# Patient Record
Sex: FEMALE | Race: WHITE | NOT HISPANIC OR LATINO | Employment: FULL TIME | URBAN - METROPOLITAN AREA
[De-identification: names, ages, dates, MRNs, and addresses within clinical notes are randomized per-mention and may not be internally consistent; named-entity substitution may affect disease eponyms.]

---

## 2017-01-15 ENCOUNTER — MYC REFILL (OUTPATIENT)
Dept: FAMILY MEDICINE | Facility: CLINIC | Age: 47
End: 2017-01-15

## 2017-01-15 DIAGNOSIS — M79.7 FIBROMYALGIA: ICD-10-CM

## 2017-01-16 ENCOUNTER — MYC REFILL (OUTPATIENT)
Dept: FAMILY MEDICINE | Facility: CLINIC | Age: 47
End: 2017-01-16

## 2017-01-16 DIAGNOSIS — E11.9 TYPE 2 DIABETES MELLITUS WITHOUT COMPLICATION (H): Primary | ICD-10-CM

## 2017-01-16 DIAGNOSIS — E11.9 TYPE 2 DIABETES MELLITUS WITHOUT COMPLICATION (H): ICD-10-CM

## 2017-01-16 RX ORDER — CYCLOBENZAPRINE HCL 10 MG
10 TABLET ORAL 3 TIMES DAILY PRN
Qty: 20 TABLET | Refills: 0 | Status: SHIPPED | OUTPATIENT
Start: 2017-01-16 | End: 2017-02-06

## 2017-01-16 NOTE — TELEPHONE ENCOUNTER
Message from [facility]:  Original authorizing provider: Irene Castellanos MD, MD Kaylee Lopez would like a refill of the following medications:  cyclobenzaprine (FLEXERIL) 10 MG tablet [Irene Castellanos MD, MD]    Preferred pharmacy: [facility] [id number] [place] [address] [facility]    Comment:

## 2017-01-16 NOTE — TELEPHONE ENCOUNTER
Flexeril    LRF 12/24/16, dispense 20  LOV 6/22/16    Routing refill request to provider for review/approval because:  Drug not on the [facility] refill protocol - looks like she was to f/u in the fall, did you want to see her for an appt?  She did have some lab work.

## 2017-01-16 NOTE — TELEPHONE ENCOUNTER
I called patient she is unable to make an appt right now will call back when she knows her schedule

## 2017-01-17 RX ORDER — LIRAGLUTIDE 6 MG/ML
1.2 INJECTION SUBCUTANEOUS DAILY
Qty: 6 ML | Refills: 1 | Status: SHIPPED | OUTPATIENT
Start: 2017-01-17 | End: 2017-02-27

## 2017-01-17 NOTE — TELEPHONE ENCOUNTER
Victoza         Last Written Prescription Date: 8/17/2016  Last Fill Quantity: 6nl, # refills: 2  Last Office Visit with [facility], [facility] or [facility] prescribing provider:  6/22/2016   Next 5 appointments (look out 90 days)     Feb 06, 2017  7:45 AM   Return Visit with Frederic Ag MD   [facility] [place] ([facility])    [address]  [facility] [address] [phone]            Feb 27, 2017  8:10 AM   Office Visit with Irene Castellanos MD   [facility] ([facility])    [address] [address] [phone]            Apr 01, 2017  9:45 AM   Screening Mammogram with CRMA1   [facility] ([facility])    [address] [facility] [id number] [phone]                   BP Readings from Last 3 Encounters:   06/22/16 122/80   05/17/16 133/84   05/03/16 142/86     MICROL       13   4/29/2016  No results found for this basename: microalbumin  CREATININE   Date Value Ref Range Status   08/22/2016 0.80 0.52 - 1.04 mg/dL Final   ]  GFR ESTIMATE   Date Value Ref Range Status   08/22/2016 78 >60 mL/min/1.7m2 Final     Comment:     Non  GFR Calc   03/22/2016 78 >60 mL/min/1.7m2 Final   10/30/2015 83 >60 mL/min/1.7m2 Final     Comment:     Non  GFR Calc     GFR ESTIMATE IF BLACK   Date Value Ref Range Status   08/22/2016 >90   GFR Calc   >60 mL/min/1.7m2 Final   03/22/2016 >90 >60 mL/min/1.7m2 Final   10/30/2015 >90   GFR Calc   >60 mL/min/1.7m2 Final     CHOL      112   8/22/2016  HDL       43   8/22/2016  LDL       46   8/22/2016  TRIG      115   8/22/2016  CHOLHDLRATIO      3.0   4/28/2015  AST        9   8/22/2016  ALT       21   8/22/2016  A1C      5.6   8/22/2016  A1C      7.6   4/29/2016  A1C      6.6   10/30/2015  A1C      6.6   4/28/2015  A1C      5.9   10/30/2014  POTASSIUM   Date  Value Ref Range Status   08/22/2016 4.2 3.4 - 5.3 mmol/L Final     Medication is being filled for 1 time refill only due to:  Patient needs to be seen because due for DM follow up. Pt. has OV scheduled for 2/27/2017.     Prescription approved per [facility] Refill Protocol.    Areli Patton RN, BSN, PHN

## 2017-01-17 NOTE — TELEPHONE ENCOUNTER
liraglutide (VICTOZA) 18 MG/3ML soln         Last Written Prescription Date: 8/17/16  Last Fill Quantity: 6ml, # refills: 2  Last Office Visit with [facility], [facility] or [facility] prescribing provider:  6/22/16   Next 5 appointments (look out 90 days)     Feb 06, 2017  7:45 AM   Return Visit with Frederic Ag MD   [facility] [place] ([facility])    [address]  [facility] [place] [address] [phone]            Feb 27, 2017  8:10 AM   Office Visit with Irene Castellanos MD   [facility]    [address] [address] [address] [phone]            Apr 01, 2017  9:45 AM   Screening Mammogram with CRMA1   [facility] ([facility])    [address] [facility] [address] [phone]                   BP Readings from Last 3 Encounters:   06/22/16 122/80   05/17/16 133/84   05/03/16 142/86     MICROL       13   4/29/2016  No results found for this basename: microalbumin  CREATININE   Date Value Ref Range Status   08/22/2016 0.80 0.52 - 1.04 mg/dL Final   ]  GFR ESTIMATE   Date Value Ref Range Status   08/22/2016 78 >60 mL/min/1.7m2 Final     Comment:     Non  GFR Calc   03/22/2016 78 >60 mL/min/1.7m2 Final   10/30/2015 83 >60 mL/min/1.7m2 Final     Comment:     Non  GFR Calc     GFR ESTIMATE IF BLACK   Date Value Ref Range Status   08/22/2016 >90   GFR Calc   >60 mL/min/1.7m2 Final   03/22/2016 >90 >60 mL/min/1.7m2 Final   10/30/2015 >90   GFR Calc   >60 mL/min/1.7m2 Final     CHOL      112   8/22/2016  HDL       43   8/22/2016  LDL       46   8/22/2016  TRIG      115   8/22/2016  CHOLHDLRATIO      3.0   4/28/2015  AST        9   8/22/2016  ALT       21   8/22/2016  A1C      5.6   8/22/2016  A1C      7.6   4/29/2016  A1C      6.6   10/30/2015  A1C      6.6   4/28/2015  A1C      5.9    10/30/2014  POTASSIUM   Date Value Ref Range Status   08/22/2016 4.2 3.4 - 5.3 mmol/L Final

## 2017-01-17 NOTE — TELEPHONE ENCOUNTER
Message from [employer]:  Original authorizing provider: Irene Castellanos MD, MD Kaylee Lopez would like a refill of the following medications:  liraglutide (VICTOZA) 18 MG/3ML soln [Irene Castellanos MD, MD]    Preferred pharmacy: [facility] [address] [facility] [address] [facility]    Comment:

## 2017-01-19 RX ORDER — LIRAGLUTIDE 6 MG/ML
1.2 INJECTION SUBCUTANEOUS DAILY
Qty: 6 ML | Refills: 2 | Status: SHIPPED | OUTPATIENT
Start: 2017-01-19 | End: 2017-06-12

## 2017-01-19 NOTE — TELEPHONE ENCOUNTER
Prescription approved per [facility] [place]ill Protocol.  Patient has appointment with Dr. ramos in February.  Linda Miles, RN  Triage Nurse

## 2017-01-20 DIAGNOSIS — M79.7 FIBROMYALGIA: ICD-10-CM

## 2017-01-20 RX ORDER — TRAZODONE HYDROCHLORIDE 50 MG/1
TABLET, FILM COATED ORAL
Qty: 45 TABLET | Refills: 5 | Status: SHIPPED | OUTPATIENT
Start: 2017-01-20 | End: 2017-07-12

## 2017-01-20 NOTE — TELEPHONE ENCOUNTER
traZODone (DESYREL) 50 MG       Last Written Prescription Date: 7/26/16  Last Fill Quantity: 45; # refills: 5  Last Office Visit with [facility], [facility] or [facility] prescribing provider:  6/22/16   Next 5 appointments (look out 90 days)     Feb 06, 2017  7:45 AM   Return Visit with Frederic Ag MD   [facility] [place] ([facility])    [address] [place] [address]   [phone]            Feb 27, 2017  8:10 AM   Office Visit with Irene Castellanos MD   [facility] ([facility])    [address] [address] [phone]            Apr 01, 2017  9:45 AM   Screening Mammogram with CRMA1   [facility]    [address] [address] [id number] [phone]                   Last PHQ-9 score on record=   PHQ-9 SCORE 12/13/2016   Total Score -   Total Score MyChart 1 (Minimal depression)   Total Score 1       AST        9   8/22/2016  ALT       21   8/22/2016

## 2017-01-20 NOTE — TELEPHONE ENCOUNTER
Prescription approved per [facility] Refill Protocol.  Linda Miles, RN  Triage Nurse

## 2017-01-25 ENCOUNTER — MYC REFILL (OUTPATIENT)
Dept: FAMILY MEDICINE | Facility: CLINIC | Age: 47
End: 2017-01-25

## 2017-01-25 DIAGNOSIS — M79.7 FIBROMYALGIA: ICD-10-CM

## 2017-01-26 RX ORDER — TRAMADOL HYDROCHLORIDE 50 MG/1
50-100 TABLET ORAL EVERY 8 HOURS PRN
Qty: 60 TABLET | Refills: 1 | Status: SHIPPED | OUTPATIENT
Start: 2017-01-26 | End: 2017-06-16

## 2017-01-26 NOTE — TELEPHONE ENCOUNTER
Routing refill request to provider for review/approval because:  Drug not on the [facility] refill protocol please sign if ok.  Linda Miles, ADA  Triage Nurse

## 2017-01-26 NOTE — TELEPHONE ENCOUNTER
Message from [employer]:  Original authorizing provider: Irene Castellanos MD, MD Kaylee Lopez would like a refill of the following medications:  traMADol (ULTRAM) 50 MG tablet [Irene Castellanos MD, MD]    Preferred pharmacy: [facility] [id number] [place] [address] [facility]    Comment:

## 2017-01-27 ENCOUNTER — MYC MEDICAL ADVICE (OUTPATIENT)
Dept: FAMILY MEDICINE | Facility: CLINIC | Age: 47
End: 2017-01-27

## 2017-02-03 ENCOUNTER — HOSPITAL ENCOUNTER (OUTPATIENT)
Dept: CARDIOLOGY | Facility: CLINIC | Age: 47
Discharge: HOME | End: 2017-02-03
Attending: INTERNAL MEDICINE | Admitting: INTERNAL MEDICINE
Payer: COMMERCIAL

## 2017-02-03 DIAGNOSIS — I27.20 PULMONARY HYPERTENSION (H): ICD-10-CM

## 2017-02-03 DIAGNOSIS — E78.5 HYPERLIPIDEMIA LDL GOAL <100: ICD-10-CM

## 2017-02-03 DIAGNOSIS — I07.9 DISEASE OF TRICUSPID VALVE: ICD-10-CM

## 2017-02-03 PROCEDURE — 25500064 ZZH RX 255 OP 636: Performed by: INTERNAL MEDICINE

## 2017-02-03 PROCEDURE — 93306 TTE W/DOPPLER COMPLETE: CPT | Mod: 26 | Performed by: INTERNAL MEDICINE

## 2017-02-03 PROCEDURE — 40000264 ECHO COMPLETE WITH OPTISON

## 2017-02-03 RX ADMIN — HUMAN ALBUMIN MICROSPHERES AND PERFLUTREN 3 ML: 10; .22 INJECTION, SOLUTION INTRAVENOUS at 15:32

## 2017-02-06 ENCOUNTER — MYC REFILL (OUTPATIENT)
Dept: FAMILY MEDICINE | Facility: CLINIC | Age: 47
End: 2017-02-06

## 2017-02-06 ENCOUNTER — OFFICE VISIT (OUTPATIENT)
Dept: CARDIOLOGY | Facility: CLINIC | Age: 47
End: 2017-02-06
Payer: COMMERCIAL

## 2017-02-06 VITALS
HEIGHT: 65 IN | BODY MASS INDEX: 37.99 KG/M2 | SYSTOLIC BLOOD PRESSURE: 110 MMHG | DIASTOLIC BLOOD PRESSURE: 74 MMHG | WEIGHT: 228 LBS | HEART RATE: 84 BPM

## 2017-02-06 DIAGNOSIS — I07.9 DISEASE OF TRICUSPID VALVE: ICD-10-CM

## 2017-02-06 DIAGNOSIS — I27.20 PULMONARY HYPERTENSION (H): Primary | ICD-10-CM

## 2017-02-06 DIAGNOSIS — E78.5 HYPERLIPIDEMIA LDL GOAL <100: ICD-10-CM

## 2017-02-06 DIAGNOSIS — M79.7 FIBROMYALGIA: ICD-10-CM

## 2017-02-06 PROCEDURE — 99214 OFFICE O/P EST MOD 30 MIN: CPT | Performed by: INTERNAL MEDICINE

## 2017-02-06 RX ORDER — CYCLOBENZAPRINE HCL 10 MG
10 TABLET ORAL 3 TIMES DAILY PRN
Qty: 20 TABLET | Refills: 0 | Status: SHIPPED | OUTPATIENT
Start: 2017-02-06 | End: 2017-02-27

## 2017-02-06 NOTE — PROGRESS NOTES
2017             Irene Castellanos MD    [facility]   [address] [place] [address]       RE:    Kaylee Lopez   MRN:  [id number]   :  1970      Dear Dr. Castellanos:      It was my pleasure to see your patient, Kaylee Lopez, in followup.  As you know, this is a 46-year-old patient with a history of pulmonary hypertension and tricuspid regurgitation.  This patient in the past has actually had a right heart cath.  This was on 2011 and her pulmonary pressures were normal at that particular time, but certainly echocardiography we have found raised pulmonary pressures and tricuspid regurgitation.        A few days ago, echocardiography showed that her degree of tricuspid regurgitation was moderate (2+).  Her pulmonary hypertension with mild to moderate at 37.9 mmHg plus right atrial pressure.  Her left ventricular systolic function is normal.  Her left ventricular filling pressures also appeared to be normal as to E to E prime velocity is less than 8.        She feels well.  She has no shortness of breath, orthopnea or PND.  In the past, she has complained of palpitations, but she does not have any palpitations at present unless she drinks caffeine.  Her systemic blood pressure is excellent today is 110/74.  Her lipids are excellent on 10 mg of atorvastatin with an LDL of 46, HDL 43 and triglycerides of 115.  He liver function tests are normal at 21.      Kaylee, as you know, is morbidly obese, based upon a BMI of 38.61.  She is a diabetic, taking metformin and Victoza.      IMPRESSION:   1.  Moderate tricuspid regurgitation.  The degree of tricuspid regurgitation appears to vary with her pulmonary pressures.   2.  Mild to moderate pulmonary hypertension.   3.  Morbid obesity.   4.  Diabetes mellitus.   5.  Excellent lipid profile with the exception of mildly reduced HDL.      PLAN:  We will continue the patient on her good present medications.  I will see her back again in 1 year  and I have encouraged her to continue to lose weight.      It has been a pleasure to be involved in the care of this very nice patient.      Sincerely,         MD PIPO Gastelum MD, [facility]             D: 2017 08:01   T: 2017 11:31   MT: JACK      Name:     VANCE CONNER   MRN:      [id number]        Account:      [id number]   :      1970           Service Date: 2017      Document: [id number]

## 2017-02-06 NOTE — PROGRESS NOTES
HPI and Plan:   See dictation    Orders Placed This Encounter   Procedures     Follow-Up with Cardiologist     Echocardiogram       No orders of the defined types were placed in this encounter.       There are no discontinued medications.      Encounter Diagnoses   Name Primary?     Pulmonary hypertension (H) Yes     Disease of tricuspid valve      Hyperlipidemia LDL goal <100        CURRENT MEDICATIONS:  Current Outpatient Prescriptions   Medication Sig Dispense Refill     traMADol (ULTRAM) 50 MG tablet Take 1-2 tablets ( mg) by mouth every 8 hours as needed for pain not to exceed 8 tablets a day; max 60 per month 60 tablet 1     traZODone (DESYREL) 50 MG tablet Take  by mouth. Take 1.5 tablets before bedtime 45 tablet 5     liraglutide (VICTOZA) 18 MG/3ML soln Inject 1.2 mg Subcutaneous daily Start at 0.6mg once daily for 1 week and then increase to 1.2mg. 6 mL 2     liraglutide (VICTOZA) 18 MG/3ML soln Inject 1.2 mg Subcutaneous daily Start at 0.6mg once daily for 1 week and then increase to 1.2mg. 6 mL 1     cyclobenzaprine (FLEXERIL) 10 MG tablet Take 1 tablet (10 mg) by mouth 3 times daily as needed for muscle spasms 20 tablet 0     metFORMIN (GLUCOPHAGE-XR) 500 MG 24 hr tablet Take 4 tablets (2,000 mg) by mouth daily (with breakfast) 120 tablet 2     atorvastatin (LIPITOR) 10 MG tablet Take 1 tablet (10 mg) by mouth daily 90 tablet 1     citalopram (CELEXA) 40 MG tablet Take 1 tablet (40 mg) by mouth daily 90 tablet 0     cetirizine (ZYRTEC) 10 MG tablet Take 10 mg by mouth daily as needed for allergies       insulin pen needle (B-D U/F) 31G X 5 MM Use 1 pen needles daily or as directed. 31 G x 5mm 100 each 3     blood glucose monitoring (ACCU-CHEK SARAH) test strip Use to test blood sugars three times daily or as directed. 100 each 2     Cholecalciferol (VITAMIN D3) 3000 UNITS TABS        blood glucose calibration (NO BRAND SPECIFIED) solution Use to calibrate blood glucose monitor as directed. 1 each  0     mometasone (NASONEX) 50 MCG/ACT nasal spray Spray 2 sprays into both nostrils daily 1 Box 6     Blood Glucose Monitoring Suppl MISC In vitro 1 each 3     ACE NOT PRESCRIBED, INTENTIONAL, 1 each ACE Inhibitor not prescribed due to Other: not indicated 0 each 0     ASPIRIN NOT PRESCRIBED, INTENTIONAL, 1 each continuous prn for other Antiplatelet medication not prescribed intentionally due to not age appropriate 0 each 0       ALLERGIES     Allergies   Allergen Reactions     Codeine Nausea     nausea       PAST MEDICAL HISTORY:  Past Medical History   Diagnosis Date     TRICUSPID VALVE DISEASE (regurg)      sees Cardiology      Abnormal Papanicolaou smear of cervix and cervical HPV 2003     late 2003, early 2004; treated with TCA     Inflammatory arthritis      ?; has been discharged from Rheumatology     Fibromyalgia      Pulmonary hypertension (H) 5/2/2010     on ECHO, but normal right heart cath 2011     Narcolepsy 11/1/2010     doing well without medication.     Diabetes mellitus      Depression      Reflex sympathetic dystrophy      right foot ; related to stress fracture     Obesity        PAST SURGICAL HISTORY:  Past Surgical History   Procedure Laterality Date     Colonoscopy  1/2008     normal     Laparoscopic cholecystectomy  7/2000     Tca for abnormal pap  2004     Angiogram  3/2011     No pulmonary hypertension     Surgical history of -        essure procedure for contraception       FAMILY HISTORY:  Family History   Problem Relation Age of Onset     Respiratory Father      asthma     DIABETES Paternal Grandfather      CEREBROVASCULAR DISEASE Paternal Grandfather      Cancer - colorectal Paternal Grandmother      Prostate Cancer Maternal Grandfather      CANCER Maternal Grandmother      pancreatic     Arthritis Father      hips     Arthritis Paternal Grandmother      hips     Arthritis Maternal Grandmother      hips     Depression Mother      Depression Father      Depression Maternal Grandmother       Depression Maternal Uncle      bipolar     Alcohol/Drug Father      Respiratory Brother      sleep apnea     Cancer - colorectal Maternal Uncle      Rashes/Skin Problems Mother      Rosacea     Arthritis Mother      osteoarthritis in hands and knees     CEREBROVASCULAR DISEASE Maternal Uncle       of massive stroke--no heart problems     Hypertension Son      Hypertension Mother      Hypertension Maternal Grandmother      Hypertension Maternal Grandfather      Hypertension Paternal Grandfather      Colon Cancer Paternal Grandmother      Other Cancer Maternal Grandmother      Gallbladder Disease Maternal Grandmother        SOCIAL HISTORY:  Social History     Social History     Marital Status:      Spouse Name: N/A     Number of Children: 2     Years of Education: 13+     Occupational History     ; new job starting        None      Social History Main Topics     Smoking status: Never Smoker      Smokeless tobacco: Never Used     Alcohol Use: 0.0 oz/week     0 Standard drinks or equivalent per week      Comment: rare     Drug Use: No     Sexual Activity:     Partners: Male      Comment: essure procedure     Other Topics Concern     Caffeine Concern No     soda- 1 a day     Special Diet No     watches; fruits and veggies throughout the day.      Exercise No     working with fibromyalgia nurses; anticipating more.      Parent/Sibling W/ Cabg, Mi Or Angioplasty Before 65f 55m? No     Social History Narrative       Review of Systems:  Skin:  Negative       Eyes:  Positive for glasses    ENT:  Negative      Respiratory:  Negative       Cardiovascular:    Positive for;palpitations    Gastroenterology: Negative      Genitourinary:  Negative      Musculoskeletal:  Positive for fibromyalgia;arthritis inflammatory arthritis  Neurologic:  Positive for migraine headaches    Psychiatric:  Positive for depression    Heme/Lymph/Imm:  Positive for allergies (seasonal)    Endocrine:  Positive  "for diabetes;night sweats      Physical Exam:  Vitals: /74 mmHg  Pulse 84  Ht 1.638 m (5' 4.5\")  Wt 103.42 kg (228 lb)  BMI 38.55 kg/m2    Constitutional:  cooperative, alert and oriented, well developed, well nourished, in no acute distress morbidly obese      Skin:  warm and dry to the touch, no apparent skin lesions or masses noted        Head:  normocephalic, no masses or lesions        Eyes:  pupils equal and round, conjunctivae and lids unremarkable, sclera white, no xanthalasma, EOMS intact, no nystagmus        ENT:  no pallor or cyanosis, dentition good        Neck:  carotid pulses are full and equal bilaterally, JVP normal, no carotid bruit, no thyromegaly        Chest:  normal breath sounds, clear to auscultation, normal A-P diameter, normal symmetry, normal respiratory excursion, no use of accessory muscles          Cardiac: regular rhythm, normal S1/S2, no S3 or S4, apical impulse not displaced, no murmurs, gallops or rubs                  Abdomen:  abdomen soft, non-tender, BS normoactive, no mass, no HSM, no bruits        Vascular: pulses full and equal, no bruits auscultated                                        Extremities and Back:  no deformities, clubbing, cyanosis, erythema observed;no edema              Neurological:  affect appropriate, oriented to time, person and place;no gross motor deficits              CC  Irene Castellanos MD  [facility]  [address] [place] [address]                "

## 2017-02-06 NOTE — TELEPHONE ENCOUNTER
Routing refill request to provider for review/approval because:  Drug not on the [facility] refill protocol. Last refill 1/16 for 20)  Appointment with Dr. Castellanos on 2/27 (recommended at last refill request) Please sign in PCP absence.  Linda Miles, RN  Triage Nurse

## 2017-02-06 NOTE — TELEPHONE ENCOUNTER
Message from [employer]:  Original authorizing provider: Irene Castellanos MD, MD Kaylee Lopez would like a refill of the following medications:  cyclobenzaprine (FLEXERIL) 10 MG tablet [Irene Castellanos MD, MD]    Preferred pharmacy: [facility] [id number] [place] [address] [facility]    Comment:

## 2017-02-10 ENCOUNTER — TRANSFERRED RECORDS (OUTPATIENT)
Dept: HEALTH INFORMATION MANAGEMENT | Facility: CLINIC | Age: 47
End: 2017-02-10

## 2017-02-16 ENCOUNTER — OFFICE VISIT (OUTPATIENT)
Dept: FAMILY MEDICINE | Facility: CLINIC | Age: 47
End: 2017-02-16
Payer: COMMERCIAL

## 2017-02-16 VITALS
WEIGHT: 225.7 LBS | DIASTOLIC BLOOD PRESSURE: 80 MMHG | HEIGHT: 65 IN | OXYGEN SATURATION: 98 % | HEART RATE: 90 BPM | RESPIRATION RATE: 16 BRPM | TEMPERATURE: 98.4 F | SYSTOLIC BLOOD PRESSURE: 122 MMHG | BODY MASS INDEX: 37.61 KG/M2

## 2017-02-16 DIAGNOSIS — G89.29 OTHER CHRONIC PAIN: ICD-10-CM

## 2017-02-16 DIAGNOSIS — E11.9 TYPE 2 DIABETES MELLITUS WITHOUT COMPLICATION, WITHOUT LONG-TERM CURRENT USE OF INSULIN (H): ICD-10-CM

## 2017-02-16 DIAGNOSIS — Z23 NEED FOR PROPHYLACTIC VACCINATION AND INOCULATION AGAINST INFLUENZA: ICD-10-CM

## 2017-02-16 DIAGNOSIS — M79.7 FIBROMYALGIA: Primary | ICD-10-CM

## 2017-02-16 PROCEDURE — 99213 OFFICE O/P EST LOW 20 MIN: CPT | Mod: 25 | Performed by: FAMILY MEDICINE

## 2017-02-16 PROCEDURE — 90471 IMMUNIZATION ADMIN: CPT | Performed by: FAMILY MEDICINE

## 2017-02-16 PROCEDURE — 90686 IIV4 VACC NO PRSV 0.5 ML IM: CPT | Performed by: FAMILY MEDICINE

## 2017-02-16 NOTE — PROGRESS NOTES
Injectable Influenza Immunization Documentation    1.  Is the person to be vaccinated sick today?  No    2. Does the person to be vaccinated have an allergy to eggs or to a component of the vaccine?  No    3. Has the person to be vaccinated today ever had a serious reaction to influenza vaccine in the past?  No    4. Has the person to be vaccinated ever had Guillain-[place] syndrome?  No     Form completed by Maggie Beasley CMA

## 2017-02-16 NOTE — PROGRESS NOTES
SUBJECTIVE:                                                    Kaylee Lopez is a 46 year old female who presents to clinic today for the following health issues:      Here to have FMLA forms filled out and signed.        Patient Active Problem List   Diagnosis     Fibromyalgia     Disease of tricuspid valve     Papanicolaou smear of cervix with atypical squamous cells cannot exclude high grade squamous intraepithelial lesion (ASC-H)     Major depression in complete remission (H)     Narcolepsy     Hyperlipidemia LDL goal <100     Health Care Home     Reflex sympathetic dystrophy     Contraception     Elevated BMI     Chronic pain-Fibromyalgia     Type 2 diabetes mellitus without complication (H)     Sprain of right ankle, unspecified ligament, initial encounter     Migraine with aura and without status migrainosus, not intractable     Elevated blood pressure reading without diagnosis of hypertension       Current Outpatient Prescriptions   Medication Sig Dispense Refill     cyclobenzaprine (FLEXERIL) 10 MG tablet Take 1 tablet (10 mg) by mouth 3 times daily as needed for muscle spasms 20 tablet 0     traMADol (ULTRAM) 50 MG tablet Take 1-2 tablets ( mg) by mouth every 8 hours as needed for pain not to exceed 8 tablets a day; max 60 per month 60 tablet 1     traZODone (DESYREL) 50 MG tablet Take  by mouth. Take 1.5 tablets before bedtime 45 tablet 5     liraglutide (VICTOZA) 18 MG/3ML soln Inject 1.2 mg Subcutaneous daily Start at 0.6mg once daily for 1 week and then increase to 1.2mg. 6 mL 2     liraglutide (VICTOZA) 18 MG/3ML soln Inject 1.2 mg Subcutaneous daily Start at 0.6mg once daily for 1 week and then increase to 1.2mg. 6 mL 1     metFORMIN (GLUCOPHAGE-XR) 500 MG 24 hr tablet Take 4 tablets (2,000 mg) by mouth daily (with breakfast) 120 tablet 2     atorvastatin (LIPITOR) 10 MG tablet Take 1 tablet (10 mg) by mouth daily 90 tablet 1     citalopram (CELEXA) 40 MG tablet Take 1 tablet (40 mg) by  "mouth daily 90 tablet 0     cetirizine (ZYRTEC) 10 MG tablet Take 10 mg by mouth daily as needed for allergies       insulin pen needle (B-D U/F) 31G X 5 MM Use 1 pen needles daily or as directed. 31 G x 5mm 100 each 3     blood glucose monitoring (ACCU-CHEK SARAH) test strip Use to test blood sugars three times daily or as directed. 100 each 2     Cholecalciferol (VITAMIN D3) 3000 UNITS TABS        blood glucose calibration (NO BRAND SPECIFIED) solution Use to calibrate blood glucose monitor as directed. 1 each 0     mometasone (NASONEX) 50 MCG/ACT nasal spray Spray 2 sprays into both nostrils daily 1 Box 6     Blood Glucose Monitoring Suppl MISC In vitro 1 each 3     ACE NOT PRESCRIBED, INTENTIONAL, 1 each ACE Inhibitor not prescribed due to Other: not indicated 0 each 0     ASPIRIN NOT PRESCRIBED, INTENTIONAL, 1 each continuous prn for other Antiplatelet medication not prescribed intentionally due to not age appropriate 0 each 0       ROS:  CONSTITUTIONAL:She has lost some weight.  MUSCULOSKELETAL: She does report a recent flare of her fibromyalgia. See below.  PSYCHIATRIC: NEGATIVE for changes in mood or affect    Here to complete [place] paperwork. Notes she may need some adjusting; just recently missed 4 days in a row.     Had a recent flare that was worse than she has had in some time.   She notes that she missed 4 days and some hours with getting in late due to using cane etc. Almost came in to ask for lyrica, but glad she didn't. She now recalls the cognitive effects she had with that.    She notes that otherwise this 6 month certification, she had done well. She did not miss much if any. The previous 6 months, she missed hours here and there.     Notes difficult to predict.     OBJECTIVE:                                                    /80 (BP Location: Right arm, Patient Position: Chair, Cuff Size: Adult Large)  Pulse 90  Temp 98.4  F (36.9  C) (Oral)  Resp 16  Ht 5' 4.5\" (1.638 m)  Wt 225 lb 11.2 " oz (102.4 kg)  SpO2 98%  BMI 38.14 kg/m2  Body mass index is 38.14 kg/(m^2).  GENERAL APPEARANCE: alert and no distress  CV: regular rates and rhythm  PSYCH: mentation appears normal and affect normal/bright    Reviewed her refills of tramadol.           ASSESSMENT/PLAN:                                                      Fibromyalgia  She does have FMLA. Have completed paperwork for this again. Will scan into chart.  She overall manages well. Reports recent flare as above.     Type 2 diabetes mellitus without complication, without long-term current use of insulin (H)  She will return for lab and appointment after that.  - Hemoglobin A1c; Future  - TSH with free T4 reflex; Future    Other chronic pain  She is using tramadol prn    Need for prophylactic vaccination and inoculation against influenza    - FLU VAC, SPLIT VIRUS IM > 3 YO (QUADRIVALENT) [06246]  - Vaccine Administration, Initial [67642]    Patient Instructions   See you in a week!          Irene Castellanos MD, MD  [facility]

## 2017-02-16 NOTE — NURSING NOTE
"Chief Complaint   Patient presents with     Forms     FMLA       Initial /80 (BP Location: Right arm, Patient Position: Chair, Cuff Size: Adult Large)  Pulse 90  Temp 98.4  F (36.9  C) (Oral)  Resp 16  Ht 5' 4.5\" (1.638 m)  Wt 225 lb 11.2 oz (102.4 kg)  SpO2 98%  BMI 38.14 kg/m2 Estimated body mass index is 38.14 kg/(m^2) as calculated from the following:    Height as of this encounter: 5' 4.5\" (1.638 m).    Weight as of this encounter: 225 lb 11.2 oz (102.4 kg).  Medication Reconciliation: complete   Maggie Beasley, WILLA      "

## 2017-02-16 NOTE — MR AVS SNAPSHOT
After Visit Summary   2/16/2017    Kaylee Lopez    MRN: [id number]           Patient Information     Date Of Birth          1970        Visit Information        Provider Department      2/16/2017 8:10 AM Irene Castellanos MD [facility]        Care Instructions    See you in a week!            Follow-ups after your visit        Your next 10 appointments already scheduled     Feb 27, 2017  8:10 AM CST   Office Visit with Irene Castellanos MD   [facility] ([facility])    [address] [facility] [address] [phone]           Bring a current list of meds and any records pertaining to this visit.  For Physicals, please bring immunization records and any forms needing to be filled out.  Please arrive 10 minutes early to complete paperwork.            Apr 01, 2017  9:45 AM CDT   Screening Mammogram with CRMA1   [facility] ([facility]    [address] [place] [address] [phone]           Do NOT use body powder, lotions, perfume or deodorant the day of the exam.      If your last mammogram was not done at [facility], please bring your mammogram films. We will need the name of your provider to send a copy of your report.        A mammogram may be covered on an annual or biannual basis, please check with your insurance company.               Future tests that were ordered for you today     Open Future Orders        Priority Expected Expires Ordered    MA Screening Digital Bilateral Routine  2/15/2018 2/15/2017            Who to contact     If you have questions or need follow up information about today's clinic visit or your schedule please contact [facility] directly at [phone].  Normal or non-critical lab and imaging results will be communicated to you by MyChart, letter or phone within 4 business days after the clinic has received the results. If you do not  "hear from us within 7 days, please contact the clinic through MongoHQ or phone. If you have a critical or abnormal lab result, we will notify you by phone as soon as possible.  Submit refill requests through MongoHQ or call your pharmacy and they will forward the refill request to us. Please allow 3 business days for your refill to be completed.          Additional Information About Your Visit        JobbrharLocate Special Diet Information     MongoHQ gives you secure access to your electronic health record. If you see a primary care provider, you can also send messages to your care team and make appointments. If you have questions, please call your primary care clinic.  If you do not have a primary care provider, please call [phone] and they will assist you.        Care EveryWhere ID     This is your Care EveryWhere ID. This could be used by other organizations to access your [place] medical records  [id number]        Your Vitals Were     Pulse Temperature Respirations Height Pulse Oximetry BMI (Body Mass Index)    90 98.4  F (36.9  C) (Oral) 16 5' 4.5\" (1.638 m) 98% 38.14 kg/m2       Blood Pressure from Last 3 Encounters:   02/16/17 122/80   02/06/17 110/74   06/22/16 122/80    Weight from Last 3 Encounters:   02/16/17 225 lb 11.2 oz (102.4 kg)   02/06/17 228 lb (103.4 kg)   06/22/16 240 lb (108.9 kg)              Today, you had the following     No orders found for display       Primary Care Provider Office Phone # Fax #    Irene Castellanos [phone]       [facility] [address] [facility] [address]        Thank you!     Thank you for choosing [facility]  for your care. Our goal is always to provide you with excellent care. Hearing back from our patients is one way we can continue to improve our services. Please take a few minutes to complete the written survey that you may receive in the mail after your visit with us. Thank you!             Your Updated Medication List - " Protect others around you: Learn how to safely use, store and throw away your medicines at www.disposemymeds.org.          This list is accurate as of: 2/16/17  8:41 AM.  Always use your most recent med list.                   Brand Name Dispense Instructions for use    * ACE NOT PRESCRIBED (INTENTIONAL)     0 each    1 each ACE Inhibitor not prescribed due to Other: not indicated       * ASPIRIN NOT PRESCRIBED    INTENTIONAL    0 each    1 each continuous prn for other Antiplatelet medication not prescribed intentionally due to not age appropriate       atorvastatin 10 MG tablet    LIPITOR    90 tablet    Take 1 tablet (10 mg) by mouth daily       blood glucose calibration solution    no brand specified    1 each    Use to calibrate blood glucose monitor as directed.       Blood Glucose Monitoring Suppl Misc     1 each    In vitro       blood glucose monitoring test strip    ACCU-CHEK SARAH    100 each    Use to test blood sugars three times daily or as directed.       cetirizine 10 MG tablet    zyrTEC     Take 10 mg by mouth daily as needed for allergies       citalopram 40 MG tablet    celeXA    90 tablet    Take 1 tablet (40 mg) by mouth daily       cyclobenzaprine 10 MG tablet    FLEXERIL    20 tablet    Take 1 tablet (10 mg) by mouth 3 times daily as needed for muscle spasms       insulin pen needle 31G X 5 MM    B-D U/F    100 each    Use 1 pen needles daily or as directed. 31 G x 5mm       * liraglutide 18 MG/3ML soln    VICTOZA    6 mL    Inject 1.2 mg Subcutaneous daily Start at 0.6mg once daily for 1 week and then increase to 1.2mg.       * liraglutide 18 MG/3ML soln    VICTOZA    6 mL    Inject 1.2 mg Subcutaneous daily Start at 0.6mg once daily for 1 week and then increase to 1.2mg.       metFORMIN 500 MG 24 hr tablet    GLUCOPHAGE-XR    120 tablet    Take 4 tablets (2,000 mg) by mouth daily (with breakfast)       mometasone 50 MCG/ACT spray    NASONEX    1 Box    Spray 2 sprays into both nostrils daily        traMADol 50 MG tablet    ULTRAM    60 tablet    Take 1-2 tablets ( mg) by mouth every 8 hours as needed for pain not to exceed 8 tablets a day; max 60 per month       traZODone 50 MG tablet    DESYREL    45 tablet    Take  by mouth. Take 1.5 tablets before bedtime       Vitamin D3 3000 UNITS Tabs          * Notice:  This list has 4 medication(s) that are the same as other medications prescribed for you. Read the directions carefully, and ask your doctor or other care provider to review them with you.

## 2017-02-21 DIAGNOSIS — F32.5 MAJOR DEPRESSION IN COMPLETE REMISSION (H): ICD-10-CM

## 2017-02-21 NOTE — TELEPHONE ENCOUNTER
citalopram (CELEXA) 40 MG     Last Written Prescription Date: 11/22/16  Last Fill Quantity: 90, # refills: 0  Last Office Visit with [facility] primary care provider:  2/16/17   Next 5 appointments (look out 90 days)     Feb 27, 2017  8:10 AM CST   Office Visit with Irene Castellanos MD   [facility] ([facility])    [address] [place] [address]   [phone]            Apr 01, 2017  9:45 AM CDT   Screening Mammogram with CRMA1   [facility] ([facility])    [id number] [place] [address] [phone]                   Last PHQ-9 score on record=   PHQ-9 SCORE 12/13/2016   Total Score MyChart 1 (Minimal depression)   Total Score 1

## 2017-02-22 ENCOUNTER — MYC MEDICAL ADVICE (OUTPATIENT)
Dept: FAMILY MEDICINE | Facility: CLINIC | Age: 47
End: 2017-02-22

## 2017-02-22 NOTE — TELEPHONE ENCOUNTER
Patient called and wanted to add to the letter that she would to add that she should be able to work from home during inclement weather because driving for an extended amount of time and the stopping and going can cause a flare up of her fibromyalgia.  She is hoping to be the letter before Friday due to the inclement weather we are expecting.    Jeane TURNER    [facility]

## 2017-02-22 NOTE — LETTER
[facility]  [address] [facility] [address] [phone]          February 22, 2017        Re:  Kaylee Lopez                                                                                                                     [address] [facility] [address]            To Whom it May Concern,    Kaylee has fibromyalgia.  I am requesting that she be able to work from home during flare ups and when there is inclement weather.   It is more difficult for her to drive for a prolonged time in difficult traffic which can cause elevated pain and muscle spasms, leading to a flare of her fibromyalgia.    Sincerely,         Irene Castellanos MD

## 2017-02-23 RX ORDER — CITALOPRAM HYDROBROMIDE 40 MG/1
40 TABLET ORAL DAILY
Qty: 90 TABLET | Refills: 1 | Status: SHIPPED | OUTPATIENT
Start: 2017-02-23 | End: 2017-07-20

## 2017-02-23 NOTE — TELEPHONE ENCOUNTER
Prescription approved per [facility] Refill Protocol.  Linda Miles, RN  Triage Nurse

## 2017-02-27 ENCOUNTER — OFFICE VISIT (OUTPATIENT)
Dept: FAMILY MEDICINE | Facility: CLINIC | Age: 47
End: 2017-02-27
Payer: COMMERCIAL

## 2017-02-27 VITALS
TEMPERATURE: 98.5 F | WEIGHT: 226.9 LBS | SYSTOLIC BLOOD PRESSURE: 122 MMHG | HEIGHT: 65 IN | HEART RATE: 90 BPM | BODY MASS INDEX: 37.8 KG/M2 | DIASTOLIC BLOOD PRESSURE: 80 MMHG | OXYGEN SATURATION: 98 %

## 2017-02-27 DIAGNOSIS — M79.7 FIBROMYALGIA: ICD-10-CM

## 2017-02-27 DIAGNOSIS — F32.5 MAJOR DEPRESSION IN COMPLETE REMISSION (H): ICD-10-CM

## 2017-02-27 DIAGNOSIS — E66.01 MORBID OBESITY, UNSPECIFIED OBESITY TYPE (H): ICD-10-CM

## 2017-02-27 DIAGNOSIS — E11.9 TYPE 2 DIABETES MELLITUS WITHOUT COMPLICATION, WITHOUT LONG-TERM CURRENT USE OF INSULIN (H): Primary | ICD-10-CM

## 2017-02-27 DIAGNOSIS — I27.20 PULMONARY HYPERTENSION (H): ICD-10-CM

## 2017-02-27 DIAGNOSIS — E78.5 HYPERLIPIDEMIA LDL GOAL <100: ICD-10-CM

## 2017-02-27 DIAGNOSIS — I36.1 NON-RHEUMATIC TRICUSPID VALVE INSUFFICIENCY: ICD-10-CM

## 2017-02-27 LAB
HBA1C MFR BLD: 5.5 % (ref 4.3–6)
TSH SERPL DL<=0.005 MIU/L-ACNC: 2.66 MU/L (ref 0.4–4)

## 2017-02-27 PROCEDURE — 99214 OFFICE O/P EST MOD 30 MIN: CPT | Performed by: FAMILY MEDICINE

## 2017-02-27 PROCEDURE — 83036 HEMOGLOBIN GLYCOSYLATED A1C: CPT | Performed by: FAMILY MEDICINE

## 2017-02-27 PROCEDURE — 84443 ASSAY THYROID STIM HORMONE: CPT | Performed by: FAMILY MEDICINE

## 2017-02-27 PROCEDURE — 36415 COLL VENOUS BLD VENIPUNCTURE: CPT | Performed by: FAMILY MEDICINE

## 2017-02-27 RX ORDER — CYCLOBENZAPRINE HCL 10 MG
10 TABLET ORAL 3 TIMES DAILY PRN
Qty: 90 TABLET | Refills: 1 | Status: SHIPPED | OUTPATIENT
Start: 2017-02-27 | End: 2017-06-17

## 2017-02-27 RX ORDER — METFORMIN HCL 500 MG
2000 TABLET, EXTENDED RELEASE 24 HR ORAL
Qty: 360 TABLET | Refills: 1 | Status: SHIPPED | OUTPATIENT
Start: 2017-02-27 | End: 2017-10-11

## 2017-02-27 NOTE — PROGRESS NOTES
SUBJECTIVE:                                                    Kaylee Lopez is a 47 year old female who presents to clinic today for the following health issues:      Diabetes Follow-up    Patient is checking blood sugars: three times daily.   Blood sugar testing frequency justification: previously due to adjusting medications; will decrease to once daily  Results are as follows:         am -               postprandial after lunch- 120-133             bedtime - 130-133    Diabetic concerns: None     Symptoms of hypoglycemia (low blood sugar): none     Paresthesias (numbness or burning in feet) or sores: No     Date of last diabetic eye exam: 02/10/2017     Depression Followup    Status since last visit: Stable     See PHQ-9 for current symptoms.  Other associated symptoms: None    Complicating factors:   Significant life event:  No   Current substance abuse:  None  Anxiety or Panic symptoms:  No    PHQ-9  English PHQ-9   Any Language            Amount of exercise or physical activity: None    Problems taking medications regularly: No    Medication side effects: none  Diet: regular (no restrictions)      See under ros    Patient Active Problem List   Diagnosis     Fibromyalgia     Disease of tricuspid valve     Papanicolaou smear of cervix with atypical squamous cells cannot exclude high grade squamous intraepithelial lesion (ASC-H)     Major depression in complete remission (H)     Narcolepsy     Hyperlipidemia LDL goal <100     Health Care Home     Reflex sympathetic dystrophy     Contraception     Elevated BMI     Chronic pain-Fibromyalgia     Type 2 diabetes mellitus without complication (H)     Sprain of right ankle, unspecified ligament, initial encounter     Migraine with aura and without status migrainosus, not intractable     Elevated blood pressure reading without diagnosis of hypertension       Current Outpatient Prescriptions   Medication Sig Dispense Refill     citalopram (CELEXA) 40 MG  tablet Take 1 tablet (40 mg) by mouth daily 90 tablet 1     atorvastatin (LIPITOR) 10 MG tablet Take 1 tablet (10 mg) by mouth daily 90 tablet 1     cetirizine (ZYRTEC) 10 MG tablet Take 10 mg by mouth daily as needed for allergies       blood glucose monitoring (ACCU-CHEK SARAH) test strip Use to test blood sugars three times daily or as directed. 100 each 2     Cholecalciferol (VITAMIN D3) 3000 UNITS TABS        blood glucose calibration (NO BRAND SPECIFIED) solution Use to calibrate blood glucose monitor as directed. 1 each 0     Blood Glucose Monitoring Suppl MISC In vitro 1 each 3     ACE NOT PRESCRIBED, INTENTIONAL, 1 each ACE Inhibitor not prescribed due to Other: not indicated 0 each 0     ASPIRIN NOT PRESCRIBED, INTENTIONAL, 1 each continuous prn for other Antiplatelet medication not prescribed intentionally due to not age appropriate 0 each 0     cyclobenzaprine (FLEXERIL) 10 MG tablet Take 1 tablet (10 mg) by mouth 3 times daily as needed for muscle spasms 20 tablet 0     traMADol (ULTRAM) 50 MG tablet Take 1-2 tablets ( mg) by mouth every 8 hours as needed for pain not to exceed 8 tablets a day; max 60 per month 60 tablet 1     traZODone (DESYREL) 50 MG tablet Take  by mouth. Take 1.5 tablets before bedtime 45 tablet 5     liraglutide (VICTOZA) 18 MG/3ML soln Inject 1.2 mg Subcutaneous daily Start at 0.6mg once daily for 1 week and then increase to 1.2mg. 6 mL 2     liraglutide (VICTOZA) 18 MG/3ML soln Inject 1.2 mg Subcutaneous daily Start at 0.6mg once daily for 1 week and then increase to 1.2mg. 6 mL 1     metFORMIN (GLUCOPHAGE-XR) 500 MG 24 hr tablet Take 4 tablets (2,000 mg) by mouth daily (with breakfast) 120 tablet 2     insulin pen needle (B-D U/F) 31G X 5 MM Use 1 pen needles daily or as directed. 31 G x 5mm 100 each 3     mometasone (NASONEX) 50 MCG/ACT nasal spray Spray 2 sprays into both nostrils daily 1 Box 6     ROS:  CONSTITUTIONAL:NEGATIVE for fever, chills, change in weight x  "recent gain, but overall some loss.  RESP:NEGATIVE for significant cough or SOB  CV: NEGATIVE for chest pain, palpitations or peripheral edema  PSYCHIATRIC: NEGATIVE for changes in mood or affect    Some discomfort in legs p going to the mall. Some discomfort since fibro flare; will build up slowly.    She denies any hypoglycemia.    OBJECTIVE:                                                    /80 (BP Location: Right arm, Patient Position: Chair, Cuff Size: Adult Large)  Pulse 90  Temp 98.5  F (36.9  C) (Oral)  Ht 5' 4.5\" (1.638 m)  Wt 226 lb 14.4 oz (102.9 kg)  SpO2 98%  BMI 38.35 kg/m2  Body mass index is 38.35 kg/(m^2).  GENERAL APPEARANCE: alert and no distress  RESP: lungs clear to auscultation - no rales, rhonchi or wheezes  CV: regular rates and rhythm  MS: extremities normal- no gross deformities noted  PSYCH: mentation appears normal and affect normal/bright    Lab Results   Component Value Date    A1C 5.5 02/27/2017    A1C 5.6 08/22/2016    A1C 7.6 04/29/2016    A1C 6.6 10/30/2015    A1C 6.6 04/28/2015       Reviewed Cardiology note.    PHQ-9 SCORE 3/2/2016 5/3/2016 12/13/2016   Total Score - - -   Total Score MyChart - - 1 (Minimal depression)   Total Score 0 3 1       MICHAEL-7 SCORE 3/2/2016 5/3/2016 12/13/2016   Total Score - - -   Total Score - - 1 (minimal anxiety)   Total Score 0 0 1              ASSESSMENT/PLAN:                                                      Type 2 diabetes mellitus without complication, without long-term current use of insulin (H)  Stable; HgbA1C looks real good. She is not having any hypoglycemia. At this time, will cotninue current medications.   - Hemoglobin A1c  - TSH with free T4 reflex  - metFORMIN (GLUCOPHAGE-XR) 500 MG 24 hr tablet; Take 4 tablets (2,000 mg) by mouth daily (with breakfast)  - **Lipid panel reflex to direct LDL FUTURE anytime; Future  - **A1C FUTURE anytime; Future  - Comprehensive metabolic panel; Future  - **Albumin Random Urine Quant FUTURE " anytime; Future    Morbid obesity, unspecified obesity type (H)  She has had some weight loss. The victoza and metformin both may be helping. Continue current medication. She will try to increase her activity as tolerated after her recent fibro flare.     Pulmonary hypertension (H)  She has been seeing Cardiology. She notes they will be rechecking again in about a year.     Major depression in complete remission (H)  Stable. Doing well. Continues on treatment.    Non-rheumatic tricuspid valve insufficiency  She has had recent ECHO and Cardiology eval.   Thought that the amount of leaking may be related to her pulmonary pressures. Continue to encourage weight loss.     Hyperlipidemia LDL goal <100  Will do fasting lab next visit.   - **Lipid panel reflex to direct LDL FUTURE anytime; Future  - Comprehensive metabolic panel; Future    Fibromyalgia  Refilled. Recent flare; she is working on recovering from this.   - cyclobenzaprine (FLEXERIL) 10 MG tablet; Take 1 tablet (10 mg) by mouth 3 times daily as needed for muscle spasms        Patient Instructions   Keep up the good work!    If all is well, I would like to see you in 6 months.  We should do fasting labs at that time.      china Castellanos MD, MD  [facility]

## 2017-02-27 NOTE — PATIENT INSTRUCTIONS
Keep up the good work!    If all is well, I would like to see you in 6 months.  We should do fasting labs at that time.      m

## 2017-02-27 NOTE — MR AVS SNAPSHOT
After Visit Summary   2/27/2017    Kaylee Lopez    MRN: [id number]           Patient Information     Date Of Birth          1970        Visit Information        Provider Department      2/27/2017 8:10 AM Irene Castellanos MD [facility]        Today's Diagnoses     Type 2 diabetes mellitus without complication, without long-term current use of insulin (H)        Fibromyalgia          Care Instructions    Keep up the good work!    If all is well, I would like to see you in 6 months.  We should do fasting labs at that time.              Follow-ups after your visit        Your next 10 appointments already scheduled     Apr 01, 2017  9:45 AM CDT   Screening Mammogram with CRMA1   [facility]    [address] [place] [address] [phone]           Do NOT use body powder, lotions, perfume or deodorant the day of the exam.      If your last mammogram was not done at [facility], please bring your mammogram films. We will need the name of your provider to send a copy of your report.        A mammogram may be covered on an annual or biannual basis, please check with your insurance company.               Who to contact     If you have questions or need follow up information about today's clinic visit or your schedule please contact [facility] directly at [phone].  Normal or non-critical lab and imaging results will be communicated to you by MyChart, letter or phone within 4 business days after the clinic has received the results. If you do not hear from us within 7 days, please contact the clinic through MyChart or phone. If you have a critical or abnormal lab result, we will notify you by phone as soon as possible.  Submit refill requests through [employer]o or call your pharmacy and they will forward the refill request to us. Please allow 3 business days for your refill to be completed.           "Additional Information About Your Visit        Poptenthart Information     [employer] gives you secure access to your electronic health record. If you see a primary care provider, you can also send messages to your care team and make appointments. If you have questions, please call your primary care clinic.  If you do not have a primary care provider, please call [phone] and they will assist you.        Care EveryWhere ID     This is your Care EveryWhere ID. This could be used by other organizations to access your [place] medical records  [id number]        Your Vitals Were     Pulse Temperature Height Pulse Oximetry BMI (Body Mass Index)       90 98.5  F (36.9  C) (Oral) 5' 4.5\" (1.638 m) 98% 38.35 kg/m2        Blood Pressure from Last 3 Encounters:   02/27/17 122/80   02/16/17 122/80   02/06/17 110/74    Weight from Last 3 Encounters:   02/27/17 226 lb 14.4 oz (102.9 kg)   02/16/17 225 lb 11.2 oz (102.4 kg)   02/06/17 228 lb (103.4 kg)              We Performed the Following     Hemoglobin A1c     TSH with free T4 reflex          Today's Medication Changes          These changes are accurate as of: 2/27/17  8:49 AM.  If you have any questions, ask your nurse or doctor.               These medicines have changed or have updated prescriptions.        Dose/Directions    liraglutide 18 MG/3ML soln   Commonly known as:  VICTOZA   This may have changed:  Another medication with the same name was removed. Continue taking this medication, and follow the directions you see here.   Used for:  Type 2 diabetes mellitus without complication (H)   Changed by:  Irene Castellanos MD        Dose:  1.2 mg   Inject 1.2 mg Subcutaneous daily Start at 0.6mg once daily for 1 week and then increase to 1.2mg.   Quantity:  6 mL   Refills:  2         Stop taking these medicines if you haven't already. Please contact your care team if you have questions.     mometasone 50 MCG/ACT spray   Commonly known as:  NASONEX   Stopped by:  Irene Castellanos" MD GISELE                Where to get your medicines      These medications were sent to [facility] [address] [place] [facility] [address] [facility]  [address] [facility] [address]    Hours:  Tech issues with their phone system Phone:  [phone]     cyclobenzaprine 10 MG tablet    metFORMIN 500 MG 24 hr tablet                Primary Care Provider Office Phone # Fax #    Irene Castellanos [phone]       [facility] [address] [address]  [facility] [address]        Thank you!     Thank you for choosing [facility]  for your care. Our goal is always to provide you with excellent care. Hearing back from our patients is one way we can continue to improve our services. Please take a few minutes to complete the written survey that you may receive in the mail after your visit with us. Thank you!             Your Updated Medication List - Protect others around you: Learn how to safely use, store and throw away your medicines at www.disposemymeds.org.          This list is accurate as of: 2/27/17  8:49 AM.  Always use your most recent med list.                   Brand Name Dispense Instructions for use    * ACE NOT PRESCRIBED (INTENTIONAL)     0 each    1 each ACE Inhibitor not prescribed due to Other: not indicated       * ASPIRIN NOT PRESCRIBED    INTENTIONAL    0 each    1 each continuous prn for other Antiplatelet medication not prescribed intentionally due to not age appropriate       atorvastatin 10 MG tablet    LIPITOR    90 tablet    Take 1 tablet (10 mg) by mouth daily       blood glucose calibration solution    no brand specified    1 each    Use to calibrate blood glucose monitor as directed.       Blood Glucose Monitoring Suppl Misc     1 each    In vitro       blood glucose monitoring test strip    ACCU-CHEK SARAH    100 each    Use to test blood sugars three times daily or as directed.       cetirizine 10 MG tablet    zyrTEC     Take 10 mg by mouth daily as needed  for allergies       citalopram 40 MG tablet    celeXA    90 tablet    Take 1 tablet (40 mg) by mouth daily       cyclobenzaprine 10 MG tablet    FLEXERIL    90 tablet    Take 1 tablet (10 mg) by mouth 3 times daily as needed for muscle spasms       insulin pen needle 31G X 5 MM    B-D U/F    100 each    Use 1 pen needles daily or as directed. 31 G x 5mm       liraglutide 18 MG/3ML soln    VICTOZA    6 mL    Inject 1.2 mg Subcutaneous daily Start at 0.6mg once daily for 1 week and then increase to 1.2mg.       metFORMIN 500 MG 24 hr tablet    GLUCOPHAGE-XR    360 tablet    Take 4 tablets (2,000 mg) by mouth daily (with breakfast)       traMADol 50 MG tablet    ULTRAM    60 tablet    Take 1-2 tablets ( mg) by mouth every 8 hours as needed for pain not to exceed 8 tablets a day; max 60 per month       traZODone 50 MG tablet    DESYREL    45 tablet    Take  by mouth. Take 1.5 tablets before bedtime       Vitamin D3 3000 UNITS Tabs          * Notice:  This list has 2 medication(s) that are the same as other medications prescribed for you. Read the directions carefully, and ask your doctor or other care provider to review them with you.

## 2017-02-27 NOTE — NURSING NOTE
"  Chief Complaint   Patient presents with     Diabetes       Initial /80 (BP Location: Right arm, Patient Position: Chair, Cuff Size: Adult Large)  Pulse 90  Temp 98.5  F (36.9  C) (Oral)  Ht 5' 4.5\" (1.638 m)  Wt 226 lb 14.4 oz (102.9 kg)  SpO2 98%  BMI 38.35 kg/m2 Estimated body mass index is 38.35 kg/(m^2) as calculated from the following:    Height as of this encounter: 5' 4.5\" (1.638 m).    Weight as of this encounter: 226 lb 14.4 oz (102.9 kg).  Medication Reconciliation: complete   Maggie Beasley, WILLA      "

## 2017-04-17 NOTE — Clinical Note
[facility]  [facility] [address]  [place]  [address]  [phone]              Return to Work Release    Date: 4/17/2017      Name: Coretta Huitron                       YOB: 1979        The patient was seen at [facility]    Please excuse from all work responsibilities 4/18/2017.    She may return to work without restrictions on 4/19/2017.              _________________________  Alondra Blakely PA-C                          2017             Irene Castellanos MD    [facility]   [address] [place] [address]       RE:    Kaylee Lopez   MRN:  [id number]   :  1970      Dear Dr. Castellanos:      It was my pleasure to see your patient, Kaylee Lopez, in followup.  As you know, this is a 46-year-old patient with a history of pulmonary hypertension and tricuspid regurgitation.  This patient in the past has actually had a right heart cath.  This was on 2011 and her pulmonary pressures were normal at that particular time, but certainly echocardiography we have found raised pulmonary pressures and tricuspid regurgitation.        A few days ago, echocardiography showed that her degree of tricuspid regurgitation was moderate (2+).  Her pulmonary hypertension with mild to moderate at 37.9 mmHg plus right atrial pressure.  Her left ventricular systolic function is normal.  Her left ventricular filling pressures also appeared to be normal as to E to E prime velocity is less than 8.        She feels well.  She has no shortness of breath, orthopnea or PND.  In the past, she has complained of palpitations, but she does not have any palpitations at present unless she drinks caffeine.  Her systemic blood pressure is excellent today is 110/74.  Her lipids are excellent on 10 mg of atorvastatin with an LDL of 46, HDL 43 and triglycerides of 115.  He liver function tests are normal at 21.      Kaylee, as you know, is morbidly obese, based upon a BMI of 38.61.  She is a diabetic, taking metformin and Victoza.      IMPRESSION:   1.  Moderate tricuspid regurgitation.  The degree of tricuspid regurgitation appears to vary with her pulmonary pressures.   2.  Mild to moderate pulmonary hypertension.   3.  Morbid obesity.   4.  Diabetes mellitus.   5.  Excellent lipid profile with the exception of mildly reduced HDL.      PLAN:  We will continue the patient on her good present medications.  I will see her back again in 1 year  and I have encouraged her to continue to lose weight.      It has been a pleasure to be involved in the care of this very nice patient.      Sincerely,         Frederic Hills MD

## 2017-05-07 ENCOUNTER — OFFICE VISIT (OUTPATIENT)
Dept: URGENT CARE | Facility: URGENT CARE | Age: 47
End: 2017-05-07
Payer: COMMERCIAL

## 2017-05-07 VITALS
DIASTOLIC BLOOD PRESSURE: 86 MMHG | HEART RATE: 109 BPM | SYSTOLIC BLOOD PRESSURE: 133 MMHG | OXYGEN SATURATION: 98 % | TEMPERATURE: 98.8 F

## 2017-05-07 DIAGNOSIS — J06.9 VIRAL URI WITH COUGH: ICD-10-CM

## 2017-05-07 DIAGNOSIS — R07.0 THROAT PAIN: Primary | ICD-10-CM

## 2017-05-07 LAB
DEPRECATED S PYO AG THROAT QL EIA: NORMAL
MICRO REPORT STATUS: NORMAL
SPECIMEN SOURCE: NORMAL

## 2017-05-07 PROCEDURE — 99213 OFFICE O/P EST LOW 20 MIN: CPT | Performed by: NURSE PRACTITIONER

## 2017-05-07 PROCEDURE — 87081 CULTURE SCREEN ONLY: CPT | Performed by: NURSE PRACTITIONER

## 2017-05-07 PROCEDURE — 87880 STREP A ASSAY W/OPTIC: CPT | Performed by: NURSE PRACTITIONER

## 2017-05-07 RX ORDER — ALBUTEROL SULFATE 90 UG/1
2 AEROSOL, METERED RESPIRATORY (INHALATION) EVERY 6 HOURS PRN
Qty: 3 INHALER | Refills: 1 | Status: SHIPPED | OUTPATIENT
Start: 2017-05-07 | End: 2018-06-16

## 2017-05-07 NOTE — PROGRESS NOTES
Chief Complaint   Patient presents with     Urgent Care     Pharyngitis     Pharyngitis, Cx congestion, Cough, ear pressure x4 days       SUBJECTIVE:  Kaylee Lopez is a 47 year old female who presents with about 4 days of upper respiratory symptoms which have included some coughing and congestion. Spouse had similar symptoms and was seen earlier this week and was given some azithromycin. Patient is wondering whether she can get this in prescription. Has been trying some over-the-counter treatments including some Mucinex DM. Patient is diabetic. Reporting occasional shortness of breath. Previously she has used albuterol with similar symptoms with good response. No prior history of reactive airway.        OBJECTIVE:  /86 (BP Location: Right arm, Patient Position: Chair, Cuff Size: Adult Large)  Pulse 109  Temp 98.8  F (37.1  C) (Oral)  SpO2 98%    overweight female in no acute distress. Nontoxic looking. Occasional productive cough with an otherwise normal exam. Bilateral eyes were non injected with pupils equal and reactive to light. Fundi was normal. Bilateral TM were clear. Bilateral external ear canals were clear. Oral mucosa was moist without any erythema or exudate. No significant cervical adenopathy. Lung sounds were clear to ascultation throughout without any wheezing or rales. No rhonchi. Heart was of regular rhythm and rate. No murmur. Abdomen was soft and nontender, with bowel sounds active throughout. No organomegaly.    Results for orders placed or performed in visit on 05/07/17   Rapid strep screen   Result Value Ref Range    Specimen Description Throat     Rapid Strep A Screen       NEGATIVE: No Group A streptococcal antigen detected by immunoassay, await   culture report.      Micro Report Status FINAL 05/07/2017          ASSESSMENT/PLAN:    (R07.0) Throat pain  (primary encounter diagnosis)//(J06.9,  B97.89) Viral URI with cough  Comment: Symptoms probably viral in nature.  Suggestion symptomatic management. Given reported reactivity, patient was prescribed albuterol to use as needed. For now we will defer from the azithromycin which would not be appropriate anyway given that she is on Celexa. Should symptoms not improve in 3-4 days she can call back and we may consider amoxicillin 875 mg b.i.d. for 10 days to cover for bacterial component. Otherwise follow up with any persistent concerns.  Plan: Rapid strep screen, albuterol (PROAIR         HFA/PROVENTIL HFA/VENTOLIN HFA) 108 (90 BASE)         MCG/ACT Inhaler            JEANE Reilly CNP

## 2017-05-07 NOTE — PATIENT INSTRUCTIONS

## 2017-05-07 NOTE — MR AVS SNAPSHOT
After Visit Summary   5/7/2017    Kaylee Lopez    MRN: [id number]           Patient Information     Date Of Birth          1970        Visit Information        Provider Department      5/7/2017 11:10 AM Marcy Cesar APRN [facility]        Today's Diagnoses     Throat pain    -  1    Viral URI with cough          Care Instructions      Viral Upper Respiratory Illness (Adult)  You have a viral upper respiratory illness (URI), which is another term for the common cold. This illness is contagious during the first few days. It is spread through the air by coughing and sneezing. It may also be spread by direct contact (touching the sick person and then touching your own eyes, nose, or mouth). Frequent handwashing will decrease risk of spread. Most viral illnesses go away within 7 to 10 days with rest and simple home remedies. Sometimes the illness may last for several weeks. Antibiotics will not kill a virus, and they are generally not prescribed for this condition.    Home care    If symptoms are severe, rest at home for the first 2 to 3 days. When you resume activity, don't let yourself get too tired.    Avoid being exposed to cigarette smoke (yours or others ).    You may use acetaminophen or ibuprofen to control pain and fever, unless another medicine was prescribed. (Note: If you have chronic liver or kidney disease, have ever had a stomach ulcer or gastrointestinal bleeding, or are taking blood-thinning medicines, talk with your healthcare provider before using these medicines.) Aspirin should never be given to anyone under 18 years of age who is ill with a viral infection or fever. It may cause severe liver or brain damage.    Your appetite may be poor, so a light diet is fine. Avoid dehydration by drinking 6 to 8 glasses of fluids per day (water, soft drinks, juices, tea, or soup). Extra fluids will help loosen secretions in the nose and  lungs.    Over-the-counter cold medicines will not shorten the length of time you re sick, but they may be helpful for the following symptoms: cough, sore throat, and nasal and sinus congestion. (Note: Do not use decongestants if you have high blood pressure.)  Follow-up care  Follow up with your healthcare provider, or as advised.  When to seek medical advice  Call your healthcare provider right away if any of these occur:    Cough with lots of colored sputum (mucus)    Severe headache; face, neck, or ear pain    Difficulty swallowing due to throat pain    Fever of 100.4 F (38 C)  Call 911, or get immediate medical care  Call emergency services right away if any of these occur:    Chest pain, shortness of breath, wheezing, or difficulty breathing    Coughing up blood    Inability to swallow due to throat pain    5906-3773 [employer]. [address] [place] [address]189. All rights reserved. This information is not intended as a substitute for professional medical care. Always follow your healthcare professional's instructions.              Follow-ups after your visit        Who to contact     If you have questions or need follow up information about today's clinic visit or your schedule please contact [facility] URGENT CARE directly at [phone].  Normal or non-critical lab and imaging results will be communicated to you by [employer], letter or phone within 4 business days after the clinic has received the results. If you do not hear from us within 7 days, please contact the clinic through [employer] or phone. If you have a critical or abnormal lab result, we will notify you by phone as soon as possible.  Submit refill requests through [employer] or call your pharmacy and they will forward the refill request to us. Please allow 3 business days for your refill to be completed.          Additional Information About Your Visit        [employer] Information     [employer] gives you secure access to  your electronic health record. If you see a primary care provider, you can also send messages to your care team and make appointments. If you have questions, please call your primary care clinic.  If you do not have a primary care provider, please call [phone] and they will assist you.        Care EveryWhere ID     This is your Care EveryWhere ID. This could be used by other organizations to access your [place] medical records  [id number]        Your Vitals Were     Pulse Temperature Pulse Oximetry             109 98.8  F (37.1  C) (Oral) 98%          Blood Pressure from Last 3 Encounters:   05/07/17 133/86   02/27/17 122/80   02/16/17 122/80    Weight from Last 3 Encounters:   02/27/17 226 lb 14.4 oz (102.9 kg)   02/16/17 225 lb 11.2 oz (102.4 kg)   02/06/17 228 lb (103.4 kg)              We Performed the Following     Rapid strep screen          Today's Medication Changes          These changes are accurate as of: 5/7/17 11:36 AM.  If you have any questions, ask your nurse or doctor.               Start taking these medicines.        Dose/Directions    albuterol 108 (90 BASE) MCG/ACT Inhaler   Commonly known as:  PROAIR HFA/PROVENTIL HFA/VENTOLIN HFA   Used for:  Viral URI with cough, Throat pain        Dose:  2 puff   Inhale 2 puffs into the lungs every 6 hours as needed for shortness of breath / dyspnea or wheezing   Quantity:  3 Inhaler   Refills:  1            Where to get your medicines      These medications were sent to [place] [address] [place] [facility] [address] [facility]  [address] [facility] [address]    Hours:  Tech issues with their phone system Phone:  [phone]     albuterol 108 (90 BASE) MCG/ACT Inhaler                Primary Care Provider Office Phone # Fax #    Irene Castellanos [phone]       [facility] [address]  [facility] [address]        Thank you!     Thank you for choosing [facility] URGENT [facility]ARE  for your care. Our goal  is always to provide you with excellent care. Hearing back from our patients is one way we can continue to improve our services. Please take a few minutes to complete the written survey that you may receive in the mail after your visit with us. Thank you!             Your Updated Medication List - Protect others around you: Learn how to safely use, store and throw away your medicines at www.disposemymeds.org.          This list is accurate as of: 5/7/17 11:36 AM.  Always use your most recent med list.                   Brand Name Dispense Instructions for use    * ACE NOT PRESCRIBED (INTENTIONAL)     0 each    1 each ACE Inhibitor not prescribed due to Other: not indicated       albuterol 108 (90 BASE) MCG/ACT Inhaler    PROAIR HFA/PROVENTIL HFA/VENTOLIN HFA    3 Inhaler    Inhale 2 puffs into the lungs every 6 hours as needed for shortness of breath / dyspnea or wheezing       * ASPIRIN NOT PRESCRIBED    INTENTIONAL    0 each    1 each continuous prn for other Antiplatelet medication not prescribed intentionally due to not age appropriate       atorvastatin 10 MG tablet    LIPITOR    90 tablet    Take 1 tablet (10 mg) by mouth daily       blood glucose calibration solution    no brand specified    1 each    Use to calibrate blood glucose monitor as directed.       Blood Glucose Monitoring Suppl Misc     1 each    In vitro       blood glucose monitoring test strip    ACCU-CHEK SARAH    100 each    Use to test blood sugars three times daily or as directed.       cetirizine 10 MG tablet    zyrTEC     Take 10 mg by mouth daily as needed for allergies       citalopram 40 MG tablet    celeXA    90 tablet    Take 1 tablet (40 mg) by mouth daily       cyclobenzaprine 10 MG tablet    FLEXERIL    90 tablet    Take 1 tablet (10 mg) by mouth 3 times daily as needed for muscle spasms       insulin pen needle 31G X 5 MM    B-D U/F    100 each    Use 1 pen needles daily or as directed. 31 G x 5mm       liraglutide 18 MG/3ML soln     VICTOZA    6 mL    Inject 1.2 mg Subcutaneous daily Start at 0.6mg once daily for 1 week and then increase to 1.2mg.       metFORMIN 500 MG 24 hr tablet    GLUCOPHAGE-XR    360 tablet    Take 4 tablets (2,000 mg) by mouth daily (with breakfast)       traMADol 50 MG tablet    ULTRAM    60 tablet    Take 1-2 tablets ( mg) by mouth every 8 hours as needed for pain not to exceed 8 tablets a day; max 60 per month       traZODone 50 MG tablet    DESYREL    45 tablet    Take  by mouth. Take 1.5 tablets before bedtime       Vitamin D3 3000 UNITS Tabs          * Notice:  This list has 2 medication(s) that are the same as other medications prescribed for you. Read the directions carefully, and ask your doctor or other care provider to review them with you.

## 2017-05-08 LAB
BACTERIA SPEC CULT: NORMAL
MICRO REPORT STATUS: NORMAL
SPECIMEN SOURCE: NORMAL

## 2017-05-30 DIAGNOSIS — E11.9 TYPE 2 DIABETES MELLITUS WITHOUT COMPLICATION (H): ICD-10-CM

## 2017-05-30 NOTE — TELEPHONE ENCOUNTER
insulin pen needle (B-D U/F) 31G X 5 MM      Last Written Prescription Date: 5/17/16  Last Fill Quantity: 100,  # refills: 3   Last Office Visit with [facility], [facility] or [facility] prescribing provider: 2/27/17

## 2017-05-31 RX ORDER — FLURBIPROFEN SODIUM 0.3 MG/ML
SOLUTION/ DROPS OPHTHALMIC
Qty: 100 EACH | Refills: 0 | Status: SHIPPED | OUTPATIENT
Start: 2017-05-31 | End: 2017-10-02

## 2017-05-31 NOTE — TELEPHONE ENCOUNTER
Prescription approved per [facility] Refill Protocol.  Mariajose Ahmadi RN

## 2017-06-10 ENCOUNTER — HEALTH MAINTENANCE LETTER (OUTPATIENT)
Age: 47
End: 2017-06-10

## 2017-06-12 DIAGNOSIS — E11.9 TYPE 2 DIABETES MELLITUS WITHOUT COMPLICATION (H): ICD-10-CM

## 2017-06-12 NOTE — TELEPHONE ENCOUNTER
liraglutide (VICTOZA) 18 MG/3ML         Last Written Prescription Date: 1/19/17  Last Fill Quantity: 6ml, # refills: 2  Last Office Visit with [facility], P or [facility] prescribing provider:  3/27/17        BP Readings from Last 3 Encounters:   05/07/17 133/86   02/27/17 122/80   02/16/17 122/80     Lab Results   Component Value Date    MICROL 13 04/29/2016     Lab Results   Component Value Date    UMALCR 8.49 04/29/2016     Creatinine   Date Value Ref Range Status   08/22/2016 0.80 0.52 - 1.04 mg/dL Final   ]  GFR Estimate   Date Value Ref Range Status   08/22/2016 78 >60 mL/min/1.7m2 Final     Comment:     Non  GFR Calc   03/22/2016 78 >60 mL/min/1.7m2 Final   10/30/2015 83 >60 mL/min/1.7m2 Final     Comment:     Non  GFR Calc     GFR Estimate If Black   Date Value Ref Range Status   08/22/2016 >90   GFR Calc   >60 mL/min/1.7m2 Final   03/22/2016 >90 >60 mL/min/1.7m2 Final   10/30/2015 >90   GFR Calc   >60 mL/min/1.7m2 Final     Lab Results   Component Value Date    CHOL 112 08/22/2016     Lab Results   Component Value Date    HDL 43 08/22/2016     Lab Results   Component Value Date    LDL 46 08/22/2016     Lab Results   Component Value Date    TRIG 115 08/22/2016     Lab Results   Component Value Date    CHOLHDLRATIO 3.0 04/28/2015     Lab Results   Component Value Date    AST 9 08/22/2016     Lab Results   Component Value Date    ALT 21 08/22/2016     Lab Results   Component Value Date    A1C 5.5 02/27/2017    A1C 5.6 08/22/2016    A1C 7.6 04/29/2016    A1C 6.6 10/30/2015    A1C 6.6 04/28/2015     Potassium   Date Value Ref Range Status   08/22/2016 4.2 3.4 - 5.3 mmol/L Final

## 2017-06-14 DIAGNOSIS — E78.5 HYPERLIPIDEMIA LDL GOAL <100: ICD-10-CM

## 2017-06-15 RX ORDER — LIRAGLUTIDE 6 MG/ML
INJECTION SUBCUTANEOUS
Qty: 6 ML | Refills: 2 | Status: SHIPPED | OUTPATIENT
Start: 2017-06-15 | End: 2017-09-05

## 2017-06-15 NOTE — TELEPHONE ENCOUNTER
atorvastatin (LIPITOR) 10 MG     Last Written Prescription Date: 12/7/16  Last Fill Quantity: 90, # refills: 1  Last Office Visit with G, P or [facility] prescribing provider: 2/27/17       Lab Results   Component Value Date    CHOL 112 08/22/2016     Lab Results   Component Value Date    HDL 43 08/22/2016     Lab Results   Component Value Date    LDL 46 08/22/2016     Lab Results   Component Value Date    TRIG 115 08/22/2016     Lab Results   Component Value Date    CHOLHDLRATIO 3.0 04/28/2015

## 2017-06-16 ENCOUNTER — MYC REFILL (OUTPATIENT)
Dept: FAMILY MEDICINE | Facility: CLINIC | Age: 47
End: 2017-06-16

## 2017-06-16 DIAGNOSIS — E11.9 TYPE 2 DIABETES, HBA1C GOAL < 7% (H): ICD-10-CM

## 2017-06-16 DIAGNOSIS — M79.7 FIBROMYALGIA: ICD-10-CM

## 2017-06-16 DIAGNOSIS — E78.5 HYPERLIPIDEMIA LDL GOAL <100: ICD-10-CM

## 2017-06-16 DIAGNOSIS — E11.9 TYPE 2 DIABETES MELLITUS WITHOUT COMPLICATION (H): ICD-10-CM

## 2017-06-16 RX ORDER — TRAMADOL HYDROCHLORIDE 50 MG/1
50-100 TABLET ORAL EVERY 8 HOURS PRN
Qty: 60 TABLET | Refills: 1 | Status: SHIPPED | OUTPATIENT
Start: 2017-06-16 | End: 2017-12-03

## 2017-06-16 RX ORDER — ATORVASTATIN CALCIUM 10 MG/1
10 TABLET, FILM COATED ORAL DAILY
Qty: 90 TABLET | Refills: 0 | Status: SHIPPED | OUTPATIENT
Start: 2017-06-16 | End: 2017-09-19

## 2017-06-16 RX ORDER — BLOOD-GLUCOSE CONTROL, NORMAL
EACH MISCELLANEOUS
Qty: 1 EACH | Refills: 0 | Status: SHIPPED | OUTPATIENT
Start: 2017-06-16 | End: 2018-08-12

## 2017-06-16 RX ORDER — ATORVASTATIN CALCIUM 10 MG/1
TABLET, FILM COATED ORAL
Qty: 90 TABLET | Refills: 1 | OUTPATIENT
Start: 2017-06-16

## 2017-06-16 NOTE — TELEPHONE ENCOUNTER
Prescription approved per [facility] Refill Protocol.  Due for 6 month diabetic check/labs in August.    Tramadol:  Routing refill request to provider for review/approval because:  Drug not on the [facility] refill protocol   Linda Miles RN  Triage Nurse

## 2017-06-16 NOTE — TELEPHONE ENCOUNTER
Message from [employer]:  Original authorizing provider: Irene Castellanos MD, MD    Kaylee ELZADeysi Lopez would like a refill of the following medications:  Blood Glucose Monitoring Suppl MISC [Irene Castellanos MD, MD]  blood glucose calibration (NO BRAND SPECIFIED) solution [Irene Castellanos MD, MD]  blood glucose monitoring (ACCU-CHEK SARAH) test strip [Irene Castellanos MD, MD]  atorvastatin (LIPITOR) 10 MG tablet [Irene Castellanos MD, MD]  traMADol (ULTRAM) 50 MG tablet [Irene Castellanos MD, MD]    Preferred pharmacy: [facility] [id number] [facility] [place] [address] [facility]    Comment:

## 2017-06-17 DIAGNOSIS — M79.7 FIBROMYALGIA: ICD-10-CM

## 2017-06-19 RX ORDER — CYCLOBENZAPRINE HCL 10 MG
TABLET ORAL
Qty: 90 TABLET | Refills: 1 | Status: SHIPPED | OUTPATIENT
Start: 2017-06-19 | End: 2017-12-03

## 2017-06-19 NOTE — TELEPHONE ENCOUNTER
cyclobenzaprine (FLEXERIL) 10 MG      Last Written Prescription Date:  2/27/17  Last Fill Quantity: 90,   # refills: 1  Last Office Visit with [facility], [facility] or [facility] prescribing provider: 2/27/17  Future Office visit:       Routing refill request to provider for review/approval because:  Drug not on the [facility], [facility] or [facility] refill protocol or controlled substance

## 2017-07-06 ENCOUNTER — MYC MEDICAL ADVICE (OUTPATIENT)
Dept: FAMILY MEDICINE | Facility: CLINIC | Age: 47
End: 2017-07-06

## 2017-07-06 NOTE — LETTER
[facility]  [address] [facility] [address] [phone]          July 7, 2017  Re:  Kaylee Lopez                                                                                                                     [address] [facility] [address]            Dear To Whom it May Concern:    Please have light dimming bulbs in her office; this should help her with migraine management.        Sincerely,         Irene Castellanos MD

## 2017-07-07 NOTE — TELEPHONE ENCOUNTER
Letter drafted. Please check to see if OK with her.    If she needs my signature, place on my desk to sign next week.  Otherwise, you can go ahead and send.    Thanks!!

## 2017-07-07 NOTE — TELEPHONE ENCOUNTER
Letter reviewed with patient. Letter sounds good.     Would like letter signed and have at  on Tuesday as her daughter has an OV in which her spouse (Jose Lopez) will  .    Areli RENO RN, BSN, PHN  [place] [employer] RN

## 2017-07-12 ENCOUNTER — TELEPHONE (OUTPATIENT)
Dept: FAMILY MEDICINE | Facility: CLINIC | Age: 47
End: 2017-07-12

## 2017-07-12 DIAGNOSIS — M79.7 FIBROMYALGIA: ICD-10-CM

## 2017-07-12 NOTE — TELEPHONE ENCOUNTER
traZODone (DESYREL) 50 MG tablet       Last Written Prescription Date: 1/20/2017  Last Fill Quantity: 45; # refills: 5  Last Office Visit with [facility], [facility] or [facility] prescribing provider:  2/27/2017   Next 5 appointments (look out 90 days)     Jul 20, 2017  4:10 PM CDT   MyChart Long with Irene Castellanos MD   [address] [address] [phone]                   Last PHQ-9 score on record=   PHQ-9 SCORE 12/13/2016   Total Score MyChart 1 (Minimal depression)   Total Score 1       Lab Results   Component Value Date    AST 9 08/22/2016     Lab Results   Component Value Date    ALT 21 08/22/2016

## 2017-07-12 NOTE — TELEPHONE ENCOUNTER
PA has been started through covermymeds.com, will wait for response.     Medication: Victoza 18mg/3mL  Insurance ID: [id number]  Insurance ph #: [phone]  Mallorie Red MA

## 2017-07-12 NOTE — TELEPHONE ENCOUNTER
PA has been approved from 7/12/17 through 7/12/18, approval letter sent to abstracting. Pharmacy has been notified of approval.   Mallorie Red MA

## 2017-07-13 RX ORDER — TRAZODONE HYDROCHLORIDE 50 MG/1
TABLET, FILM COATED ORAL
Qty: 45 TABLET | Refills: 5 | Status: SHIPPED | OUTPATIENT
Start: 2017-07-13 | End: 2018-01-09

## 2017-07-13 NOTE — TELEPHONE ENCOUNTER
Prescription approved per [facility] Refill Protocol.  Linda Miles, RN  Triage Nurse

## 2017-07-15 ENCOUNTER — TELEPHONE (OUTPATIENT)
Dept: FAMILY MEDICINE | Facility: CLINIC | Age: 47
End: 2017-07-15

## 2017-07-15 DIAGNOSIS — E11.9 TYPE 2 DIABETES MELLITUS WITHOUT COMPLICATION, WITHOUT LONG-TERM CURRENT USE OF INSULIN (H): Primary | ICD-10-CM

## 2017-07-17 RX ORDER — BLOOD-GLUCOSE CONTROL, NORMAL
EACH MISCELLANEOUS
Qty: 1 EACH | Refills: 0 | Status: SHIPPED | OUTPATIENT
Start: 2017-07-17 | End: 2019-05-24

## 2017-07-17 RX ORDER — LANCING DEVICE
EACH MISCELLANEOUS
Qty: 100 EACH | Refills: 5 | Status: SHIPPED | OUTPATIENT
Start: 2017-07-17 | End: 2019-05-24

## 2017-07-17 NOTE — TELEPHONE ENCOUNTER
ACCU-CHEK SARAH not covered. Please send a new Rx for test strips, meter, and lancets.     test strips, meter, and lancets      Last Written Prescription Date: 6/16/17  Last Fill Quantity: 100,  # refills: 2   Last Office Visit with [facility], [facility] or [facility] prescribing provider: 2/27/17                                         Next 5 appointments (look out 90 days)     Jul 20, 2017  4:10 PM CDT   MyChart Long with Irene Castellanos MD   [facility]    [address] [facility] [address] [phone]

## 2017-07-17 NOTE — TELEPHONE ENCOUNTER
Called pharmacy to ask what supplies are covered.   They are not able to tell, so would like a generic order for meter  And supplies sent. This is done with comment to fill with covered meter and supplies.   Linda Miles, RN  Triage Nurse

## 2017-07-20 ENCOUNTER — OFFICE VISIT (OUTPATIENT)
Dept: FAMILY MEDICINE | Facility: CLINIC | Age: 47
End: 2017-07-20
Payer: COMMERCIAL

## 2017-07-20 VITALS
BODY MASS INDEX: 38.37 KG/M2 | WEIGHT: 230.3 LBS | RESPIRATION RATE: 16 BRPM | DIASTOLIC BLOOD PRESSURE: 78 MMHG | HEART RATE: 100 BPM | HEIGHT: 65 IN | OXYGEN SATURATION: 97 % | SYSTOLIC BLOOD PRESSURE: 122 MMHG | TEMPERATURE: 98.6 F

## 2017-07-20 DIAGNOSIS — E11.9 TYPE 2 DIABETES MELLITUS WITHOUT COMPLICATION, WITHOUT LONG-TERM CURRENT USE OF INSULIN (H): ICD-10-CM

## 2017-07-20 DIAGNOSIS — M79.7 FIBROMYALGIA: Primary | ICD-10-CM

## 2017-07-20 DIAGNOSIS — F32.5 MAJOR DEPRESSION IN COMPLETE REMISSION (H): ICD-10-CM

## 2017-07-20 PROCEDURE — 99214 OFFICE O/P EST MOD 30 MIN: CPT | Performed by: FAMILY MEDICINE

## 2017-07-20 PROCEDURE — 82043 UR ALBUMIN QUANTITATIVE: CPT | Performed by: FAMILY MEDICINE

## 2017-07-20 PROCEDURE — 99207 C FOOT EXAM  NO CHARGE: CPT | Performed by: FAMILY MEDICINE

## 2017-07-20 RX ORDER — CITALOPRAM HYDROBROMIDE 40 MG/1
40 TABLET ORAL DAILY
Qty: 90 TABLET | Refills: 1 | Status: SHIPPED | OUTPATIENT
Start: 2017-07-20 | End: 2018-01-09

## 2017-07-20 ASSESSMENT — ANXIETY QUESTIONNAIRES
5. BEING SO RESTLESS THAT IT IS HARD TO SIT STILL: NOT AT ALL
1. FEELING NERVOUS, ANXIOUS, OR ON EDGE: NOT AT ALL
GAD7 TOTAL SCORE: 4
7. FEELING AFRAID AS IF SOMETHING AWFUL MIGHT HAPPEN: SEVERAL DAYS
2. NOT BEING ABLE TO STOP OR CONTROL WORRYING: NOT AT ALL
6. BECOMING EASILY ANNOYED OR IRRITABLE: SEVERAL DAYS
IF YOU CHECKED OFF ANY PROBLEMS ON THIS QUESTIONNAIRE, HOW DIFFICULT HAVE THESE PROBLEMS MADE IT FOR YOU TO DO YOUR WORK, TAKE CARE OF THINGS AT HOME, OR GET ALONG WITH OTHER PEOPLE: NOT DIFFICULT AT ALL
3. WORRYING TOO MUCH ABOUT DIFFERENT THINGS: NOT AT ALL

## 2017-07-20 ASSESSMENT — PATIENT HEALTH QUESTIONNAIRE - PHQ9: 5. POOR APPETITE OR OVEREATING: MORE THAN HALF THE DAYS

## 2017-07-20 NOTE — NURSING NOTE
"Chief Complaint   Patient presents with     Depression     medication recheck       Initial /78 (BP Location: Right arm, Cuff Size: Adult Large)  Pulse 100  Temp 98.6  F (37  C) (Oral)  Resp 16  Ht 5' 4.5\" (1.638 m)  Wt 230 lb 4.8 oz (104.5 kg)  SpO2 97%  BMI 38.92 kg/m2 Estimated body mass index is 38.92 kg/(m^2) as calculated from the following:    Height as of this encounter: 5' 4.5\" (1.638 m).    Weight as of this encounter: 230 lb 4.8 oz (104.5 kg).  Medication Reconciliation: complete   Maggie Beasley, CMA      "

## 2017-07-20 NOTE — PROGRESS NOTES
SUBJECTIVE:                                                    Kaylee Lopez is a 47 year old female who presents to clinic today for the following health issues:        Depression and Anxiety Follow-Up    Status since last visit: No change    Other associated symptoms:None    Complicating factors:     Significant life event: No     Current substance abuse: None    PHQ-9 SCORE 3/2/2016 5/3/2016 12/13/2016   Total Score - - -   Total Score MyChart - - 1 (Minimal depression)   Total Score 0 3 1     MICHAEL-7 SCORE 3/2/2016 5/3/2016 12/13/2016   Total Score - - -   Total Score - - 1 (minimal anxiety)   Total Score 0 0 1       PHQ-9  English  PHQ-9   Any Language  GAD7      Amount of exercise or physical activity: walking daily 30 minutes per day    Problems taking medications regularly: No    Medication side effects: dry mouth from the tramadol  Diet: regular (no restrictions)      Here to have FMLA paperwork.    Problem list and histories reviewed & adjusted, as indicated.  Additional history:     See under ROS     Patient Active Problem List   Diagnosis     Fibromyalgia     Non-rheumatic tricuspid valve insufficiency     Papanicolaou smear of cervix with atypical squamous cells cannot exclude high grade squamous intraepithelial lesion (ASC-H)     Major depression in complete remission (H)     Narcolepsy     Hyperlipidemia LDL goal <100     Health Care Home     Reflex sympathetic dystrophy     Contraception     Elevated BMI     Chronic pain-Fibromyalgia     Type 2 diabetes mellitus without complication (H)     Migraine with aura and without status migrainosus, not intractable     Elevated blood pressure reading without diagnosis of hypertension     Pulmonary hypertension (H)       Current Outpatient Prescriptions   Medication Sig Dispense Refill     blood glucose monitoring (NO BRAND SPECIFIED) test strip Use to test blood sugars 3 times daily or as directed 100 strip 5     blood glucose monitoring (NO BRAND  SPECIFIED) meter device kit Use to test blood sugar 3 times daily or as directed. 1 kit 0     blood glucose (NO BRAND SPECIFIED) lancing device Use to test blood sugars 3 times daily or as directed. 100 each 5     blood glucose calibration (NO BRAND SPECIFIED) solution Use to calibrate blood glucose monitor as directed. 1 each 0     traZODone (DESYREL) 50 MG tablet TAKE 1.5 TABLETS BY MOUTH BEFORE BEDTIME 45 tablet 5     cyclobenzaprine (FLEXERIL) 10 MG tablet TAKE 1 TABLET BY MOUTH 3 TIMES DAILY AS NEEDED FOR MUSCLE SPASMS 90 tablet 1     blood glucose calibration (NO BRAND SPECIFIED) solution Use to calibrate blood glucose monitor as directed. 1 each 0     atorvastatin (LIPITOR) 10 MG tablet Take 1 tablet (10 mg) by mouth daily 90 tablet 0     traMADol (ULTRAM) 50 MG tablet Take 1-2 tablets ( mg) by mouth every 8 hours as needed for pain not to exceed 8 tablets a day; max 60 per month 60 tablet 1     VICTOZA PEN 18 MG/3ML soln INJECT 1.2 MG SUBCUTANEOUS DAILY 6 mL 2     B-D U/F insulin pen needle USE 1 PER DAY OR AS DIRECTED 100 each 0     albuterol (PROAIR HFA/PROVENTIL HFA/VENTOLIN HFA) 108 (90 BASE) MCG/ACT Inhaler Inhale 2 puffs into the lungs every 6 hours as needed for shortness of breath / dyspnea or wheezing 3 Inhaler 1     metFORMIN (GLUCOPHAGE-XR) 500 MG 24 hr tablet Take 4 tablets (2,000 mg) by mouth daily (with breakfast) 360 tablet 1     citalopram (CELEXA) 40 MG tablet Take 1 tablet (40 mg) by mouth daily 90 tablet 1     cetirizine (ZYRTEC) 10 MG tablet Take 10 mg by mouth daily as needed for allergies       Cholecalciferol (VITAMIN D3) 3000 UNITS TABS        Blood Glucose Monitoring Suppl MISC In vitro 1 each 3     ACE NOT PRESCRIBED, INTENTIONAL, 1 each ACE Inhibitor not prescribed due to Other: not indicated 0 each 0     ASPIRIN NOT PRESCRIBED, INTENTIONAL, 1 each continuous prn for other Antiplatelet medication not prescribed intentionally due to not age appropriate 0 each 0       Reviewed  "and updated as needed this visit by clinical staffTobacco  Allergies  Med Hx  Surg Hx  Fam Hx  Soc Hx      Reviewed and updated as needed this visit by Provider         ROS:  CONSTITUTIONAL:NEGATIVE for fever, chills, change in weight x has gone down some.  RESP:NEGATIVE for significant cough or SOB  CV: NEGATIVE for chest pain, palpitations or peripheral edema  PSYCHIATRIC:  work is stressful. Difficult to shut brain off.  Now document .  New position.    OBJECTIVE:     /78 (BP Location: Right arm, Cuff Size: Adult Large)  Pulse 100  Temp 98.6  F (37  C) (Oral)  Resp 16  Ht 5' 4.5\" (1.638 m)  Wt 230 lb 4.8 oz (104.5 kg)  SpO2 97%  BMI 38.92 kg/m2  Body mass index is 38.92 kg/(m^2).  GENERAL APPEARANCE: alert and no distress  RESP: lungs clear to auscultation - no rales, rhonchi or wheezes  CV: regular rates and rhythm  MS: no ankle edema.  PSYCH: mentation appears normal and affect normal/bright    DP pulse present bilaterally.  No sores or ulcerations.  Little callous.  Monofilament exam normal.    Lab Results   Component Value Date    A1C 5.5 02/27/2017    A1C 5.6 08/22/2016    A1C 7.6 04/29/2016    A1C 6.6 10/30/2015    A1C 6.6 04/28/2015     PHQ-9 SCORE 5/3/2016 12/13/2016 7/20/2017   Total Score - - -   Total Score MyChart - 1 (Minimal depression) -   Total Score 3 1 2       MICHAEL-7 SCORE 5/3/2016 12/13/2016 7/20/2017   Total Score - - -   Total Score - 1 (minimal anxiety) -   Total Score 0 1 4           ASSESSMENT/PLAN:     Fibromyalgia  [place] for her. See scan.    Type 2 diabetes mellitus without complication, without long-term current use of insulin (H)    - FOOT EXAM  - **Albumin Random Urine Quant FUTURE anytime    Major depression in complete remission (H)  Stable. Continue current medication. She does note stressful job; new position.  - citalopram (CELEXA) 40 MG tablet; Take 1 tablet (40 mg) by mouth daily      She anticipates following up end of " August for DM.      Irene Castellanos MD, MD  [facility]

## 2017-07-20 NOTE — MR AVS SNAPSHOT
"              After Visit Summary   7/20/2017    Kaylee Lopez    MRN: [id number]           Patient Information     Date Of Birth          1970        Visit Information        Provider Department      7/20/2017 4:10 PM Irene Castellanos MD [facility]        Today's Diagnoses     Type 2 diabetes mellitus without complication, without long-term current use of insulin (H)        Major depression in complete remission (H)           Follow-ups after your visit        Who to contact     If you have questions or need follow up information about today's clinic visit or your schedule please contact [facility] directly at [phone].  Normal or non-critical lab and imaging results will be communicated to you by [employer], letter or phone within 4 business days after the clinic has received the results. If you do not hear from us within 7 days, please contact the clinic through [employer] or phone. If you have a critical or abnormal lab result, we will notify you by phone as soon as possible.  Submit refill requests through [employer] or call your pharmacy and they will forward the refill request to us. Please allow 3 business days for your refill to be completed.          Additional Information About Your Visit        MyChart Information     [employer] gives you secure access to your electronic health record. If you see a primary care provider, you can also send messages to your care team and make appointments. If you have questions, please call your primary care clinic.  If you do not have a primary care provider, please call [phone] and they will assist you.        Care EveryWhere ID     This is your Care EveryWhere ID. This could be used by other organizations to access your [place] medical records  [id number]        Your Vitals Were     Pulse Temperature Respirations Height Pulse Oximetry BMI (Body Mass Index)    100 98.6  F (37  C) (Oral) 16 5' 4.5\" (1.638 m) 97% 38.92 kg/m2       " Blood Pressure from Last 3 Encounters:   07/20/17 122/78   05/07/17 133/86   02/27/17 122/80    Weight from Last 3 Encounters:   07/20/17 230 lb 4.8 oz (104.5 kg)   02/27/17 226 lb 14.4 oz (102.9 kg)   02/16/17 225 lb 11.2 oz (102.4 kg)              We Performed the Following     **Albumin Random Urine Quant FUTURE anytime     DEPRESSION ACTION PLAN (DAP)     FOOT EXAM          Today's Medication Changes          These changes are accurate as of: 7/20/17  5:12 PM.  If you have any questions, ask your nurse or doctor.               These medicines have changed or have updated prescriptions.        Dose/Directions    blood glucose monitoring test strip   Commonly known as:  no brand specified   This may have changed:  Another medication with the same name was removed. Continue taking this medication, and follow the directions you see here.   Used for:  Type 2 diabetes mellitus without complication, without long-term current use of insulin (H)   Changed by:  Irene Castellanos MD        Use to test blood sugars 3 times daily or as directed   Quantity:  100 strip   Refills:  5            Where to get your medicines      These medications were sent to [address] [address] [facility]  [address] [facility] [address]    Hours:  Tech issues with their phone system Phone:  [phone]     citalopram 40 MG tablet                Primary Care Provider Office Phone # Fax #    Irene Castellanos [phone]       [facility] [address] [facility] [address]        Equal Access to Services     GERARDO CRAWLEY AH: Hadii aad ku hadasho Soomaali, waaxda luqadaha, qaybta kaalmada adeegyada, waxay idiin haymelanyn renetta godinez. So [facility] [phone].    ATENCIÓN: Si habla español, tiene a garner disposición servicios gratuitos de asistencia lingüística. Llame al [phone].    We comply with applicable federal civil rights laws and [place] laws. We do not discriminate on  the basis of race, color, national origin, age, disability sex, sexual orientation or gender identity.            Thank you!     Thank you for choosing [facility]  for your care. Our goal is always to provide you with excellent care. Hearing back from our patients is one way we can continue to improve our services. Please take a few minutes to complete the written survey that you may receive in the mail after your visit with us. Thank you!             Your Updated Medication List - Protect others around you: Learn how to safely use, store and throw away your medicines at www.disposemymeds.org.          This list is accurate as of: 7/20/17  5:12 PM.  Always use your most recent med list.                   Brand Name Dispense Instructions for use Diagnosis    * ACE NOT PRESCRIBED (INTENTIONAL)     0 each    1 each ACE Inhibitor not prescribed due to Other: not indicated    Type 2 diabetes, HbA1c goal < 7% (H)       albuterol 108 (90 BASE) MCG/ACT Inhaler    PROAIR HFA/PROVENTIL HFA/VENTOLIN HFA    3 Inhaler    Inhale 2 puffs into the lungs every 6 hours as needed for shortness of breath / dyspnea or wheezing    Viral URI with cough, Throat pain       * ASPIRIN NOT PRESCRIBED    INTENTIONAL    0 each    1 each continuous prn for other Antiplatelet medication not prescribed intentionally due to not age appropriate    Type 2 diabetes, HbA1c goal < 7% (H)       atorvastatin 10 MG tablet    LIPITOR    90 tablet    Take 1 tablet (10 mg) by mouth daily    Hyperlipidemia LDL goal <100       B-D U/F 31G X 5 MM   Generic drug:  insulin pen needle     100 each    USE 1 PER DAY OR AS DIRECTED    Type 2 diabetes mellitus without complication (H)       * blood glucose calibration solution    no brand specified    1 each    Use to calibrate blood glucose monitor as directed.    Type 2 diabetes, HbA1c goal < 7% (H), Type 2 diabetes mellitus without complication (H)       * blood glucose calibration solution    no  brand specified    1 each    Use to calibrate blood glucose monitor as directed.    Type 2 diabetes mellitus without complication, without long-term current use of insulin (H)       blood glucose lancing device    no brand specified    100 each    Use to test blood sugars 3 times daily or as directed.    Type 2 diabetes mellitus without complication, without long-term current use of insulin (H)       * Blood Glucose Monitoring Suppl Misc     1 each    In vitro    Type 2 diabetes, HbA1c goal < 7% (H)       * blood glucose monitoring meter device kit    no brand specified    1 kit    Use to test blood sugar 3 times daily or as directed.    Type 2 diabetes mellitus without complication, without long-term current use of insulin (H)       blood glucose monitoring test strip    no brand specified    100 strip    Use to test blood sugars 3 times daily or as directed    Type 2 diabetes mellitus without complication, without long-term current use of insulin (H)       cetirizine 10 MG tablet    zyrTEC     Take 10 mg by mouth daily as needed for allergies        citalopram 40 MG tablet    celeXA    90 tablet    Take 1 tablet (40 mg) by mouth daily    Major depression in complete remission (H)       cyclobenzaprine 10 MG tablet    FLEXERIL    90 tablet    TAKE 1 TABLET BY MOUTH 3 TIMES DAILY AS NEEDED FOR MUSCLE SPASMS    Fibromyalgia       metFORMIN 500 MG 24 hr tablet    GLUCOPHAGE-XR    360 tablet    Take 4 tablets (2,000 mg) by mouth daily (with breakfast)    Type 2 diabetes mellitus without complication, without long-term current use of insulin (H)       traMADol 50 MG tablet    ULTRAM    60 tablet    Take 1-2 tablets ( mg) by mouth every 8 hours as needed for pain not to exceed 8 tablets a day; max 60 per month    Fibromyalgia       traZODone 50 MG tablet    DESYREL    45 tablet    TAKE 1.5 TABLETS BY MOUTH BEFORE BEDTIME    Fibromyalgia       VICTOZA PEN 18 MG/3ML soln   Generic drug:  liraglutide     6 mL     INJECT 1.2 MG SUBCUTANEOUS DAILY    Type 2 diabetes mellitus without complication (H)       Vitamin D3 3000 UNITS Tabs           * Notice:  This list has 6 medication(s) that are the same as other medications prescribed for you. Read the directions carefully, and ask your doctor or other care provider to review them with you.

## 2017-07-20 NOTE — LETTER
My Depression Action Plan  Name: Kaylee Lopez   Date of Birth 1970  Date: 7/20/2017    My doctor: Irene Castellanos   My clinic: [address] [address] [phone]          GREEN    ZONE   Good Control    What it looks like:     Things are going generally well. You have normal up s and down s. You may even feel depressed from time to time, but bad moods usually last less than a day.   What you need to do:  1. Continue to care for yourself (see self care plan)  2. Check your depression survival kit and update it as needed  3. Follow your physician s recommendations including any medication.  4. Do not stop taking medication unless you consult with your physician first.           YELLOW         ZONE Getting Worse    What it looks like:     Depression is starting to interfere with your life.     It may be hard to get out of bed; you may be starting to isolate yourself from others.    Symptoms of depression are starting to last most all day and this has happened for several days.     You may have suicidal thoughts but they are not constant.   What you need to do:     1. Call your care team, your response to treatment will improve if you keep your care team informed of your progress. Yellow periods are signs an adjustment may need to be made.     2. Continue your self-care, even if you have to fake it!    3. Talk to someone in your support network    4. Open up your depression survival kit           RED    ZONE Medical Alert - Get Help    What it looks like:     Depression is seriously interfering with your life.     You may experience these or other symptoms: You can t get out of bed most days, can t work or engage in other necessary activities, you have trouble taking care of basic hygiene, or basic responsibilities, thoughts of suicide or death that will not go away, self-injurious behavior.     What you need to do:  1. Call your care team and  request a same-day appointment. If they are not available (weekends or after hours) call your local crisis line, emergency room or 911.      Electronically signed by: Maggie Beasley, July 20, 2017    Depression Self Care Plan / Survival Kit    Self-Care for Depression  Here s the deal. Your body and mind are really not as separate as most people think.  What you do and think affects how you feel and how you feel influences what you do and think. This means if you do things that people who feel good do, it will help you feel better.  Sometimes this is all it takes.  There is also a place for medication and therapy depending on how severe your depression is, so be sure to consult with your medical provider and/ or Behavioral Health Consultant if your symptoms are worsening or not improving.     In order to better manage my stress, I will:    Exercise  Get some form of exercise, every day. This will help reduce pain and release endorphins, the  feel good  chemicals in your brain. This is almost as good as taking antidepressants!  This is not the same as joining a gym and then never going! (they count on that by the way ) It can be as simple as just going for a walk or doing some gardening, anything that will get you moving.      Hygiene   Maintain good hygiene (Get out of bed in the morning, Make your bed, Brush your teeth, Take a shower, and Get dressed like you were going to work, even if you are unemployed).  If your clothes don't fit try to get ones that do.    Diet  I will strive to eat foods that are good for me, drink plenty of water, and avoid excessive sugar, caffeine, alcohol, and other mood-altering substances.  Some foods that are helpful in depression are: complex carbohydrates, B vitamins, flaxseed, fish or fish oil, fresh fruits and vegetables.    Psychotherapy  I agree to participate in Individual Therapy (if recommended).    Medication  If prescribed medications, I agree to take them.  Missing doses  can result in serious side effects.  I understand that drinking alcohol, or other illicit drug use, may cause potential side effects.  I will not stop my medication abruptly without first discussing it with my provider.    Staying Connected With Others  I will stay in touch with my friends, family members, and my primary care provider/team.    Use your imagination  Be creative.  We all have a creative side; it doesn t matter if it s oil painting, sand castles, or mud pies! This will also kick up the endorphins.    Witness Beauty  (AKA stop and smell the roses) Take a look outside, even in mid-winter. Notice colors, textures. Watch the squirrels and birds.     Service to others  Be of service to others.  There is always someone else in need.  By helping others we can  get out of ourselves  and remember the really important things.  This also provides opportunities for practicing all the other parts of the program.    Humor  Laugh and be silly!  Adjust your TV habits for less news and crime-drama and more comedy.    Control your stress  Try breathing deep, massage therapy, biofeedback, and meditation. Find time to relax each day.     My support system    Clinic Contact:  Phone number:    Contact 1:  Phone number:    Contact 2:  Phone number:    Spiritism/:  Phone number:    Therapist:  Phone number:    Local crisis center:    Phone number:    Other community support:  Phone number:

## 2017-07-21 LAB
CREAT UR-MCNC: 295 MG/DL
MICROALBUMIN UR-MCNC: 26 MG/L
MICROALBUMIN/CREAT UR: 8.81 MG/G CR (ref 0–25)

## 2017-07-21 ASSESSMENT — PATIENT HEALTH QUESTIONNAIRE - PHQ9: SUM OF ALL RESPONSES TO PHQ QUESTIONS 1-9: 2

## 2017-07-21 ASSESSMENT — ANXIETY QUESTIONNAIRES: GAD7 TOTAL SCORE: 4

## 2017-08-15 DIAGNOSIS — F32.5 MAJOR DEPRESSION IN COMPLETE REMISSION (H): ICD-10-CM

## 2017-08-15 NOTE — TELEPHONE ENCOUNTER
citalopram (CELEXA) 40 MG      Last Written Prescription Date: 7/20/17  Last Fill Quantity: 90, # refills: 1  Last Office Visit with G primary care provider:  7/20/17        Last PHQ-9 score on record=   PHQ-9 SCORE 7/20/2017   Total Score -   Total Score MyChart -   Total Score 2

## 2017-08-16 ENCOUNTER — OFFICE VISIT (OUTPATIENT)
Dept: FAMILY MEDICINE | Facility: CLINIC | Age: 47
End: 2017-08-16
Payer: COMMERCIAL

## 2017-08-16 ENCOUNTER — TELEPHONE (OUTPATIENT)
Dept: NURSING | Facility: CLINIC | Age: 47
End: 2017-08-16

## 2017-08-16 ENCOUNTER — NURSE TRIAGE (OUTPATIENT)
Dept: NURSING | Facility: CLINIC | Age: 47
End: 2017-08-16

## 2017-08-16 VITALS
SYSTOLIC BLOOD PRESSURE: 118 MMHG | BODY MASS INDEX: 38.65 KG/M2 | HEART RATE: 94 BPM | WEIGHT: 228.7 LBS | OXYGEN SATURATION: 97 % | DIASTOLIC BLOOD PRESSURE: 78 MMHG | RESPIRATION RATE: 18 BRPM | TEMPERATURE: 98.8 F

## 2017-08-16 DIAGNOSIS — J02.9 ACUTE PHARYNGITIS, UNSPECIFIED ETIOLOGY: Primary | ICD-10-CM

## 2017-08-16 LAB
DEPRECATED S PYO AG THROAT QL EIA: NORMAL
SPECIMEN SOURCE: NORMAL

## 2017-08-16 PROCEDURE — 99213 OFFICE O/P EST LOW 20 MIN: CPT | Performed by: NURSE PRACTITIONER

## 2017-08-16 PROCEDURE — 87081 CULTURE SCREEN ONLY: CPT | Performed by: NURSE PRACTITIONER

## 2017-08-16 PROCEDURE — 87880 STREP A ASSAY W/OPTIC: CPT | Performed by: NURSE PRACTITIONER

## 2017-08-16 NOTE — PROGRESS NOTES
SUBJECTIVE:                                                    Kaylee Lopez is a 47 year old female who presents to clinic today for the following health issues:      RESPIRATORY SYMPTOMS      Duration: 4 days    Description  sore throat, fever, chills, headache, hoarse voice, myalgias, nausea and stomach ache     Severity: moderate    Accompanying signs and symptoms: None    History (predisposing factors):  none    Precipitating or alleviating factors: None    Therapies tried and outcome:  Acetaminophen without relief    Upset stomach and headache for the past 2 days.  Felt feverish this am.  Did not take temp.  Sore throat, L ear pain.  Taking in food and fluids.  No vomiting or diarrhea.    No known exposure.  But was around a lot of people at her brother's .    Problem list and histories reviewed & adjusted, as indicated.  Additional history: as documented    Patient Active Problem List   Diagnosis     Fibromyalgia     Non-rheumatic tricuspid valve insufficiency     Papanicolaou smear of cervix with atypical squamous cells cannot exclude high grade squamous intraepithelial lesion (ASC-H)     Major depression in complete remission (H)     Narcolepsy     Hyperlipidemia LDL goal <100     Health Care Home     Reflex sympathetic dystrophy     Contraception     Elevated BMI     Chronic pain-Fibromyalgia     Type 2 diabetes mellitus without complication (H)     Migraine with aura and without status migrainosus, not intractable     Elevated blood pressure reading without diagnosis of hypertension     Pulmonary hypertension (H)     Past Surgical History:   Procedure Laterality Date     Angiogram  3/2011    No pulmonary hypertension     COLONOSCOPY  2008    normal     LAPAROSCOPIC CHOLECYSTECTOMY  2000     SURGICAL HISTORY OF -       essure procedure for contraception     TCA for abnormal pap         Social History   Substance Use Topics     Smoking status: Never Smoker     Smokeless tobacco: Never  Used     Alcohol use 0.0 oz/week     0 Standard drinks or equivalent per week      Comment: rare     Family History   Problem Relation Age of Onset     Respiratory Father      asthma     Arthritis Father      hips     Depression Father      Alcohol/Drug Father      DIABETES Paternal Grandfather      CEREBROVASCULAR DISEASE Paternal Grandfather      Hypertension Paternal Grandfather      Cancer - colorectal Paternal Grandmother      Arthritis Paternal Grandmother      hips     Colon Cancer Paternal Grandmother      Prostate Cancer Maternal Grandfather      Hypertension Maternal Grandfather      CANCER Maternal Grandmother      pancreatic     Arthritis Maternal Grandmother      hips     Depression Maternal Grandmother      Hypertension Maternal Grandmother      Other Cancer Maternal Grandmother      Gallbladder Disease Maternal Grandmother      Depression Mother      Rashes/Skin Problems Mother      Rosacea     Arthritis Mother      osteoarthritis in hands and knees     Hypertension Mother      Depression Maternal Uncle      bipolar     Respiratory Brother      sleep apnea     Cancer - colorectal Maternal Uncle      CEREBROVASCULAR DISEASE Maternal Uncle       of massive stroke--no heart problems     Hypertension Son              Reviewed and updated as needed this visit by clinical staffTobacco  Allergies  Meds  Med Hx  Surg Hx  Fam Hx  Soc Hx      Reviewed and updated as needed this visit by Provider         ROS:  SEE HPI.    OBJECTIVE:     /78  Pulse 94  Temp 98.8  F (37.1  C) (Oral)  Resp 18  Wt 228 lb 11.2 oz (103.7 kg)  SpO2 97%  BMI 38.65 kg/m2  Body mass index is 38.65 kg/(m^2).  GENERAL: healthy, alert and no distress  EYES: Eyes grossly normal to inspection  HENT: ear canals and TM's normal, nose and mouth without ulcers or lesions  NECK: no adenopathy, no asymmetry, masses, or scars and thyroid normal to palpation  RESP: lungs clear to auscultation - no rales, rhonchi or wheezes  CV:  regular rates and rhythm, normal S1 S2, no S3 or S4   ABDOMEN:  BS normal, soft, nontender.  PSYCH: mentation appears normal, affect normal/bright    Diagnostic Test Results:  Results for orders placed or performed in visit on 08/16/17 (from the past 24 hour(s))   Rapid strep screen   Result Value Ref Range    Specimen Description Throat     Rapid Strep A Screen       NEGATIVE: No Group A streptococcal antigen detected by immunoassay, await culture report.       ASSESSMENT/PLAN:   1. Acute pharyngitis, unspecified etiology  RST negative.  Monitor symptoms,  thermometer from pharmacy.  If symptoms persist or worsen she will call or return, or schedule e-visit with PCP.  Symptomatic care.  Pt agrees with plan and verbalized understanding.  - Rapid strep screen  - Beta strep group A culture    JEANE Londono Ra [facility]

## 2017-08-16 NOTE — TELEPHONE ENCOUNTER
Kaylee has a headache fever and upset stomach.  Autumn states that she has a sore throat left side.

## 2017-08-16 NOTE — TELEPHONE ENCOUNTER
Clinic Action Needed:  None FYI  FNA Triage Call  Presenting Problem:    Kaylee tested positive for measles screen (low risk).   Positive symptoms are fever, runny nose cough and was to [facility] in [facility].  Kaylee was vaccinated for measles.  Kaylee's main complaint today is sore throat.  No red eyes and exposure was that she was in   [facility] visiting brother in hospital.  Appointment is set for 2:20pm today with NP Erika Silva.      Routed to:  RN Pool  Please be sure to close this encounter once this patient's issue/question has been addressed.    Irene Wolf RN/[facility]

## 2017-08-16 NOTE — MR AVS SNAPSHOT
After Visit Summary   8/16/2017    Kaylee Lopez    MRN: [id number]           Patient Information     Date Of Birth          1970        Visit Information        Provider Department      8/16/2017 2:20 PM Erika Silva Ra, APRN CNP [facility]        Today's Diagnoses     Acute pharyngitis, unspecified etiology    -  1      Care Instructions      Please continue to monitor symptoms.  I would recommend increasing fluids, rest, and steamy humidification.  Please return to clinic if you notice any change or increase in symptoms.              Follow-ups after your visit        Who to contact     If you have questions or need follow up information about today's clinic visit or your schedule please contact [facility] directly at [phone].  Normal or non-critical lab and imaging results will be communicated to you by [employer], letter or phone within 4 business days after the clinic has received the results. If you do not hear from us within 7 days, please contact the clinic through [employer] or phone. If you have a critical or abnormal lab result, we will notify you by phone as soon as possible.  Submit refill requests through [employer] or call your pharmacy and they will forward the refill request to us. Please allow 3 business days for your refill to be completed.          Additional Information About Your Visit        MyChart Information     [employer] gives you secure access to your electronic health record. If you see a primary care provider, you can also send messages to your care team and make appointments. If you have questions, please call your primary care clinic.  If you do not have a primary care provider, please call [phone] and they will assist you.        Care EveryWhere ID     This is your Care EveryWhere ID. This could be used by other organizations to access your [facility] medical records  [id number]        Your Vitals Were     Pulse Temperature  Respirations Pulse Oximetry BMI (Body Mass Index)       94 98.8  F (37.1  C) (Oral) 18 97% 38.65 kg/m2        Blood Pressure from Last 3 Encounters:   08/16/17 118/78   07/20/17 122/78   05/07/17 133/86    Weight from Last 3 Encounters:   08/16/17 228 lb 11.2 oz (103.7 kg)   07/20/17 230 lb 4.8 oz (104.5 kg)   02/27/17 226 lb 14.4 oz (102.9 kg)              We Performed the Following     Beta strep group A culture     Rapid strep screen        Primary Care Provider Office Phone # Fax #    Irene Castellanos [phone] [address] [facility] [address]        Equal Access to Services     GERARDO CRAWLEY : Hadii aad ku hadasho Soania, waaxda luqadaha, qaybta kaalmada adeegyada, gloria padron . So [facility] [phone].    ATENCIÓN: Si habla español, tiene a garner disposición servicios gratuitos de asistencia lingüística. Llame al [phone].    We comply with applicable federal civil rights laws and [place] laws. We do not discriminate on the basis of race, color, national origin, age, disability sex, sexual orientation or gender identity.            Thank you!     Thank you for choosing [facility]  for your care. Our goal is always to provide you with excellent care. Hearing back from our patients is one way we can continue to improve our services. Please take a few minutes to complete the written survey that you may receive in the mail after your visit with us. Thank you!             Your Updated Medication List - Protect others around you: Learn how to safely use, store and throw away your medicines at www.disposemymeds.org.          This list is accurate as of: 8/16/17  2:57 PM.  Always use your most recent med list.                   Brand Name Dispense Instructions for use Diagnosis    * ACE NOT PRESCRIBED (INTENTIONAL)     0 each    1 each ACE Inhibitor not prescribed due to Other: not indicated    Type 2 diabetes, HbA1c goal < 7% (H)        albuterol 108 (90 BASE) MCG/ACT Inhaler    PROAIR HFA/PROVENTIL HFA/VENTOLIN HFA    3 Inhaler    Inhale 2 puffs into the lungs every 6 hours as needed for shortness of breath / dyspnea or wheezing    Viral URI with cough, Throat pain       * ASPIRIN NOT PRESCRIBED    INTENTIONAL    0 each    1 each continuous prn for other Antiplatelet medication not prescribed intentionally due to not age appropriate    Type 2 diabetes, HbA1c goal < 7% (H)       atorvastatin 10 MG tablet    LIPITOR    90 tablet    Take 1 tablet (10 mg) by mouth daily    Hyperlipidemia LDL goal <100       B-D U/F 31G X 5 MM   Generic drug:  insulin pen needle     100 each    USE 1 PER DAY OR AS DIRECTED    Type 2 diabetes mellitus without complication (H)       * blood glucose calibration solution    no brand specified    1 each    Use to calibrate blood glucose monitor as directed.    Type 2 diabetes, HbA1c goal < 7% (H), Type 2 diabetes mellitus without complication (H)       * blood glucose calibration solution    no brand specified    1 each    Use to calibrate blood glucose monitor as directed.    Type 2 diabetes mellitus without complication, without long-term current use of insulin (H)       blood glucose lancing device    no brand specified    100 each    Use to test blood sugars 3 times daily or as directed.    Type 2 diabetes mellitus without complication, without long-term current use of insulin (H)       * Blood Glucose Monitoring Suppl Misc     1 each    In vitro    Type 2 diabetes, HbA1c goal < 7% (H)       * blood glucose monitoring meter device kit    no brand specified    1 kit    Use to test blood sugar 3 times daily or as directed.    Type 2 diabetes mellitus without complication, without long-term current use of insulin (H)       blood glucose monitoring test strip    no brand specified    100 strip    Use to test blood sugars 3 times daily or as directed    Type 2 diabetes mellitus without complication, without long-term current  use of insulin (H)       cetirizine 10 MG tablet    zyrTEC     Take 10 mg by mouth daily as needed for allergies        citalopram 40 MG tablet    celeXA    90 tablet    Take 1 tablet (40 mg) by mouth daily    Major depression in complete remission (H)       cyclobenzaprine 10 MG tablet    FLEXERIL    90 tablet    TAKE 1 TABLET BY MOUTH 3 TIMES DAILY AS NEEDED FOR MUSCLE SPASMS    Fibromyalgia       metFORMIN 500 MG 24 hr tablet    GLUCOPHAGE-XR    360 tablet    Take 4 tablets (2,000 mg) by mouth daily (with breakfast)    Type 2 diabetes mellitus without complication, without long-term current use of insulin (H)       traMADol 50 MG tablet    ULTRAM    60 tablet    Take 1-2 tablets ( mg) by mouth every 8 hours as needed for pain not to exceed 8 tablets a day; max 60 per month    Fibromyalgia       traZODone 50 MG tablet    DESYREL    45 tablet    TAKE 1.5 TABLETS BY MOUTH BEFORE BEDTIME    Fibromyalgia       VICTOZA PEN 18 MG/3ML soln   Generic drug:  liraglutide     6 mL    INJECT 1.2 MG SUBCUTANEOUS DAILY    Type 2 diabetes mellitus without complication (H)       Vitamin D3 3000 UNITS Tabs           * Notice:  This list has 6 medication(s) that are the same as other medications prescribed for you. Read the directions carefully, and ask your doctor or other care provider to review them with you.

## 2017-08-16 NOTE — TELEPHONE ENCOUNTER
Reason for Disposition    SEVERE (e.g., excruciating) throat pain    Additional Information    Negative: Severe difficulty breathing (e.g., struggling for each breath, speaks in single words, stridor)    Negative: Sounds like a life-threatening emergency to the triager    Negative: [1] Diagnosed strep throat AND [2] taking antibiotic AND [3] symptoms continue    Negative: Throat culture results, call about    Negative: Productive cough is main symptom    Negative: Non-productive cough is main symptom    Negative: Hoarseness is main symptom    Negative: Runny nose is main symptom    Negative: [1] Drooling or spitting out saliva (because can't swallow) AND [2] normal breathing    Negative: Unable to open mouth completely    Negative: [1] Difficulty breathing AND [2] not severe    Negative: Fever > 104 F (40 C)    Negative: [1] Refuses to drink anything AND [2] for > 12 hours    Negative: [1] Drinking very little AND [2] dehydration suspected (e.g., no urine > 12 hours, very dry mouth, very lightheaded)    Negative: Patient sounds very sick or weak to the triager    Protocols used: SORE THROAT-ADULT-  Kaylee tested positive for measles screen.  Positive symptoms are fever, runny nose cough and was [place] [facility] [place] [facility].  Autumn was vaccinated for measles.  Kaylee's main complaint today is sore throat.  No red eyes and exposure was that she was [place]   [facility] [place] in hospital.

## 2017-08-16 NOTE — NURSING NOTE
"Chief Complaint   Patient presents with     URI       Initial /78  Pulse 94  Temp 98.8  F (37.1  C) (Oral)  Resp 18  Wt 228 lb 11.2 oz (103.7 kg)  SpO2 97%  BMI 38.65 kg/m2 Estimated body mass index is 38.65 kg/(m^2) as calculated from the following:    Height as of 7/20/17: 5' 4.5\" (1.638 m).    Weight as of this encounter: 228 lb 11.2 oz (103.7 kg).  Medication Reconciliation: complete   Tiny MACIEL M.A.      "

## 2017-08-18 LAB
BACTERIA SPEC CULT: NORMAL
SPECIMEN SOURCE: NORMAL

## 2017-08-18 RX ORDER — CITALOPRAM HYDROBROMIDE 40 MG/1
TABLET ORAL
Qty: 90 TABLET | Refills: 1 | OUTPATIENT
Start: 2017-08-18

## 2017-08-18 NOTE — TELEPHONE ENCOUNTER
Chart review confirms medication was sent 7/20/17 to requesting pharmacy, 6 month supply. Sent back as denied/duplicate.    Anita Sharp, MSN, RN-BC  Care Coordinator

## 2017-09-05 DIAGNOSIS — E11.9 TYPE 2 DIABETES MELLITUS WITHOUT COMPLICATION (H): ICD-10-CM

## 2017-09-05 NOTE — TELEPHONE ENCOUNTER
VICTOZA PEN 18 MG/3ML soln         Last Written Prescription Date: 6/15/17  Last Fill Quantity: 6ml, # refills: 2  Last Office Visit with G, [facility] or [facility] prescribing provider:  8/16/17        BP Readings from Last 3 Encounters:   08/16/17 118/78   07/20/17 122/78   05/07/17 133/86     Lab Results   Component Value Date    MICROL 26 07/20/2017     Lab Results   Component Value Date    UMALCR 8.81 07/20/2017     Creatinine   Date Value Ref Range Status   08/22/2016 0.80 0.52 - 1.04 mg/dL Final   ]  GFR Estimate   Date Value Ref Range Status   08/22/2016 78 >60 mL/min/1.7m2 Final     Comment:     Non  GFR Calc   03/22/2016 78 >60 mL/min/1.7m2 Final   10/30/2015 83 >60 mL/min/1.7m2 Final     Comment:     Non  GFR Calc     GFR Estimate If Black   Date Value Ref Range Status   08/22/2016 >90   GFR Calc   >60 mL/min/1.7m2 Final   03/22/2016 >90 >60 mL/min/1.7m2 Final   10/30/2015 >90   GFR Calc   >60 mL/min/1.7m2 Final     Lab Results   Component Value Date    CHOL 112 08/22/2016     Lab Results   Component Value Date    HDL 43 08/22/2016     Lab Results   Component Value Date    LDL 46 08/22/2016     Lab Results   Component Value Date    TRIG 115 08/22/2016     Lab Results   Component Value Date    CHOLHDLRATIO 3.0 04/28/2015     Lab Results   Component Value Date    AST 9 08/22/2016     Lab Results   Component Value Date    ALT 21 08/22/2016     Lab Results   Component Value Date    A1C 5.5 02/27/2017    A1C 5.6 08/22/2016    A1C 7.6 04/29/2016    A1C 6.6 10/30/2015    A1C 6.6 04/28/2015     Potassium   Date Value Ref Range Status   08/22/2016 4.2 3.4 - 5.3 mmol/L Final

## 2017-09-07 RX ORDER — LIRAGLUTIDE 6 MG/ML
INJECTION SUBCUTANEOUS
Qty: 6 ML | Refills: 0 | Status: SHIPPED | OUTPATIENT
Start: 2017-09-07 | End: 2017-10-12

## 2017-09-07 NOTE — TELEPHONE ENCOUNTER
Medication is being filled for 1 time refill only due to:  Patient needs labs fasting labs. Patient needs to be seen because diabetic office visit..     Patient informed per [employer] message.    Belkys Calvin RN

## 2017-09-18 DIAGNOSIS — E11.9 TYPE 2 DIABETES MELLITUS WITHOUT COMPLICATION, WITHOUT LONG-TERM CURRENT USE OF INSULIN (H): ICD-10-CM

## 2017-09-18 DIAGNOSIS — E78.5 HYPERLIPIDEMIA LDL GOAL <100: ICD-10-CM

## 2017-09-18 LAB
ALBUMIN SERPL-MCNC: 3.7 G/DL (ref 3.4–5)
ALP SERPL-CCNC: 138 U/L (ref 40–150)
ALT SERPL W P-5'-P-CCNC: 23 U/L (ref 0–50)
ANION GAP SERPL CALCULATED.3IONS-SCNC: 10 MMOL/L (ref 3–14)
AST SERPL W P-5'-P-CCNC: 15 U/L (ref 0–45)
BILIRUB SERPL-MCNC: 0.6 MG/DL (ref 0.2–1.3)
BUN SERPL-MCNC: 13 MG/DL (ref 7–30)
CALCIUM SERPL-MCNC: 8.9 MG/DL (ref 8.5–10.1)
CHLORIDE SERPL-SCNC: 101 MMOL/L (ref 94–109)
CHOLEST SERPL-MCNC: 107 MG/DL
CO2 SERPL-SCNC: 28 MMOL/L (ref 20–32)
CREAT SERPL-MCNC: 0.8 MG/DL (ref 0.52–1.04)
GFR SERPL CREATININE-BSD FRML MDRD: 77 ML/MIN/1.7M2
GLUCOSE SERPL-MCNC: 104 MG/DL (ref 70–99)
HBA1C MFR BLD: 5.6 % (ref 4.3–6)
HDLC SERPL-MCNC: 63 MG/DL
LDLC SERPL CALC-MCNC: 19 MG/DL
NONHDLC SERPL-MCNC: 44 MG/DL
POTASSIUM SERPL-SCNC: 4 MMOL/L (ref 3.4–5.3)
PROT SERPL-MCNC: 7.5 G/DL (ref 6.8–8.8)
SODIUM SERPL-SCNC: 139 MMOL/L (ref 133–144)
TRIGL SERPL-MCNC: 124 MG/DL

## 2017-09-18 PROCEDURE — 80061 LIPID PANEL: CPT | Performed by: FAMILY MEDICINE

## 2017-09-18 PROCEDURE — 80053 COMPREHEN METABOLIC PANEL: CPT | Performed by: FAMILY MEDICINE

## 2017-09-18 PROCEDURE — 83036 HEMOGLOBIN GLYCOSYLATED A1C: CPT | Performed by: FAMILY MEDICINE

## 2017-09-18 PROCEDURE — 36415 COLL VENOUS BLD VENIPUNCTURE: CPT | Performed by: FAMILY MEDICINE

## 2017-09-19 ENCOUNTER — TELEPHONE (OUTPATIENT)
Dept: PHARMACY | Facility: CLINIC | Age: 47
End: 2017-09-19

## 2017-09-19 DIAGNOSIS — E78.5 HYPERLIPIDEMIA LDL GOAL <100: ICD-10-CM

## 2017-09-19 RX ORDER — ATORVASTATIN CALCIUM 10 MG/1
TABLET, FILM COATED ORAL
Qty: 90 TABLET | Refills: 3 | Status: SHIPPED | OUTPATIENT
Start: 2017-09-19 | End: 2018-09-20

## 2017-09-19 NOTE — TELEPHONE ENCOUNTER
atorvastatin (LIPITOR) 10 MG     Last Written Prescription Date: 6/16/17  Last Fill Quantity: 90, # refills: 0  Last Office Visit with G, P or [facility] prescribing provider: 8/16/17       Lab Results   Component Value Date    CHOL 107 09/18/2017     Lab Results   Component Value Date    HDL 63 09/18/2017     Lab Results   Component Value Date    LDL 19 09/18/2017     Lab Results   Component Value Date    TRIG 124 09/18/2017     Lab Results   Component Value Date    CHOLHDLRATIO 3.0 04/28/2015

## 2017-09-19 NOTE — TELEPHONE ENCOUNTER
This patient is due for MT follow-up.     Patient is not reachable after several attempts. We will stop reaching out to the patient at this time. Please let us know if we can assist in this patient's care in the future. Routed to PCP as FYI.    She currently does not have [facility] insurance coverage.    Bertha Wolfe, PharmD [facility]  Medication Therapy Management Practitioner   #[phone]

## 2017-09-19 NOTE — TELEPHONE ENCOUNTER
Prescription approved per [facility] Refill Protocol.  Mariajose Ahmadi RN

## 2017-09-25 ENCOUNTER — HOSPITAL ENCOUNTER (OUTPATIENT)
Dept: MAMMOGRAPHY | Facility: CLINIC | Age: 47
Discharge: HOME | End: 2017-09-25
Attending: FAMILY MEDICINE | Admitting: FAMILY MEDICINE
Payer: COMMERCIAL

## 2017-09-25 DIAGNOSIS — Z12.31 VISIT FOR SCREENING MAMMOGRAM: ICD-10-CM

## 2017-09-25 PROCEDURE — G0202 SCR MAMMO BI INCL CAD: HCPCS

## 2017-09-25 PROCEDURE — 77063 BREAST TOMOSYNTHESIS BI: CPT

## 2017-09-28 ENCOUNTER — HOSPITAL ENCOUNTER (OUTPATIENT)
Dept: ULTRASOUND IMAGING | Facility: CLINIC | Age: 47
Discharge: HOME | End: 2017-09-28
Attending: FAMILY MEDICINE | Admitting: FAMILY MEDICINE
Payer: COMMERCIAL

## 2017-09-28 DIAGNOSIS — R92.8 ABNORMAL MAMMOGRAM: ICD-10-CM

## 2017-09-28 PROCEDURE — 76642 ULTRASOUND BREAST LIMITED: CPT | Mod: LT

## 2017-10-02 ENCOUNTER — MYC REFILL (OUTPATIENT)
Dept: FAMILY MEDICINE | Facility: CLINIC | Age: 47
End: 2017-10-02

## 2017-10-02 ENCOUNTER — MYC MEDICAL ADVICE (OUTPATIENT)
Dept: FAMILY MEDICINE | Facility: CLINIC | Age: 47
End: 2017-10-02

## 2017-10-02 DIAGNOSIS — E11.9 TYPE 2 DIABETES MELLITUS WITHOUT COMPLICATION (H): ICD-10-CM

## 2017-10-03 NOTE — TELEPHONE ENCOUNTER
Message from [employer]:  Original authorizing provider: Irene Castellanos MD, MD Kaylee Lopez would like a refill of the following medications:  B-D U/F insulin pen needle [Irene Castellanos MD, MD]    Preferred pharmacy: [facility] [address] [facility] [address] [facility]    Comment:

## 2017-10-11 DIAGNOSIS — E11.9 TYPE 2 DIABETES MELLITUS WITHOUT COMPLICATION, WITHOUT LONG-TERM CURRENT USE OF INSULIN (H): ICD-10-CM

## 2017-10-11 RX ORDER — METFORMIN HCL 500 MG
TABLET, EXTENDED RELEASE 24 HR ORAL
Qty: 360 TABLET | Refills: 1 | Status: SHIPPED | OUTPATIENT
Start: 2017-10-11 | End: 2018-03-27

## 2017-10-12 DIAGNOSIS — E11.9 TYPE 2 DIABETES MELLITUS WITHOUT COMPLICATION, WITHOUT LONG-TERM CURRENT USE OF INSULIN (H): Primary | ICD-10-CM

## 2017-10-12 NOTE — TELEPHONE ENCOUNTER
VICTOZA PEN 18 MG/3ML soln         Last Written Prescription Date: 9/7/17  Last Fill Quantity: 6ml, # refills: 0  Last Office Visit with G, [facility] or [facility] prescribing provider:  8/16/17        BP Readings from Last 3 Encounters:   08/16/17 118/78   07/20/17 122/78   05/07/17 133/86     Lab Results   Component Value Date    MICROL 26 07/20/2017     Lab Results   Component Value Date    UMALCR 8.81 07/20/2017     Creatinine   Date Value Ref Range Status   09/18/2017 0.80 0.52 - 1.04 mg/dL Final   ]  GFR Estimate   Date Value Ref Range Status   09/18/2017 77 >60 mL/min/1.7m2 Final     Comment:     Non  GFR Calc   08/22/2016 78 >60 mL/min/1.7m2 Final     Comment:     Non  GFR Calc   03/22/2016 78 >60 mL/min/1.7m2 Final     GFR Estimate If Black   Date Value Ref Range Status   09/18/2017 >90 >60 mL/min/1.7m2 Final     Comment:      GFR Calc   08/22/2016 >90   GFR Calc   >60 mL/min/1.7m2 Final   03/22/2016 >90 >60 mL/min/1.7m2 Final     Lab Results   Component Value Date    CHOL 107 09/18/2017     Lab Results   Component Value Date    HDL 63 09/18/2017     Lab Results   Component Value Date    LDL 19 09/18/2017     Lab Results   Component Value Date    TRIG 124 09/18/2017     Lab Results   Component Value Date    CHOLHDLRATIO 3.0 04/28/2015     Lab Results   Component Value Date    AST 15 09/18/2017     Lab Results   Component Value Date    ALT 23 09/18/2017     Lab Results   Component Value Date    A1C 5.6 09/18/2017    A1C 5.5 02/27/2017    A1C 5.6 08/22/2016    A1C 7.6 04/29/2016    A1C 6.6 10/30/2015     Potassium   Date Value Ref Range Status   09/18/2017 4.0 3.4 - 5.3 mmol/L Final

## 2017-10-13 RX ORDER — LIRAGLUTIDE 6 MG/ML
1.2 INJECTION SUBCUTANEOUS DAILY
Qty: 18 ML | Refills: 0 | Status: SHIPPED | OUTPATIENT
Start: 2017-10-13 | End: 2018-01-10

## 2017-10-13 NOTE — TELEPHONE ENCOUNTER
Routing refill request to provider for review/approval because:  Kelli given x1 and patient did not follow up, please advise

## 2017-10-24 ENCOUNTER — OFFICE VISIT (OUTPATIENT)
Dept: FAMILY MEDICINE | Facility: CLINIC | Age: 47
End: 2017-10-24
Payer: COMMERCIAL

## 2017-10-24 VITALS
DIASTOLIC BLOOD PRESSURE: 78 MMHG | RESPIRATION RATE: 18 BRPM | WEIGHT: 228.4 LBS | HEIGHT: 65 IN | BODY MASS INDEX: 38.05 KG/M2 | OXYGEN SATURATION: 97 % | SYSTOLIC BLOOD PRESSURE: 122 MMHG | HEART RATE: 87 BPM | TEMPERATURE: 98.7 F

## 2017-10-24 DIAGNOSIS — H00.014 HORDEOLUM EXTERNUM OF LEFT UPPER EYELID: Primary | ICD-10-CM

## 2017-10-24 PROCEDURE — 99213 OFFICE O/P EST LOW 20 MIN: CPT | Performed by: PHYSICIAN ASSISTANT

## 2017-10-24 NOTE — NURSING NOTE
"Chief Complaint   Patient presents with     Eye Problem       Initial /78 (BP Location: Right arm, Patient Position: Chair, Cuff Size: Adult Large)  Pulse 87  Temp 98.7  F (37.1  C) (Oral)  Resp 18  Ht 5' 4.5\" (1.638 m)  Wt 228 lb 6.4 oz (103.6 kg)  LMP 10/10/2017 (Exact Date)  SpO2 97%  Breastfeeding? No  BMI 38.6 kg/m2 Estimated body mass index is 38.6 kg/(m^2) as calculated from the following:    Height as of this encounter: 5' 4.5\" (1.638 m).    Weight as of this encounter: 228 lb 6.4 oz (103.6 kg).  Medication Reconciliation: complete   Anu Cosme MA      "

## 2017-10-24 NOTE — MR AVS SNAPSHOT
"              After Visit Summary   10/24/2017    Kaylee Lopez    MRN: [id number]           Patient Information     Date Of Birth          1970        Visit Information        Provider Department      10/24/2017 9:10 AM Roseanne Suero PA-C [facility]        Today's Diagnoses     Hordeolum externum of left upper eyelid    -  1       Follow-ups after your visit        Who to contact     If you have questions or need follow up information about today's clinic visit or your schedule please contact [facility] directly at [phone].  Normal or non-critical lab and imaging results will be communicated to you by [employer], letter or phone within 4 business days after the clinic has received the results. If you do not hear from us within 7 days, please contact the clinic through TrackaPh[employer] or phone. If you have a critical or abnormal lab result, we will notify you by phone as soon as possible.  Submit refill requests through [employer] or call your pharmacy and they will forward the refill request to us. Please allow 3 business days for your refill to be completed.          Additional Information About Your Visit        MyChart Information     [employer] gives you secure access to your electronic health record. If you see a primary care provider, you can also send messages to your care team and make appointments. If you have questions, please call your primary care clinic.  If you do not have a primary care provider, please call [phone] and they will assist you.        Care EveryWhere ID     This is your Care EveryWhere ID. This could be used by other organizations to access your [facility] medical records  [id number]        Your Vitals Were     Pulse Temperature Respirations Height Last Period Pulse Oximetry    87 98.7  F (37.1  C) (Oral) 18 5' 4.5\" (1.638 m) 10/10/2017 (Exact Date) 97%    Breastfeeding? BMI (Body Mass Index)                No 38.6 kg/m2           Blood " Pressure from Last 3 Encounters:   10/24/17 122/78   08/16/17 118/78   07/20/17 122/78    Weight from Last 3 Encounters:   10/24/17 228 lb 6.4 oz (103.6 kg)   08/16/17 228 lb 11.2 oz (103.7 kg)   07/20/17 230 lb 4.8 oz (104.5 kg)              Today, you had the following     No orders found for display       Primary Care Provider Office Phone # Fax #    Irene Castellanos [phone]       [address] [facility] [address]        Equal Access to Services     [facility]: Hadii aad ku hadasho Soomaali, waaxda luqadaha, qaybta kaalmada adeegyada, gloria aguero adepaul padron . So [facility] [phone].    ATENCIÓN: Si habla español, tiene a garner disposición servicios gratuitos de asistencia lingüística. Llame al [phone].    We comply with applicable federal civil rights laws and [place] laws. We do not discriminate on the basis of race, color, national origin, age, disability, sex, sexual orientation, or gender identity.            Thank you!     Thank you for choosing [facility]  for your care. Our goal is always to provide you with excellent care. Hearing back from our patients is one way we can continue to improve our services. Please take a few minutes to complete the written survey that you may receive in the mail after your visit with us. Thank you!             Your Updated Medication List - Protect others around you: Learn how to safely use, store and throw away your medicines at www.disposemymeds.org.          This list is accurate as of: 10/24/17  9:18 AM.  Always use your most recent med list.                   Brand Name Dispense Instructions for use Diagnosis    * ACE NOT PRESCRIBED (INTENTIONAL)     0 each    1 each ACE Inhibitor not prescribed due to Other: not indicated    Type 2 diabetes, HbA1c goal < 7% (H)       albuterol 108 (90 BASE) MCG/ACT Inhaler    PROAIR HFA/PROVENTIL HFA/VENTOLIN HFA    3 Inhaler    Inhale 2 puffs into the lungs every 6 hours as  needed for shortness of breath / dyspnea or wheezing    Viral URI with cough, Throat pain       * ASPIRIN NOT PRESCRIBED    INTENTIONAL    0 each    1 each continuous prn for other Antiplatelet medication not prescribed intentionally due to not age appropriate    Type 2 diabetes, HbA1c goal < 7% (H)       atorvastatin 10 MG tablet    LIPITOR    90 tablet    TAKE 1 TABLET BY MOUTH EVERY DAY    Hyperlipidemia LDL goal <100       * blood glucose calibration solution    no brand specified    1 each    Use to calibrate blood glucose monitor as directed.    Type 2 diabetes, HbA1c goal < 7% (H), Type 2 diabetes mellitus without complication (H)       * blood glucose calibration solution    no brand specified    1 each    Use to calibrate blood glucose monitor as directed.    Type 2 diabetes mellitus without complication, without long-term current use of insulin (H)       blood glucose lancing device    no brand specified    100 each    Use to test blood sugars 3 times daily or as directed.    Type 2 diabetes mellitus without complication, without long-term current use of insulin (H)       * Blood Glucose Monitoring Suppl Misc     1 each    In vitro    Type 2 diabetes, HbA1c goal < 7% (H)       * blood glucose monitoring meter device kit    no brand specified    1 kit    Use to test blood sugar 3 times daily or as directed.    Type 2 diabetes mellitus without complication, without long-term current use of insulin (H)       blood glucose monitoring test strip    no brand specified    100 strip    Use to test blood sugars 3 times daily or as directed    Type 2 diabetes mellitus without complication, without long-term current use of insulin (H)       cetirizine 10 MG tablet    zyrTEC     Take 10 mg by mouth daily as needed for allergies        citalopram 40 MG tablet    celeXA    90 tablet    Take 1 tablet (40 mg) by mouth daily    Major depression in complete remission (H)       cyclobenzaprine 10 MG tablet    FLEXERIL    90  tablet    TAKE 1 TABLET BY MOUTH 3 TIMES DAILY AS NEEDED FOR MUSCLE SPASMS    Fibromyalgia       insulin pen needle 31G X 5 MM    B-D U/F    100 each    Use 1 pen needles daily or as directed.    Type 2 diabetes mellitus without complication (H)       liraglutide 18 MG/3ML soln    VICTOZA PEN    18 mL    Inject 1.2 mg Subcutaneous daily    Type 2 diabetes mellitus without complication, without long-term current use of insulin (H)       metFORMIN 500 MG 24 hr tablet    GLUCOPHAGE-XR    360 tablet    TAKE 4 TABLETS BY MOUTH DAILY WITH BREAKFAST    Type 2 diabetes mellitus without complication, without long-term current use of insulin (H)       traMADol 50 MG tablet    ULTRAM    60 tablet    Take 1-2 tablets ( mg) by mouth every 8 hours as needed for pain not to exceed 8 tablets a day; max 60 per month    Fibromyalgia       traZODone 50 MG tablet    DESYREL    45 tablet    TAKE 1.5 TABLETS BY MOUTH BEFORE BEDTIME    Fibromyalgia       Vitamin D3 3000 UNITS Tabs           * Notice:  This list has 6 medication(s) that are the same as other medications prescribed for you. Read the directions carefully, and ask your doctor or other care provider to review them with you.

## 2017-10-24 NOTE — PROGRESS NOTES
SUBJECTIVE:   Kaylee Lopez is a 47 year old female who presents to clinic today for the following health issues:      Eye(s) Problem      Duration: this AM, but pt thought she had a small stye last night    Description:  Location: left  Pain: YES  Redness: no   Discharge: YES    Accompanying signs and symptoms: very swollen, was having some blurred vision but not anymore    History (Trauma, foreign body exposure,): None    Precipitating or alleviating factors (contact use): hot compress to reduce swelling    Therapies tried and outcome: hot compress, helped with swelling    Patient is here today for evaluation of left eyelid swelling  Woke up this morning with it swollen   She notes it has improved since she started using hot compresses  No fever, chills, no eye pain, had some blurry vision initially, but that has resolved  No light sensitivity  Notes no copious drainage, there was some dried drainage along eye lid this am  Hx of stye in past    Problem list and histories reviewed & adjusted, as indicated.  Additional history: as documented    Patient Active Problem List   Diagnosis     Fibromyalgia     Non-rheumatic tricuspid valve insufficiency     Papanicolaou smear of cervix with atypical squamous cells cannot exclude high grade squamous intraepithelial lesion (ASC-H)     Major depression in complete remission (H)     Narcolepsy     Hyperlipidemia LDL goal <100     Health Care Home     Reflex sympathetic dystrophy     Contraception     Elevated BMI     Chronic pain-Fibromyalgia     Type 2 diabetes mellitus without complication (H)     Migraine with aura and without status migrainosus, not intractable     Elevated blood pressure reading without diagnosis of hypertension     Pulmonary hypertension     Past Surgical History:   Procedure Laterality Date     Angiogram  3/2011    No pulmonary hypertension     COLONOSCOPY  1/2008    normal     LAPAROSCOPIC CHOLECYSTECTOMY  7/2000     SURGICAL HISTORY OF -        essure procedure for contraception     TCA for abnormal pap         Social History   Substance Use Topics     Smoking status: Never Smoker     Smokeless tobacco: Never Used     Alcohol use 0.0 oz/week     0 Standard drinks or equivalent per week      Comment: rare     Family History   Problem Relation Age of Onset     Respiratory Father      asthma     Arthritis Father      hips     Depression Father      Alcohol/Drug Father      DIABETES Paternal Grandfather      CEREBROVASCULAR DISEASE Paternal Grandfather      Hypertension Paternal Grandfather      Cancer - colorectal Paternal Grandmother      Arthritis Paternal Grandmother      hips     Colon Cancer Paternal Grandmother      Prostate Cancer Maternal Grandfather      Hypertension Maternal Grandfather      CANCER Maternal Grandmother      pancreatic     Arthritis Maternal Grandmother      hips     Depression Maternal Grandmother      Hypertension Maternal Grandmother      Other Cancer Maternal Grandmother      Gallbladder Disease Maternal Grandmother      Depression Mother      Rashes/Skin Problems Mother      Rosacea     Arthritis Mother      osteoarthritis in hands and knees     Hypertension Mother      Depression Maternal Uncle      bipolar     Respiratory Brother      sleep apnea     Cancer - colorectal Maternal Uncle      CEREBROVASCULAR DISEASE Maternal Uncle       of massive stroke--no heart problems     Hypertension Son          Current Outpatient Prescriptions   Medication Sig Dispense Refill     liraglutide (VICTOZA PEN) 18 MG/3ML soln Inject 1.2 mg Subcutaneous daily 18 mL 0     metFORMIN (GLUCOPHAGE-XR) 500 MG 24 hr tablet TAKE 4 TABLETS BY MOUTH DAILY WITH BREAKFAST 360 tablet 1     insulin pen needle (B-D U/F) 31G X 5 MM Use 1 pen needles daily or as directed. 100 each 3     atorvastatin (LIPITOR) 10 MG tablet TAKE 1 TABLET BY MOUTH EVERY DAY 90 tablet 3     citalopram (CELEXA) 40 MG tablet Take 1 tablet (40 mg) by mouth daily 90 tablet 1      blood glucose monitoring (NO BRAND SPECIFIED) test strip Use to test blood sugars 3 times daily or as directed 100 strip 5     blood glucose monitoring (NO BRAND SPECIFIED) meter device kit Use to test blood sugar 3 times daily or as directed. 1 kit 0     blood glucose (NO BRAND SPECIFIED) lancing device Use to test blood sugars 3 times daily or as directed. 100 each 5     blood glucose calibration (NO BRAND SPECIFIED) solution Use to calibrate blood glucose monitor as directed. 1 each 0     traZODone (DESYREL) 50 MG tablet TAKE 1.5 TABLETS BY MOUTH BEFORE BEDTIME 45 tablet 5     cyclobenzaprine (FLEXERIL) 10 MG tablet TAKE 1 TABLET BY MOUTH 3 TIMES DAILY AS NEEDED FOR MUSCLE SPASMS 90 tablet 1     blood glucose calibration (NO BRAND SPECIFIED) solution Use to calibrate blood glucose monitor as directed. 1 each 0     traMADol (ULTRAM) 50 MG tablet Take 1-2 tablets ( mg) by mouth every 8 hours as needed for pain not to exceed 8 tablets a day; max 60 per month 60 tablet 1     albuterol (PROAIR HFA/PROVENTIL HFA/VENTOLIN HFA) 108 (90 BASE) MCG/ACT Inhaler Inhale 2 puffs into the lungs every 6 hours as needed for shortness of breath / dyspnea or wheezing 3 Inhaler 1     cetirizine (ZYRTEC) 10 MG tablet Take 10 mg by mouth daily as needed for allergies       Cholecalciferol (VITAMIN D3) 3000 UNITS TABS        Blood Glucose Monitoring Suppl MISC In vitro 1 each 3     ACE NOT PRESCRIBED, INTENTIONAL, 1 each ACE Inhibitor not prescribed due to Other: not indicated 0 each 0     ASPIRIN NOT PRESCRIBED, INTENTIONAL, 1 each continuous prn for other Antiplatelet medication not prescribed intentionally due to not age appropriate 0 each 0     Allergies   Allergen Reactions     Codeine Nausea     nausea         Reviewed and updated as needed this visit by clinical staffTobacco  Allergies  Med Hx  Surg Hx  Fam Hx  Soc Hx      Reviewed and updated as needed this visit by Provider         ROS:  Constitutional, HEENT,  "cardiovascular, pulmonary, gi and gu systems are negative, except as otherwise noted.      OBJECTIVE:   /78 (BP Location: Right arm, Patient Position: Chair, Cuff Size: Adult Large)  Pulse 87  Temp 98.7  F (37.1  C) (Oral)  Resp 18  Ht 5' 4.5\" (1.638 m)  Wt 228 lb 6.4 oz (103.6 kg)  LMP 10/10/2017 (Exact Date)  SpO2 97%  Breastfeeding? No  BMI 38.6 kg/m2  Body mass index is 38.6 kg/(m^2).  GENERAL: healthy, alert and no distress  EYES: PERRL, EOMI, visual fields normal, conjunctivae and sclerae normal and eyelids- stye on let upper inner lid  NECK: no adenopathy, no asymmetry, masses, or scars and thyroid normal to palpation  RESP: lungs clear to auscultation - no rales, rhonchi or wheezes  CV: regular rate and rhythm, normal S1 S2, no S3 or S4, no murmur, click or rub, no peripheral edema and peripheral pulses strong  MS: no gross musculoskeletal defects noted, no edema    Diagnostic Test Results:  none     ASSESSMENT/PLAN:     1. Hordeolum externum of left upper eyelid  New problem, recommend she continue warm compresses. Advised against use of antibiotic drops or ointment. Recommend f/u in clinic if increased redness, pain, swelling, vision changes.      Risks, benefits and alternatives were discussed with patient. Agreeable to the plan of care.      Roseanne Suero PA-C  [facility]    "

## 2017-12-03 DIAGNOSIS — M79.7 FIBROMYALGIA: ICD-10-CM

## 2017-12-03 DIAGNOSIS — G89.29 CHRONIC PAIN: ICD-10-CM

## 2017-12-04 RX ORDER — CYCLOBENZAPRINE HCL 10 MG
TABLET ORAL
Qty: 90 TABLET | Refills: 1 | Status: SHIPPED | OUTPATIENT
Start: 2017-12-04 | End: 2018-04-23

## 2017-12-04 RX ORDER — TRAMADOL HYDROCHLORIDE 50 MG/1
TABLET ORAL
Qty: 60 TABLET | Refills: 0 | Status: SHIPPED | OUTPATIENT
Start: 2017-12-04 | End: 2018-01-09

## 2017-12-04 NOTE — TELEPHONE ENCOUNTER
Called and talked to pt, notified of approved rx ready for pick-up.  Anu Cosme CMA ([facility])

## 2017-12-04 NOTE — TELEPHONE ENCOUNTER
TRAMADOL 50 MG - MAX 8 TABS/DAY, 60 TABS/MONTH  Last rx written: 6/16/17 # 60 w/ 1 refills    **[place]  reviewed- last filled: 9/1, 6/21  Narc agreement: #60 for 30 days    CYCLOBENZAPRINE 10 MG TID PRN  Last rx written: 6/19/17 # 90 w/ 1 refill      Last OV: 10/24/17  for hordeolum    Unable to fill per standing order routed to Dr. Castellanos for approval.    [facility] to [facility].      Problem List Complete:  Yes   checked in past 6 months?  Yes 12/4/17    Tonia Acuna RN

## 2018-01-09 DIAGNOSIS — M79.7 FIBROMYALGIA: ICD-10-CM

## 2018-01-09 DIAGNOSIS — F32.5 MAJOR DEPRESSION IN COMPLETE REMISSION (H): ICD-10-CM

## 2018-01-09 NOTE — TELEPHONE ENCOUNTER
"Requested Prescriptions   Pending Prescriptions Disp Refills     citalopram (CELEXA) 40 MG tablet [Pharmacy Med Name: CITALOPRAM HBR 40 MG TABLET]  Last Written Prescription Date:  7/20/17  Last Fill Quantity: 90,  # refills: 1   Last Office Visit with [facility], [facility] or [facility] prescribing provider:  10/24/2017   Future Office Visit:    Next 5 appointments (look out 90 days)     Mar 16, 2018  3:45 PM CDT   Return Visit with Frederic Ag MD   [facility] ([facility])    [address] [place] [id number] [phone]                  90 tablet 1     Sig: TAKE 1 TABLET BY MOUTH DAILY    SSRIs Protocol Passed    1/9/2018  1:02 AM       Passed - PHQ-9 score less than 5 in past 6 months    The PHQ-9 criteria is meant to fail. It requires a PHQ-9 score review  PHQ-9 SCORE 5/3/2016 12/13/2016 7/20/2017   Total Score - - -   Total Score MyChart - 1 (Minimal depression) -   Total Score 3 1 2     MICHAEL-7 SCORE 5/3/2016 12/13/2016 7/20/2017   Total Score - - -   Total Score - 1 (minimal anxiety) -   Total Score 0 1 4              Passed - Patient is age 18 or older       Passed - No active pregnancy on record       Passed - No positive pregnancy test in last 12 months       Passed - Recent (6 mo) or future visit with authorizing provider's specialty    Patient had office visit in the last 6 months or has a visit in the next 30 days with authorizing provider.  See \"Patient Info\" tab in inbasket, or \"Choose Columns\" in Meds & Orders section of the refill encounter.                 traZODone (DESYREL) 50 MG tablet [Pharmacy Med Name: TRAZODONE 50 MG TABLET]  Last Written Prescription Date:  7/13/17  Last Fill Quantity: 45,  # refills: 5   Last Office Visit with [facility], [facility] or [facility] prescribing provider:  10/24/2017     Future Office Visit:    Next 5 appointments (look out 90 days)     Mar 16, 2018  3:45 PM CDT   Return Visit with Frederic Ag, " "MD   [facility] ([facility])    [address] [address]  [place] [address] [phone]                  45 tablet 5     Sig: TAKE 1.5 TABLETS BY MOUTH BEFORE BEDTIME    Serotonin Modulators Passed    1/9/2018  1:02 AM       Passed - Recent or future visit with authorizing provider's specialty    Patient had office visit in the last year or has a visit in the next 30 days with authorizing provider.  See \"Patient Info\" tab in inbasket, or \"Choose Columns\" in Meds & Orders section of the refill encounter.              Passed - Patient is age 18 or older       Passed - No active pregnancy on record       Passed - No positive pregnancy test in past 12 months          "

## 2018-01-10 DIAGNOSIS — E11.9 TYPE 2 DIABETES MELLITUS WITHOUT COMPLICATION, WITHOUT LONG-TERM CURRENT USE OF INSULIN (H): ICD-10-CM

## 2018-01-10 NOTE — TELEPHONE ENCOUNTER
Requested Prescriptions   Pending Prescriptions Disp Refills     VICTOZA PEN 18 MG/3ML soln [Pharmacy Med Name: VICTOZA 2-ANDREW 18 MG/3 ML PEN]    Last Written Prescription Date:  10/13/2017  Last Fill Quantity: 18 ml,  # refills: 0   Last Office Visit with [facility], [facility] or [facility] prescribing provider:  10/24/2017   Future Office Visit:    Next 5 appointments (look out 90 days)     Mar 16, 2018  3:45 PM CDT   Return Visit with Frederic gA MD   [facility] ([facility])    [address] [place] [id number] [phone]                   0     Sig: INJECT 1.2 MG SUBCUTANEOUS DAILY    GLP-1 Agonists Protocol Passed    1/10/2018  1:11 AM       Passed - Blood pressure less than 140/90 in past 6 months    BP Readings from Last 3 Encounters:   10/24/17 122/78   08/16/17 118/78   07/20/17 122/78                Passed - LDL on file in past 12 months    Recent Labs   Lab Test  09/18/17   0754   LDL  19            Passed - Microalbumin on file in past 12 months    Recent Labs   Lab Test  07/20/17   1631   MICROL  26   UMALCR  8.81            Passed - HgbA1C in past 3 or 6 months    Recent Labs   Lab Test  09/18/17   0754   A1C  5.6            Passed - Patient is age 18 or older       Passed - No active pregnancy on record       Passed - A normal serum creatinine on file in past 12 months    Recent Labs   Lab Test  09/18/17   0754   CR  0.80            Passed - No positive pregnancy test in past 12 months       Passed - Recent visit with authorizing provider's specialty in past 6 months    IV to PO - Antibiotics     None

## 2018-01-12 ENCOUNTER — MYC MEDICAL ADVICE (OUTPATIENT)
Dept: FAMILY MEDICINE | Facility: CLINIC | Age: 48
End: 2018-01-12

## 2018-01-12 RX ORDER — TRAZODONE HYDROCHLORIDE 50 MG/1
TABLET, FILM COATED ORAL
Qty: 45 TABLET | Refills: 5 | OUTPATIENT
Start: 2018-01-12

## 2018-01-12 RX ORDER — CITALOPRAM HYDROBROMIDE 40 MG/1
TABLET ORAL
Qty: 90 TABLET | Refills: 1 | Status: SHIPPED | OUTPATIENT
Start: 2018-01-12 | End: 2018-06-26

## 2018-01-12 RX ORDER — LIRAGLUTIDE 6 MG/ML
INJECTION SUBCUTANEOUS
Qty: 18 ML | Refills: 1 | Status: SHIPPED | OUTPATIENT
Start: 2018-01-12 | End: 2018-06-27

## 2018-01-12 ASSESSMENT — ANXIETY QUESTIONNAIRES
GAD7 TOTAL SCORE: 1
5. BEING SO RESTLESS THAT IT IS HARD TO SIT STILL: NOT AT ALL
4. TROUBLE RELAXING: SEVERAL DAYS
6. BECOMING EASILY ANNOYED OR IRRITABLE: NOT AT ALL
1. FEELING NERVOUS, ANXIOUS, OR ON EDGE: NOT AT ALL
GAD7 TOTAL SCORE: 1
3. WORRYING TOO MUCH ABOUT DIFFERENT THINGS: NOT AT ALL
7. FEELING AFRAID AS IF SOMETHING AWFUL MIGHT HAPPEN: NOT AT ALL
2. NOT BEING ABLE TO STOP OR CONTROL WORRYING: NOT AT ALL
GAD7 TOTAL SCORE: 1
7. FEELING AFRAID AS IF SOMETHING AWFUL MIGHT HAPPEN: NOT AT ALL

## 2018-01-12 ASSESSMENT — PATIENT HEALTH QUESTIONNAIRE - PHQ9
SUM OF ALL RESPONSES TO PHQ QUESTIONS 1-9: 2
10. IF YOU CHECKED OFF ANY PROBLEMS, HOW DIFFICULT HAVE THESE PROBLEMS MADE IT FOR YOU TO DO YOUR WORK, TAKE CARE OF THINGS AT HOME, OR GET ALONG WITH OTHER PEOPLE: SOMEWHAT DIFFICULT
SUM OF ALL RESPONSES TO PHQ QUESTIONS 1-9: 2

## 2018-01-12 NOTE — TELEPHONE ENCOUNTER
Patient due for an updated PHQ 9/MICHAEL 7. This is sent through  My chart. Trazodone was filled on 1/10/18.  Linda Miles, RN  Triage Nurse

## 2018-01-13 ASSESSMENT — ANXIETY QUESTIONNAIRES: GAD7 TOTAL SCORE: 1

## 2018-01-13 ASSESSMENT — PATIENT HEALTH QUESTIONNAIRE - PHQ9: SUM OF ALL RESPONSES TO PHQ QUESTIONS 1-9: 2

## 2018-03-08 DIAGNOSIS — M79.7 FIBROMYALGIA: ICD-10-CM

## 2018-03-08 NOTE — TELEPHONE ENCOUNTER
"Requested Prescriptions   Pending Prescriptions Disp Refills     traZODone (DESYREL) 50 MG tablet [Pharmacy Med Name: TRAZODONE 50 MG TABLET]  Last Written Prescription Date:  1/10/18  Last Fill Quantity: 45,  # refills: 1   Last office visit: 10/24/2017 with prescribing provider:  10/24/2017     Future Office Visit:   Next 5 appointments (look out 90 days)     Mar 16, 2018  3:45 PM CDT   Return Visit with Frederic Ag MD   [facility] ([facility])    [address] [place] [id number] [phone]                  45 tablet 1     Sig: TAKE 1.5 TABLETS (75 MG) BY MOUTH NIGHTLY AS NEEDED FOR SLEEP    Serotonin Modulators Passed    3/8/2018  1:39 AM       Passed - Recent (12 mo) or future (30 days) visit within the authorizing provider's specialty    Patient had office visit in the last year or has a visit in the next 30 days with authorizing provider.  See \"Patient Info\" tab in inbasket, or \"Choose Columns\" in Meds & Orders section of the refill encounter.            Passed - Patient is age 18 or older       Passed - No active pregnancy on record       Passed - No positive pregnancy test in past 12 months          "

## 2018-03-09 RX ORDER — TRAZODONE HYDROCHLORIDE 50 MG/1
TABLET, FILM COATED ORAL
Qty: 45 TABLET | Refills: 1 | Status: SHIPPED | OUTPATIENT
Start: 2018-03-09 | End: 2018-05-05

## 2018-03-09 NOTE — TELEPHONE ENCOUNTER
Prescription approved per [facility] Refill Protocol.  Patient is due for a diabetic check and labs this month.   Sent my chart message.   Linda Miles RN  Triage Nurse

## 2018-03-15 ENCOUNTER — HOSPITAL ENCOUNTER (OUTPATIENT)
Dept: CARDIOLOGY | Facility: CLINIC | Age: 48
Discharge: HOME | End: 2018-03-15
Admitting: FAMILY MEDICINE
Payer: COMMERCIAL

## 2018-03-15 DIAGNOSIS — I07.9 DISEASE OF TRICUSPID VALVE: ICD-10-CM

## 2018-03-15 DIAGNOSIS — E11.9 TYPE 2 DIABETES MELLITUS WITHOUT COMPLICATION, WITHOUT LONG-TERM CURRENT USE OF INSULIN (H): ICD-10-CM

## 2018-03-15 DIAGNOSIS — I27.20 PULMONARY HYPERTENSION (H): ICD-10-CM

## 2018-03-15 PROCEDURE — 93306 TTE W/DOPPLER COMPLETE: CPT | Mod: 26 | Performed by: INTERNAL MEDICINE

## 2018-03-15 PROCEDURE — 25500064 ZZH RX 255 OP 636: Performed by: INTERNAL MEDICINE

## 2018-03-15 PROCEDURE — 40000264 ECHO COMPLETE WITH LUMASON

## 2018-03-15 RX ADMIN — SULFUR HEXAFLUORIDE 5 ML: KIT at 09:11

## 2018-03-15 NOTE — TELEPHONE ENCOUNTER
"Requested Prescriptions   Pending Prescriptions Disp Refills     RAJNI CONTOUR NEXT test strip [Pharmacy Med Name: CONTOUR NEXT STRIPS]    Last Written Prescription Date:  7/17/2017  Last Fill Quantity: 100,  # refills: 5   Last office visit: 10/24/2017 with prescribing provider:  Roseanne Suero PA-C    Future Office Visit:   Next 5 appointments (look out 90 days)     Mar 16, 2018  3:45 PM CDT   Return Visit with Frederic Ag MD   [facility] ([facility])    [address] [place] [id number] [phone]                  100 strip 5     Sig: USE TO TEST BLOOD SUGARS 3 TIMES DAILY OR AS DIRECTED    Diabetic Supplies Protocol Passed    3/15/2018  3:44 AM       Passed - Patient is 18 years of age or older       Passed - Recent (6 mo) or future (30 days) visit within the authorizing provider's specialty    Patient had office visit in the last 6 months or has a visit in the next 30 days with authorizing provider.  See \"Patient Info\" tab in inbasket, or \"Choose Columns\" in Meds & Orders section of the refill encounter.              "

## 2018-03-16 ENCOUNTER — OFFICE VISIT (OUTPATIENT)
Dept: CARDIOLOGY | Facility: CLINIC | Age: 48
End: 2018-03-16
Attending: INTERNAL MEDICINE
Payer: COMMERCIAL

## 2018-03-16 VITALS
WEIGHT: 238 LBS | HEART RATE: 106 BPM | BODY MASS INDEX: 39.65 KG/M2 | SYSTOLIC BLOOD PRESSURE: 135 MMHG | HEIGHT: 65 IN | DIASTOLIC BLOOD PRESSURE: 81 MMHG

## 2018-03-16 DIAGNOSIS — I27.20 PULMONARY HYPERTENSION (H): ICD-10-CM

## 2018-03-16 DIAGNOSIS — E78.5 HYPERLIPIDEMIA LDL GOAL <100: ICD-10-CM

## 2018-03-16 DIAGNOSIS — I07.9 DISEASE OF TRICUSPID VALVE: ICD-10-CM

## 2018-03-16 PROCEDURE — 99213 OFFICE O/P EST LOW 20 MIN: CPT | Performed by: INTERNAL MEDICINE

## 2018-03-16 NOTE — LETTER
3/16/2018      [place]  [address]  [facility] [address]      RE: Kaylee Lopez       Dear Colleague,    I had the pleasure of seeing Kaylee Lopez in the [facility].    Service Date: 03/16/2018      REFERRING PHYSICIAN:  Dr. Irene Castellanos.       HISTORY OF PRESENT ILLNESS:  It is my pleasure to see your patient, Kaylee Lopez.  She is a pleasant 48-year-old patient with a history of mild pulmonary hypertension, tricuspid valve disease, mixed hyperlipidemia, diabetes mellitus and obesity.  Since I last saw her, she has been doing relatively well.  Echocardiography shows that she still has moderate mitral regurgitation which is unchanged from previously.  Her right ventricular size and function is normal.  Her pulmonary pressures are only mildly raised at 33 mmHg plus right atrial pressure.  She has a trace degree of mitral regurgitation which was worrying her, but I told her that trace mitral regurgitation is ubiquitous in the population.  She is feeling well, but she had a stressful week this week and a stressful day today, which probably accounts for her blood pressure being upper limits of normal at 135/81.  Normally her systolic pressures are in the 120s, and her echocardiogram indeed showed a blood pressure of 116/70.  She has no chest pains or chest pressure.  She has gained about 10 pounds in weight since last October, with her BMI going from 38.68 to 40.31.  Her hemoglobin A1c, however, last September, which is the last time it was drawn, was excellent at 5.6.      Her lipid profile reveals an LDL of 19, HDL of 63 and triglycerides of 124.  This is on atorvastatin 10 mg per day, so she has quite an amplified response to atorvastatin.      IMPRESSION:   1.  Moderate tricuspid regurgitation which is unchanged from previously.   2.  Mild pulmonary hypertension.   3.  Blood pressure is at the upper limits of normal but normally is very well controlled.   4.   Excellent lipid profile.   5.  Diabetes mellitus with an excellent hemoglobin A1c.   6.  Severe obesity with a BMI of 40.31.      PLAN:  We continue the patient on her present medications.  She will try to continue to exercise and lose weight.  As always, he has been told to contact me if she has any questions or any concerns.  We will repeat her echo in a year.      Many thanks for allowing me to be involved in the care of this nice patient.      cc:   [place] [address]         PIPO SOARES MD, M[facility]             D: 2018   T: 2018   MT: KIRSTEN      Name:     VANCE CONNER   MRN:      [id number]        Account:      [id number]   :      1970           Service Date: 2018      Document: [id number]         Outpatient Encounter Prescriptions as of 3/16/2018   Medication Sig Dispense Refill     RAJNI CONTOUR NEXT test strip USE TO TEST BLOOD SUGARS 3 TIMES DAILY OR AS DIRECTED 100 strip 0     traZODone (DESYREL) 50 MG tablet TAKE 1.5 TABLETS (75 MG) BY MOUTH NIGHTLY AS NEEDED FOR SLEEP 45 tablet 1     citalopram (CELEXA) 40 MG tablet TAKE 1 TABLET BY MOUTH DAILY 90 tablet 1     VICTOZA PEN 18 MG/3ML soln INJECT 1.2 MG SUBCUTANEOUS DAILY 18 mL 1     traMADol (ULTRAM) 50 MG tablet Take 1-2 tablets ( mg) by mouth every 8 hours as needed for moderate pain NOT TO EXCEED 8 TABS PER DAY. 60TABS/MONTH 60 tablet 0     cyclobenzaprine (FLEXERIL) 10 MG tablet TAKE 1 TABLET BY MOUTH 3 TIMES DAILY AS NEEDED FOR MUSCLE SPASMS 90 tablet 1     metFORMIN (GLUCOPHAGE-XR) 500 MG 24 hr tablet TAKE 4 TABLETS BY MOUTH DAILY WITH BREAKFAST 360 tablet 1     insulin pen needle (B-D U/F) 31G X 5 MM Use 1 pen needles daily or as directed. 100 each 3     atorvastatin (LIPITOR) 10 MG tablet TAKE 1 TABLET BY MOUTH EVERY DAY 90 tablet 3     blood glucose monitoring (NO BRAND SPECIFIED) meter device kit Use to test blood sugar 3 times daily  or as directed. 1 kit 0     blood glucose (NO BRAND SPECIFIED) lancing device Use to test blood sugars 3 times daily or as directed. 100 each 5     blood glucose calibration (NO BRAND SPECIFIED) solution Use to calibrate blood glucose monitor as directed. 1 each 0     blood glucose calibration (NO BRAND SPECIFIED) solution Use to calibrate blood glucose monitor as directed. 1 each 0     albuterol (PROAIR HFA/PROVENTIL HFA/VENTOLIN HFA) 108 (90 BASE) MCG/ACT Inhaler Inhale 2 puffs into the lungs every 6 hours as needed for shortness of breath / dyspnea or wheezing 3 Inhaler 1     cetirizine (ZYRTEC) 10 MG tablet Take 10 mg by mouth daily as needed for allergies       Blood Glucose Monitoring Suppl MISC In vitro 1 each 3     ACE NOT PRESCRIBED, INTENTIONAL, 1 each ACE Inhibitor not prescribed due to Other: not indicated 0 each 0     ASPIRIN NOT PRESCRIBED, INTENTIONAL, 1 each continuous prn for other Antiplatelet medication not prescribed intentionally due to not age appropriate 0 each 0     [DISCONTINUED] Cholecalciferol (VITAMIN D3) 3000 UNITS TABS        No facility-administered encounter medications on file as of 3/16/2018.        Again, thank you for allowing me to participate in the care of your patient.      Sincerely,    Frederic Ag MD, MD     [facility]

## 2018-03-16 NOTE — MR AVS SNAPSHOT
After Visit Summary   3/16/2018    Kaylee Lopez    MRN: [id number]           Patient Information     Date Of Birth          1970        Visit Information        Provider Department      3/16/2018 3:45 PM Frederic Ag MD [facility]        Today's Diagnoses     Pulmonary hypertension        Disease of tricuspid valve        Hyperlipidemia LDL goal <100           Follow-ups after your visit        Additional Services     Follow-Up with Cardiologist                 Future tests that were ordered for you today     Open Future Orders        Priority Expected Expires Ordered    Echocardiogram Routine 3/16/2019 3/17/2019 3/16/2018    Lipid Profile Routine 3/16/2019 3/16/2019 3/16/2018    ALT Routine 3/16/2019 3/16/2019 3/16/2018    Follow-Up with Cardiologist Routine 3/16/2019 3/17/2019 3/16/2018    Echo Complete with Lumason Routine 3/31/2018 9/21/2018 2/6/2017            Who to contact     If you have questions or need follow up information about today's clinic visit or your schedule please contact [facility] directly at [phone].  Normal or non-critical lab and imaging results will be communicated to you by [employer], letter or phone within 4 business days after the clinic has received the results. If you do not hear from us within 7 days, please contact the clinic through [employer] or phone. If you have a critical or abnormal lab result, we will notify you by phone as soon as possible.  Submit refill requests through [employer] or call your pharmacy and they will forward the refill request to us. Please allow 3 business days for your refill to be completed.          Additional Information About Your Visit        MyChart Information     [employer] gives you secure access to your electronic health record. If you see a primary care provider, you can also send messages to your care team and make appointments. If you  "have questions, please call your primary care clinic.  If you do not have a primary care provider, please call [phone] and they will assist you.        Care EveryWhere ID     This is your Care EveryWhere ID. This could be used by other organizations to access your [place] medical records  [id number]        Your Vitals Were     Pulse Height BMI (Body Mass Index)             106 1.638 m (5' 4.5\") 40.22 kg/m2          Blood Pressure from Last 3 Encounters:   03/16/18 135/81   10/24/17 122/78   08/16/17 118/78    Weight from Last 3 Encounters:   03/16/18 108 kg (238 lb)   10/24/17 103.6 kg (228 lb 6.4 oz)   08/16/17 103.7 kg (228 lb 11.2 oz)              We Performed the Following     Follow-Up with Cardiologist        Primary Care Provider Office Phone # Fax #    Irene Castellanos [phone] [address] [facility] [address]        Equal Access to Services     SUKI CRAWLEY : Hadii aad ku hadasho Soomaali, waaxda luqadaha, qaybta kaalmada adeegyada, gloria tanner haycamilo padron . So [facility] [phone].    ATENCIÓN: Si habla español, tiene a garner disposición servicios gratuitos de asistencia lingüística. Llame al [phone].    We comply with applicable federal civil rights laws and [place] laws. We do not discriminate on the basis of race, color, national origin, age, disability, sex, sexual orientation, or gender identity.            Thank you!     Thank you for choosing [facility]  for your care. Our goal is always to provide you with excellent care. Hearing back from our patients is one way we can continue to improve our services. Please take a few minutes to complete the written survey that you may receive in the mail after your visit with us. Thank you!             Your Updated Medication List - Protect others around you: Learn how to safely use, store and throw away your medicines at www.disposemymeds.org.          This list is " accurate as of 3/16/18  3:53 PM.  Always use your most recent med list.                   Brand Name Dispense Instructions for use Diagnosis    * ACE NOT PRESCRIBED (INTENTIONAL)     0 each    1 each ACE Inhibitor not prescribed due to Other: not indicated    Type 2 diabetes, HbA1c goal < 7% (H)       albuterol 108 (90 BASE) MCG/ACT Inhaler    PROAIR HFA/PROVENTIL HFA/VENTOLIN HFA    3 Inhaler    Inhale 2 puffs into the lungs every 6 hours as needed for shortness of breath / dyspnea or wheezing    Viral URI with cough, Throat pain       * ASPIRIN NOT PRESCRIBED    INTENTIONAL    0 each    1 each continuous prn for other Antiplatelet medication not prescribed intentionally due to not age appropriate    Type 2 diabetes, HbA1c goal < 7% (H)       atorvastatin 10 MG tablet    LIPITOR    90 tablet    TAKE 1 TABLET BY MOUTH EVERY DAY    Hyperlipidemia LDL goal <100       RAJNI CONTOUR NEXT test strip   Generic drug:  blood glucose monitoring     100 strip    USE TO TEST BLOOD SUGARS 3 TIMES DAILY OR AS DIRECTED    Type 2 diabetes mellitus without complication, without long-term current use of insulin (H)       * blood glucose calibration solution    no brand specified    1 each    Use to calibrate blood glucose monitor as directed.    Type 2 diabetes, HbA1c goal < 7% (H), Type 2 diabetes mellitus without complication (H)       * blood glucose calibration solution    no brand specified    1 each    Use to calibrate blood glucose monitor as directed.    Type 2 diabetes mellitus without complication, without long-term current use of insulin (H)       blood glucose lancing device    no brand specified    100 each    Use to test blood sugars 3 times daily or as directed.    Type 2 diabetes mellitus without complication, without long-term current use of insulin (H)       * Blood Glucose Monitoring Suppl Misc     1 each    In vitro    Type 2 diabetes, HbA1c goal < 7% (H)       * blood glucose monitoring meter device kit    no  brand specified    1 kit    Use to test blood sugar 3 times daily or as directed.    Type 2 diabetes mellitus without complication, without long-term current use of insulin (H)       cetirizine 10 MG tablet    zyrTEC     Take 10 mg by mouth daily as needed for allergies        citalopram 40 MG tablet    celeXA    90 tablet    TAKE 1 TABLET BY MOUTH DAILY    Major depression in complete remission (H)       cyclobenzaprine 10 MG tablet    FLEXERIL    90 tablet    TAKE 1 TABLET BY MOUTH 3 TIMES DAILY AS NEEDED FOR MUSCLE SPASMS    Fibromyalgia       insulin pen needle 31G X 5 MM    B-D U/F    100 each    Use 1 pen needles daily or as directed.    Type 2 diabetes mellitus without complication (H)       metFORMIN 500 MG 24 hr tablet    GLUCOPHAGE-XR    360 tablet    TAKE 4 TABLETS BY MOUTH DAILY WITH BREAKFAST    Type 2 diabetes mellitus without complication, without long-term current use of insulin (H)       traMADol 50 MG tablet    ULTRAM    60 tablet    Take 1-2 tablets ( mg) by mouth every 8 hours as needed for moderate pain NOT TO EXCEED 8 TABS PER DAY. 60TABS/MONTH    Fibromyalgia       traZODone 50 MG tablet    DESYREL    45 tablet    TAKE 1.5 TABLETS (75 MG) BY MOUTH NIGHTLY AS NEEDED FOR SLEEP    Fibromyalgia       VICTOZA PEN 18 MG/3ML soln   Generic drug:  liraglutide     18 mL    INJECT 1.2 MG SUBCUTANEOUS DAILY    Type 2 diabetes mellitus without complication, without long-term current use of insulin (H)       * Notice:  This list has 6 medication(s) that are the same as other medications prescribed for you. Read the directions carefully, and ask your doctor or other care provider to review them with you.

## 2018-03-16 NOTE — PROGRESS NOTES
Service Date: 03/16/2018      REFERRING PHYSICIAN:  Dr. Irene Castellanos.       HISTORY OF PRESENT ILLNESS:  It is my pleasure to see your patient, Kaylee Lopez.  She is a pleasant 48-year-old patient with a history of mild pulmonary hypertension, tricuspid valve disease, mixed hyperlipidemia, diabetes mellitus and obesity.  Since I last saw her, she has been doing relatively well.  Echocardiography shows that she still has moderate mitral regurgitation which is unchanged from previously.  Her right ventricular size and function is normal.  Her pulmonary pressures are only mildly raised at 33 mmHg plus right atrial pressure.  She has a trace degree of mitral regurgitation which was worrying her, but I told her that trace mitral regurgitation is ubiquitous in the population.  She is feeling well, but she had a stressful week this week and a stressful day today, which probably accounts for her blood pressure being upper limits of normal at 135/81.  Normally her systolic pressures are in the 120s, and her echocardiogram indeed showed a blood pressure of 116/70.  She has no chest pains or chest pressure.  She has gained about 10 pounds in weight since last October, with her BMI going from 38.68 to 40.31.  Her hemoglobin A1c, however, last September, which is the last time it was drawn, was excellent at 5.6.      Her lipid profile reveals an LDL of 19, HDL of 63 and triglycerides of 124.  This is on atorvastatin 10 mg per day, so she has quite an amplified response to atorvastatin.      IMPRESSION:   1.  Moderate tricuspid regurgitation which is unchanged from previously.   2.  Mild pulmonary hypertension.   3.  Blood pressure is at the upper limits of normal but normally is very well controlled.   4.  Excellent lipid profile.   5.  Diabetes mellitus with an excellent hemoglobin A1c.   6.  Severe obesity with a BMI of 40.31.      PLAN:  We continue the patient on her present medications.  She will try to continue to exercise  and lose weight.  As always, he has been told to contact me if she has any questions or any concerns.  We will repeat her echo in a year.      Many thanks for allowing me to be involved in the care of this nice patient.      cc:   [place] [address]         PIPO SOARES MD, [facility]             D: 2018   T: 2018   MT: KIRSTEN      Name:     VANCE CONNER   MRN:      [id number]        Account:      [id number]   :      1970           Service Date: 2018      Document: [id number]

## 2018-03-16 NOTE — PROGRESS NOTES
HPI and Plan:   See dictation    Orders Placed This Encounter   Procedures     Lipid Profile     ALT     Follow-Up with Cardiologist     Echocardiogram       No orders of the defined types were placed in this encounter.      Medications Discontinued During This Encounter   Medication Reason     Cholecalciferol (VITAMIN D3) 3000 UNITS TABS Stopped by Patient         Encounter Diagnoses   Name Primary?     Pulmonary hypertension      Disease of tricuspid valve      Hyperlipidemia LDL goal <100        CURRENT MEDICATIONS:  Current Outpatient Prescriptions   Medication Sig Dispense Refill     RAJNI CONTOUR NEXT test strip USE TO TEST BLOOD SUGARS 3 TIMES DAILY OR AS DIRECTED 100 strip 0     traZODone (DESYREL) 50 MG tablet TAKE 1.5 TABLETS (75 MG) BY MOUTH NIGHTLY AS NEEDED FOR SLEEP 45 tablet 1     citalopram (CELEXA) 40 MG tablet TAKE 1 TABLET BY MOUTH DAILY 90 tablet 1     VICTOZA PEN 18 MG/3ML soln INJECT 1.2 MG SUBCUTANEOUS DAILY 18 mL 1     traMADol (ULTRAM) 50 MG tablet Take 1-2 tablets ( mg) by mouth every 8 hours as needed for moderate pain NOT TO EXCEED 8 TABS PER DAY. 60TABS/MONTH 60 tablet 0     cyclobenzaprine (FLEXERIL) 10 MG tablet TAKE 1 TABLET BY MOUTH 3 TIMES DAILY AS NEEDED FOR MUSCLE SPASMS 90 tablet 1     metFORMIN (GLUCOPHAGE-XR) 500 MG 24 hr tablet TAKE 4 TABLETS BY MOUTH DAILY WITH BREAKFAST 360 tablet 1     insulin pen needle (B-D U/F) 31G X 5 MM Use 1 pen needles daily or as directed. 100 each 3     atorvastatin (LIPITOR) 10 MG tablet TAKE 1 TABLET BY MOUTH EVERY DAY 90 tablet 3     blood glucose monitoring (NO BRAND SPECIFIED) meter device kit Use to test blood sugar 3 times daily or as directed. 1 kit 0     blood glucose (NO BRAND SPECIFIED) lancing device Use to test blood sugars 3 times daily or as directed. 100 each 5     blood glucose calibration (NO BRAND SPECIFIED) solution Use to calibrate blood glucose monitor as directed. 1 each 0     blood glucose calibration (NO BRAND  SPECIFIED) solution Use to calibrate blood glucose monitor as directed. 1 each 0     albuterol (PROAIR HFA/PROVENTIL HFA/VENTOLIN HFA) 108 (90 BASE) MCG/ACT Inhaler Inhale 2 puffs into the lungs every 6 hours as needed for shortness of breath / dyspnea or wheezing 3 Inhaler 1     cetirizine (ZYRTEC) 10 MG tablet Take 10 mg by mouth daily as needed for allergies       Blood Glucose Monitoring Suppl MISC In vitro 1 each 3     ACE NOT PRESCRIBED, INTENTIONAL, 1 each ACE Inhibitor not prescribed due to Other: not indicated 0 each 0     ASPIRIN NOT PRESCRIBED, INTENTIONAL, 1 each continuous prn for other Antiplatelet medication not prescribed intentionally due to not age appropriate 0 each 0       ALLERGIES     Allergies   Allergen Reactions     Codeine Nausea     nausea       PAST MEDICAL HISTORY:  Past Medical History:   Diagnosis Date     Abnormal Papanicolaou smear of cervix and cervical HPV 2003    late 2003, early 2004; treated with TCA     Depression      Diabetes mellitus      Fibromyalgia      Inflammatory arthritis     ?; has been discharged from Rheumatology     Narcolepsy 11/1/2010    doing well without medication.     Non-rheumatic tricuspid valve insufficiency     antibiotic prophylax Problem list name updated by automated process. Provider to review      Obesity      Pulmonary hypertension 5/2/2010    on ECHO, but normal right heart cath 2011     Reflex sympathetic dystrophy     right foot ; related to stress fracture     TRICUSPID VALVE DISEASE (regurg)     sees Cardiology        PAST SURGICAL HISTORY:  Past Surgical History:   Procedure Laterality Date     Angiogram  3/2011    No pulmonary hypertension     COLONOSCOPY  1/2008    normal     LAPAROSCOPIC CHOLECYSTECTOMY  7/2000     SURGICAL HISTORY OF -       essure procedure for contraception     TCA for abnormal pap  2004       FAMILY HISTORY:  Family History   Problem Relation Age of Onset     Respiratory Father      asthma     Arthritis Father       hips     Depression Father      Alcohol/Drug Father      DIABETES Paternal Grandfather      CEREBROVASCULAR DISEASE Paternal Grandfather      Hypertension Paternal Grandfather      Cancer - colorectal Paternal Grandmother      Arthritis Paternal Grandmother      hips     Colon Cancer Paternal Grandmother      Prostate Cancer Maternal Grandfather      Hypertension Maternal Grandfather      CANCER Maternal Grandmother      pancreatic     Arthritis Maternal Grandmother      hips     Depression Maternal Grandmother      Hypertension Maternal Grandmother      Other Cancer Maternal Grandmother      Gallbladder Disease Maternal Grandmother      Depression Mother      Rashes/Skin Problems Mother      Rosacea     Arthritis Mother      osteoarthritis in hands and knees     Hypertension Mother      Depression Maternal Uncle      bipolar     Respiratory Brother      sleep apnea     Cancer - colorectal Maternal Uncle      CEREBROVASCULAR DISEASE Maternal Uncle       of massive stroke--no heart problems     Hypertension Son        SOCIAL HISTORY:  Social History     Social History     Marital status:      Spouse name: N/A     Number of children: 2     Years of education: 13+     Occupational History     ; new job starting        None      Social History Main Topics     Smoking status: Never Smoker     Smokeless tobacco: Never Used     Alcohol use 0.0 oz/week     0 Standard drinks or equivalent per week      Comment: rare     Drug use: No     Sexual activity: Yes     Partners: Male      Comment: essure procedure     Other Topics Concern     Caffeine Concern No     soda- 1 a day     Special Diet No     watches; fruits and veggies throughout the day.      Exercise No     working with fibromyalgia nurses; anticipating more.      Parent/Sibling W/ Cabg, Mi Or Angioplasty Before 65f 55m? No     Social History Narrative       Review of Systems:  Skin:  Negative       Eyes:  Positive for glasses   "  ENT:  Negative      Respiratory:  Positive for dyspnea on exertion on occ    Cardiovascular:    Positive for;palpitations    Gastroenterology: Negative      Genitourinary:  Negative      Musculoskeletal:  Positive for fibromyalgia;arthritis inflammatory arthritis  Neurologic:  Positive for migraine headaches    Psychiatric:  Positive for depression    Heme/Lymph/Imm:  Positive for allergies    Endocrine:  Positive for diabetes;night sweats      Physical Exam:  Vitals: /81  Pulse 106  Ht 1.638 m (5' 4.5\")  Wt 108 kg (238 lb)  BMI 40.22 kg/m2    Constitutional:  cooperative, alert and oriented, well developed, well nourished, in no acute distress morbidly obese      Skin:  warm and dry to the touch, no apparent skin lesions or masses noted          Head:  normocephalic, no masses or lesions        Eyes:  pupils equal and round, conjunctivae and lids unremarkable, sclera white, no xanthalasma, EOMS intact, no nystagmus        Lymph:      ENT:  no pallor or cyanosis, dentition good        Neck:  carotid pulses are full and equal bilaterally, JVP normal, no carotid bruit        Respiratory:  normal breath sounds, clear to auscultation, normal A-P diameter, normal symmetry, normal respiratory excursion, no use of accessory muscles         Cardiac: regular rhythm, normal S1/S2, no S3 or S4, apical impulse not displaced, no murmurs, gallops or rubs                pulses full and equal, no bruits auscultated                                        GI:  abdomen soft, non-tender, BS normoactive, no mass, no HSM, no bruits        Extremities and Muscular Skeletal:  no deformities, clubbing, cyanosis, erythema observed;no edema              Neurological:  no gross motor deficits;affect appropriate        Psych:  Alert and Oriented x 3        CC  Frederic Ag MD  [address]  [place] [address]              "

## 2018-03-16 NOTE — LETTER
3/16/2018    [place]  [address] [place] [address]    RE: Kaylee Lopez       Dear Colleague,    I had the pleasure of seeing Kaylee Lopez in the [facility].    HPI and Plan:   See dictation    Orders Placed This Encounter   Procedures     Lipid Profile     ALT     Follow-Up with Cardiologist     Echocardiogram       No orders of the defined types were placed in this encounter.      Medications Discontinued During This Encounter   Medication Reason     Cholecalciferol (VITAMIN D3) 3000 UNITS TABS Stopped by Patient         Encounter Diagnoses   Name Primary?     Pulmonary hypertension      Disease of tricuspid valve      Hyperlipidemia LDL goal <100        CURRENT MEDICATIONS:  Current Outpatient Prescriptions   Medication Sig Dispense Refill     RAJNI CONTOUR NEXT test strip USE TO TEST BLOOD SUGARS 3 TIMES DAILY OR AS DIRECTED 100 strip 0     traZODone (DESYREL) 50 MG tablet TAKE 1.5 TABLETS (75 MG) BY MOUTH NIGHTLY AS NEEDED FOR SLEEP 45 tablet 1     citalopram (CELEXA) 40 MG tablet TAKE 1 TABLET BY MOUTH DAILY 90 tablet 1     VICTOZA PEN 18 MG/3ML soln INJECT 1.2 MG SUBCUTANEOUS DAILY 18 mL 1     traMADol (ULTRAM) 50 MG tablet Take 1-2 tablets ( mg) by mouth every 8 hours as needed for moderate pain NOT TO EXCEED 8 TABS PER DAY. 60TABS/MONTH 60 tablet 0     cyclobenzaprine (FLEXERIL) 10 MG tablet TAKE 1 TABLET BY MOUTH 3 TIMES DAILY AS NEEDED FOR MUSCLE SPASMS 90 tablet 1     metFORMIN (GLUCOPHAGE-XR) 500 MG 24 hr tablet TAKE 4 TABLETS BY MOUTH DAILY WITH BREAKFAST 360 tablet 1     insulin pen needle (B-D U/F) 31G X 5 MM Use 1 pen needles daily or as directed. 100 each 3     atorvastatin (LIPITOR) 10 MG tablet TAKE 1 TABLET BY MOUTH EVERY DAY 90 tablet 3     blood glucose monitoring (NO BRAND SPECIFIED) meter device kit Use to test blood sugar 3 times daily or as directed. 1 kit 0     blood glucose (NO BRAND SPECIFIED) lancing device Use to test  blood sugars 3 times daily or as directed. 100 each 5     blood glucose calibration (NO BRAND SPECIFIED) solution Use to calibrate blood glucose monitor as directed. 1 each 0     blood glucose calibration (NO BRAND SPECIFIED) solution Use to calibrate blood glucose monitor as directed. 1 each 0     albuterol (PROAIR HFA/PROVENTIL HFA/VENTOLIN HFA) 108 (90 BASE) MCG/ACT Inhaler Inhale 2 puffs into the lungs every 6 hours as needed for shortness of breath / dyspnea or wheezing 3 Inhaler 1     cetirizine (ZYRTEC) 10 MG tablet Take 10 mg by mouth daily as needed for allergies       Blood Glucose Monitoring Suppl MISC In vitro 1 each 3     ACE NOT PRESCRIBED, INTENTIONAL, 1 each ACE Inhibitor not prescribed due to Other: not indicated 0 each 0     ASPIRIN NOT PRESCRIBED, INTENTIONAL, 1 each continuous prn for other Antiplatelet medication not prescribed intentionally due to not age appropriate 0 each 0       ALLERGIES     Allergies   Allergen Reactions     Codeine Nausea     nausea       PAST MEDICAL HISTORY:  Past Medical History:   Diagnosis Date     Abnormal Papanicolaou smear of cervix and cervical HPV 2003    late 2003, early 2004; treated with TCA     Depression      Diabetes mellitus      Fibromyalgia      Inflammatory arthritis     ?; has been discharged from Rheumatology     Narcolepsy 11/1/2010    doing well without medication.     Non-rheumatic tricuspid valve insufficiency     antibiotic prophylax Problem list name updated by automated process. Provider to review      Obesity      Pulmonary hypertension 5/2/2010    on ECHO, but normal right heart cath 2011     Reflex sympathetic dystrophy     right foot ; related to stress fracture     TRICUSPID VALVE DISEASE (regurg)     sees Cardiology        PAST SURGICAL HISTORY:  Past Surgical History:   Procedure Laterality Date     Angiogram  3/2011    No pulmonary hypertension     COLONOSCOPY  1/2008    normal     LAPAROSCOPIC CHOLECYSTECTOMY  7/2000     SURGICAL  HISTORY OF -       essure procedure for contraception     TCA for abnormal pap         FAMILY HISTORY:  Family History   Problem Relation Age of Onset     Respiratory Father      asthma     Arthritis Father      hips     Depression Father      Alcohol/Drug Father      DIABETES Paternal Grandfather      CEREBROVASCULAR DISEASE Paternal Grandfather      Hypertension Paternal Grandfather      Cancer - colorectal Paternal Grandmother      Arthritis Paternal Grandmother      hips     Colon Cancer Paternal Grandmother      Prostate Cancer Maternal Grandfather      Hypertension Maternal Grandfather      CANCER Maternal Grandmother      pancreatic     Arthritis Maternal Grandmother      hips     Depression Maternal Grandmother      Hypertension Maternal Grandmother      Other Cancer Maternal Grandmother      Gallbladder Disease Maternal Grandmother      Depression Mother      Rashes/Skin Problems Mother      Rosacea     Arthritis Mother      osteoarthritis in hands and knees     Hypertension Mother      Depression Maternal Uncle      bipolar     Respiratory Brother      sleep apnea     Cancer - colorectal Maternal Uncle      CEREBROVASCULAR DISEASE Maternal Uncle       of massive stroke--no heart problems     Hypertension Son        SOCIAL HISTORY:  Social History     Social History     Marital status:      Spouse name: N/A     Number of children: 2     Years of education: 13+     Occupational History     ; new job starting        None      Social History Main Topics     Smoking status: Never Smoker     Smokeless tobacco: Never Used     Alcohol use 0.0 oz/week     0 Standard drinks or equivalent per week      Comment: rare     Drug use: No     Sexual activity: Yes     Partners: Male      Comment: essure procedure     Other Topics Concern     Caffeine Concern No     soda- 1 a day     Special Diet No     watches; fruits and veggies throughout the day.      Exercise No     working with  "fibromyalgia nurses; anticipating more.      Parent/Sibling W/ Cabg, Mi Or Angioplasty Before 65f 55m? No     Social History Narrative       Review of Systems:  Skin:  Negative       Eyes:  Positive for glasses    ENT:  Negative      Respiratory:  Positive for dyspnea on exertion on occ    Cardiovascular:    Positive for;palpitations    Gastroenterology: Negative      Genitourinary:  Negative      Musculoskeletal:  Positive for fibromyalgia;arthritis inflammatory arthritis  Neurologic:  Positive for migraine headaches    Psychiatric:  Positive for depression    Heme/Lymph/Imm:  Positive for allergies    Endocrine:  Positive for diabetes;night sweats      Physical Exam:  Vitals: /81  Pulse 106  Ht 1.638 m (5' 4.5\")  Wt 108 kg (238 lb)  BMI 40.22 kg/m2    Constitutional:  cooperative, alert and oriented, well developed, well nourished, in no acute distress morbidly obese      Skin:  warm and dry to the touch, no apparent skin lesions or masses noted          Head:  normocephalic, no masses or lesions        Eyes:  pupils equal and round, conjunctivae and lids unremarkable, sclera white, no xanthalasma, EOMS intact, no nystagmus        Lymph:      ENT:  no pallor or cyanosis, dentition good        Neck:  carotid pulses are full and equal bilaterally, JVP normal, no carotid bruit        Respiratory:  normal breath sounds, clear to auscultation, normal A-P diameter, normal symmetry, normal respiratory excursion, no use of accessory muscles         Cardiac: regular rhythm, normal S1/S2, no S3 or S4, apical impulse not displaced, no murmurs, gallops or rubs                pulses full and equal, no bruits auscultated                                        GI:  abdomen soft, non-tender, BS normoactive, no mass, no HSM, no bruits        Extremities and Muscular Skeletal:  no deformities, clubbing, cyanosis, erythema observed;no edema              Neurological:  no gross motor deficits;affect appropriate    "     Psych:  Alert and Oriented x 3        CC  Frederic Ag MD  [address]  [place] [address]                Thank you for allowing me to participate in the care of your patient.      Sincerely,     Frederic Ag MD, MD     [facility]    cc:   Frederic Ag MD  [address]  [place] [id number]

## 2018-03-23 DIAGNOSIS — E78.5 HYPERLIPIDEMIA LDL GOAL <100: ICD-10-CM

## 2018-03-23 LAB
ALT SERPL W P-5'-P-CCNC: 20 U/L (ref 0–50)
CHOLEST SERPL-MCNC: 127 MG/DL
HDLC SERPL-MCNC: 61 MG/DL
LDLC SERPL CALC-MCNC: 46 MG/DL
NONHDLC SERPL-MCNC: 66 MG/DL
TRIGL SERPL-MCNC: 100 MG/DL

## 2018-03-23 PROCEDURE — 36415 COLL VENOUS BLD VENIPUNCTURE: CPT | Performed by: INTERNAL MEDICINE

## 2018-03-23 PROCEDURE — 80061 LIPID PANEL: CPT | Performed by: INTERNAL MEDICINE

## 2018-03-23 PROCEDURE — 84460 ALANINE AMINO (ALT) (SGPT): CPT | Performed by: INTERNAL MEDICINE

## 2018-03-27 DIAGNOSIS — E11.9 TYPE 2 DIABETES MELLITUS WITHOUT COMPLICATION, WITHOUT LONG-TERM CURRENT USE OF INSULIN (H): ICD-10-CM

## 2018-03-27 NOTE — TELEPHONE ENCOUNTER
Requested Prescriptions   Pending Prescriptions Disp Refills     metFORMIN (GLUCOPHAGE-XR) 500 MG 24 hr tablet [Pharmacy Med Name: METFORMIN  MG TABLET]  Last Written Prescription Date:  10/11/17  Last Fill Quantity: 360 TABLET,  # refills: 1   Last office visit: 10/24/2017 with prescribing provider:  YARIEL   Future Office Visit:   Next 5 appointments (look out 90 days)     Apr 13, 2018  8:30 AM CDT   Pepe Guerra with Irene Castellanos MD   [facility] ([facility]    [address] [facility] [address] [phone]                  360 tablet 1     Sig: TAKE 4 TABLETS BY MOUTH DAILY WITH BREAKFAST    Biguanide Agents Failed    3/27/2018  1:48 AM       Failed - Patient has documented A1c within the specified period of time.    Recent Labs   Lab Test  09/18/17   0754   A1C  5.6            Passed - Blood pressure less than 140/90 in past 6 months    BP Readings from Last 3 Encounters:   03/16/18 135/81   10/24/17 122/78   08/16/17 118/78                Passed - Patient has documented LDL within the past 12 mos.    Recent Labs   Lab Test  03/23/18   0831   LDL  46            Passed - Patient has had a Microalbumin in the past 12 mos.    Recent Labs   Lab Test  07/20/17   1631   MICROL  26   UMALCR  8.81            Passed - Patient is age 10 or older       Passed - Patient's CR is NOT>1.4 OR Patient's EGFR is NOT<45 within past 12 mos.    Recent Labs   Lab Test  09/18/17   0754   GFRESTIMATED  77   GFRESTBLACK  >90       Recent Labs   Lab Test  09/18/17   0754   CR  0.80            Passed - Patient does NOT have a diagnosis of CHF.       Passed - Patient is not pregnant       Passed - Patient has not had a positive pregnancy test within the past 12 mos.        Passed - Recent (6 mo) or future (30 days) visit within the authorizing provider's specialty    Patient had office visit in the last 6 months or has a visit in the next 30 days with authorizing provider or within  "the authorizing provider's specialty.  See \"Patient Info\" tab in inbasket, or \"Choose Columns\" in Meds & Orders section of the refill encounter.              "

## 2018-03-28 RX ORDER — METFORMIN HCL 500 MG
TABLET, EXTENDED RELEASE 24 HR ORAL
Qty: 360 TABLET | Refills: 0 | Status: SHIPPED | OUTPATIENT
Start: 2018-03-28 | End: 2018-06-21

## 2018-03-28 NOTE — TELEPHONE ENCOUNTER
Medication is being filled for 1 time refill only due to:  due for labs and diabetic check.   Appointment next month.  Linda Miles, RN  Triage Nurse

## 2018-04-23 ENCOUNTER — OFFICE VISIT (OUTPATIENT)
Dept: FAMILY MEDICINE | Facility: CLINIC | Age: 48
End: 2018-04-23
Payer: COMMERCIAL

## 2018-04-23 VITALS
WEIGHT: 239.2 LBS | SYSTOLIC BLOOD PRESSURE: 136 MMHG | DIASTOLIC BLOOD PRESSURE: 84 MMHG | HEIGHT: 65 IN | OXYGEN SATURATION: 98 % | RESPIRATION RATE: 16 BRPM | BODY MASS INDEX: 39.85 KG/M2 | TEMPERATURE: 98.6 F | HEART RATE: 88 BPM

## 2018-04-23 DIAGNOSIS — E66.01 MORBID OBESITY (H): ICD-10-CM

## 2018-04-23 DIAGNOSIS — Z11.4 ENCOUNTER FOR SCREENING FOR HIV: ICD-10-CM

## 2018-04-23 DIAGNOSIS — Z12.4 SCREENING FOR CERVICAL CANCER: ICD-10-CM

## 2018-04-23 DIAGNOSIS — E78.5 HYPERLIPIDEMIA LDL GOAL <100: ICD-10-CM

## 2018-04-23 DIAGNOSIS — Z23 NEED FOR PROPHYLACTIC VACCINATION WITH TETANUS-DIPHTHERIA (TD): ICD-10-CM

## 2018-04-23 DIAGNOSIS — E11.9 TYPE 2 DIABETES MELLITUS WITHOUT COMPLICATION, WITHOUT LONG-TERM CURRENT USE OF INSULIN (H): Primary | ICD-10-CM

## 2018-04-23 DIAGNOSIS — M79.7 FIBROMYALGIA: ICD-10-CM

## 2018-04-23 DIAGNOSIS — F32.5 MAJOR DEPRESSION IN COMPLETE REMISSION (H): ICD-10-CM

## 2018-04-23 LAB — HBA1C MFR BLD: 5.8 % (ref 0–5.6)

## 2018-04-23 PROCEDURE — 84443 ASSAY THYROID STIM HORMONE: CPT | Performed by: FAMILY MEDICINE

## 2018-04-23 PROCEDURE — 83036 HEMOGLOBIN GLYCOSYLATED A1C: CPT | Performed by: FAMILY MEDICINE

## 2018-04-23 PROCEDURE — 87389 HIV-1 AG W/HIV-1&-2 AB AG IA: CPT | Performed by: FAMILY MEDICINE

## 2018-04-23 PROCEDURE — 36415 COLL VENOUS BLD VENIPUNCTURE: CPT | Performed by: FAMILY MEDICINE

## 2018-04-23 PROCEDURE — G0124 SCREEN C/V THIN LAYER BY MD: HCPCS | Performed by: FAMILY MEDICINE

## 2018-04-23 PROCEDURE — 99214 OFFICE O/P EST MOD 30 MIN: CPT | Mod: 25 | Performed by: FAMILY MEDICINE

## 2018-04-23 PROCEDURE — 90715 TDAP VACCINE 7 YRS/> IM: CPT | Performed by: FAMILY MEDICINE

## 2018-04-23 PROCEDURE — G0145 SCR C/V CYTO,THINLAYER,RESCR: HCPCS | Performed by: FAMILY MEDICINE

## 2018-04-23 PROCEDURE — 90471 IMMUNIZATION ADMIN: CPT | Performed by: FAMILY MEDICINE

## 2018-04-23 PROCEDURE — 87624 HPV HI-RISK TYP POOLED RSLT: CPT | Performed by: FAMILY MEDICINE

## 2018-04-23 RX ORDER — CYCLOBENZAPRINE HCL 10 MG
TABLET ORAL
Qty: 90 TABLET | Refills: 1 | Status: SHIPPED | OUTPATIENT
Start: 2018-04-23 | End: 2018-10-30

## 2018-04-23 NOTE — PROGRESS NOTES
SUBJECTIVE:   Kaylee Lopez is a 48 year old female who presents to clinic today for the following health issues:      1. Wants pap done today  Due to abnormal test results in the past    2. Diabetes Follow-up      Patient is checking blood sugars: not at all    Diabetic concerns: None     Symptoms of hypoglycemia (low blood sugar): none     Paresthesias (numbness or burning in feet) or sores: No     Date of last diabetic eye exam: 2/10/17, has one scheduled next week    BP Readings from Last 2 Encounters:   03/16/18 135/81   10/24/17 122/78     Hemoglobin A1C (%)   Date Value   09/18/2017 5.6   02/27/2017 5.5     LDL Cholesterol Calculated (mg/dL)   Date Value   03/23/2018 46   09/18/2017 19       Amount of exercise or physical activity: None    Problems taking medications regularly: No    Medication side effects: dry mouth with tramadol but it is tolerable    Diet: low sugar, low gluten        Problem list and histories reviewed & adjusted, as indicated.  Additional history: as documented    See under ROS     Patient Active Problem List   Diagnosis     Fibromyalgia     Non-rheumatic tricuspid valve insufficiency     Papanicolaou smear of cervix with atypical squamous cells cannot exclude high grade squamous intraepithelial lesion (ASC-H)     Major depression in complete remission (H)     Narcolepsy     Hyperlipidemia LDL goal <100     Health Care Home     Reflex sympathetic dystrophy     Contraception     Elevated BMI     Chronic pain-Fibromyalgia     Type 2 diabetes mellitus without complication (H)     Migraine with aura and without status migrainosus, not intractable     Elevated blood pressure reading without diagnosis of hypertension     Pulmonary hypertension     Past Surgical History:   Procedure Laterality Date     Angiogram  3/2011    No pulmonary hypertension     COLONOSCOPY  1/2008    normal     LAPAROSCOPIC CHOLECYSTECTOMY  7/2000     SURGICAL HISTORY OF -       essure procedure for contraception      TCA for abnormal pap  2004         Current Outpatient Prescriptions   Medication Sig Dispense Refill     ACE NOT PRESCRIBED, INTENTIONAL, 1 each ACE Inhibitor not prescribed due to Other: not indicated 0 each 0     albuterol (PROAIR HFA/PROVENTIL HFA/VENTOLIN HFA) 108 (90 BASE) MCG/ACT Inhaler Inhale 2 puffs into the lungs every 6 hours as needed for shortness of breath / dyspnea or wheezing 3 Inhaler 1     atorvastatin (LIPITOR) 10 MG tablet TAKE 1 TABLET BY MOUTH EVERY DAY 90 tablet 3     RAJNI CONTOUR NEXT test strip USE TO TEST BLOOD SUGARS 3 TIMES DAILY OR AS DIRECTED 100 strip 0     blood glucose (NO BRAND SPECIFIED) lancing device Use to test blood sugars 3 times daily or as directed. 100 each 5     blood glucose calibration (NO BRAND SPECIFIED) solution Use to calibrate blood glucose monitor as directed. 1 each 0     blood glucose calibration (NO BRAND SPECIFIED) solution Use to calibrate blood glucose monitor as directed. 1 each 0     blood glucose monitoring (NO BRAND SPECIFIED) meter device kit Use to test blood sugar 3 times daily or as directed. 1 kit 0     Blood Glucose Monitoring Suppl MISC In vitro 1 each 3     cetirizine (ZYRTEC) 10 MG tablet Take 10 mg by mouth daily as needed for allergies       citalopram (CELEXA) 40 MG tablet TAKE 1 TABLET BY MOUTH DAILY 90 tablet 1     cyclobenzaprine (FLEXERIL) 10 MG tablet TAKE 1 TABLET BY MOUTH 3 TIMES DAILY AS NEEDED FOR MUSCLE SPASMS 90 tablet 1     insulin pen needle (B-D U/F) 31G X 5 MM Use 1 pen needles daily or as directed. 100 each 3     metFORMIN (GLUCOPHAGE-XR) 500 MG 24 hr tablet TAKE 4 TABLETS BY MOUTH DAILY WITH BREAKFAST 360 tablet 0     traMADol (ULTRAM) 50 MG tablet TAKE 1-2 TABS BY MOUTH EVERY 8 HOURS AS NEEDED FOR MODERATE PAIN. MAX 8TABS/DAY & 60 TABS/MONTH 60 tablet 0     traZODone (DESYREL) 50 MG tablet TAKE 1.5 TABLETS (75 MG) BY MOUTH NIGHTLY AS NEEDED FOR SLEEP 45 tablet 1     VICTOZA PEN 18 MG/3ML soln INJECT 1.2 MG SUBCUTANEOUS  "DAILY 18 mL 1     [DISCONTINUED] ASPIRIN NOT PRESCRIBED, INTENTIONAL, 1 each continuous prn for other Antiplatelet medication not prescribed intentionally due to not age appropriate 0 each 0         Reviewed and updated as needed this visit by clinical staff       Reviewed and updated as needed this visit by Provider         ROS:  CONSTITUTIONAL:NEGATIVE for fever, chills, change in weight  RESP:NEGATIVE for significant cough or SOB  CV: NEGATIVE for chest pain, palpitations or peripheral edema  PSYCHIATRIC: NEGATIVE for changes in mood or affect    Periods mostly regular; will skip a month here or there.     Getting more hot flashes than previously.      Has eye exam next week.     OBJECTIVE:     /84 (BP Location: Right arm, Patient Position: Chair, Cuff Size: Adult Large)  Pulse 88  Temp 98.6  F (37  C) (Oral)  Resp 16  Ht 5' 4.5\" (1.638 m)  Wt 239 lb 3.2 oz (108.5 kg)  LMP 04/05/2018 (Exact Date)  SpO2 98%  Breastfeeding? No  BMI 40.42 kg/m2  Body mass index is 40.42 kg/(m^2).  GENERAL APPEARANCE: alert and no distress  RESP: lungs clear to auscultation - no rales, rhonchi or wheezes  CV: regular rates and rhythm   (female): normal cervix, adnexae, and uterus without masses or discharge  MS: no edema  PSYCH: mentation appears normal and affect normal/bright    DP pulse present bilaterally.  No sores or ulcerations.  Little callous.  Monofilament exam normal.    PHQ-9 SCORE 12/13/2016 7/20/2017 1/12/2018   Total Score - - -   Total Score MyChart 1 (Minimal depression) - 2 (Minimal depression)   Total Score 1 2 2       MICHAEL-7 SCORE 12/13/2016 7/20/2017 1/12/2018   Total Score - - -   Total Score 1 (minimal anxiety) - 1 (minimal anxiety)   Total Score 1 4 1           Recent Labs   Lab Test  03/23/18   0831  09/18/17   0754   04/28/15   0758  04/28/14   0813   CHOL  127  107   < >  166  151   HDL  61  63   < >  56  42*   LDL  46  19   < >  82  81   TRIG  100  124   < >  142  139   CHOLHDLRATIO   --    " --    --   3.0  3.6    < > = values in this interval not displayed.       Lab Results   Component Value Date    A1C 5.8 04/23/2018    A1C 5.6 09/18/2017    A1C 5.5 02/27/2017    A1C 5.6 08/22/2016    A1C 7.6 04/29/2016             ASSESSMENT/PLAN:     Type 2 diabetes mellitus without complication, without long-term current use of insulin (H)  Stable. Meeting goal.   - Hemoglobin A1c  - TSH with free T4 reflex    Fibromyalgia  Refilling.   - cyclobenzaprine (FLEXERIL) 10 MG tablet; TAKE 1 TABLET BY MOUTH 3 TIMES DAILY AS NEEDED FOR MUSCLE SPASMS    Major depression in complete remission (H)  Controlled.     Elevated BMI  Encourage healthy lifestyle.     Hyperlipidemia LDL goal <100  Satisfactory.    Encounter for screening for HIV    - HIV Antigen Antibody Combo    Screening for cervical cancer    - Pap imaged thin layer screen with HPV - recommended age 30 - 65 years (select HPV order below)  - HPV High Risk Types DNA Cervical    Need for prophylactic vaccination with tetanus-diphtheria (TD)    -      ADMIN VACCINE, FIRST  - SCREENING QUESTIONS FOR ADULT IMMUNIZATIONS  - TDAP VACCINE (ADACEL)          Irene Castellanos MD, MD  [facility]

## 2018-04-23 NOTE — MR AVS SNAPSHOT
After Visit Summary   4/23/2018    Kaylee Lopez    MRN: [id number]           Patient Information     Date Of Birth          1970        Visit Information        Provider Department      4/23/2018 3:10 PM Irene Castellanos MD [facility] [place]        Today's Diagnoses     Need for prophylactic vaccination with tetanus-diphtheria (TD)    -  1    Type 2 diabetes mellitus without complication, without long-term current use of insulin (H)        Encounter for screening for HIV        Screening for cervical cancer        Encounter for drug screening        Fibromyalgia           Follow-ups after your visit        Follow-up notes from your care team     Return in about 6 months (around 10/23/2018).      Who to contact     If you have questions or need follow up information about today's clinic visit or your schedule please contact [facility] directly at [phone].  Normal or non-critical lab and imaging results will be communicated to you by SolAeroMedhart, letter or phone within 4 business days after the clinic has received the results. If you do not hear from us within 7 days, please contact the clinic through SolAeroMedhart or phone. If you have a critical or abnormal lab result, we will notify you by phone as soon as possible.  Submit refill requests through Smash Bucket or call your pharmacy and they will forward the refill request to us. Please allow 3 business days for your refill to be completed.          Additional Information About Your Visit        SolAeroMedhart Information     Smash Bucket gives you secure access to your electronic health record. If you see a primary care provider, you can also send messages to your care team and make appointments. If you have questions, please call your primary care clinic.  If you do not have a primary care provider, please call [phone] and they will assist you.        Care EveryWhere ID     This is your Care EveryWhere ID. This could be used by other  "organizations to access your [place] medical records  [id number]        Your Vitals Were     Pulse Temperature Respirations Height Last Period Pulse Oximetry    88 98.6  F (37  C) (Oral) 16 5' 4.5\" (1.638 m) 04/05/2018 (Exact Date) 98%    Breastfeeding? BMI (Body Mass Index)                No 40.42 kg/m2           Blood Pressure from Last 3 Encounters:   04/23/18 136/84   03/16/18 135/81   10/24/17 122/78    Weight from Last 3 Encounters:   04/23/18 239 lb 3.2 oz (108.5 kg)   03/16/18 238 lb (108 kg)   10/24/17 228 lb 6.4 oz (103.6 kg)              We Performed the Following          ADMIN VACCINE, FIRST     Hemoglobin A1c     HIV Antigen Antibody Combo     HPV High Risk Types DNA Cervical     Pap imaged thin layer screen with HPV - recommended age 30 - 65 years (select HPV order below)     SCREENING QUESTIONS FOR ADULT IMMUNIZATIONS     TDAP VACCINE (ADACEL)     TSH with free T4 reflex          Today's Medication Changes          These changes are accurate as of 4/23/18  3:44 PM.  If you have any questions, ask your nurse or doctor.               These medicines have changed or have updated prescriptions.        Dose/Directions    cyclobenzaprine 10 MG tablet   Commonly known as:  FLEXERIL   This may have changed:  See the new instructions.   Used for:  Fibromyalgia   Changed by:  Irene Castellanos MD        TAKE 1 TABLET BY MOUTH 3 TIMES DAILY AS NEEDED FOR MUSCLE SPASMS   Quantity:  90 tablet   Refills:  1            Where to get your medicines      These medications were sent to [place] [address] [place] [facility] [address] [facility]  [address] [facility] [address]    Hours:  Tech issues with their phone system Phone:  [phone]     cyclobenzaprine 10 MG tablet                Primary Care Provider Office Phone # Fax #    Irene Castellanos [phone] [address]  [facility] [address]        Equal Access to Services     GERARDO CRAWLEY AH: dane Weber " darryljaye gloria kang ah. So [facility] [phone].    ATENCIÓN: Si kris buchanan, tiene a garner disposición servicios gratuitos de asistencia lingüística. Elle al [phone].    We comply with applicable federal civil rights laws and [place] laws. We do not discriminate on the basis of race, color, national origin, age, disability, sex, sexual orientation, or gender identity.            Thank you!     Thank you for choosing [facility]  for your care. Our goal is always to provide you with excellent care. Hearing back from our patients is one way we can continue to improve our services. Please take a few minutes to complete the written survey that you may receive in the mail after your visit with us. Thank you!             Your Updated Medication List - Protect others around you: Learn how to safely use, store and throw away your medicines at www.disposemymeds.org.          This list is accurate as of 4/23/18  3:44 PM.  Always use your most recent med list.                   Brand Name Dispense Instructions for use Diagnosis    ACE NOT PRESCRIBED (INTENTIONAL)     0 each    1 each ACE Inhibitor not prescribed due to Other: not indicated    Type 2 diabetes, HbA1c goal < 7% (H)       albuterol 108 (90 Base) MCG/ACT Inhaler    PROAIR HFA/PROVENTIL HFA/VENTOLIN HFA    3 Inhaler    Inhale 2 puffs into the lungs every 6 hours as needed for shortness of breath / dyspnea or wheezing    Viral URI with cough, Throat pain       atorvastatin 10 MG tablet    LIPITOR    90 tablet    TAKE 1 TABLET BY MOUTH EVERY DAY    Hyperlipidemia LDL goal <100       RAJNI CONTOUR NEXT test strip   Generic drug:  blood glucose monitoring     100 strip    USE TO TEST BLOOD SUGARS 3 TIMES DAILY OR AS DIRECTED    Type 2 diabetes mellitus without complication, without long-term current use of insulin (H)       * blood glucose calibration solution    no brand specified    1 each     Use to calibrate blood glucose monitor as directed.    Type 2 diabetes, HbA1c goal < 7% (H), Type 2 diabetes mellitus without complication (H)       * blood glucose calibration solution    no brand specified    1 each    Use to calibrate blood glucose monitor as directed.    Type 2 diabetes mellitus without complication, without long-term current use of insulin (H)       blood glucose lancing device    no brand specified    100 each    Use to test blood sugars 3 times daily or as directed.    Type 2 diabetes mellitus without complication, without long-term current use of insulin (H)       * Blood Glucose Monitoring Suppl Misc     1 each    In vitro    Type 2 diabetes, HbA1c goal < 7% (H)       * blood glucose monitoring meter device kit    no brand specified    1 kit    Use to test blood sugar 3 times daily or as directed.    Type 2 diabetes mellitus without complication, without long-term current use of insulin (H)       cetirizine 10 MG tablet    zyrTEC     Take 10 mg by mouth daily as needed for allergies        citalopram 40 MG tablet    celeXA    90 tablet    TAKE 1 TABLET BY MOUTH DAILY    Major depression in complete remission (H)       cyclobenzaprine 10 MG tablet    FLEXERIL    90 tablet    TAKE 1 TABLET BY MOUTH 3 TIMES DAILY AS NEEDED FOR MUSCLE SPASMS    Fibromyalgia       insulin pen needle 31G X 5 MM    B-D U/F    100 each    Use 1 pen needles daily or as directed.    Type 2 diabetes mellitus without complication (H)       metFORMIN 500 MG 24 hr tablet    GLUCOPHAGE-XR    360 tablet    TAKE 4 TABLETS BY MOUTH DAILY WITH BREAKFAST    Type 2 diabetes mellitus without complication, without long-term current use of insulin (H)       traMADol 50 MG tablet    ULTRAM    60 tablet    TAKE 1-2 TABS BY MOUTH EVERY 8 HOURS AS NEEDED FOR MODERATE PAIN. MAX 8TABS/DAY & 60 TABS/MONTH    Fibromyalgia       traZODone 50 MG tablet    DESYREL    45 tablet    TAKE 1.5 TABLETS (75 MG) BY MOUTH NIGHTLY AS NEEDED FOR SLEEP     Fibromyalgia       VICTOZA PEN 18 MG/3ML soln   Generic drug:  liraglutide     18 mL    INJECT 1.2 MG SUBCUTANEOUS DAILY    Type 2 diabetes mellitus without complication, without long-term current use of insulin (H)       * Notice:  This list has 4 medication(s) that are the same as other medications prescribed for you. Read the directions carefully, and ask your doctor or other care provider to review them with you.

## 2018-04-24 LAB
HIV 1+2 AB+HIV1 P24 AG SERPL QL IA: NONREACTIVE
TSH SERPL DL<=0.005 MIU/L-ACNC: 2.44 MU/L (ref 0.4–4)

## 2018-04-26 LAB
COPATH REPORT: NORMAL
PAP: NORMAL

## 2018-04-30 ENCOUNTER — TELEPHONE (OUTPATIENT)
Dept: FAMILY MEDICINE | Facility: CLINIC | Age: 48
End: 2018-04-30

## 2018-04-30 LAB
FINAL DIAGNOSIS: NORMAL
HPV HR 12 DNA CVX QL NAA+PROBE: NEGATIVE
HPV16 DNA SPEC QL NAA+PROBE: NEGATIVE
HPV18 DNA SPEC QL NAA+PROBE: NEGATIVE
SPECIMEN DESCRIPTION: NORMAL
SPECIMEN SOURCE CVX/VAG CYTO: NORMAL

## 2018-04-30 NOTE — TELEPHONE ENCOUNTER
"Pap from 4/23/18 showing: Normal, Negative HPV, Presence of Endometrial cells.    Per visit notes:  LMP date listed as 4/5/18  \"Periods mostly regular; will skip a month here or there.\"    Dr. Castellanos- Please advise if you would like any follow up regarding endometrial cells on pap.    Thank you  Lynette Nissen RN    "

## 2018-04-30 NOTE — TELEPHONE ENCOUNTER
See if she got her period shortly after that. If so, no follow up.  If not, would recommend consult with GYN.

## 2018-05-01 ENCOUNTER — TRANSFERRED RECORDS (OUTPATIENT)
Dept: HEALTH INFORMATION MANAGEMENT | Facility: CLINIC | Age: 48
End: 2018-05-01

## 2018-05-01 ENCOUNTER — MYC MEDICAL ADVICE (OUTPATIENT)
Dept: FAMILY MEDICINE | Facility: CLINIC | Age: 48
End: 2018-05-01

## 2018-05-01 NOTE — TELEPHONE ENCOUNTER
Let's have a consult with GYN...    I did review some in UpToDate. She does have some risk factors with her DM and high BMI.     Thanks!

## 2018-05-01 NOTE — TELEPHONE ENCOUNTER
Spoke with the patient, informed of recommendation for consult with Gyn.  She will plan to call and schedule with Dr. Pizarro.  We discussed that evaluation may include ultrasound and/or endometrial biopsy and would be per provider discretion.  Encouraged to prepare for visit by taking IB or Tylenol 1 hour prior and to schedule outside of her menstrual cycle.  Patient verbalized understanding and agrees to plan.  Lynette Nissen RN

## 2018-05-01 NOTE — TELEPHONE ENCOUNTER
Dr. Johnson MEZA spoke with the patient this morning.  She did not get a period shortly after her pap on 4/23/18.    She had her period for the month of April on 4/4/18 through 4/8/18.  She plans to get her period now at the end of this week or early next week.  She reports that her menstrual cycles are monthly and regular, BUT, she did skip a cycle back in February or March.    Please advise if you would like to recommend any follow up or continue to monitor?  Thank you  Lynette Nissen RN

## 2018-05-01 NOTE — TELEPHONE ENCOUNTER
Will forward to station A - pt states she is going to have an eye exam today - could you help update that on health maintenance?  Thanks!

## 2018-05-01 NOTE — TELEPHONE ENCOUNTER
Sent patient message regarding need to know where she's having eye exam done and what the results are.  Anu Cosme CMA ([facility])

## 2018-05-01 NOTE — TELEPHONE ENCOUNTER
[facility] will send over results from eye exam when done.  Anu Cosme CMA ([facility])

## 2018-05-05 DIAGNOSIS — M79.7 FIBROMYALGIA: ICD-10-CM

## 2018-05-05 NOTE — TELEPHONE ENCOUNTER
"Requested Prescriptions   Pending Prescriptions Disp Refills     traZODone (DESYREL) 50 MG tablet [Pharmacy Med Name: TRAZODONE 50 MG TABLET]  Last Written Prescription Date:  03/09/2018  Last Fill Quantity: 45 tablet,  # refills: 1   Last office visit: 4/23/2018 with prescribing provider:  Irene Castellanos MD    Future Office Visit:   Next 5 appointments (look out 90 days)     Jun 14, 2018  9:30 AM CDT   [facility] Georgiana [facility] DO   [facility]    [address]  [facility] [address]   [phone]                  45 tablet 1     Sig: TAKE 1.5 TABLETS (75 MG) BY MOUTH NIGHTLY AS NEEDED FOR SLEEP    Serotonin Modulators Passed    5/5/2018  2:02 AM       Passed - Recent (12 mo) or future (30 days) visit within the authorizing provider's specialty    Patient had office visit in the last 12 months or has a visit in the next 30 days with authorizing provider or within the authorizing provider's specialty.  See \"Patient Info\" tab in inbasket, or \"Choose Columns\" in Meds & Orders section of the refill encounter.           Passed - Patient is age 18 or older       Passed - No active pregnancy on record       Passed - No positive pregnancy test in past 12 months          "

## 2018-05-08 RX ORDER — TRAZODONE HYDROCHLORIDE 50 MG/1
TABLET, FILM COATED ORAL
Qty: 45 TABLET | Refills: 1 | Status: SHIPPED | OUTPATIENT
Start: 2018-05-08 | End: 2018-07-09

## 2018-05-08 NOTE — TELEPHONE ENCOUNTER
Prescription approved per [facility] Refill Protocol.    Janis Davila RN  [facility]

## 2018-06-07 ENCOUNTER — RADIANT APPOINTMENT (OUTPATIENT)
Dept: ULTRASOUND IMAGING | Facility: CLINIC | Age: 48
End: 2018-06-07
Payer: COMMERCIAL

## 2018-06-07 DIAGNOSIS — R87.619 ENDOMETRIAL CELLS ON CERVICAL PAP SMEAR INCONSISTENT WITH LAST MENSTRUAL PERIOD: ICD-10-CM

## 2018-06-07 PROCEDURE — 76856 US EXAM PELVIC COMPLETE: CPT | Performed by: FAMILY MEDICINE

## 2018-06-07 PROCEDURE — 76830 TRANSVAGINAL US NON-OB: CPT | Performed by: FAMILY MEDICINE

## 2018-06-16 ENCOUNTER — MYC REFILL (OUTPATIENT)
Dept: FAMILY MEDICINE | Facility: CLINIC | Age: 48
End: 2018-06-16

## 2018-06-16 DIAGNOSIS — R05.9 COUGH: Primary | ICD-10-CM

## 2018-06-18 ENCOUNTER — OFFICE VISIT (OUTPATIENT)
Dept: OBGYN | Facility: CLINIC | Age: 48
End: 2018-06-18
Payer: COMMERCIAL

## 2018-06-18 VITALS
HEIGHT: 65 IN | DIASTOLIC BLOOD PRESSURE: 82 MMHG | WEIGHT: 237.7 LBS | SYSTOLIC BLOOD PRESSURE: 134 MMHG | BODY MASS INDEX: 39.6 KG/M2

## 2018-06-18 DIAGNOSIS — N84.0 ENDOMETRIAL POLYP: ICD-10-CM

## 2018-06-18 DIAGNOSIS — R87.619 ENDOMETRIAL CELLS ON CERVICAL PAP SMEAR INCONSISTENT WITH LAST MENSTRUAL PERIOD: Primary | ICD-10-CM

## 2018-06-18 PROCEDURE — 88305 TISSUE EXAM BY PATHOLOGIST: CPT | Performed by: FAMILY MEDICINE

## 2018-06-18 PROCEDURE — 58100 BIOPSY OF UTERUS LINING: CPT | Performed by: FAMILY MEDICINE

## 2018-06-18 PROCEDURE — 99214 OFFICE O/P EST MOD 30 MIN: CPT | Mod: 25 | Performed by: FAMILY MEDICINE

## 2018-06-18 RX ORDER — ALBUTEROL SULFATE 90 UG/1
2 AEROSOL, METERED RESPIRATORY (INHALATION) EVERY 6 HOURS PRN
Qty: 1 INHALER | Refills: 0 | Status: SHIPPED | OUTPATIENT
Start: 2018-06-18 | End: 2019-03-07

## 2018-06-18 NOTE — MR AVS SNAPSHOT
After Visit Summary   6/18/2018    Kaylee Lopez    MRN: [id number]           Patient Information     Date Of Birth          1970        Visit Information        Provider Department      6/18/2018 3:15 PM Georgiana Pizarro, DO [facility]        Today's Diagnoses     Endometrial cells on cervical Pap smear inconsistent with last menstrual period    -  1    Endometrial polyp          Care Instructions    Will notify of results     jann will call you to schedule the procedure     Dr. Georgiana Pizarro, DO    Obstetrics and Gynecology  [facility]                 Follow-ups after your visit        Who to contact     If you have questions or need follow up information about today's clinic visit or your schedule please contact [facility] directly at [phone].  Normal or non-critical lab and imaging results will be communicated to you by InContext Solutionshart, letter or phone within 4 business days after the clinic has received the results. If you do not hear from us within 7 days, please contact the clinic through ozuket or phone. If you have a critical or abnormal lab result, we will notify you by phone as soon as possible.  Submit refill requests through [employer] or call your pharmacy and they will forward the refill request to us. Please allow 3 business days for your refill to be completed.          Additional Information About Your Visit        InContext Solutions[employer]Mercury solar systems Information     [employer] gives you secure access to your electronic health record. If you see a primary care provider, you can also send messages to your care team and make appointments. If you have questions, please call your primary care clinic.  If you do not have a primary care provider, please call [phone] and they will assist you.        Care EveryWhere ID     This is your Care EveryWhere ID. This could be used by other organizations to access your [facility]  "records  [id number]        Your Vitals Were     Height Last Period BMI (Body Mass Index)             5' 4.5\" (1.638 m) 05/30/2018 40.17 kg/m2          Blood Pressure from Last 3 Encounters:   06/18/18 134/82   04/23/18 136/84   03/16/18 135/81    Weight from Last 3 Encounters:   06/18/18 237 lb 11.2 oz (107.8 kg)   04/23/18 239 lb 3.2 oz (108.5 kg)   03/16/18 238 lb (108 kg)              We Performed the Following     Марина-Operative Worksheet GYN        Primary Care Provider Office Phone # Fax #    Irene Castellanos [phone]       [address] [address] [facility] [address]        Equal Access to Services     [facility] MISBAH : Hadii dima alfred hadasho Soomaali, waaxda luqadaha, qaybta kaalmada adeegyada, gloria padron . So [facility] [phone].    ATENCIÓN: Si habla español, tiene a garner disposición servicios gratuitos de asistencia lingüística. Llame al [phone].    We comply with applicable federal civil rights laws and [place] laws. We do not discriminate on the basis of race, color, national origin, age, disability, sex, sexual orientation, or gender identity.            Thank you!     Thank you for choosing [facility]  for your care. Our goal is always to provide you with excellent care. Hearing back from our patients is one way we can continue to improve our services. Please take a few minutes to complete the written survey that you may receive in the mail after your visit with us. Thank you!             Your Updated Medication List - Protect others around you: Learn how to safely use, store and throw away your medicines at www.disposemymeds.org.          This list is accurate as of 6/18/18  3:37 PM.  Always use your most recent med list.                   Brand Name Dispense Instructions for use Diagnosis    ACE NOT PRESCRIBED (INTENTIONAL)     0 each    1 each ACE Inhibitor not prescribed due to Other: not indicated    Type 2 diabetes, HbA1c goal < 7% (H)    "    albuterol 108 (90 Base) MCG/ACT Inhaler    PROAIR HFA/PROVENTIL HFA/VENTOLIN HFA    1 Inhaler    Inhale 2 puffs into the lungs every 6 hours as needed for shortness of breath / dyspnea or wheezing    Cough       atorvastatin 10 MG tablet    LIPITOR    90 tablet    TAKE 1 TABLET BY MOUTH EVERY DAY    Hyperlipidemia LDL goal <100       RAJNI CONTOUR NEXT test strip   Generic drug:  blood glucose monitoring     100 strip    USE TO TEST BLOOD SUGARS 3 TIMES DAILY OR AS DIRECTED    Type 2 diabetes mellitus without complication, without long-term current use of insulin (H)       * blood glucose calibration solution    no brand specified    1 each    Use to calibrate blood glucose monitor as directed.    Type 2 diabetes, HbA1c goal < 7% (H), Type 2 diabetes mellitus without complication (H)       * blood glucose calibration solution    no brand specified    1 each    Use to calibrate blood glucose monitor as directed.    Type 2 diabetes mellitus without complication, without long-term current use of insulin (H)       blood glucose lancing device    no brand specified    100 each    Use to test blood sugars 3 times daily or as directed.    Type 2 diabetes mellitus without complication, without long-term current use of insulin (H)       * Blood Glucose Monitoring Suppl Misc     1 each    In vitro    Type 2 diabetes, HbA1c goal < 7% (H)       * blood glucose monitoring meter device kit    no brand specified    1 kit    Use to test blood sugar 3 times daily or as directed.    Type 2 diabetes mellitus without complication, without long-term current use of insulin (H)       cetirizine 10 MG tablet    zyrTEC     Take 10 mg by mouth daily as needed for allergies        citalopram 40 MG tablet    celeXA    90 tablet    TAKE 1 TABLET BY MOUTH DAILY    Major depression in complete remission (H)       cyclobenzaprine 10 MG tablet    FLEXERIL    90 tablet    TAKE 1 TABLET BY MOUTH 3 TIMES DAILY AS NEEDED FOR MUSCLE SPASMS     Fibromyalgia       insulin pen needle 31G X 5 MM    B-D U/F    100 each    Use 1 pen needles daily or as directed.    Type 2 diabetes mellitus without complication (H)       metFORMIN 500 MG 24 hr tablet    GLUCOPHAGE-XR    360 tablet    TAKE 4 TABLETS BY MOUTH DAILY WITH BREAKFAST    Type 2 diabetes mellitus without complication, without long-term current use of insulin (H)       traMADol 50 MG tablet    ULTRAM    60 tablet    TAKE 1-2 TABS BY MOUTH EVERY 8 HOURS AS NEEDED FOR MODERATE PAIN. MAX 8TABS/DAY & 60 TABS/MONTH    Fibromyalgia       traZODone 50 MG tablet    DESYREL    45 tablet    TAKE 1.5 TABLETS (75 MG) BY MOUTH NIGHTLY AS NEEDED FOR SLEEP    Fibromyalgia       VICTOZA PEN 18 MG/3ML soln   Generic drug:  liraglutide     18 mL    INJECT 1.2 MG SUBCUTANEOUS DAILY    Type 2 diabetes mellitus without complication, without long-term current use of insulin (H)       * Notice:  This list has 4 medication(s) that are the same as other medications prescribed for you. Read the directions carefully, and ask your doctor or other care provider to review them with you.

## 2018-06-18 NOTE — TELEPHONE ENCOUNTER
Message from BareedEEhart:  Original authorizing provider: JEANE Reilly CNP would like a refill of the following medications:  albuterol (PROAIR HFA/PROVENTIL HFA/VENTOLIN HFA) 108 (90 BASE) MCG/ACT Inhaler [JEANE Reilly CNP]    Preferred pharmacy: [address]    Comment:

## 2018-06-18 NOTE — PROGRESS NOTES
SUBJECTIVE:  Kaylee Lopez is an 48 year old  woman who presents for   gynecology consult for abnormal pap findings - Endometrial cells present in a woman 45 years of age or older.  Patient's last menstrual period was 2018. Periods are regular q 28-30 days, lasting 5 days. Dysmenorrhea:moderate, occurring throughout menses. Cyclic symptoms include none. No intermenstrual bleeding, spotting, or discharge.    Current contraception: Essure  History of abnormal Pap smear: No  Family history of uterine or ovarian cancer: No  History of abnormal mammogram: No  Family history of breast cancer: No    Concerns today:       US Results:  Normal myometrium   Endometrium with intracavitary hyperechoic area: polyp versus submucous myoma  Consider hysterosonogram vs hysteroscopy for diagnosis    - Pt states her menses has been increasing in volume & pelvic cramping has worsened over the past 6 months -- she believed this was just due to aging, but states the cramping did become so severe that she missed 2 days of work. She would like an endo bx performed today in clinic, to investigate the cause of her menorrhagia & endo cells seen on pap.           Past Medical History:   Diagnosis Date     Abnormal Papanicolaou smear of cervix and cervical HPV     late , early ; treated with TCA     Depression      Diabetes mellitus      Fibromyalgia      Inflammatory arthritis     ?; has been discharged from Rheumatology     Narcolepsy 2010    doing well without medication.     Non-rheumatic tricuspid valve insufficiency     antibiotic prophylax Problem list name updated by automated process. Provider to review      Obesity      Pulmonary hypertension 2010    on ECHO, but normal right heart cath      Reflex sympathetic dystrophy     right foot ; related to stress fracture     TRICUSPID VALVE DISEASE (regurg)     sees Cardiology           Family History   Problem Relation Age of Onset     Respiratory  Father      asthma     Arthritis Father      hips     Depression Father      Alcohol/Drug Father      DIABETES Paternal Grandfather      CEREBROVASCULAR DISEASE Paternal Grandfather      Hypertension Paternal Grandfather      Cancer - colorectal Paternal Grandmother      Arthritis Paternal Grandmother      hips     Colon Cancer Paternal Grandmother      Prostate Cancer Maternal Grandfather      Hypertension Maternal Grandfather      CANCER Maternal Grandmother      pancreatic     Arthritis Maternal Grandmother      hips     Depression Maternal Grandmother      Hypertension Maternal Grandmother      Other Cancer Maternal Grandmother      Gallbladder Disease Maternal Grandmother      Depression Mother      Rashes/Skin Problems Mother      Rosacea     Arthritis Mother      osteoarthritis in hands and knees     Hypertension Mother      Depression Maternal Uncle      bipolar     Respiratory Brother      sleep apnea     Cancer - colorectal Maternal Uncle      CEREBROVASCULAR DISEASE Maternal Uncle       of massive stroke--no heart problems     Hypertension Son        Past Surgical History:   Procedure Laterality Date     Angiogram  3/2011    No pulmonary hypertension     COLONOSCOPY  2008    normal     LAPAROSCOPIC CHOLECYSTECTOMY  2000     SURGICAL HISTORY OF -       essure procedure for contraception     TCA for abnormal pap         Current Outpatient Prescriptions   Medication     ACE NOT PRESCRIBED, INTENTIONAL,     albuterol (PROAIR HFA/PROVENTIL HFA/VENTOLIN HFA) 108 (90 Base) MCG/ACT Inhaler     atorvastatin (LIPITOR) 10 MG tablet     OpenX CONTOUR NEXT test strip     blood glucose (NO BRAND SPECIFIED) lancing device     blood glucose calibration (NO BRAND SPECIFIED) solution     blood glucose calibration (NO BRAND SPECIFIED) solution     blood glucose monitoring (NO BRAND SPECIFIED) meter device kit     Blood Glucose Monitoring Suppl MISC     cetirizine (ZYRTEC) 10 MG tablet     citalopram (CELEXA) 40  "MG tablet     cyclobenzaprine (FLEXERIL) 10 MG tablet     insulin pen needle (B-D U/F) 31G X 5 MM     metFORMIN (GLUCOPHAGE-XR) 500 MG 24 hr tablet     traMADol (ULTRAM) 50 MG tablet     traZODone (DESYREL) 50 MG tablet     VICTOZA PEN 18 MG/3ML soln     No current facility-administered medications for this visit.      Allergies   Allergen Reactions     Codeine Nausea     nausea       Social History   Substance Use Topics     Smoking status: Never Smoker     Smokeless tobacco: Never Used     Alcohol use 0.0 oz/week     0 Standard drinks or equivalent per week      Comment: rare       Review Of Systems  Ears/Nose/Throat: negative  Respiratory: No shortness of breath, dyspnea on exertion, cough, or hemoptysis  Cardiovascular: negative  Gastrointestinal: negative  Genitourinary: negative  Constitutional, HEENT, cardiovascular, pulmonary, GI, , musculoskeletal, neuro, skin, endocrine and psych systems are negative, except as otherwise noted.      This document serves as a record of the services and decisions personally performed and made by Georgiana Pizarro DO. It was created on her behalf by Britany Frausto, a trained medical scribe. The creation of this document is based the provider's statements to the medical scribe.  Scribe Britany Frausto 3:24 PM, June 18, 2018    OBJECTIVE:  /82  Ht 1.638 m (5' 4.5\")  Wt 107.8 kg (237 lb 11.2 oz)  LMP 05/30/2018  BMI 40.17 kg/m2  General appearance: healthy, alert and no distress  Skin: Skin color, texture, turgor normal. No rashes or lesions.  Neck: Neck supple. No adenopathy. Thyroid symmetric, normal size,, Carotids without bruits.  Lungs: negative, Percussion normal. Good diaphragmatic excursion. Lungs clear  Heart: negative, PMI normal. No lifts, heaves, or thrills. RRR. No murmurs, clicks gallops or rub  Pelvic: Pelvic examination without pap/ Gonorrhea and Chlamydia   including  External genitalia normal   and vagina normal rugatted not atrophic  Examination of urethra " normal no masses, tenderness, scarring  bladder, no masses or tenderness  Cervix no lesions or discharge  Bimanual exam with   Uterus 10 weeks size, mid position, mobile, no tenderness, no descent   Adnexa/parametria normal        LABS:  None        Procedure:  Endometrial biopsy  After obtaining informed consent pt prepped in the usual sterile fashion and endometrial biopsy preformed w a pipelle type sampler without difficulty or complication w a thorough sampling and acceptable specimen.  The uterus sounded to 10 cm.  the pt tolerated the procedure well and was D/C'd in good condition. Signs and Sx's of complications disc, pt to call.  pt will call in 1 week to rev path report and develope an approp plan.        ASSESSMENT:  Kaylee Lopez is an 48 year old  woman who presents for   gynecology consult for abnormal pap findings - Endometrial cells present in a woman 45 years of age or older.      PLAN:  Dx: Abnormal pap & US findings; Menorrhagia  1)  Abnormal pap & US this possible fibroid or polyp:  Discussed procedures to investigate   - Endometrial biopsy, Hysteroscopy    - Pt desires both of these procedures: endo completed today in clinic, D&C to be  scheduled as soon as possible  2)  Menorrhagia: Discussed possible treatments, including medical & surgical   - Medical: OCP, IUD, Nexplanon, Depo shot   - Surgical: Uterine ablation, Hysteroscopy, Hysterectomy  3)  Return to clinic prn.        Rx:  None        The information in this document, created by the medical scribe for me, accurately reflects the services I personally performed and the decisions made by me. I have reviewed and approved this document for accuracy prior to leaving the patient care area.  3:24 PM, 18    Dr. Georgiana Pizarro, DO    Obstetrics and Gynecology  [facility]

## 2018-06-18 NOTE — NURSING NOTE
"Chief Complaint   Patient presents with     Minor Procedure     endometrial biopsy     Results     pelvic US 18       Initial /82  Ht 5' 4.5\" (1.638 m)  Wt 237 lb 11.2 oz (107.8 kg)  LMP 2018  BMI 40.17 kg/m2 Estimated body mass index is 40.17 kg/(m^2) as calculated from the following:    Height as of this encounter: 5' 4.5\" (1.638 m).    Weight as of this encounter: 237 lb 11.2 oz (107.8 kg).  BP completed using cuff size: large        The following HM Due: NONE    "

## 2018-06-18 NOTE — PATIENT INSTRUCTIONS
Will notify of results     jann will call you to schedule the procedure     Dr. Georgiana Pizarro,     Obstetrics and Gynecology  [facility]

## 2018-06-19 ENCOUNTER — TELEPHONE (OUTPATIENT)
Dept: OBGYN | Facility: CLINIC | Age: 48
End: 2018-06-19

## 2018-06-19 NOTE — TELEPHONE ENCOUNTER
Procedure name(s) - multi select hysteroscopy, polpectomy vs myomectomy with myosure device, dilation and curettage       Reason for procedure endometrial polyp vs fibroid      Surgeon: Juarez      Is this a multi surgeon case? No      Laterality N/A      Request for additional equipment Other (see comments) None     Anesthesia General      Initiate Pre-op orders for above procedure: Yes, as ordered in Epic Additional orders noted there also     Location of Case: [facility]      Surgeon Procedure Time (incision to closure) in minutes (per procedure as applicable) 30      Note:  Surgical Case Time Needed (in minutes)     Date of Surgery (Requested): 7/1/2018 july or august     Patient Class (for admit prior to surgery, specify number of days in comments): Same day (surgery center outpatient)      Post-Op Appointment 1.5 week      Vendor Needed? No

## 2018-06-20 LAB — COPATH REPORT: NORMAL

## 2018-06-20 NOTE — TELEPHONE ENCOUNTER
Patient would like to schedule at the first available opening on a Wednesday, with the exception of 7/25/18.

## 2018-06-21 DIAGNOSIS — E11.9 TYPE 2 DIABETES MELLITUS WITHOUT COMPLICATION, WITHOUT LONG-TERM CURRENT USE OF INSULIN (H): ICD-10-CM

## 2018-06-21 RX ORDER — METFORMIN HCL 500 MG
TABLET, EXTENDED RELEASE 24 HR ORAL
Qty: 360 TABLET | Refills: 0 | Status: SHIPPED | OUTPATIENT
Start: 2018-06-21 | End: 2018-10-30

## 2018-06-21 NOTE — TELEPHONE ENCOUNTER
"Requested Prescriptions   Pending Prescriptions Disp Refills     metFORMIN (GLUCOPHAGE-XR) 500 MG 24 hr tablet [Pharmacy Med Name: METFORMIN  MG TABLET] 360 tablet 0     Sig: TAKE 4 TABLETS BY MOUTH DAILY WITH BREAKFAST    Biguanide Agents Passed    6/21/2018  5:52 AM       Passed - Blood pressure less than 140/90 in past 6 months    BP Readings from Last 3 Encounters:   06/18/18 134/82   04/23/18 136/84   03/16/18 135/81                Passed - Patient has documented LDL within the past 12 mos.    Recent Labs   Lab Test  03/23/18   0831   LDL  46            Passed - Patient has had a Microalbumin in the past 12 mos.    Recent Labs   Lab Test  07/20/17   1631   MICROL  26   UMALCR  8.81            Passed - Patient is age 10 or older       Passed - Patient has documented A1c within the specified period of time.    If HgbA1C is 8 or greater, it needs to be on file within the past 3 months.  If less than 8, must be on file within the past 6 months.     Recent Labs   Lab Test  04/23/18   1512   A1C  5.8*            Passed - Patient's CR is NOT>1.4 OR Patient's EGFR is NOT<45 within past 12 mos.    Recent Labs   Lab Test  09/18/17   0754   GFRESTIMATED  77   GFRESTBLACK  >90       Recent Labs   Lab Test  09/18/17   0754   CR  0.80            Passed - Patient does NOT have a diagnosis of CHF.       Passed - Patient is not pregnant       Passed - Patient has not had a positive pregnancy test within the past 12 mos.        Passed - Recent (6 mo) or future (30 days) visit within the authorizing provider's specialty    Patient had office visit in the last 6 months or has a visit in the next 30 days with authorizing provider or within the authorizing provider's specialty.  See \"Patient Info\" tab in inbasket, or \"Choose Columns\" in Meds & Orders section of the refill encounter.              "

## 2018-06-21 NOTE — TELEPHONE ENCOUNTER
Last Written Prescription Date:  3/28/2018  Last Fill Quantity: 360,  # refills: 0   Last office visit: 4/23/2018 with prescribing provider:  Irene Castellanos   Future Office Visit:      Prescription approved per [facility] Refill Protocol.    Due for DM follow up in October.    Areli RENO RN, BSN, PHN  [place] [facility] RN'

## 2018-06-21 NOTE — TELEPHONE ENCOUNTER
Type of surgery: hysteroscopy, polypectomy vs myomectomy with myosure device, dilation and curettage  Location of surgery: [facility]  Date and time of surgery: 7/9/18 @ 7:30 am  Surgeon: Dr. Pizarro  Pre-Op Appt Date: 7/2/18  Post-Op Appt Date: 7/23/18   Packet sent out: Yes  Pre-cert/Authorization completed:  No  Date:   '

## 2018-06-26 DIAGNOSIS — E11.9 TYPE 2 DIABETES MELLITUS WITHOUT COMPLICATION, WITHOUT LONG-TERM CURRENT USE OF INSULIN (H): ICD-10-CM

## 2018-06-26 DIAGNOSIS — F32.5 MAJOR DEPRESSION IN COMPLETE REMISSION (H): ICD-10-CM

## 2018-06-27 ENCOUNTER — MYC REFILL (OUTPATIENT)
Dept: FAMILY MEDICINE | Facility: CLINIC | Age: 48
End: 2018-06-27

## 2018-06-27 DIAGNOSIS — E11.9 TYPE 2 DIABETES MELLITUS WITHOUT COMPLICATION, WITHOUT LONG-TERM CURRENT USE OF INSULIN (H): ICD-10-CM

## 2018-06-27 RX ORDER — CITALOPRAM HYDROBROMIDE 40 MG/1
TABLET ORAL
Qty: 90 TABLET | Refills: 1 | Status: SHIPPED | OUTPATIENT
Start: 2018-06-27 | End: 2018-12-19

## 2018-06-27 RX ORDER — LIRAGLUTIDE 6 MG/ML
INJECTION SUBCUTANEOUS
Qty: 18 ML | Refills: 0 | Status: SHIPPED | OUTPATIENT
Start: 2018-06-27 | End: 2018-08-12

## 2018-06-27 RX ORDER — LIRAGLUTIDE 6 MG/ML
1.2 INJECTION SUBCUTANEOUS DAILY
Qty: 18 ML | Refills: 0 | Status: SHIPPED | OUTPATIENT
Start: 2018-06-27 | End: 2018-11-26

## 2018-06-27 NOTE — TELEPHONE ENCOUNTER
Message from [place]:  Original authorizing provider: Irene Castellanos MD, MD    Kaylee Lopez would like a refill of the following medications:  VICTOZA PEN 18 MG/3ML soln [Irene Castellanos MD, MD]    Preferred pharmacy: [facility] [address] [address] [facility] [address] [facility]    Comment:

## 2018-06-27 NOTE — TELEPHONE ENCOUNTER
"Requested Prescriptions   Pending Prescriptions Disp Refills     liraglutide (VICTOZA PEN) 18 MG/3ML soln 18 mL 1    GLP-1 Agonists Protocol Passed    6/27/2018 11:59 AM       Passed - Blood pressure less than 140/90 in past 6 months    BP Readings from Last 3 Encounters:   06/18/18 134/82   04/23/18 136/84   03/16/18 135/81                Passed - LDL on file in past 12 months    Recent Labs   Lab Test  03/23/18   0831   LDL  46            Passed - Microalbumin on file in past 12 months    Recent Labs   Lab Test  07/20/17   1631   MICROL  26   UMALCR  8.81            Passed - HgbA1C in past 3 or 6 months    If HgbA1C is 8 or greater, it needs to be on file within the past 3 months.  If less than 8, must be on file within the past 6 months.     Recent Labs   Lab Test  04/23/18   1512   A1C  5.8*            Passed - Patient is age 18 or older       Passed - No active pregnancy on record       Passed - Normal serum creatinine on file in past 12 months    Recent Labs   Lab Test  09/18/17   0754   CR  0.80            Passed - No positive pregnancy test in past 12 months       Passed - Recent (6 mo) or future (30 days) visit within the authorizing provider's specialty    Patient had office visit in the last 6 months or has a visit in the next 30 days with authorizing provider.  See \"Patient Info\" tab in inbasket, or \"Choose Columns\" in Meds & Orders section of the refill encounter.              "

## 2018-06-27 NOTE — TELEPHONE ENCOUNTER
Prescription approved per [facility] Refill Protocol.    Areli RENO RN, BSN, PHN  [place] [facility] RN

## 2018-06-27 NOTE — TELEPHONE ENCOUNTER
Prescription approved per [facility] Refill Protocol.    Areli RENO RN, BSN, PHN  [place] Flex RN

## 2018-06-27 NOTE — TELEPHONE ENCOUNTER
Requested Prescriptions   Pending Prescriptions Disp Refills     VICTOZA PEN 18 MG/3ML soln [Pharmacy Med Name: VICTOZA 3-ANDREW 18 MG/3 ML PEN]    Last Written Prescription Date:  1/12/2018  Last Fill Quantity: 18 ml,  # refills: 1   Last office visit: 4/23/2018 with prescribing provider:  Irene Castellanos     Future Office Visit:   Next 5 appointments (look out 90 days)     Jul 02, 2018  8:00 AM CDT   [facility]    [address] [address]   [phone]            Jul 23, 2018 11:00 AM CDT   SHORT with Georgiana Pizarro DO   [facility]    [address] [address] [address]   [phone]            Jul 24, 2018  4:10 PM CDT   MyChart Long with Irene Castellanos MD   [facility]    [address] [address] [address]   [phone]                   1     Sig: INJECT 1.2 MG SUBCUTANEOUS DAILY    GLP-1 Agonists Protocol Passed    6/26/2018  7:04 PM       Passed - Blood pressure less than 140/90 in past 6 months    BP Readings from Last 3 Encounters:   06/18/18 134/82   04/23/18 136/84   03/16/18 135/81                Passed - LDL on file in past 12 months    Recent Labs   Lab Test  03/23/18   0831   LDL  46            Passed - Microalbumin on file in past 12 months    Recent Labs   Lab Test  07/20/17   1631   MICROL  26   UMALCR  8.81            Passed - HgbA1C in past 3 or 6 months    If HgbA1C is 8 or greater, it needs to be on file within the past 3 months.  If less than 8, must be on file within the past 6 months.     Recent Labs   Lab Test  04/23/18   1512   A1C  5.8*            Passed - Patient is age 18 or older       Passed - No active pregnancy on record       Passed - Normal serum creatinine on file in past 12 months    Recent Labs   Lab Test  09/18/17   0754   CR  0.80            Passed  "- No positive pregnancy test in past 12 months       Passed - Recent (6 mo) or future (30 days) visit within the authorizing provider's specialty    Patient had office visit in the last 6 months or has a visit in the next 30 days with authorizing provider.  See \"Patient Info\" tab in inbasket, or \"Choose Columns\" in Meds & Orders section of the refill encounter.            citalopram (CELEXA) 40 MG tablet [Pharmacy Med Name: CITALOPRAM HBR 40 MG TABLET]    Last Written Prescription Date:  1/12/2018  Last Fill Quantity: 90,  # refills: 1   Last office visit: 4/23/2018 with prescribing provider:  Irene Castellanos     Future Office Visit:   Next 5 appointments (look out 90 days)     Jul 02, 2018  8:00 AM CDT   Pre-Op physical with JEANE Londono Ra, CNP   [facility]    [address] [address] [address]   [phone]            Jul 23, 2018 11:00 AM CDT   [facility] P[facility]ro,    [facility]    [address] [address] [facility] [address]   [phone]            Jul 24, 2018  4:10 PM CDT   MyChart Long with Irene Castellanos MD   [facility]    [address] [address] [address]   [phone]                  90 tablet 1     Sig: TAKE 1 TABLET BY MOUTH DAILY    SSRIs Protocol Passed    6/26/2018  7:04 PM       Passed - PHQ-9 score less than 5 in past 6 months    Please review last PHQ-9 score.     PHQ-9 SCORE 12/13/2016 7/20/2017 1/12/2018   Total Score - - -   Total Score MyChart 1 (Minimal depression) - 2 (Minimal depression)   Total Score 1 2 2     MICHAEL-7 SCORE 12/13/2016 7/20/2017 1/12/2018   Total Score - - -   Total Score 1 (minimal anxiety) - 1 (minimal anxiety)   Total Score 1 4 1                Passed - Patient is age 18 or older       Passed - No active pregnancy on record       Passed - No positive pregnancy test in last 12 months       " "Passed - Recent (6 mo) or future (30 days) visit within the authorizing provider's specialty    Patient had office visit in the last 6 months or has a visit in the next 30 days with authorizing provider or within the authorizing provider's specialty.  See \"Patient Info\" tab in inbasket, or \"Choose Columns\" in Meds & Orders section of the refill encounter.              "

## 2018-06-29 ENCOUNTER — OFFICE VISIT (OUTPATIENT)
Dept: FAMILY MEDICINE | Facility: CLINIC | Age: 48
End: 2018-06-29
Payer: COMMERCIAL

## 2018-06-29 VITALS
TEMPERATURE: 99.1 F | SYSTOLIC BLOOD PRESSURE: 137 MMHG | HEART RATE: 90 BPM | BODY MASS INDEX: 38.65 KG/M2 | RESPIRATION RATE: 14 BRPM | OXYGEN SATURATION: 96 % | WEIGHT: 232 LBS | DIASTOLIC BLOOD PRESSURE: 81 MMHG | HEIGHT: 65 IN

## 2018-06-29 DIAGNOSIS — J36 TONSILLAR ABSCESS: Primary | ICD-10-CM

## 2018-06-29 PROCEDURE — 99214 OFFICE O/P EST MOD 30 MIN: CPT | Performed by: INTERNAL MEDICINE

## 2018-06-29 RX ORDER — CLINDAMYCIN HCL 300 MG
300 CAPSULE ORAL 4 TIMES DAILY
Qty: 40 CAPSULE | Refills: 0 | Status: SHIPPED | OUTPATIENT
Start: 2018-06-29 | End: 2018-08-12

## 2018-06-29 ASSESSMENT — ENCOUNTER SYMPTOMS
FEVER: 0
COUGH: 1
SHORTNESS OF BREATH: 0
ABDOMINAL PAIN: 0
VOMITING: 0
DIARRHEA: 0
NAUSEA: 0
SORE THROAT: 1
MYALGIAS: 0
CHILLS: 0

## 2018-06-29 NOTE — NURSING NOTE
"Chief Complaint   Patient presents with     Mass       Initial /81  Pulse 90  Temp 99.1  F (37.3  C) (Oral)  Resp 14  Ht 5' 4.5\" (1.638 m)  Wt 232 lb (105.2 kg)  LMP 05/30/2018  SpO2 96%  Breastfeeding? No  BMI 39.21 kg/m2 Estimated body mass index is 39.21 kg/(m^2) as calculated from the following:    Height as of this encounter: 5' 4.5\" (1.638 m).    Weight as of this encounter: 232 lb (105.2 kg).  BP completed using cuff size: large    "

## 2018-06-29 NOTE — MR AVS SNAPSHOT
After Visit Summary   6/29/2018    Kaylee Lopez    MRN: [id number]           Patient Information     Date Of Birth          1970        Visit Information        Provider Department      6/29/2018 2:30 PM Girma Acosta MD [facility] [place]        Today's Diagnoses     Tonsillar abscess    -  1      Care Instructions    Take antibiotic as prescribed.    Follow up with your doctor if your symptoms persist/worsens, or if you develop new symptoms or side effects from the medication.    Inform the surgeon about your infection.          Follow-ups after your visit        Your next 10 appointments already scheduled     Jul 02, 2018  8:00 AM CDT   Pre-Op physical with JEANE Londono Ra, CNP   [facility] ([facility])    [address] [facility] [address] [phone]            Jul 09, 2018   Procedure with Georgiana Pizarro DO   [facility] (--)    [address] [facility] [id number] [phone]            Jul 23, 2018 11:00 AM CDT   SHORT with Georgiana Pizarro DO   [facility]    [address] [facility] [id number] [phone]            Jul 24, 2018  4:10 PM CDT   MyChart Long with Irene Castellanos MD   [facility] ([facility])    [address] [facility] [address] [phone]              Who to contact     If you have questions or need follow up information about today's clinic visit or your schedule please contact [facility] directly at [phone].  Normal or non-critical lab and imaging results will be communicated to you by MyChart, letter or phone within 4 business days after the clinic has received the results. If you do not hear from us within 7 days, please contact the clinic through MyChart or phone. If you have a critical or abnormal lab result, we will notify you  "by phone as soon as possible.  Submit refill requests through [employer] or call your pharmacy and they will forward the refill request to us. Please allow 3 business days for your refill to be completed.          Additional Information About Your Visit        [employer] Information     [employer] gives you secure access to your electronic health record. If you see a primary care provider, you can also send messages to your care team and make appointments. If you have questions, please call your primary care clinic.  If you do not have a primary care provider, please call [phone] and they will assist you.        Care EveryWhere ID     This is your Care EveryWhere ID. This could be used by other organizations to access your [place] medical records  [id number]        Your Vitals Were     Pulse Temperature Respirations Height Last Period Pulse Oximetry    90 99.1  F (37.3  C) (Oral) 14 5' 4.5\" (1.638 m) 05/30/2018 96%    Breastfeeding? BMI (Body Mass Index)                No 39.21 kg/m2           Blood Pressure from Last 3 Encounters:   06/29/18 137/81   06/18/18 134/82   04/23/18 136/84    Weight from Last 3 Encounters:   06/29/18 232 lb (105.2 kg)   06/18/18 237 lb 11.2 oz (107.8 kg)   04/23/18 239 lb 3.2 oz (108.5 kg)              Today, you had the following     No orders found for display         Today's Medication Changes          These changes are accurate as of 6/29/18  2:59 PM.  If you have any questions, ask your nurse or doctor.               Start taking these medicines.        Dose/Directions    clindamycin 300 MG capsule   Commonly known as:  CLEOCIN   Used for:  Tonsillar abscess   Started by:  Girma Acosta MD        Dose:  300 mg   Take 1 capsule (300 mg) by mouth 4 times daily   Quantity:  40 capsule   Refills:  0            Where to get your medicines      These medications were sent to [place] [address] [place] [facility] [address] [address] " [address]    Hours:  Tech issues with their phone system Phone:  [phone]     clindamycin 300 MG capsule                Primary Care Provider Office Phone # Fax #    Irene Castellanos [phone] [address] [place] [address]        Equal Access to Services     [facility] MISBAH : Hadii aad ku hadasho Soomaali, waaxda luqadaha, qaybta kaalmada adeegyada, waxay idiin hayaan adeeg khcristysh larebecan ah. So [facility] [phone].    ATENCIÓN: Si habla español, tiene a garner disposición servicios gratuitos de asistencia lingüística. [facility] [phone].    We comply with applicable federal civil rights laws and [place] laws. We do not discriminate on the basis of race, color, national origin, age, disability, sex, sexual orientation, or gender identity.            Thank you!     Thank you for choosing [facility]  for your care. Our goal is always to provide you with excellent care. Hearing back from our patients is one way we can continue to improve our services. Please take a few minutes to complete the written survey that you may receive in the mail after your visit with us. Thank you!             Your Updated Medication List - Protect others around you: Learn how to safely use, store and throw away your medicines at www.disposemymeds.org.          This list is accurate as of 6/29/18  2:59 PM.  Always use your most recent med list.                   Brand Name Dispense Instructions for use Diagnosis    ACE NOT PRESCRIBED (INTENTIONAL)     0 each    1 each ACE Inhibitor not prescribed due to Other: not indicated    Type 2 diabetes, HbA1c goal < 7% (H)       albuterol 108 (90 Base) MCG/ACT Inhaler    PROAIR HFA/PROVENTIL HFA/VENTOLIN HFA    1 Inhaler    Inhale 2 puffs into the lungs every 6 hours as needed for shortness of breath / dyspnea or wheezing    Cough       atorvastatin 10 MG tablet    LIPITOR    90 tablet    TAKE 1 TABLET BY MOUTH EVERY DAY    Hyperlipidemia LDL goal <100       RAJNI  CONTOUR NEXT test strip   Generic drug:  blood glucose monitoring     100 strip    USE TO TEST BLOOD SUGARS 3 TIMES DAILY OR AS DIRECTED    Type 2 diabetes mellitus without complication, without long-term current use of insulin (H)       * blood glucose calibration solution    no brand specified    1 each    Use to calibrate blood glucose monitor as directed.    Type 2 diabetes, HbA1c goal < 7% (H), Type 2 diabetes mellitus without complication (H)       * blood glucose calibration solution    no brand specified    1 each    Use to calibrate blood glucose monitor as directed.    Type 2 diabetes mellitus without complication, without long-term current use of insulin (H)       blood glucose lancing device    no brand specified    100 each    Use to test blood sugars 3 times daily or as directed.    Type 2 diabetes mellitus without complication, without long-term current use of insulin (H)       * Blood Glucose Monitoring Suppl Misc     1 each    In vitro    Type 2 diabetes, HbA1c goal < 7% (H)       * blood glucose monitoring meter device kit    no brand specified    1 kit    Use to test blood sugar 3 times daily or as directed.    Type 2 diabetes mellitus without complication, without long-term current use of insulin (H)       cetirizine 10 MG tablet    zyrTEC     Take 10 mg by mouth daily as needed for allergies        citalopram 40 MG tablet    celeXA    90 tablet    TAKE 1 TABLET BY MOUTH DAILY    Major depression in complete remission (H)       clindamycin 300 MG capsule    CLEOCIN    40 capsule    Take 1 capsule (300 mg) by mouth 4 times daily    Tonsillar abscess       cyclobenzaprine 10 MG tablet    FLEXERIL    90 tablet    TAKE 1 TABLET BY MOUTH 3 TIMES DAILY AS NEEDED FOR MUSCLE SPASMS    Fibromyalgia       insulin pen needle 31G X 5 MM    B-D U/F    100 each    Use 1 pen needles daily or as directed.    Type 2 diabetes mellitus without complication (H)       metFORMIN 500 MG 24 hr tablet    GLUCOPHAGE-XR     360 tablet    TAKE 4 TABLETS BY MOUTH DAILY WITH BREAKFAST    Type 2 diabetes mellitus without complication, without long-term current use of insulin (H)       traMADol 50 MG tablet    ULTRAM    60 tablet    TAKE 1-2 TABS BY MOUTH EVERY 8 HOURS AS NEEDED FOR MODERATE PAIN. MAX 8TABS/DAY & 60 TABS/MONTH    Fibromyalgia       traZODone 50 MG tablet    DESYREL    45 tablet    TAKE 1.5 TABLETS (75 MG) BY MOUTH NIGHTLY AS NEEDED FOR SLEEP    Fibromyalgia       * VICTOZA PEN 18 MG/3ML soln   Generic drug:  liraglutide     18 mL    INJECT 1.2 MG SUBCUTANEOUS DAILY    Type 2 diabetes mellitus without complication, without long-term current use of insulin (H)       * liraglutide 18 MG/3ML soln    VICTOZA PEN    18 mL    Inject 1.2 mg Subcutaneous daily    Type 2 diabetes mellitus without complication, without long-term current use of insulin (H)       * Notice:  This list has 6 medication(s) that are the same as other medications prescribed for you. Read the directions carefully, and ask your doctor or other care provider to review them with you.

## 2018-06-29 NOTE — PROGRESS NOTES
"HPI    SUBJECTIVE:   Kaylee Lopez is a 48 year old female who presents to clinic today for the following health issues:      lump      Duration: had 1-2 weeks ago - went away, then returned today    Description (location/character/radiation): lump on right tonsil    Intensity:  mild    Accompanying signs and symptoms: cough    History (similar episodes/previous evaluation): None    Precipitating or alleviating factors: None    Therapies tried and outcome: OTC throat spray -- no relief       Past Medical History:   Diagnosis Date     Abnormal Papanicolaou smear of cervix and cervical HPV 2003    late 2003, early 2004; treated with TCA     Depression      Diabetes mellitus      Fibromyalgia      Inflammatory arthritis     ?; has been discharged from Rheumatology     Narcolepsy 11/1/2010    doing well without medication.     Non-rheumatic tricuspid valve insufficiency     antibiotic prophylax Problem list name updated by automated process. Provider to review      Obesity      Pulmonary hypertension 5/2/2010    on ECHO, but normal right heart cath 2011     Reflex sympathetic dystrophy     right foot ; related to stress fracture     TRICUSPID VALVE DISEASE (regurg)     sees Cardiology        Review of Systems   Constitutional: Negative for chills, fever and malaise/fatigue.   HENT: Positive for sore throat. Negative for congestion.    Respiratory: Positive for cough. Negative for shortness of breath.    Gastrointestinal: Negative for abdominal pain, diarrhea, nausea and vomiting.   Musculoskeletal: Negative for myalgias.   Skin: Negative for rash.       /81  Pulse 90  Temp 99.1  F (37.3  C) (Oral)  Resp 14  Ht 5' 4.5\" (1.638 m)  Wt 232 lb (105.2 kg)  LMP 05/30/2018  SpO2 96%  Breastfeeding? No  BMI 39.21 kg/m2      Physical Exam   Constitutional: She is oriented to person, place, and time. No distress.   HENT:   Mouth/Throat: Oropharyngeal exudate present.   (+) Small right tonsillar abscess "   Pulmonary/Chest: Effort normal. No respiratory distress.   Lymphadenopathy:     She has cervical adenopathy.   Neurological: She is alert and oriented to person, place, and time. GCS score is 15.   Skin: No rash noted.   Vitals reviewed.        ICD-10-CM    1. Tonsillar abscess J36 clindamycin (CLEOCIN) 300 MG capsule       Patient Instructions   Take antibiotic as prescribed.    Follow up with your doctor if your symptoms persist/worsens, or if you develop new symptoms or side effects from the medication.    Inform the surgeon about your infection.

## 2018-06-29 NOTE — PATIENT INSTRUCTIONS
Take antibiotic as prescribed.    Follow up with your doctor if your symptoms persist/worsens, or if you develop new symptoms or side effects from the medication.    Inform the surgeon about your infection.

## 2018-07-02 ENCOUNTER — OFFICE VISIT (OUTPATIENT)
Dept: FAMILY MEDICINE | Facility: CLINIC | Age: 48
End: 2018-07-02
Payer: COMMERCIAL

## 2018-07-02 VITALS
HEART RATE: 99 BPM | SYSTOLIC BLOOD PRESSURE: 114 MMHG | BODY MASS INDEX: 40.22 KG/M2 | TEMPERATURE: 98.9 F | RESPIRATION RATE: 16 BRPM | DIASTOLIC BLOOD PRESSURE: 82 MMHG | OXYGEN SATURATION: 97 % | WEIGHT: 238 LBS

## 2018-07-02 DIAGNOSIS — N84.0 UTERINE POLYP: ICD-10-CM

## 2018-07-02 DIAGNOSIS — Z01.818 PREOP GENERAL PHYSICAL EXAM: Primary | ICD-10-CM

## 2018-07-02 LAB — HGB BLD-MCNC: 11.8 G/DL (ref 11.7–15.7)

## 2018-07-02 PROCEDURE — 80048 BASIC METABOLIC PNL TOTAL CA: CPT | Performed by: NURSE PRACTITIONER

## 2018-07-02 PROCEDURE — 36415 COLL VENOUS BLD VENIPUNCTURE: CPT | Performed by: NURSE PRACTITIONER

## 2018-07-02 PROCEDURE — 93000 ELECTROCARDIOGRAM COMPLETE: CPT | Performed by: NURSE PRACTITIONER

## 2018-07-02 PROCEDURE — 99214 OFFICE O/P EST MOD 30 MIN: CPT | Performed by: NURSE PRACTITIONER

## 2018-07-02 PROCEDURE — 85018 HEMOGLOBIN: CPT | Performed by: NURSE PRACTITIONER

## 2018-07-02 ASSESSMENT — ANXIETY QUESTIONNAIRES
1. FEELING NERVOUS, ANXIOUS, OR ON EDGE: SEVERAL DAYS
IF YOU CHECKED OFF ANY PROBLEMS ON THIS QUESTIONNAIRE, HOW DIFFICULT HAVE THESE PROBLEMS MADE IT FOR YOU TO DO YOUR WORK, TAKE CARE OF THINGS AT HOME, OR GET ALONG WITH OTHER PEOPLE: SOMEWHAT DIFFICULT
3. WORRYING TOO MUCH ABOUT DIFFERENT THINGS: NOT AT ALL
2. NOT BEING ABLE TO STOP OR CONTROL WORRYING: NOT AT ALL
6. BECOMING EASILY ANNOYED OR IRRITABLE: SEVERAL DAYS
GAD7 TOTAL SCORE: 3
7. FEELING AFRAID AS IF SOMETHING AWFUL MIGHT HAPPEN: SEVERAL DAYS
5. BEING SO RESTLESS THAT IT IS HARD TO SIT STILL: NOT AT ALL

## 2018-07-02 ASSESSMENT — PATIENT HEALTH QUESTIONNAIRE - PHQ9: 5. POOR APPETITE OR OVEREATING: NOT AT ALL

## 2018-07-02 NOTE — MR AVS SNAPSHOT
After Visit Summary   7/2/2018    Kaylee Lopez    MRN: [id number]           Patient Information     Date Of Birth          1970        Visit Information        Provider Department      7/2/2018 8:00 AM Erika Silva Ra, APRN CNP [facility]        Today's Diagnoses     Preop general physical exam    -  1      Care Instructions      Before Your Surgery      Call your surgeon if there is any change in your health. This includes signs of a cold or flu (such as a sore throat, runny nose, cough, rash or fever).    Do not smoke, drink alcohol or take over the counter medicine (unless your surgeon or primary care doctor tells you to) for the 24 hours before and after surgery.    If you take prescribed drugs: Follow your doctor s orders about which medicines to take and which to stop until after surgery.    Eating and drinking prior to surgery: follow the instructions from your surgeon    Take a shower or bath the night before surgery. Use the soap your surgeon gave you to gently clean your skin. If you do not have soap from your surgeon, use your regular soap. Do not shave or scrub the surgery site.  Wear clean pajamas and have clean sheets on your bed.     Do not take your metformin or victoza 24 hours prior to surgery.          Follow-ups after your visit        Follow-up notes from your care team     Return in about 1 year (around 7/2/2019) for Physical Exam.      Your next 10 appointments already scheduled     Jul 09, 2018   Procedure with Georgiana Pizarro DO   [facility] (--)    [address] [facility] [address]   [phone]            Jul 23, 2018 11:00 AM CDT   SHORT with Georgiana Pizarro DO   [facility] ([facility])    [address] [facility] [address]   [phone]            Jul 24, 2018  4:10 PM CDT   MyChart Long with Irene Castellanos MD   [facility] ([facility]  [place] [address] [facility] [address] [phone]              Who to contact     If you have questions or need follow up information about today's clinic visit or your schedule please contact [facility] directly at [phone].  Normal or non-critical lab and imaging results will be communicated to you by MyChart, letter or phone within 4 business days after the clinic has received the results. If you do not hear from us within 7 days, please contact the clinic through MyChart or phone. If you have a critical or abnormal lab result, we will notify you by phone as soon as possible.  Submit refill requests through [employer] or call your pharmacy and they will forward the refill request to us. Please allow 3 business days for your refill to be completed.          Additional Information About Your Visit        MyChart Information     [employer] gives you secure access to your electronic health record. If you see a primary care provider, you can also send messages to your care team and make appointments. If you have questions, please call your primary care clinic.  If you do not have a primary care provider, please call [phone] and they will assist you.        Care EveryWhere ID     This is your Care EveryWhere ID. This could be used by other organizations to access your [facility] medical records  [id number]        Your Vitals Were     Pulse Temperature Respirations Last Period Pulse Oximetry BMI (Body Mass Index)    99 98.9  F (37.2  C) (Oral) 16 07/01/2018 (Exact Date) 97% 40.22 kg/m2       Blood Pressure from Last 3 Encounters:   07/02/18 114/82   06/29/18 137/81   06/18/18 134/82    Weight from Last 3 Encounters:   07/02/18 238 lb (108 kg)   06/29/18 232 lb (105.2 kg)   06/18/18 237 lb 11.2 oz (107.8 kg)              We Performed the Following     Basic metabolic panel     DEPRESSION ACTION PLAN (DAP)     EKG 12-lead complete w/read - Clinics     Hemoglobin        Primary  Care Provider Office Phone # Fax #    Irene Castellanos [phone]       [address]  [facility] [address]        Equal Access to Services     PAUL[facility]KI MISBAH : Hadii aad ku hadkano Soeleanorali, waaxda luqadaha, qaybta kaalmada adepaulyada, gloria capps larebecagrupo herbert. So [facility] [phone].    ATENCIÓN: Si habla español, tiene a garner disposición servicios gratuitos de asistencia lingüística. Llame al [phone].    We comply with applicable federal civil rights laws and [place] laws. We do not discriminate on the basis of race, color, national origin, age, disability, sex, sexual orientation, or gender identity.            Thank you!     Thank you for choosing [facility]  for your care. Our goal is always to provide you with excellent care. Hearing back from our patients is one way we can continue to improve our services. Please take a few minutes to complete the written survey that you may receive in the mail after your visit with us. Thank you!             Your Updated Medication List - Protect others around you: Learn how to safely use, store and throw away your medicines at www.disposemymeds.org.          This list is accurate as of 7/2/18  8:58 AM.  Always use your most recent med list.                   Brand Name Dispense Instructions for use Diagnosis    ACE NOT PRESCRIBED (INTENTIONAL)     0 each    1 each ACE Inhibitor not prescribed due to Other: not indicated    Type 2 diabetes, HbA1c goal < 7% (H)       albuterol 108 (90 Base) MCG/ACT Inhaler    PROAIR HFA/PROVENTIL HFA/VENTOLIN HFA    1 Inhaler    Inhale 2 puffs into the lungs every 6 hours as needed for shortness of breath / dyspnea or wheezing    Cough       atorvastatin 10 MG tablet    LIPITOR    90 tablet    TAKE 1 TABLET BY MOUTH EVERY DAY    Hyperlipidemia LDL goal <100       RAJNI CONTOUR NEXT test strip   Generic drug:  blood glucose monitoring     100 strip    USE TO TEST BLOOD SUGARS 3 TIMES DAILY OR  AS DIRECTED    Type 2 diabetes mellitus without complication, without long-term current use of insulin (H)       * blood glucose calibration solution    no brand specified    1 each    Use to calibrate blood glucose monitor as directed.    Type 2 diabetes, HbA1c goal < 7% (H), Type 2 diabetes mellitus without complication (H)       * blood glucose calibration solution    no brand specified    1 each    Use to calibrate blood glucose monitor as directed.    Type 2 diabetes mellitus without complication, without long-term current use of insulin (H)       blood glucose lancing device    no brand specified    100 each    Use to test blood sugars 3 times daily or as directed.    Type 2 diabetes mellitus without complication, without long-term current use of insulin (H)       * Blood Glucose Monitoring Suppl Misc     1 each    In vitro    Type 2 diabetes, HbA1c goal < 7% (H)       * blood glucose monitoring meter device kit    no brand specified    1 kit    Use to test blood sugar 3 times daily or as directed.    Type 2 diabetes mellitus without complication, without long-term current use of insulin (H)       cetirizine 10 MG tablet    zyrTEC     Take 10 mg by mouth daily as needed for allergies        citalopram 40 MG tablet    celeXA    90 tablet    TAKE 1 TABLET BY MOUTH DAILY    Major depression in complete remission (H)       clindamycin 300 MG capsule    CLEOCIN    40 capsule    Take 1 capsule (300 mg) by mouth 4 times daily    Tonsillar abscess       cyclobenzaprine 10 MG tablet    FLEXERIL    90 tablet    TAKE 1 TABLET BY MOUTH 3 TIMES DAILY AS NEEDED FOR MUSCLE SPASMS    Fibromyalgia       insulin pen needle 31G X 5 MM    B-D U/F    100 each    Use 1 pen needles daily or as directed.    Type 2 diabetes mellitus without complication (H)       metFORMIN 500 MG 24 hr tablet    GLUCOPHAGE-XR    360 tablet    TAKE 4 TABLETS BY MOUTH DAILY WITH BREAKFAST    Type 2 diabetes mellitus without complication, without  long-term current use of insulin (H)       traMADol 50 MG tablet    ULTRAM    60 tablet    TAKE 1-2 TABS BY MOUTH EVERY 8 HOURS AS NEEDED FOR MODERATE PAIN. MAX 8TABS/DAY & 60 TABS/MONTH    Fibromyalgia       traZODone 50 MG tablet    DESYREL    45 tablet    TAKE 1.5 TABLETS (75 MG) BY MOUTH NIGHTLY AS NEEDED FOR SLEEP    Fibromyalgia       * VICTOZA PEN 18 MG/3ML soln   Generic drug:  liraglutide     18 mL    INJECT 1.2 MG SUBCUTANEOUS DAILY    Type 2 diabetes mellitus without complication, without long-term current use of insulin (H)       * liraglutide 18 MG/3ML soln    VICTOZA PEN    18 mL    Inject 1.2 mg Subcutaneous daily    Type 2 diabetes mellitus without complication, without long-term current use of insulin (H)       * Notice:  This list has 6 medication(s) that are the same as other medications prescribed for you. Read the directions carefully, and ask your doctor or other care provider to review them with you.

## 2018-07-02 NOTE — LETTER
My Depression Action Plan  Name: Kaylee Lopez   Date of Birth 1970  Date: 7/2/2018    My doctor: Irene Castellanos   My clinic: [address] [address] [phone]          GREEN    ZONE   Good Control    What it looks like:     Things are going generally well. You have normal up s and down s. You may even feel depressed from time to time, but bad moods usually last less than a day.   What you need to do:  1. Continue to care for yourself (see self care plan)  2. Check your depression survival kit and update it as needed  3. Follow your physician s recommendations including any medication.  4. Do not stop taking medication unless you consult with your physician first.           YELLOW         ZONE Getting Worse    What it looks like:     Depression is starting to interfere with your life.     It may be hard to get out of bed; you may be starting to isolate yourself from others.    Symptoms of depression are starting to last most all day and this has happened for several days.     You may have suicidal thoughts but they are not constant.   What you need to do:     1. Call your care team, your response to treatment will improve if you keep your care team informed of your progress. Yellow periods are signs an adjustment may need to be made.     2. Continue your self-care, even if you have to fake it!    3. Talk to someone in your support network    4. Open up your depression survival kit           RED    ZONE Medical Alert - Get Help    What it looks like:     Depression is seriously interfering with your life.     You may experience these or other symptoms: You can t get out of bed most days, can t work or engage in other necessary activities, you have trouble taking care of basic hygiene, or basic responsibilities, thoughts of suicide or death that will not go away, self-injurious behavior.     What you need to do:  1. Call your care team and  request a same-day appointment. If they are not available (weekends or after hours) call your local crisis line, emergency room or 911.            Depression Self Care Plan / Survival Kit    Self-Care for Depression  Here s the deal. Your body and mind are really not as separate as most people think.  What you do and think affects how you feel and how you feel influences what you do and think. This means if you do things that people who feel good do, it will help you feel better.  Sometimes this is all it takes.  There is also a place for medication and therapy depending on how severe your depression is, so be sure to consult with your medical provider and/ or Behavioral Health Consultant if your symptoms are worsening or not improving.     In order to better manage my stress, I will:    Exercise  Get some form of exercise, every day. This will help reduce pain and release endorphins, the  feel good  chemicals in your brain. This is almost as good as taking antidepressants!  This is not the same as joining a gym and then never going! (they count on that by the way ) It can be as simple as just going for a walk or doing some gardening, anything that will get you moving.      Hygiene   Maintain good hygiene (Get out of bed in the morning, Make your bed, Brush your teeth, Take a shower, and Get dressed like you were going to work, even if you are unemployed).  If your clothes don't fit try to get ones that do.    Diet  I will strive to eat foods that are good for me, drink plenty of water, and avoid excessive sugar, caffeine, alcohol, and other mood-altering substances.  Some foods that are helpful in depression are: complex carbohydrates, B vitamins, flaxseed, fish or fish oil, fresh fruits and vegetables.    Psychotherapy  I agree to participate in Individual Therapy (if recommended).    Medication  If prescribed medications, I agree to take them.  Missing doses can result in serious side effects.  I understand that  drinking alcohol, or other illicit drug use, may cause potential side effects.  I will not stop my medication abruptly without first discussing it with my provider.    Staying Connected With Others  I will stay in touch with my friends, family members, and my primary care provider/team.    Use your imagination  Be creative.  We all have a creative side; it doesn t matter if it s oil painting, sand castles, or mud pies! This will also kick up the endorphins.    Witness Beauty  (AKA stop and smell the roses) Take a look outside, even in mid-winter. Notice colors, textures. Watch the squirrels and birds.     Service to others  Be of service to others.  There is always someone else in need.  By helping others we can  get out of ourselves  and remember the really important things.  This also provides opportunities for practicing all the other parts of the program.    Humor  Laugh and be silly!  Adjust your TV habits for less news and crime-drama and more comedy.    Control your stress  Try breathing deep, massage therapy, biofeedback, and meditation. Find time to relax each day.     My support system    Clinic Contact:  Phone number:    Contact 1:  Phone number:    Contact 2:  Phone number:    [facility]/:  Phone number:    Therapist:  Phone number:    Local crisis center:    Phone number:    Other community support:  Phone number:

## 2018-07-02 NOTE — PROGRESS NOTES
[facility]  [address] [facility] [address] [phone]  Dept: [phone]    PRE-OP EVALUATION:  Today's date: 2018    Kaylee Lopez (: 1970) presents for pre-operative evaluation assessment as requested by Dr. Pizarro.  She requires evaluation and anesthesia risk assessment prior to undergoing surgery/procedure for treatment of COMBINED DILATION AND CURETTAGE, OPERATIVE HYSTEROSCOPY WITH MORCELLATOR .    Fax number for surgical facility: [facility]  Primary Physician: Irene Castellanos  Type of Anesthesia Anticipated: General    Patient has a Health Care Directive or Living Will:  NO    Preop Questions 2018   Who is doing your surgery? Dr Reynoso   What are you having done? biopsy of mass in uterus with D and C   Date of Surgery/Procedure:    Facility or Hospital where procedure/surgery will be performed[facility]   1.  Do you have a history of Heart attack, stroke, stent, coronary bypass surgery, or other heart surgery? No   2.  Do you ever have any pain or discomfort in your chest? No   3.  Do you have a history of  Heart Failure? No   4.   Are you troubled by shortness of breath when:  walking on a level surface, or up a slight hill, or at night? No   5.  Do you currently have a cold, bronchitis or other respiratory infection? No   6.  Do you have a cough, shortness of breath, or wheezing? No   7.  Do you sometimes get pains in the calves of your legs when you walk? No   8. Do you or anyone in your family have previous history of blood clots? UNKNOWN - none on mom's side.   9.  Do you or does anyone in your family have a serious bleeding problem such as prolonged bleeding following surgeries or cuts? No   10. Have you ever had problems with anemia or been told to take iron pills? No   11. Have you had any abnormal blood loss such as black, tarry or bloody stools, or abnormal vaginal bleeding? No   12. Have you ever had a blood transfusion? No    13. Have you or any of your relatives ever had problems with anesthesia? No   14. Do you have sleep apnea, excessive snoring or daytime drowsiness? No   15. Do you have any prosthetic heart valves? No   16. Do you have prosthetic joints? No   17. Is there any chance that you may be pregnant? No         HPI:     HPI related to upcoming procedure:     Endometrial cells found on pap.  US with intracavitary hyperechoic area, polyp vs. submucous myoma.  Endometrial biopsy performed.  Hysteroscopy planned.  Also notes cramps.    DIABETES - Patient has a longstanding history of DiabetesType Type II . Patient is being treated with oral agents and victoza and denies significant side effects. Control has been good.                                                                                                             .  Pulmonary htn  Cardiology visit 3/2018  Mild pulmonary htn.  Stable.  No changes to medications.      MEDICAL HISTORY:     Patient Active Problem List    Diagnosis Date Noted     Health Care Home 07/06/2012     Priority: High           Diagnosis Specialist Due to be seen Following   Tricuspid valve Cardiology Annually; due in ~ May 2013    Narcolepsy  Sleep; [facility]; Dr. Yevgeniy noe   Fibromyalgia Rheum nurses About due cyclobenzaprene             DX V65.8 REPLACED WITH 48008 [facility]ALTH CARE HOME (04/08/2013)       Pulmonary hypertension 02/27/2017     Priority: Medium     Elevated blood pressure reading without diagnosis of hypertension 05/09/2016     Priority: Medium     Migraine with aura and without status migrainosus, not intractable 05/03/2016     Priority: Medium     Type 2 diabetes mellitus with retinopathy and macular edema (H) 10/29/2015     Priority: Medium     Chronic pain-Fibromyalgia 05/05/2015     Priority: Medium     Patient is followed by Irene Castellanos MD for ongoing prescription of pain medication.  All refills should be approved by this provider, or covering partner.    Medication(s):  tramadol.   Maximum quantity per month: 60  Clinic visit frequency required: Q 6  months     Controlled substance agreement on file: Yes       Date(s): 5/15/15    Pain Clinic evaluation in the past: No    DIRE Total Score(s):  No flowsheet data found.    Pain Clinic evaluation in the past: No    Last [facility] website verification:  4/12/18   [email or web address]         Elevated BMI 12/15/2014     Priority: Medium     Contraception 10/29/2013     Priority: Medium     Reflex sympathetic dystrophy      Priority: Medium     right foot ; related to stress fracture       Hyperlipidemia LDL goal <100 07/27/2011     Priority: Medium     Narcolepsy 11/01/2010     Priority: Medium     Major depression in complete remission (H) 05/01/2010     Priority: Medium     Papanicolaou smear of cervix with atypical squamous cells cannot exclude high grade squamous intraepithelial lesion (ASC-H) 04/27/2010     Priority: Medium     Late 2003; treated with TCA.        Non-rheumatic tricuspid valve insufficiency      Priority: Medium     antibiotic prophylax  Problem list name updated by automated process. Provider to review       Fibromyalgia 10/15/2005     Priority: Medium     Problem list name updated by automated process. Provider to review        Past Medical History:   Diagnosis Date     Abnormal Papanicolaou smear of cervix and cervical HPV 2003    late 2003, early 2004; treated with TCA     Depression      Diabetes mellitus      Fibromyalgia      Inflammatory arthritis     ?; has been discharged from Rheumatology     Narcolepsy 11/1/2010    doing well without medication.     Non-rheumatic tricuspid valve insufficiency     antibiotic prophylax Problem list name updated by automated process. Provider to review      Obesity      Pulmonary hypertension 5/2/2010    on ECHO, but normal right heart cath 2011     Reflex sympathetic dystrophy     right foot ; related to stress fracture     TRICUSPID VALVE DISEASE (regurg)     sees  Cardiology      Past Surgical History:   Procedure Laterality Date     Angiogram  3/2011    No pulmonary hypertension     COLONOSCOPY  1/2008    normal     LAPAROSCOPIC CHOLECYSTECTOMY  7/2000     SURGICAL HISTORY OF -       essure procedure for contraception     TCA for abnormal pap  2004     Current Outpatient Prescriptions   Medication Sig Dispense Refill     ACE NOT PRESCRIBED, INTENTIONAL, 1 each ACE Inhibitor not prescribed due to Other: not indicated 0 each 0     albuterol (PROAIR HFA/PROVENTIL HFA/VENTOLIN HFA) 108 (90 Base) MCG/ACT Inhaler Inhale 2 puffs into the lungs every 6 hours as needed for shortness of breath / dyspnea or wheezing 1 Inhaler 0     atorvastatin (LIPITOR) 10 MG tablet TAKE 1 TABLET BY MOUTH EVERY DAY 90 tablet 3     RAJNI CONTOUR NEXT test strip USE TO TEST BLOOD SUGARS 3 TIMES DAILY OR AS DIRECTED 100 strip 0     blood glucose (NO BRAND SPECIFIED) lancing device Use to test blood sugars 3 times daily or as directed. 100 each 5     blood glucose calibration (NO BRAND SPECIFIED) solution Use to calibrate blood glucose monitor as directed. 1 each 0     blood glucose calibration (NO BRAND SPECIFIED) solution Use to calibrate blood glucose monitor as directed. 1 each 0     blood glucose monitoring (NO BRAND SPECIFIED) meter device kit Use to test blood sugar 3 times daily or as directed. 1 kit 0     Blood Glucose Monitoring Suppl MISC In vitro 1 each 3     cetirizine (ZYRTEC) 10 MG tablet Take 10 mg by mouth daily as needed for allergies       citalopram (CELEXA) 40 MG tablet TAKE 1 TABLET BY MOUTH DAILY 90 tablet 1     clindamycin (CLEOCIN) 300 MG capsule Take 1 capsule (300 mg) by mouth 4 times daily 40 capsule 0     cyclobenzaprine (FLEXERIL) 10 MG tablet TAKE 1 TABLET BY MOUTH 3 TIMES DAILY AS NEEDED FOR MUSCLE SPASMS 90 tablet 1     insulin pen needle (B-D U/F) 31G X 5 MM Use 1 pen needles daily or as directed. 100 each 3     liraglutide (VICTOZA PEN) 18 MG/3ML soln Inject 1.2 mg  Subcutaneous daily 18 mL 0     metFORMIN (GLUCOPHAGE-XR) 500 MG 24 hr tablet TAKE 4 TABLETS BY MOUTH DAILY WITH BREAKFAST 360 tablet 0     traMADol (ULTRAM) 50 MG tablet TAKE 1-2 TABS BY MOUTH EVERY 8 HOURS AS NEEDED FOR MODERATE PAIN. MAX 8TABS/DAY & 60 TABS/MONTH 60 tablet 0     traZODone (DESYREL) 50 MG tablet TAKE 1.5 TABLETS (75 MG) BY MOUTH NIGHTLY AS NEEDED FOR SLEEP 45 tablet 1     VICTOZA PEN 18 MG/3ML soln INJECT 1.2 MG SUBCUTANEOUS DAILY 18 mL 0     OTC products: None, no NSAIDs for the past 1 week.    Allergies   Allergen Reactions     Codeine Nausea     nausea      Latex Allergy: NO    Social History   Substance Use Topics     Smoking status: Never Smoker     Smokeless tobacco: Never Used     Alcohol use 0.0 oz/week     0 Standard drinks or equivalent per week      Comment: rare     History   Drug Use No       REVIEW OF SYSTEMS:   CONSTITUTIONAL: NEGATIVE for fever, chills, change in weight  EYES: NEGATIVE for vision changes or irritation  ENT/MOUTH: Recent dx of tonsillar abscess, treated with clindamycin, now asymptomatic.  Surgeon aware.  RESP: NEGATIVE for significant cough or SOB  CV: NEGATIVE for chest pain, palpitations or peripheral edema  GI: NEGATIVE for nausea, abdominal pain, heartburn, or change in bowel habits   female: SEE HPI.  NEURO: NEGATIVE for weakness, dizziness or paresthesias  ENDOCRINE: NEGATIVE for temperature intolerance, skin/hair changes  HEME: NEGATIVE for bleeding problems  PSYCHIATRIC: NEGATIVE for changes in mood or affect    EXAM:   /82 (BP Location: Right arm, Patient Position: Chair, Cuff Size: Adult Large)  Pulse 99  Temp 98.9  F (37.2  C) (Oral)  Resp 16  Wt 238 lb (108 kg)  LMP 07/01/2018 (Exact Date)  SpO2 97%  BMI 40.22 kg/m2    GENERAL APPEARANCE: healthy, alert and no distress     EYES: Eyes grossly normal to inspection     HENT: ear canals and TM's normal and nose and mouth without ulcers or lesions     NECK: no adenopathy, no asymmetry, masses,  or scars and thyroid normal to palpation     RESP: lungs clear to auscultation - no rales, rhonchi or wheezes     CV: regular rates and rhythm, normal S1 S2, no S3 or S4 and no murmur, click or rub     ABDOMEN:  soft, nontender, no HSM or masses and bowel sounds normal     NEURO: Normal strength and tone, sensory exam grossly normal, mentation intact and speech normal     PSYCH: mentation appears normal. and affect normal/bright     LYMPHATICS: No cervical adenopathy    DIAGNOSTICS:   EKG: appears normal, NSR, normal axis, normal intervals, no acute ST/T changes c/w ischemia, no LVH by voltage criteria, unchanged from previous tracings  Hemoglobin (indicated for history of anemia or procedure with significant blood loss such as tonsillectomy, major intraperitoneal surgery, vascular surgery, major spine surgery, total joint replacement)  Serum Potassium  Serum Creatinine    Hemoglobin   Date Value Ref Range Status   07/02/2018 11.8 11.7 - 15.7 g/dL Final     Last Basic Metabolic Panel:  Lab Results   Component Value Date     07/02/2018      Lab Results   Component Value Date    POTASSIUM 4.4 07/02/2018     Lab Results   Component Value Date    CHLORIDE 102 07/02/2018     Lab Results   Component Value Date    KIMBERLEE 8.4 07/02/2018     Lab Results   Component Value Date    CO2 29 07/02/2018     Lab Results   Component Value Date    BUN 13 07/02/2018     Lab Results   Component Value Date    CR 0.81 07/02/2018     Lab Results   Component Value Date     07/02/2018     Lab Results   Component Value Date    A1C 5.8 04/23/2018    A1C 5.6 09/18/2017    A1C 5.5 02/27/2017    A1C 5.6 08/22/2016    A1C 7.6 04/29/2016       pmRecent Labs   Lab Test  04/23/18   1512  09/18/17   0754   08/22/16   0745   10/30/15   1319   04/20/12   0905  04/20/12   0830   03/04/11   1117   HGB   --    --    --    --    --   13.5   --    --   13.2   < >  13.1   PLT   --    --    --    --    --   419   --    --   357   --   322   INR   --     --    --    --    --    --    --   1.16*   --    --   1.14   NA   --   139   --   138   --    --    < >   --   138   < >  137   POTASSIUM   --   4.0   --   4.2   --    --    < >   --   4.3   < >  4.6   CR   --   0.80   --   0.80   --    --    < >   --   0.56   < >  0.74   A1C  5.8*  5.6   < >  5.6   < >   --    < >   --    --    < >   --     < > = values in this interval not displayed.        IMPRESSION:   Reason for surgery/procedure: endometrial   Diagnosis/reason for consult: preop, hx of well controlled DM, mild pulmonary htn.      The proposed surgical procedure is considered INTERMEDIATE risk.    REVISED CARDIAC RISK INDEX  The patient has the following serious cardiovascular risks for perioperative complications such as (MI, PE, VFib and 3  AV Block):  No serious cardiac risks  INTERPRETATION: 0 risks: Class I (very low risk - 0.4% complication rate)    The patient has the following additional risks for perioperative complications:  No identified additional risks      ICD-10-CM    1. Preop general physical exam Z01.818 Basic metabolic panel     Hemoglobin     EKG 12-lead complete w/read - Clinics   2. Uterine polyp N84.0        RECOMMENDATIONS:     --Patient is to take all scheduled medications on the day of surgery EXCEPT for modifications listed below.    Diabetes Medication Use    -----Hold usual oral and non-insulin diabetic meds (e.g. Metformin, Actos, Glipizide) while NPO.   Hold victoza 24 hours prior to procedure.      APPROVAL GIVEN to proceed with proposed procedure, without further diagnostic evaluation       Signed Electronically by: JEANE Londono Ra CNP    Copy of this evaluation report is provided to requesting physician.    [facility] Preop Guidelines    Revised Cardiac Risk Index

## 2018-07-02 NOTE — PATIENT INSTRUCTIONS
Before Your Surgery      Call your surgeon if there is any change in your health. This includes signs of a cold or flu (such as a sore throat, runny nose, cough, rash or fever).    Do not smoke, drink alcohol or take over the counter medicine (unless your surgeon or primary care doctor tells you to) for the 24 hours before and after surgery.    If you take prescribed drugs: Follow your doctor s orders about which medicines to take and which to stop until after surgery.    Eating and drinking prior to surgery: follow the instructions from your surgeon    Take a shower or bath the night before surgery. Use the soap your surgeon gave you to gently clean your skin. If you do not have soap from your surgeon, use your regular soap. Do not shave or scrub the surgery site.  Wear clean pajamas and have clean sheets on your bed.     Do not take your metformin or victoza 24 hours prior to surgery.

## 2018-07-03 LAB
ANION GAP SERPL CALCULATED.3IONS-SCNC: 5 MMOL/L (ref 3–14)
BUN SERPL-MCNC: 13 MG/DL (ref 7–30)
CALCIUM SERPL-MCNC: 8.4 MG/DL (ref 8.5–10.1)
CHLORIDE SERPL-SCNC: 102 MMOL/L (ref 94–109)
CO2 SERPL-SCNC: 29 MMOL/L (ref 20–32)
CREAT SERPL-MCNC: 0.81 MG/DL (ref 0.52–1.04)
GFR SERPL CREATININE-BSD FRML MDRD: 76 ML/MIN/1.7M2
GLUCOSE SERPL-MCNC: 129 MG/DL (ref 70–99)
POTASSIUM SERPL-SCNC: 4.4 MMOL/L (ref 3.4–5.3)
SODIUM SERPL-SCNC: 136 MMOL/L (ref 133–144)

## 2018-07-03 ASSESSMENT — ANXIETY QUESTIONNAIRES: GAD7 TOTAL SCORE: 3

## 2018-07-03 ASSESSMENT — PATIENT HEALTH QUESTIONNAIRE - PHQ9: SUM OF ALL RESPONSES TO PHQ QUESTIONS 1-9: 1

## 2018-07-05 DIAGNOSIS — M79.7 FIBROMYALGIA: ICD-10-CM

## 2018-07-05 NOTE — TELEPHONE ENCOUNTER
"Requested Prescriptions   Pending Prescriptions Disp Refills     traZODone (DESYREL) 50 MG tablet [Pharmacy Med Name: TRAZODONE 50 MG TABLET]  Last Written Prescription Date:  5/8/18  Last Fill Quantity: 45,  # refills: 1   Last office visit: 7/2/2018 with prescribing provider:  7/2/2018     Future Office Visit:   Next 5 appointments (look out 90 days)     Jul 23, 2018 11:00 AM CDT   [facility] with Georgiana Pizarro DO   [facility]    [address] [facility] [id number] [phone]            Jul 24, 2018  4:10 PM CDT   MyChart Long with Irene Castellanos MD   [facility] ([facility]    [address] [address] [phone]                  45 tablet 1     Sig: TAKE 1.5 TABLETS (75 MG) BY MOUTH NIGHTLY AS NEEDED FOR SLEEP    Serotonin Modulators Passed    7/5/2018  1:35 AM       Passed - Recent (12 mo) or future (30 days) visit within the authorizing provider's specialty    Patient had office visit in the last 12 months or has a visit in the next 30 days with authorizing provider or within the authorizing provider's specialty.  See \"Patient Info\" tab in inbasket, or \"Choose Columns\" in Meds & Orders section of the refill encounter.           Passed - Patient is age 18 or older       Passed - No active pregnancy on record       Passed - No positive pregnancy test in past 12 months          "

## 2018-07-06 NOTE — H&P (VIEW-ONLY)
[facility]  [address] [facility] [id number] [phone]  Dept: [phone]    PRE-OP EVALUATION:  Today's date: 2018    Kaylee Lopez (: 1970) presents for pre-operative evaluation assessment as requested by Dr. Pizarro.  She requires evaluation and anesthesia risk assessment prior to undergoing surgery/procedure for treatment of COMBINED DILATION AND CURETTAGE, OPERATIVE HYSTEROSCOPY WITH MORCELLATOR .    Fax number for surgical facility: [facility]  Primary Physician: Irene Castellanos  Type of Anesthesia Anticipated: General    Patient has a Health Care Directive or Living Will:  NO    Preop Questions 2018   Who is doing your surgery? Dr Reynoso   What are you having done? biopsy of mass in uterus with D and C   Date of Surgery/Procedure:    Facility or Hospital where procedure/surgery [facility] be performed[facility]   1.  Do you have a history of Heart attack, stroke, stent, coronary bypass surgery, or other heart surgery? No   2.  Do you ever have any pain or discomfort in your chest? No   3.  Do you have a history of  Heart Failure? No   4.   Are you troubled by shortness of breath when:  walking on a level surface, or up a slight hill, or at night? No   5.  Do you currently have a cold, bronchitis or other respiratory infection? No   6.  Do you have a cough, shortness of breath, or wheezing? No   7.  Do you sometimes get pains in the calves of your legs when you walk? No   8. Do you or anyone in your family have previous history of blood clots? UNKNOWN - none on mom's side.   9.  Do you or does anyone in your family have a serious bleeding problem such as prolonged bleeding following surgeries or cuts? No   10. Have you ever had problems with anemia or been told to take iron pills? No   11. Have you had any abnormal blood loss such as black, tarry or bloody stools, or abnormal vaginal bleeding? No   12. Have you ever had a blood transfusion? No    13. Have you or any of your relatives ever had problems with anesthesia? No   14. Do you have sleep apnea, excessive snoring or daytime drowsiness? No   15. Do you have any prosthetic heart valves? No   16. Do you have prosthetic joints? No   17. Is there any chance that you may be pregnant? No         HPI:     HPI related to upcoming procedure:     Endometrial cells found on pap.  US with intracavitary hyperechoic area, polyp vs. submucous myoma.  Endometrial biopsy performed.  Hysteroscopy planned.  Also notes cramps.    DIABETES - Patient has a longstanding history of DiabetesType Type II . Patient is being treated with oral agents and victoza and denies significant side effects. Control has been good.                                                                                                             .  Pulmonary htn  Cardiology visit 3/2018  Mild pulmonary htn.  Stable.  No changes to medications.      MEDICAL HISTORY:     Patient Active Problem List    Diagnosis Date Noted     Health Care Home 07/06/2012     Priority: High           Diagnosis Specialist Due to be seen Following   Tricuspid valve Cardiology Annually; due in ~ May 2013    Narcolepsy  Sleep; [facility]; Dr. Yevgeniy noe   Fibromyalgia Rheum nurses About due cyclobenzaprene             DX V65.8 REPLACED WITH [facility] (04/08/2013)       Pulmonary hypertension 02/27/2017     Priority: Medium     Elevated blood pressure reading without diagnosis of hypertension 05/09/2016     Priority: Medium     Migraine with aura and without status migrainosus, not intractable 05/03/2016     Priority: Medium     Type 2 diabetes mellitus with retinopathy and macular edema (H) 10/29/2015     Priority: Medium     Chronic pain-Fibromyalgia 05/05/2015     Priority: Medium     Patient is followed by Irene Castellanos MD for ongoing prescription of pain medication.  All refills should be approved by this provider, or covering partner.    Medication(s):  tramadol.   Maximum quantity per month: 60  Clinic visit frequency required: Q 6  months     Controlled substance agreement on file: Yes       Date(s): 5/15/15    Pain Clinic evaluation in the past: No    DIRE Total Score(s):  No flowsheet data found.    Pain Clinic evaluation in the past: No    Last [facility] website verification:  4/12/18   [email or web address]         Elevated BMI 12/15/2014     Priority: Medium     Contraception 10/29/2013     Priority: Medium     Reflex sympathetic dystrophy      Priority: Medium     right foot ; related to stress fracture       Hyperlipidemia LDL goal <100 07/27/2011     Priority: Medium     Narcolepsy 11/01/2010     Priority: Medium     Major depression in complete remission (H) 05/01/2010     Priority: Medium     Papanicolaou smear of cervix with atypical squamous cells cannot exclude high grade squamous intraepithelial lesion (ASC-H) 04/27/2010     Priority: Medium     Late 2003; treated with TCA.        Non-rheumatic tricuspid valve insufficiency      Priority: Medium     antibiotic prophylax  Problem list name updated by automated process. Provider to review       Fibromyalgia 10/15/2005     Priority: Medium     Problem list name updated by automated process. Provider to review        Past Medical History:   Diagnosis Date     Abnormal Papanicolaou smear of cervix and cervical HPV 2003    late 2003, early 2004; treated with TCA     Depression      Diabetes mellitus      Fibromyalgia      Inflammatory arthritis     ?; has been discharged from Rheumatology     Narcolepsy 11/1/2010    doing well without medication.     Non-rheumatic tricuspid valve insufficiency     antibiotic prophylax Problem list name updated by automated process. Provider to review      Obesity      Pulmonary hypertension 5/2/2010    on ECHO, but normal right heart cath 2011     Reflex sympathetic dystrophy     right foot ; related to stress fracture     TRICUSPID VALVE DISEASE (regurg)     sees  Cardiology      Past Surgical History:   Procedure Laterality Date     Angiogram  3/2011    No pulmonary hypertension     COLONOSCOPY  1/2008    normal     LAPAROSCOPIC CHOLECYSTECTOMY  7/2000     SURGICAL HISTORY OF -       essure procedure for contraception     TCA for abnormal pap  2004     Current Outpatient Prescriptions   Medication Sig Dispense Refill     ACE NOT PRESCRIBED, INTENTIONAL, 1 each ACE Inhibitor not prescribed due to Other: not indicated 0 each 0     albuterol (PROAIR HFA/PROVENTIL HFA/VENTOLIN HFA) 108 (90 Base) MCG/ACT Inhaler Inhale 2 puffs into the lungs every 6 hours as needed for shortness of breath / dyspnea or wheezing 1 Inhaler 0     atorvastatin (LIPITOR) 10 MG tablet TAKE 1 TABLET BY MOUTH EVERY DAY 90 tablet 3     RAJNI CONTOUR NEXT test strip USE TO TEST BLOOD SUGARS 3 TIMES DAILY OR AS DIRECTED 100 strip 0     blood glucose (NO BRAND SPECIFIED) lancing device Use to test blood sugars 3 times daily or as directed. 100 each 5     blood glucose calibration (NO BRAND SPECIFIED) solution Use to calibrate blood glucose monitor as directed. 1 each 0     blood glucose calibration (NO BRAND SPECIFIED) solution Use to calibrate blood glucose monitor as directed. 1 each 0     blood glucose monitoring (NO BRAND SPECIFIED) meter device kit Use to test blood sugar 3 times daily or as directed. 1 kit 0     Blood Glucose Monitoring Suppl MISC In vitro 1 each 3     cetirizine (ZYRTEC) 10 MG tablet Take 10 mg by mouth daily as needed for allergies       citalopram (CELEXA) 40 MG tablet TAKE 1 TABLET BY MOUTH DAILY 90 tablet 1     clindamycin (CLEOCIN) 300 MG capsule Take 1 capsule (300 mg) by mouth 4 times daily 40 capsule 0     cyclobenzaprine (FLEXERIL) 10 MG tablet TAKE 1 TABLET BY MOUTH 3 TIMES DAILY AS NEEDED FOR MUSCLE SPASMS 90 tablet 1     insulin pen needle (B-D U/F) 31G X 5 MM Use 1 pen needles daily or as directed. 100 each 3     liraglutide (VICTOZA PEN) 18 MG/3ML soln Inject 1.2 mg  Subcutaneous daily 18 mL 0     metFORMIN (GLUCOPHAGE-XR) 500 MG 24 hr tablet TAKE 4 TABLETS BY MOUTH DAILY WITH BREAKFAST 360 tablet 0     traMADol (ULTRAM) 50 MG tablet TAKE 1-2 TABS BY MOUTH EVERY 8 HOURS AS NEEDED FOR MODERATE PAIN. MAX 8TABS/DAY & 60 TABS/MONTH 60 tablet 0     traZODone (DESYREL) 50 MG tablet TAKE 1.5 TABLETS (75 MG) BY MOUTH NIGHTLY AS NEEDED FOR SLEEP 45 tablet 1     VICTOZA PEN 18 MG/3ML soln INJECT 1.2 MG SUBCUTANEOUS DAILY 18 mL 0     OTC products: None, no NSAIDs for the past 1 week.    Allergies   Allergen Reactions     Codeine Nausea     nausea      Latex Allergy: NO    Social History   Substance Use Topics     Smoking status: Never Smoker     Smokeless tobacco: Never Used     Alcohol use 0.0 oz/week     0 Standard drinks or equivalent per week      Comment: rare     History   Drug Use No       REVIEW OF SYSTEMS:   CONSTITUTIONAL: NEGATIVE for fever, chills, change in weight  EYES: NEGATIVE for vision changes or irritation  ENT/MOUTH: Recent dx of tonsillar abscess, treated with clindamycin, now asymptomatic.  Surgeon aware.  RESP: NEGATIVE for significant cough or SOB  CV: NEGATIVE for chest pain, palpitations or peripheral edema  GI: NEGATIVE for nausea, abdominal pain, heartburn, or change in bowel habits   female: SEE HPI.  NEURO: NEGATIVE for weakness, dizziness or paresthesias  ENDOCRINE: NEGATIVE for temperature intolerance, skin/hair changes  HEME: NEGATIVE for bleeding problems  PSYCHIATRIC: NEGATIVE for changes in mood or affect    EXAM:   /82 (BP Location: Right arm, Patient Position: Chair, Cuff Size: Adult Large)  Pulse 99  Temp 98.9  F (37.2  C) (Oral)  Resp 16  Wt 238 lb (108 kg)  LMP 07/01/2018 (Exact Date)  SpO2 97%  BMI 40.22 kg/m2    GENERAL APPEARANCE: healthy, alert and no distress     EYES: Eyes grossly normal to inspection     HENT: ear canals and TM's normal and nose and mouth without ulcers or lesions     NECK: no adenopathy, no asymmetry, masses,  or scars and thyroid normal to palpation     RESP: lungs clear to auscultation - no rales, rhonchi or wheezes     CV: regular rates and rhythm, normal S1 S2, no S3 or S4 and no murmur, click or rub     ABDOMEN:  soft, nontender, no HSM or masses and bowel sounds normal     NEURO: Normal strength and tone, sensory exam grossly normal, mentation intact and speech normal     PSYCH: mentation appears normal. and affect normal/bright     LYMPHATICS: No cervical adenopathy    DIAGNOSTICS:   EKG: appears normal, NSR, normal axis, normal intervals, no acute ST/T changes c/w ischemia, no LVH by voltage criteria, unchanged from previous tracings  Hemoglobin (indicated for history of anemia or procedure with significant blood loss such as tonsillectomy, major intraperitoneal surgery, vascular surgery, major spine surgery, total joint replacement)  Serum Potassium  Serum Creatinine    Hemoglobin   Date Value Ref Range Status   07/02/2018 11.8 11.7 - 15.7 g/dL Final     Last Basic Metabolic Panel:  Lab Results   Component Value Date     07/02/2018      Lab Results   Component Value Date    POTASSIUM 4.4 07/02/2018     Lab Results   Component Value Date    CHLORIDE 102 07/02/2018     Lab Results   Component Value Date    KIMBERLEE 8.4 07/02/2018     Lab Results   Component Value Date    CO2 29 07/02/2018     Lab Results   Component Value Date    BUN 13 07/02/2018     Lab Results   Component Value Date    CR 0.81 07/02/2018     Lab Results   Component Value Date     07/02/2018     Lab Results   Component Value Date    A1C 5.8 04/23/2018    A1C 5.6 09/18/2017    A1C 5.5 02/27/2017    A1C 5.6 08/22/2016    A1C 7.6 04/29/2016       pmRecent Labs   Lab Test  04/23/18   1512  09/18/17   0754   08/22/16   0745   10/30/15   1319   04/20/12   0905  04/20/12   0830   03/04/11   1117   HGB   --    --    --    --    --   13.5   --    --   13.2   < >  13.1   PLT   --    --    --    --    --   419   --    --   357   --   322   INR   --     --    --    --    --    --    --   1.16*   --    --   1.14   NA   --   139   --   138   --    --    < >   --   138   < >  137   POTASSIUM   --   4.0   --   4.2   --    --    < >   --   4.3   < >  4.6   CR   --   0.80   --   0.80   --    --    < >   --   0.56   < >  0.74   A1C  5.8*  5.6   < >  5.6   < >   --    < >   --    --    < >   --     < > = values in this interval not displayed.        IMPRESSION:   Reason for surgery/procedure: endometrial   Diagnosis/reason for consult: preop, hx of well controlled DM, mild pulmonary htn.      The proposed surgical procedure is considered INTERMEDIATE risk.    REVISED CARDIAC RISK INDEX  The patient has the following serious cardiovascular risks for perioperative complications such as (MI, PE, VFib and 3  AV Block):  No serious cardiac risks  INTERPRETATION: 0 risks: Class I (very low risk - 0.4% complication rate)    The patient has the following additional risks for perioperative complications:  No identified additional risks      ICD-10-CM    1. Preop general physical exam Z01.818 Basic metabolic panel     Hemoglobin     EKG 12-lead complete w/read - Clinics   2. Uterine polyp N84.0        RECOMMENDATIONS:     --Patient is to take all scheduled medications on the day of surgery EXCEPT for modifications listed below.    Diabetes Medication Use    -----Hold usual oral and non-insulin diabetic meds (e.g. Metformin, Actos, Glipizide) while NPO.   Hold victoza 24 hours prior to procedure.      APPROVAL GIVEN to proceed with proposed procedure, without further diagnostic evaluation       Signed Electronically by: JEANE Londono Ra CNP    Copy of this evaluation report is provided to requesting physician.    [facility] Preop Guidelines    Revised Cardiac Risk Index

## 2018-07-09 ENCOUNTER — MYC REFILL (OUTPATIENT)
Dept: FAMILY MEDICINE | Facility: CLINIC | Age: 48
End: 2018-07-09

## 2018-07-09 ENCOUNTER — ANESTHESIA (OUTPATIENT)
Dept: SURGERY | Facility: CLINIC | Age: 48
End: 2018-07-09
Payer: COMMERCIAL

## 2018-07-09 ENCOUNTER — HOSPITAL ENCOUNTER (OUTPATIENT)
Facility: CLINIC | Age: 48
Discharge: HOME | End: 2018-07-09
Attending: FAMILY MEDICINE | Admitting: FAMILY MEDICINE
Payer: COMMERCIAL

## 2018-07-09 ENCOUNTER — ANESTHESIA EVENT (OUTPATIENT)
Dept: SURGERY | Facility: CLINIC | Age: 48
End: 2018-07-09
Payer: COMMERCIAL

## 2018-07-09 VITALS
SYSTOLIC BLOOD PRESSURE: 151 MMHG | WEIGHT: 238 LBS | HEIGHT: 65 IN | OXYGEN SATURATION: 100 % | DIASTOLIC BLOOD PRESSURE: 91 MMHG | BODY MASS INDEX: 39.65 KG/M2 | RESPIRATION RATE: 16 BRPM | TEMPERATURE: 97.9 F

## 2018-07-09 DIAGNOSIS — M79.7 FIBROMYALGIA: ICD-10-CM

## 2018-07-09 DIAGNOSIS — Z98.890 STATUS POST HYSTEROSCOPIC POLYPECTOMY: Primary | ICD-10-CM

## 2018-07-09 LAB
GLUCOSE BLDC GLUCOMTR-MCNC: 107 MG/DL (ref 70–99)
GLUCOSE BLDC GLUCOMTR-MCNC: 144 MG/DL (ref 70–99)
HCG UR QL: NEGATIVE

## 2018-07-09 PROCEDURE — 25000125 ZZHC RX 250: Performed by: NURSE ANESTHETIST, CERTIFIED REGISTERED

## 2018-07-09 PROCEDURE — 27210794 ZZH OR GENERAL SUPPLY STERILE: Performed by: FAMILY MEDICINE

## 2018-07-09 PROCEDURE — 25000132 ZZH RX MED GY IP 250 OP 250 PS 637: Performed by: ANESTHESIOLOGY

## 2018-07-09 PROCEDURE — 40000306 ZZH STATISTIC PRE PROC ASSESS II: Performed by: FAMILY MEDICINE

## 2018-07-09 PROCEDURE — 25000128 H RX IP 250 OP 636: Performed by: FAMILY MEDICINE

## 2018-07-09 PROCEDURE — 82962 GLUCOSE BLOOD TEST: CPT

## 2018-07-09 PROCEDURE — 25000128 H RX IP 250 OP 636: Performed by: ANESTHESIOLOGY

## 2018-07-09 PROCEDURE — 71000012 ZZH RECOVERY PHASE 1 LEVEL 1 FIRST HR: Performed by: FAMILY MEDICINE

## 2018-07-09 PROCEDURE — 36000056 ZZH SURGERY LEVEL 3 1ST 30 MIN: Performed by: FAMILY MEDICINE

## 2018-07-09 PROCEDURE — 81025 URINE PREGNANCY TEST: CPT | Performed by: ANESTHESIOLOGY

## 2018-07-09 PROCEDURE — 25000128 H RX IP 250 OP 636: Performed by: NURSE ANESTHETIST, CERTIFIED REGISTERED

## 2018-07-09 PROCEDURE — 88305 TISSUE EXAM BY PATHOLOGIST: CPT | Mod: 26 | Performed by: FAMILY MEDICINE

## 2018-07-09 PROCEDURE — 36000058 ZZH SURGERY LEVEL 3 EA 15 ADDTL MIN: Performed by: FAMILY MEDICINE

## 2018-07-09 PROCEDURE — 25000132 ZZH RX MED GY IP 250 OP 250 PS 637: Performed by: FAMILY MEDICINE

## 2018-07-09 PROCEDURE — 88305 TISSUE EXAM BY PATHOLOGIST: CPT | Performed by: FAMILY MEDICINE

## 2018-07-09 PROCEDURE — 37000009 ZZH ANESTHESIA TECHNICAL FEE, EACH ADDTL 15 MIN: Performed by: FAMILY MEDICINE

## 2018-07-09 PROCEDURE — 58558 HYSTEROSCOPY BIOPSY: CPT | Performed by: FAMILY MEDICINE

## 2018-07-09 PROCEDURE — 25000566 ZZH SEVOFLURANE, EA 15 MIN: Performed by: FAMILY MEDICINE

## 2018-07-09 PROCEDURE — 71000027 ZZH RECOVERY PHASE 2 EACH 15 MINS: Performed by: FAMILY MEDICINE

## 2018-07-09 PROCEDURE — 37000008 ZZH ANESTHESIA TECHNICAL FEE, 1ST 30 MIN: Performed by: FAMILY MEDICINE

## 2018-07-09 RX ORDER — DEXAMETHASONE SODIUM PHOSPHATE 4 MG/ML
INJECTION, SOLUTION INTRA-ARTICULAR; INTRALESIONAL; INTRAMUSCULAR; INTRAVENOUS; SOFT TISSUE PRN
Status: DISCONTINUED | OUTPATIENT
Start: 2018-07-09 | End: 2018-07-09

## 2018-07-09 RX ORDER — FENTANYL CITRATE 50 UG/ML
25-50 INJECTION, SOLUTION INTRAMUSCULAR; INTRAVENOUS
Status: DISCONTINUED | OUTPATIENT
Start: 2018-07-09 | End: 2018-07-09 | Stop reason: HOSPADM

## 2018-07-09 RX ORDER — NALOXONE HYDROCHLORIDE 0.4 MG/ML
.1-.4 INJECTION, SOLUTION INTRAMUSCULAR; INTRAVENOUS; SUBCUTANEOUS
Status: DISCONTINUED | OUTPATIENT
Start: 2018-07-09 | End: 2018-07-09 | Stop reason: HOSPADM

## 2018-07-09 RX ORDER — HYDROMORPHONE HYDROCHLORIDE 1 MG/ML
.3-.5 INJECTION, SOLUTION INTRAMUSCULAR; INTRAVENOUS; SUBCUTANEOUS EVERY 10 MIN PRN
Status: DISCONTINUED | OUTPATIENT
Start: 2018-07-09 | End: 2018-07-09 | Stop reason: HOSPADM

## 2018-07-09 RX ORDER — SODIUM CHLORIDE, SODIUM LACTATE, POTASSIUM CHLORIDE, CALCIUM CHLORIDE 600; 310; 30; 20 MG/100ML; MG/100ML; MG/100ML; MG/100ML
INJECTION, SOLUTION INTRAVENOUS CONTINUOUS
Status: DISCONTINUED | OUTPATIENT
Start: 2018-07-09 | End: 2018-07-09 | Stop reason: HOSPADM

## 2018-07-09 RX ORDER — HYDRALAZINE HYDROCHLORIDE 20 MG/ML
2.5-5 INJECTION INTRAMUSCULAR; INTRAVENOUS EVERY 10 MIN PRN
Status: DISCONTINUED | OUTPATIENT
Start: 2018-07-09 | End: 2018-07-09 | Stop reason: HOSPADM

## 2018-07-09 RX ORDER — ASPIRIN 81 MG
100 TABLET, DELAYED RELEASE (ENTERIC COATED) ORAL DAILY
Qty: 60 TABLET | Refills: 1 | Status: SHIPPED | OUTPATIENT
Start: 2018-07-09 | End: 2018-08-12

## 2018-07-09 RX ORDER — KETOROLAC TROMETHAMINE 30 MG/ML
30 INJECTION, SOLUTION INTRAMUSCULAR; INTRAVENOUS ONCE
Status: COMPLETED | OUTPATIENT
Start: 2018-07-09 | End: 2018-07-09

## 2018-07-09 RX ORDER — PROPOFOL 10 MG/ML
INJECTION, EMULSION INTRAVENOUS PRN
Status: DISCONTINUED | OUTPATIENT
Start: 2018-07-09 | End: 2018-07-09

## 2018-07-09 RX ORDER — ONDANSETRON 4 MG/1
4 TABLET, ORALLY DISINTEGRATING ORAL EVERY 30 MIN PRN
Status: DISCONTINUED | OUTPATIENT
Start: 2018-07-09 | End: 2018-07-09 | Stop reason: HOSPADM

## 2018-07-09 RX ORDER — IBUPROFEN 800 MG/1
800 TABLET, FILM COATED ORAL EVERY 8 HOURS PRN
Qty: 90 TABLET | Refills: 1 | Status: SHIPPED | OUTPATIENT
Start: 2018-07-09 | End: 2022-01-04

## 2018-07-09 RX ORDER — HYDROCODONE BITARTRATE AND ACETAMINOPHEN 5; 325 MG/1; MG/1
1 TABLET ORAL EVERY 6 HOURS PRN
Qty: 15 TABLET | Refills: 0 | Status: SHIPPED | OUTPATIENT
Start: 2018-07-09 | End: 2018-08-12

## 2018-07-09 RX ORDER — METOPROLOL TARTRATE 1 MG/ML
1-2 INJECTION, SOLUTION INTRAVENOUS EVERY 5 MIN PRN
Status: DISCONTINUED | OUTPATIENT
Start: 2018-07-09 | End: 2018-07-09 | Stop reason: HOSPADM

## 2018-07-09 RX ORDER — PROPOFOL 10 MG/ML
INJECTION, EMULSION INTRAVENOUS CONTINUOUS PRN
Status: DISCONTINUED | OUTPATIENT
Start: 2018-07-09 | End: 2018-07-09

## 2018-07-09 RX ORDER — FENTANYL CITRATE 50 UG/ML
INJECTION, SOLUTION INTRAMUSCULAR; INTRAVENOUS PRN
Status: DISCONTINUED | OUTPATIENT
Start: 2018-07-09 | End: 2018-07-09

## 2018-07-09 RX ORDER — ALBUTEROL SULFATE 0.83 MG/ML
2.5 SOLUTION RESPIRATORY (INHALATION) EVERY 4 HOURS PRN
Status: DISCONTINUED | OUTPATIENT
Start: 2018-07-09 | End: 2018-07-09 | Stop reason: HOSPADM

## 2018-07-09 RX ORDER — ONDANSETRON 2 MG/ML
INJECTION INTRAMUSCULAR; INTRAVENOUS PRN
Status: DISCONTINUED | OUTPATIENT
Start: 2018-07-09 | End: 2018-07-09

## 2018-07-09 RX ORDER — MEPERIDINE HYDROCHLORIDE 50 MG/ML
12.5 INJECTION INTRAMUSCULAR; INTRAVENOUS; SUBCUTANEOUS
Status: DISCONTINUED | OUTPATIENT
Start: 2018-07-09 | End: 2018-07-09 | Stop reason: HOSPADM

## 2018-07-09 RX ORDER — GLYCOPYRROLATE 0.2 MG/ML
INJECTION, SOLUTION INTRAMUSCULAR; INTRAVENOUS PRN
Status: DISCONTINUED | OUTPATIENT
Start: 2018-07-09 | End: 2018-07-09

## 2018-07-09 RX ORDER — ACETAMINOPHEN 325 MG/1
975 TABLET ORAL ONCE
Status: COMPLETED | OUTPATIENT
Start: 2018-07-09 | End: 2018-07-09

## 2018-07-09 RX ORDER — CEFAZOLIN SODIUM 1 G/3ML
1 INJECTION, POWDER, FOR SOLUTION INTRAMUSCULAR; INTRAVENOUS SEE ADMIN INSTRUCTIONS
Status: DISCONTINUED | OUTPATIENT
Start: 2018-07-09 | End: 2018-07-09 | Stop reason: HOSPADM

## 2018-07-09 RX ORDER — HYDROCODONE BITARTRATE AND ACETAMINOPHEN 5; 325 MG/1; MG/1
1 TABLET ORAL ONCE
Status: COMPLETED | OUTPATIENT
Start: 2018-07-09 | End: 2018-07-09

## 2018-07-09 RX ORDER — ONDANSETRON 2 MG/ML
4 INJECTION INTRAMUSCULAR; INTRAVENOUS EVERY 30 MIN PRN
Status: DISCONTINUED | OUTPATIENT
Start: 2018-07-09 | End: 2018-07-09 | Stop reason: HOSPADM

## 2018-07-09 RX ORDER — LIDOCAINE 40 MG/G
CREAM TOPICAL
Status: DISCONTINUED | OUTPATIENT
Start: 2018-07-09 | End: 2018-07-09 | Stop reason: HOSPADM

## 2018-07-09 RX ORDER — CEFAZOLIN SODIUM 2 G/100ML
2 INJECTION, SOLUTION INTRAVENOUS
Status: COMPLETED | OUTPATIENT
Start: 2018-07-09 | End: 2018-07-09

## 2018-07-09 RX ADMIN — PROPOFOL 150 MG: 10 INJECTION, EMULSION INTRAVENOUS at 08:46

## 2018-07-09 RX ADMIN — MIDAZOLAM 2 MG: 1 INJECTION INTRAMUSCULAR; INTRAVENOUS at 08:40

## 2018-07-09 RX ADMIN — SODIUM CHLORIDE, POTASSIUM CHLORIDE, SODIUM LACTATE AND CALCIUM CHLORIDE: 600; 310; 30; 20 INJECTION, SOLUTION INTRAVENOUS at 08:40

## 2018-07-09 RX ADMIN — DEXAMETHASONE SODIUM PHOSPHATE 4 MG: 4 INJECTION, SOLUTION INTRA-ARTICULAR; INTRALESIONAL; INTRAMUSCULAR; INTRAVENOUS; SOFT TISSUE at 08:46

## 2018-07-09 RX ADMIN — GLYCOPYRROLATE 0.2 MG: 0.2 INJECTION, SOLUTION INTRAMUSCULAR; INTRAVENOUS at 08:46

## 2018-07-09 RX ADMIN — CEFAZOLIN SODIUM 2 G: 2 INJECTION, SOLUTION INTRAVENOUS at 08:45

## 2018-07-09 RX ADMIN — KETOROLAC TROMETHAMINE 30 MG: 30 INJECTION, SOLUTION INTRAMUSCULAR at 07:26

## 2018-07-09 RX ADMIN — FENTANYL CITRATE 100 MCG: 50 INJECTION, SOLUTION INTRAMUSCULAR; INTRAVENOUS at 08:46

## 2018-07-09 RX ADMIN — FENTANYL CITRATE 50 MCG: 50 INJECTION INTRAMUSCULAR; INTRAVENOUS at 10:50

## 2018-07-09 RX ADMIN — PROPOFOL 50 MCG/KG/MIN: 10 INJECTION, EMULSION INTRAVENOUS at 08:50

## 2018-07-09 RX ADMIN — ONDANSETRON 4 MG: 2 INJECTION INTRAMUSCULAR; INTRAVENOUS at 08:56

## 2018-07-09 RX ADMIN — HYDROCODONE BITARTRATE AND ACETAMINOPHEN 1 TABLET: 5; 325 TABLET ORAL at 10:15

## 2018-07-09 RX ADMIN — ACETAMINOPHEN 975 MG: 325 TABLET, FILM COATED ORAL at 06:37

## 2018-07-09 NOTE — IP AVS SNAPSHOT
MRN:[id number]                      After Visit Summary   7/9/2018    Kaylee Lopez    MRN: [id number]           Thank you!     Thank you for choosing [facility] for your care. Our goal is always to provide you with excellent care. Hearing back from our patients is one way we can continue to improve our services. Please take a few minutes to complete the written survey that you may receive in the mail after you visit. If you would like to speak to someone directly about your visit please contact Patient Relations at [phone]. Thank you!          Patient Information     Date Of Birth          1970        About your hospital stay     You were admitted on:  July 9, 2018 You last received care in the:  [facility] PreOP/PostOP    You were discharged on:  July 9, 2018       Who to Call     For medical emergencies, please call 121.  For non-urgent questions about your medical care, please call your primary care provider or clinic, [phone]  For questions related to your surgery, please call your surgery clinic        Attending Provider     Provider Georgiana Villa DO OB/Gyn       Primary Care Provider Office Phone # Fax #    Irene Castellanos [phone]      Your next 10 appointments already scheduled     Jul 23, 2018 11:00 AM CDT   [facility] with Georgiana Pizarro DO   [facility]    [address] [address] [phone]            Jul 24, 2018  4:10 PM CDT   Josht Long with Irene Castellanos MD   [facility]    [address] [address] [phone]              Further instructions from your care team       Follow up in 1-2 weeks   Call [phone] or [phone] for appointment     Call and ask to be seen or talk to a doctor for the following (You can go to the ob triage button on answering service  line [phone]):        Increased bleeding, fever, general unwell feeling or increased pain.   Please call for any reason or concern!     Dr. Georgiana Pizarro, DO    OB/GYN   [facility]    DILATION AND CURETTAGE AND DILATION AND EVACUATION DISCHARGE INSTRUCTIONS    DO NOT DRIVE A CAR, DRINK ALCOHOL OR USE MACHINERY FOR THE NEXT 24 HOURS.  YOU SHOULD WAIT UNTIL YOU HAVE RECOVERED BEFORE MAKING ANY IMPORTANT DECISIONS.    PAIN AND DISCOMFORT  YOU MAY HAVE CRAMPS OR A LOW BACKACHE FOR 24 TO 48 HOURS.  TYLENOL (ACETAMINOPHEN) OR MOTRIN (IBUPROFEN) MAY HELP, OR YOUR DOCTOR MAY GIVE YOU PAIN MEDICINE.  CALL YOUR DOCTOR IF PAIN CANNOT BE CONTROLLED.  YOU MAY FEEL DROWSY AND WEAK FOR A DAY OR TWO.    VAGINAL DISCHARGE  YOU MAY HAVE SOME BLEEDING OR DISCHARGE FOR UP TO TWO WEEKS.  DO NOT DOUCHE, USE TAMPONS OR HAVE SEX (INTERCOURSE) IN THE FIRST WEEK.  CALL YOUR DOCTOR IF YOU SOAK MORE THAN ONE MAXI PAD (SANITARY NAPKIN) PER HOUR, OR IF YOU PASS LARGE BLOOD CLOTS.    OTHER SYMPTOMS  YOU MAY HAVE A LOW FEVER FOR THE FIRST TWO DAYS.  CALL YOUR DOCTOR IF YOUR FEVER GOES OVER 101 DEGREES FAHRENHEIT.    IF YOU HAVE NAUSEA (FEEL SICK TO YOUR STOMACH), STAY IN BED.  TRY DRINKING A SMALL AMOUNT 7-UP, TEA OR SOUP.    DIET AND ACTIVITY  EAT LIGHT MEALS AND DRINK PLENTY OF FLUIDS FOR THE FIRST 24 HOURS (OR LONGER, IF YOU HAVE NAUSEA).    YOU MAY BATHE, SHOWER AND CLIMB STAIRS.  MOST WOMEN CAN RETURN TO WORK AFTER 24 HOURS.  YOU MAY GO BACK TO YOUR OTHER ACTIVITIES AFTER YOUR PAIN GOES AWAY.      GENERAL ANESTHESIA OR SEDATION ADULT DISCHARGE INSTRUCTIONS   SPECIAL PRECAUTIONS FOR 24 HOURS AFTER SURGERY    IT IS NOT UNUSUAL TO FEEL LIGHT-HEADED OR FAINT, UP TO 24 HOURS AFTER SURGERY OR WHILE TAKING PAIN MEDICATION.  IF YOU HAVE THESE SYMPTOMS; SIT FOR A FEW MINUTES BEFORE STANDING AND HAVE SOMEONE ASSIST YOU WHEN YOU GET UP TO WALK OR USE THE BATHROOM.    YOU SHOULD REST AND RELAX FOR THE NEXT 24  HOURS AND YOU MUST MAKE ARRANGEMENTS TO HAVE SOMEONE STAY WITH YOU FOR AT LEAST 24 HOURS AFTER YOUR DISCHARGE.  AVOID HAZARDOUS AND STRENUOUS ACTIVITIES.  DO NOT MAKE IMPORTANT DECISIONS FOR 24 HOURS.    DO NOT DRIVE ANY VEHICLE OR OPERATE MECHANICAL EQUIPMENT FOR 24 HOURS FOLLOWING THE END OF YOUR SURGERY.  EVEN THOUGH YOU MAY FEEL NORMAL, YOUR REACTIONS MAY BE AFFECTED BY THE MEDICATION YOU HAVE RECEIVED.    DO NOT DRINK ALCOHOLIC BEVERAGES FOR 24 HOURS FOLLOWING YOUR SURGERY.    DRINK CLEAR LIQUIDS (APPLE JUICE, GINGER ALE, 7-UP, BROTH, ETC.).  PROGRESS TO YOUR REGULAR DIET AS YOU FEEL ABLE.    YOU MAY HAVE A DRY MOUTH, A SORE THROAT, MUSCLES ACHES OR TROUBLE SLEEPING.  THESE SHOULD GO AWAY AFTER 24 HOURS.    CALL YOUR DOCTOR FOR ANY OF THE FOLLOWING:  SIGNS OF INFECTION (FEVER, GROWING TENDERNESS AT THE SURGERY SITE, A LARGE AMOUNT OF DRAINAGE OR BLEEDING, SEVERE PAIN, FOUL-SMELLING DRAINAGE, REDNESS OR SWELLING.    IT HAS BEEN OVER 8 TO 10 HOURS SINCE SURGERY AND YOU ARE STILL NOT ABLE TO URINATE (PASS WATER).   Maximum acetaminophen (Tylenol) dose from all sources should not exceed 4 grams (4000 mg) per day. You had 975 mg today at 06:37 am.        You received Toradol, an IV form of ibuprofen (Motrin) at 07:26 am.  Do not take any ibuprofen products until 01:26 pm.    Pending Results     Date and Time Order Name Status Description    7/9/2018 0930 Surgical pathology exam In process             Statement of Approval     Ordered          07/09/18 0928  I have reviewed and agree with all the recommendations and orders detailed in this document.  EFFECTIVE NOW     Approved and electronically signed by:  Georgiana Pizarro DO             Admission Information     Date & Time Provider Department Dept. Phone    7/9/2018 Georgiana Pizarro DO [facility] PreOP/PostOP [phone]      Your Vitals Were     Blood Pressure Temperature Respirations Height Weight Last Period    151/98 97.9  F (36.6  C)  "(Temporal) 16 1.638 m (5' 4.5\") 108 kg (238 lb) 07/01/2018 (Exact Date)    Pulse Oximetry BMI (Body Mass Index)                99% 40.22 kg/m2          Zample Information     [employer] gives you secure access to your electronic health record. If you see a primary care provider, you can also send messages to your care team and make appointments. If you have questions, please call your primary care clinic.  If you do not have a primary care provider, please call [phone] and they will assist you.        Care EveryWhere ID     This is your Care EveryWhere ID. This could be used by other organizations to access your [place] medical records  [id number]        Equal Access to Services     GERARDO CRAWLEY : Marysol Gandara, dane blood, ezequiel bro, gloria godinez. So [facility] [phone].    ATENCIÓN: Si habla español, tiene a garner disposición servicios gratuitos de asistencia lingüística. Llame al [phone].    We comply with applicable federal civil rights laws and [place] laws. We do not discriminate on the basis of race, color, national origin, age, disability, sex, sexual orientation, or gender identity.               Review of your medicines      START taking        Dose / Directions    docusate sodium 100 MG tablet   Commonly known as:  COLACE   Used for:  Status post hysteroscopic polypectomy        Dose:  100 mg   Take 100 mg by mouth daily   Quantity:  60 tablet   Refills:  1       HYDROcodone-acetaminophen 5-325 MG per tablet   Commonly known as:  NORCO   Used for:  Status post hysteroscopic polypectomy        Dose:  1 tablet   Take 1 tablet by mouth every 6 hours as needed for severe pain   Quantity:  15 tablet   Refills:  0       ibuprofen 800 MG tablet   Commonly known as:  ADVIL/MOTRIN   Used for:  Status post hysteroscopic polypectomy        Dose:  800 mg   Take 1 tablet (800 mg) by mouth every 8 hours as needed for moderate pain   Quantity:  90 " tablet   Refills:  1         CONTINUE these medicines which have NOT CHANGED        Dose / Directions    ACE NOT PRESCRIBED (INTENTIONAL)   Used for:  Type 2 diabetes, HbA1c goal < 7% (H)        Dose:  1 each   1 each ACE Inhibitor not prescribed due to Other: not indicated   Quantity:  0 each   Refills:  0       albuterol 108 (90 Base) MCG/ACT Inhaler   Commonly known as:  PROAIR HFA/PROVENTIL HFA/VENTOLIN HFA   Used for:  Cough        Dose:  2 puff   Inhale 2 puffs into the lungs every 6 hours as needed for shortness of breath / dyspnea or wheezing   Quantity:  1 Inhaler   Refills:  0       atorvastatin 10 MG tablet   Commonly known as:  LIPITOR   Used for:  Hyperlipidemia LDL goal <100        TAKE 1 TABLET BY MOUTH EVERY DAY   Quantity:  90 tablet   Refills:  3       RAJNI CONTOUR NEXT test strip   Used for:  Type 2 diabetes mellitus without complication, without long-term current use of insulin (H)   Generic drug:  blood glucose monitoring        USE TO TEST BLOOD SUGARS 3 TIMES DAILY OR AS DIRECTED   Quantity:  100 strip   Refills:  0       * blood glucose calibration solution   Commonly known as:  no brand specified   Used for:  Type 2 diabetes, HbA1c goal < 7% (H), Type 2 diabetes mellitus without complication (H)        Use to calibrate blood glucose monitor as directed.   Quantity:  1 each   Refills:  0       * blood glucose calibration solution   Commonly known as:  no brand specified   Used for:  Type 2 diabetes mellitus without complication, without long-term current use of insulin (H)        Use to calibrate blood glucose monitor as directed.   Quantity:  1 each   Refills:  0       blood glucose lancing device   Commonly known as:  no brand specified   Used for:  Type 2 diabetes mellitus without complication, without long-term current use of insulin (H)        Use to test blood sugars 3 times daily or as directed.   Quantity:  100 each   Refills:  5       * Blood Glucose Monitoring Suppl Misc   Used  for:  Type 2 diabetes, HbA1c goal < 7% (H)        In vitro   Quantity:  1 each   Refills:  3       * blood glucose monitoring meter device kit   Commonly known as:  no brand specified   Used for:  Type 2 diabetes mellitus without complication, without long-term current use of insulin (H)        Use to test blood sugar 3 times daily or as directed.   Quantity:  1 kit   Refills:  0       cetirizine 10 MG tablet   Commonly known as:  zyrTEC        Dose:  10 mg   Take 10 mg by mouth daily as needed for allergies   Refills:  0       citalopram 40 MG tablet   Commonly known as:  celeXA   Used for:  Major depression in complete remission (H)        TAKE 1 TABLET BY MOUTH DAILY   Quantity:  90 tablet   Refills:  1       clindamycin 300 MG capsule   Commonly known as:  CLEOCIN   Used for:  Tonsillar abscess        Dose:  300 mg   Take 1 capsule (300 mg) by mouth 4 times daily   Quantity:  40 capsule   Refills:  0       cyclobenzaprine 10 MG tablet   Commonly known as:  FLEXERIL   Used for:  Fibromyalgia        TAKE 1 TABLET BY MOUTH 3 TIMES DAILY AS NEEDED FOR MUSCLE SPASMS   Quantity:  90 tablet   Refills:  1       insulin pen needle 31G X 5 MM   Commonly known as:  B-D U/F   Used for:  Type 2 diabetes mellitus without complication (H)        Use 1 pen needles daily or as directed.   Quantity:  100 each   Refills:  3       metFORMIN 500 MG 24 hr tablet   Commonly known as:  GLUCOPHAGE-XR   Used for:  Type 2 diabetes mellitus without complication, without long-term current use of insulin (H)        TAKE 4 TABLETS BY MOUTH DAILY WITH BREAKFAST   Quantity:  360 tablet   Refills:  0       traMADol 50 MG tablet   Commonly known as:  ULTRAM   Used for:  Fibromyalgia        TAKE 1-2 TABS BY MOUTH EVERY 8 HOURS AS NEEDED FOR MODERATE PAIN. MAX 8TABS/DAY & 60 TABS/MONTH   Quantity:  60 tablet   Refills:  0       traZODone 50 MG tablet   Commonly known as:  DESYREL   Used for:  Fibromyalgia        TAKE 1.5 TABLETS (75 MG) BY MOUTH  NIGHTLY AS NEEDED FOR SLEEP   Quantity:  45 tablet   Refills:  1       * VICTOZA PEN 18 MG/3ML soln   Used for:  Type 2 diabetes mellitus without complication, without long-term current use of insulin (H)   Generic drug:  liraglutide        INJECT 1.2 MG SUBCUTANEOUS DAILY   Quantity:  18 mL   Refills:  0       * liraglutide 18 MG/3ML soln   Commonly known as:  VICTOZA PEN   Used for:  Type 2 diabetes mellitus without complication, without long-term current use of insulin (H)        Dose:  1.2 mg   Inject 1.2 mg Subcutaneous daily   Quantity:  18 mL   Refills:  0       * Notice:  This list has 6 medication(s) that are the same as other medications prescribed for you. Read the directions carefully, and ask your doctor or other care provider to review them with you.         Where to get your medicines      These medications were sent to [place], [place] - [address] [address] [facility]  [address] [address] [address]     Phone:  [phone]     docusate sodium 100 MG tablet    ibuprofen 800 MG tablet         Some of these will need a paper prescription and others can be bought over the counter. Ask your nurse if you have questions.     Bring a paper prescription for each of these medications     HYDROcodone-acetaminophen 5-325 MG per tablet                Protect others around you: Learn how to safely use, store and throw away your medicines at www.disposemymeds.org.        Information about OPIOIDS     PRESCRIPTION OPIOIDS: WHAT YOU NEED TO KNOW   We gave you an opioid (narcotic) pain medicine. It is important to manage your pain, but opioids are not always the best choice. You should first try all the other options your care team gave you. Take this medicine for as short a time (and as few doses) as possible.     These medicines have risks:    DO NOT drive when on new or higher doses of pain medicine. These medicines can affect your alertness and reaction times, and you could be  arrested for driving under the influence (DUI). If you need to use opioids long-term, talk to your care team about driving.    DO NOT operate heave machinery    DO NOT do any other dangerous activities while taking these medicines.     DO NOT drink any alcohol while taking these medicines.      If the opioid prescribed includes acetaminophen, DO NOT take with any other medicines that contain acetaminophen. Read all labels carefully. Look for the word  acetaminophen  or  Tylenol.  Ask your pharmacist if you have questions or are unsure.    You can get addicted to pain medicines, especially if you have a history of addiction (chemical, alcohol or substance dependence). Talk to your care team about ways to reduce this risk.    Store your pills in a secure place, locked if possible. We will not replace any lost or stolen medicine. If you don t finish your medicine, please throw away (dispose) as directed by your pharmacist. The [place] Pollution [employer] Agency has more information about safe disposal: https://www.pca.[facility].mn.us/living-green/managing-unwanted-medications.     All opioids tend to cause constipation. Drink plenty of water and eat foods that have a lot of fiber, such as fruits, vegetables, prune juice, apple juice and high-fiber cereal. Take a laxative (Miralax, milk of magnesia, Colace, Senna) if you don t move your bowels at least every other day.              Medication List: This is a list of all your medications and when to take them. Check marks below indicate your daily home schedule. Keep this list as a reference.      Medications           Morning Afternoon Evening Bedtime As Needed    ACE NOT PRESCRIBED (INTENTIONAL)   1 each ACE Inhibitor not prescribed due to Other: not indicated                                albuterol 108 (90 Base) MCG/ACT Inhaler   Commonly known as:  PROAIR HFA/PROVENTIL HFA/VENTOLIN HFA   Inhale 2 puffs into the lungs every 6 hours as needed for shortness of breath /  dyspnea or wheezing                                atorvastatin 10 MG tablet   Commonly known as:  LIPITOR   TAKE 1 TABLET BY MOUTH EVERY DAY                                RAJNI CONTOUR NEXT test strip   USE TO TEST BLOOD SUGARS 3 TIMES DAILY OR AS DIRECTED   Generic drug:  blood glucose monitoring                                * blood glucose calibration solution   Commonly known as:  no brand specified   Use to calibrate blood glucose monitor as directed.                                * blood glucose calibration solution   Commonly known as:  no brand specified   Use to calibrate blood glucose monitor as directed.                                blood glucose lancing device   Commonly known as:  no brand specified   Use to test blood sugars 3 times daily or as directed.                                * Blood Glucose Monitoring Suppl Misc   In vitro                                * blood glucose monitoring meter device kit   Commonly known as:  no brand specified   Use to test blood sugar 3 times daily or as directed.                                cetirizine 10 MG tablet   Commonly known as:  zyrTEC   Take 10 mg by mouth daily as needed for allergies                                citalopram 40 MG tablet   Commonly known as:  celeXA   TAKE 1 TABLET BY MOUTH DAILY                                clindamycin 300 MG capsule   Commonly known as:  CLEOCIN   Take 1 capsule (300 mg) by mouth 4 times daily                                cyclobenzaprine 10 MG tablet   Commonly known as:  FLEXERIL   TAKE 1 TABLET BY MOUTH 3 TIMES DAILY AS NEEDED FOR MUSCLE SPASMS                                docusate sodium 100 MG tablet   Commonly known as:  COLACE   Take 100 mg by mouth daily                                HYDROcodone-acetaminophen 5-325 MG per tablet   Commonly known as:  NORCO   Take 1 tablet by mouth every 6 hours as needed for severe pain   Last time this was given:  1 tablet on 7/9/2018 10:15 AM                                 ibuprofen 800 MG tablet   Commonly known as:  ADVIL/MOTRIN   Take 1 tablet (800 mg) by mouth every 8 hours as needed for moderate pain                                insulin pen needle 31G X 5 MM   Commonly known as:  B-D U/F   Use 1 pen needles daily or as directed.                                metFORMIN 500 MG 24 hr tablet   Commonly known as:  GLUCOPHAGE-XR   TAKE 4 TABLETS BY MOUTH DAILY WITH BREAKFAST                                traMADol 50 MG tablet   Commonly known as:  ULTRAM   TAKE 1-2 TABS BY MOUTH EVERY 8 HOURS AS NEEDED FOR MODERATE PAIN. MAX 8TABS/DAY & 60 TABS/MONTH                                traZODone 50 MG tablet   Commonly known as:  DESYREL   TAKE 1.5 TABLETS (75 MG) BY MOUTH NIGHTLY AS NEEDED FOR SLEEP                                * VICTOZA PEN 18 MG/3ML soln   INJECT 1.2 MG SUBCUTANEOUS DAILY   Generic drug:  liraglutide                                * liraglutide 18 MG/3ML soln   Commonly known as:  VICTOZA PEN   Inject 1.2 mg Subcutaneous daily                                * Notice:  This list has 6 medication(s) that are the same as other medications prescribed for you. Read the directions carefully, and ask your doctor or other care provider to review them with you.

## 2018-07-09 NOTE — ANESTHESIA POSTPROCEDURE EVALUATION
Patient: Kaylee Lopez    Procedure(s):  Hysteroscopy, polypectomy  with myosure, dilation and curettage, endometrial biopsy  - Wound Class: II-Clean Contaminated    Diagnosis:Endometreal polyp versus fibroid  Diagnosis Additional Information: A 48-year-old  female with amenorrhea and ultrasound showing intracavitary hypoechoic area, with endometrial polyp    Anesthesia Type:  General    Note:  Anesthesia Post Evaluation    Patient location during evaluation: PACU  Patient participation: Able to fully participate in evaluation  Level of consciousness: awake  Pain management: adequate  Airway patency: patent  Cardiovascular status: acceptable  Respiratory status: acceptable  Hydration status: acceptable  PONV: controlled     Anesthetic complications: None          Last vitals:  Vitals:    18 0925 18 0930 18 1000   BP: 133/80 135/80 144/85   Resp: 16 14 (!) 32   Temp:      SpO2: 100% 100% 99%         Electronically Signed By: Finn Garcia DO  2018  10:08 AM

## 2018-07-09 NOTE — ANESTHESIA CARE TRANSFER NOTE
Patient: Kaylee Lopez    Procedure(s):  Hysteroscopy, polypectomy  with myosure, dilation and curettage, endometrial biopsy  - Wound Class: II-Clean Contaminated    Diagnosis: Endometreal polyp versus fibroid  Diagnosis Additional Information: No value filed.    Anesthesia Type:   General     Note:  Airway :Face Mask  Patient transferred to:PACU  Comments: To PACU, adequate gas exchange, report to RN.Handoff Report: Identifed the Patient, Identified the Reponsible Provider, Reviewed the pertinent medical history, Discussed the surgical course, Reviewed Intra-OP anesthesia mangement and issues during anesthesia, Set expectations for post-procedure period and Allowed opportunity for questions and acknowledgement of understanding      Vitals: (Last set prior to Anesthesia Care Transfer)    CRNA VITALS  7/9/2018 0849 - 7/9/2018 0925      7/9/2018             Pulse: 103    SpO2: 100 %    Resp Rate (observed): (!)  7                Electronically Signed By: JEANE Arreguin CRNA  July 9, 2018  9:25 AM

## 2018-07-09 NOTE — DISCHARGE INSTRUCTIONS
Follow up in 1-2 weeks   Call [phone] or [phone] for appointment     Call and ask to be seen or talk to a doctor for the following (You can go to the ob triage button on answering service line [phone]):        Increased bleeding, fever, general unwell feeling or increased pain.   Please call for any reason or concern!     Dr. Georgiana Pizarro, DO    OB/GYN   [facility]    DILATION AND CURETTAGE AND DILATION AND EVACUATION DISCHARGE INSTRUCTIONS    DO NOT DRIVE A CAR, DRINK ALCOHOL OR USE MACHINERY FOR THE NEXT 24 HOURS.  YOU SHOULD WAIT UNTIL YOU HAVE RECOVERED BEFORE MAKING ANY IMPORTANT DECISIONS.    PAIN AND DISCOMFORT  YOU MAY HAVE CRAMPS OR A LOW BACKACHE FOR 24 TO 48 HOURS.  TYLENOL (ACETAMINOPHEN) OR MOTRIN (IBUPROFEN) MAY HELP, OR YOUR DOCTOR MAY GIVE YOU PAIN MEDICINE.  CALL YOUR DOCTOR IF PAIN CANNOT BE CONTROLLED.  YOU MAY FEEL DROWSY AND WEAK FOR A DAY OR TWO.    VAGINAL DISCHARGE  YOU MAY HAVE SOME BLEEDING OR DISCHARGE FOR UP TO TWO WEEKS.  DO NOT DOUCHE, USE TAMPONS OR HAVE SEX (INTERCOURSE) IN THE FIRST WEEK.  CALL YOUR DOCTOR IF YOU SOAK MORE THAN ONE MAXI PAD (SANITARY NAPKIN) PER HOUR, OR IF YOU PASS LARGE BLOOD CLOTS.    OTHER SYMPTOMS  YOU MAY HAVE A LOW FEVER FOR THE FIRST TWO DAYS.  CALL YOUR DOCTOR IF YOUR FEVER GOES OVER 101 DEGREES FAHRENHEIT.    IF YOU HAVE NAUSEA (FEEL SICK TO YOUR STOMACH), STAY IN BED.  TRY DRINKING A SMALL AMOUNT 7-UP, TEA OR SOUP.    DIET AND ACTIVITY  EAT LIGHT MEALS AND DRINK PLENTY OF FLUIDS FOR THE FIRST 24 HOURS (OR LONGER, IF YOU HAVE NAUSEA).    YOU MAY BATHE, SHOWER AND CLIMB STAIRS.  MOST WOMEN CAN RETURN TO WORK AFTER 24 HOURS.  YOU MAY GO BACK TO YOUR OTHER ACTIVITIES AFTER YOUR PAIN GOES AWAY.      GENERAL ANESTHESIA OR SEDATION ADULT DISCHARGE INSTRUCTIONS   SPECIAL PRECAUTIONS FOR 24 HOURS AFTER SURGERY    IT IS NOT UNUSUAL TO FEEL LIGHT-HEADED OR FAINT, UP TO 24 HOURS AFTER SURGERY OR WHILE TAKING  PAIN MEDICATION.  IF YOU HAVE THESE SYMPTOMS; SIT FOR A FEW MINUTES BEFORE STANDING AND HAVE SOMEONE ASSIST YOU WHEN YOU GET UP TO WALK OR USE THE BATHROOM.    YOU SHOULD REST AND RELAX FOR THE NEXT 24 HOURS AND YOU MUST MAKE ARRANGEMENTS TO HAVE SOMEONE STAY WITH YOU FOR AT LEAST 24 HOURS AFTER YOUR DISCHARGE.  AVOID HAZARDOUS AND STRENUOUS ACTIVITIES.  DO NOT MAKE IMPORTANT DECISIONS FOR 24 HOURS.    DO NOT DRIVE ANY VEHICLE OR OPERATE MECHANICAL EQUIPMENT FOR 24 HOURS FOLLOWING THE END OF YOUR SURGERY.  EVEN THOUGH YOU MAY FEEL NORMAL, YOUR REACTIONS MAY BE AFFECTED BY THE MEDICATION YOU HAVE RECEIVED.    DO NOT DRINK ALCOHOLIC BEVERAGES FOR 24 HOURS FOLLOWING YOUR SURGERY.    DRINK CLEAR LIQUIDS (APPLE JUICE, GINGER ALE, 7-UP, BROTH, ETC.).  PROGRESS TO YOUR REGULAR DIET AS YOU FEEL ABLE.    YOU MAY HAVE A DRY MOUTH, A SORE THROAT, MUSCLES ACHES OR TROUBLE SLEEPING.  THESE SHOULD GO AWAY AFTER 24 HOURS.    CALL YOUR DOCTOR FOR ANY OF THE FOLLOWING:  SIGNS OF INFECTION (FEVER, GROWING TENDERNESS AT THE SURGERY SITE, A LARGE AMOUNT OF DRAINAGE OR BLEEDING, SEVERE PAIN, FOUL-SMELLING DRAINAGE, REDNESS OR SWELLING.    IT HAS BEEN OVER 8 TO 10 HOURS SINCE SURGERY AND YOU ARE STILL NOT ABLE TO URINATE (PASS WATER).   Maximum acetaminophen (Tylenol) dose from all sources should not exceed 4 grams (4000 mg) per day. You had 975 mg today at 06:37 am.        You received Toradol, an IV form of ibuprofen (Motrin) at 07:26 am.  Do not take any ibuprofen products until 01:26 pm.

## 2018-07-09 NOTE — IP AVS SNAPSHOT
[facility] PreOP/PostOP    [address]    [facility] [address]    Phone:  [phone]    Fax:  [phone]                                       After Visit Summary   7/9/2018    Kaylee Lopez    MRN: [id number]           After Visit Summary Signature Page     I have received my discharge instructions, and my questions have been answered. I have discussed any challenges I see with this plan with the nurse or doctor.    ..........................................................................................................................................  Patient/Patient Representative Signature      ..........................................................................................................................................  Patient Representative Print Name and Relationship to Patient    ..................................................               ................................................  Date                                            Time    ..........................................................................................................................................  Reviewed by Signature/Title    ...................................................              ..............................................  Date                                                            Time

## 2018-07-09 NOTE — DISCHARGE SUMMARY
47 y/o  with endometrial polyp s/p hysteroscopy, dilation and curettage/endometrial biopsy, endometrial polypectomy   Doing well    Dc home today     Dr. Georgiana Pizarro, DO    Obstetrics and Gynecology  [facility] [place] [facility] [place]

## 2018-07-09 NOTE — ANESTHESIA PREPROCEDURE EVALUATION
Anesthesia Evaluation     .             ROS/MED HX    ENT/Pulmonary:  - neg pulmonary ROS     Neurologic:  - neg neurologic ROS     Cardiovascular: Comment: Mild Pulm HTN    (+) ----. : . . . :. valvular problems/murmurs type: MVP . pulmonary hypertension,       METS/Exercise Tolerance:     Hematologic:  - neg hematologic  ROS       Musculoskeletal:   (+) , , other musculoskeletal- Fibromyalgia, RSD of right foot      GI/Hepatic:  - neg GI/hepatic ROS       Renal/Genitourinary:  - ROS Renal section negative       Endo: Comment: .Body mass index is 40.22 kg/(m^2).      (+) type II DM Obesity, .      Psychiatric:  - neg psychiatric ROS       Infectious Disease:  - neg infectious disease ROS       Malignancy:         Other: Comment: .Lab Test        07/02/18     10/30/15     04/20/12     04/20/12      --          03/04/11                       0910          1319          0905          0830           --           1117          WBC           --          8.7           --          7.3           --           --           HGB          11.8         13.5          --          13.2           < >        13.1          MCV           --          92            --          90            --           --           PLT           --          419           --          357           --          322           INR           --           --          1.16*         --           --          1.14           < > = values in this interval not displayed.                  Lab Test        07/02/18 09/18/17 08/22/16                       0910          0754          0745          NA           136          139          138           POTASSIUM    4.4          4.0          4.2           CHLORIDE     102          101          104           CO2          29           28           29            BUN          13           13           15            CR           0.81         0.80         0.80          ANIONGAP     5            10           5              KIMBERLEE          8.4*         8.9          8.7           GLC          129*         104*         135*                              Physical Exam  Normal systems: cardiovascular and pulmonary    Airway   Mallampati: II    Dental     Cardiovascular   Rhythm and rate: regular and normal      Pulmonary    breath sounds clear to auscultation                    Anesthesia Plan      History & Physical Review  History and physical reviewed and following examination; no interval change.    ASA Status:  3 .        Plan for General with Intravenous induction. Maintenance will be Inhalation and Balanced.    PONV prophylaxis:  Ondansetron (or other 5HT-3) and Dexamethasone or Solumedrol       Postoperative Care  Postoperative pain management:  IV analgesics, Oral pain medications and Multi-modal analgesia.      Consents  Anesthetic plan, risks, benefits and alternatives discussed with:  Patient or representative..                          .

## 2018-07-09 NOTE — BRIEF OP NOTE
[facility] Brief Operative Note    Pre-operative diagnosis: Endometreal polyp versus fibroid   Post-operative diagnosis 47 yo female with endometrial polyp vs fibroid, s/p hysteroscopy endometrial biopsy, dilation and curettage wtih polypectomy     Procedure: Procedure(s):  Hysteroscopy, polypectomy  with myosure, dilation and curettage, endometrial biopsy  - Wound Class: II-Clean Contaminated   Surgeon(s): Surgeon(s) and Role:     * Georgiana Pizarro DO - Primary   Estimated blood loss: 5 mL    Specimens: * No specimens in log *   Findings: Anterior lower uterine segment with endometrial polyp

## 2018-07-09 NOTE — OP NOTE
PREOPERATIVE DIAGNOSIS: A 48-year-old  female with amenorrhea and ultrasound showing intracavitary hypoechoic area, possible endometrial polyp vs fibroid  POSTOPERATIVE DIAGNOSIS:  A 48-year-old  female with amenorrhea and ultrasound showing intracavitary hypoechoic area, with endometrial polyp  SURGEON:  Dr. Georgiana Pizarro   PROCEDURE:   1. Diagnostic hysteroscopy.   2. Endometrial biopsy/dilation and curettage  3. Endometrial polypectomy  INDICATIONS: AS is a very pleasant female who was seen in the office multiple times. She is a  with ultrasound showing intracavitary hypoechoic area, possible endometrial polyp vs fibroid and with menorrhagia. She was counseled regarding risks, benefits and alternatives were discussed and I think she has a good understanding of the procedure.  COMPLICATIONS: None apparent at time of procedure.   FLUID DEFICIT: 150 cc   ESTIMATED BLOOD LOSS: Less than 5 cc   FINDINGS: Exam under anesthesia reveals an anteverted uterus about 10 weeks' size. Intrauterine findings include anterior lower uterine segment polyp, and fluffy endometrium, bilateral normal tubal ostia filling the entire uterine cavity.   DESCRIPTION OF PROCEDURE: After informed consent was obtained, the patient was taken to the operating room where she was placed in dorsal supine position and placed under general anesthesia without difficulty.  Exam under anesthesia reveals the findings above. The patient was prepped and draped in normal sterile fashion. The bladder is drained via straight catheterization.  The bivalve speculum was placed in the patient's vaginal vault.  Anterior lip of the cervix was grasped with a single-tooth tenaculum. An endometrial biopsy is performed. The cervix was dilated up to 8 mm and the hysterocope was placed into the intrauterine cavity and there was noted to be a findings above. An endometrial polypectomy is performed with the myosure device and then a directed endometrial  curettage is performed, also with the myosure device.  The hysteroscope is removed.  The single-tooth tenaculum was removed from the patient's anterior lip of the cervix. Hemostasis of the cervix was noted.  Patient tolerated the procedure well. Sponge, lap, needle counts were correct x2. The patient was awakened from anesthesia and returned to the recovery room in stable condition.   YAW PROCTOR DO

## 2018-07-10 LAB — COPATH REPORT: NORMAL

## 2018-07-10 RX ORDER — TRAZODONE HYDROCHLORIDE 50 MG/1
75 TABLET, FILM COATED ORAL AT BEDTIME
Qty: 45 TABLET | Refills: 1 | Status: SHIPPED | OUTPATIENT
Start: 2018-07-10 | End: 2018-07-24

## 2018-07-10 RX ORDER — TRAZODONE HYDROCHLORIDE 50 MG/1
TABLET, FILM COATED ORAL
Qty: 45 TABLET | Refills: 11 | Status: ON HOLD | OUTPATIENT
Start: 2018-07-10 | End: 2019-06-06

## 2018-07-10 NOTE — TELEPHONE ENCOUNTER
Message from [employer]:  Original authorizing provider: Irene Castellanos MD, MD Kaylee Lopez would like a refill of the following medications:  traZODone (DESYREL) 50 MG tablet [Irene Castellanos MD, MD]    Preferred pharmacy: [facility] [address] [facility] [address] [facility]    Comment:

## 2018-07-10 NOTE — TELEPHONE ENCOUNTER
"Trazodone  LRF 5/8/18  LOV 7/2/18    Requested Prescriptions   Pending Prescriptions Disp Refills     traZODone (DESYREL) 50 MG tablet 45 tablet 1    Serotonin Modulators Passed    7/10/2018  7:38 AM       Passed - Recent (12 mo) or future (30 days) visit within the authorizing provider's specialty    Patient had office visit in the last 12 months or has a visit in the next 30 days with authorizing provider or within the authorizing provider's specialty.  See \"Patient Info\" tab in inbasket, or \"Choose Columns\" in Meds & Orders section of the refill encounter.           Passed - Patient is age 18 or older       Passed - No active pregnancy on record       Passed - No positive pregnancy test in past 12 months        Prescription approved per [facility] [place] - pt has upcoming appt on 7/24/18    "

## 2018-07-23 ENCOUNTER — OFFICE VISIT (OUTPATIENT)
Dept: OBGYN | Facility: CLINIC | Age: 48
End: 2018-07-23
Payer: COMMERCIAL

## 2018-07-23 VITALS
BODY MASS INDEX: 40.27 KG/M2 | WEIGHT: 238.3 LBS | DIASTOLIC BLOOD PRESSURE: 74 MMHG | SYSTOLIC BLOOD PRESSURE: 124 MMHG | TEMPERATURE: 99.7 F

## 2018-07-23 DIAGNOSIS — Z30.011 ENCOUNTER FOR INITIAL PRESCRIPTION OF CONTRACEPTIVE PILLS: Primary | ICD-10-CM

## 2018-07-23 DIAGNOSIS — N92.0 EXCESSIVE OR FREQUENT MENSTRUATION: ICD-10-CM

## 2018-07-23 PROCEDURE — 99213 OFFICE O/P EST LOW 20 MIN: CPT | Performed by: FAMILY MEDICINE

## 2018-07-23 RX ORDER — NORETHINDRONE ACETATE AND ETHINYL ESTRADIOL .02; 1 MG/1; MG/1
1 TABLET ORAL DAILY
Qty: 90 TABLET | Refills: 3 | Status: SHIPPED | OUTPATIENT
Start: 2018-07-23 | End: 2019-11-26

## 2018-07-23 NOTE — PATIENT INSTRUCTIONS
Return in 6 months for recheck   Dr. Georgiana Pizarro,     Obstetrics and Gynecology  [facility] [place] [facility] [place]

## 2018-07-23 NOTE — MR AVS SNAPSHOT
After Visit Summary   7/23/2018    Kaylee Lopez    MRN: [id number]           Patient Information     Date Of Birth          1970        Visit Information        Provider Department      7/23/2018 11:00 AM Georgiana Pizarro, DO [facility]        Today's Diagnoses     Encounter for initial prescription of contraceptive pills    -  1    Excessive or frequent menstruation          Care Instructions    Return in 6 months for recheck   Dr. Georgiana Pizarro, DO    Obstetrics and Gynecology  [facility] [place]                 Follow-ups after your visit        Your next 10 appointments already scheduled     Jul 24, 2018  4:10 PM CDT   MyChart Long with Irene Castellanos MD   [facility] ([facility])    [address] [facility] [address] [phone]              Who to contact     If you have questions or need follow up information about today's clinic visit or your schedule please contact [facility] directly at [phone].  Normal or non-critical lab and imaging results will be communicated to you by MyChart, letter or phone within 4 business days after the clinic has received the results. If you do not hear from us within 7 days, please contact the clinic through [employer]Basish[employer] or phone. If you have a critical or abnormal lab result, we will notify you by phone as soon as possible.  Submit refill requests through [employer] or call your pharmacy and they will forward the refill request to us. Please allow 3 business days for your refill to be completed.          Additional Information About Your Visit        MyChart Information     [employer] gives you secure access to your electronic health record. If you see a primary care provider, you can also send messages to your care team and make appointments. If you have questions, please call your primary care clinic.  If you do not have a primary care provider,  please call [phone] and they will assist you.        Care EveryWhere ID     This is your Care EveryWhere ID. This could be used by other organizations to access your [place] medical records  [id number]        Your Vitals Were     Temperature Last Period BMI (Body Mass Index)             99.7  F (37.6  C) (Oral) 07/01/2018 (Exact Date) 40.27 kg/m2          Blood Pressure from Last 3 Encounters:   07/23/18 124/74   07/09/18 (!) 151/91   07/02/18 114/82    Weight from Last 3 Encounters:   07/23/18 238 lb 4.8 oz (108.1 kg)   07/09/18 238 lb (108 kg)   07/02/18 238 lb (108 kg)              Today, you had the following     No orders found for display         Today's Medication Changes          These changes are accurate as of 7/23/18 11:36 AM.  If you have any questions, ask your nurse or doctor.               Start taking these medicines.        Dose/Directions    norethindrone-ethinyl estradiol 1-20 MG-MCG per tablet   Commonly known as:  MICROGESTIN 1/20   Used for:  Encounter for initial prescription of contraceptive pills, Excessive or frequent menstruation   Started by:  Georgiana Pizarro DO        Dose:  1 tablet   Take 1 tablet by mouth daily   Quantity:  90 tablet   Refills:  3            Where to get your medicines      These medications were sent to [facility] [id number] [place] [address] [address] [address]    Hours:  Tech issues with their phone system Phone:  [phone]     norethindrone-ethinyl estradiol 1-20 MG-MCG per tablet                Primary Care Provider Office Phone # Fax #    Irene Castellanos [phone] [address]  [facility] [address]        Equal Access to Services     GERARDO CRAWLEY AH: Hadii dima Gandara, waaxda luqadaha, qaybta kaalmada adeegyada, gloria godinez. So [facility] [phone].    ATENCIÓN: Si habla español, tiene a garner disposición servicios gratuitos de asistencia  lingüísticaDeysi Almeida al [phone].    We comply with applicable federal civil rights laws and [place] laws. We do not discriminate on the basis of race, color, national origin, age, disability, sex, sexual orientation, or gender identity.            Thank you!     Thank you for choosing [facility]  for your care. Our goal is always to provide you with excellent care. Hearing back from our patients is one way we can continue to improve our services. Please take a few minutes to complete the written survey that you may receive in the mail after your visit with us. Thank you!             Your Updated Medication List - Protect others around you: Learn how to safely use, store and throw away your medicines at www.disposemymeds.org.          This list is accurate as of 7/23/18 11:36 AM.  Always use your most recent med list.                   Brand Name Dispense Instructions for use Diagnosis    ACE NOT PRESCRIBED (INTENTIONAL)     0 each    1 each ACE Inhibitor not prescribed due to Other: not indicated    Type 2 diabetes, HbA1c goal < 7% (H)       albuterol 108 (90 Base) MCG/ACT Inhaler    PROAIR HFA/PROVENTIL HFA/VENTOLIN HFA    1 Inhaler    Inhale 2 puffs into the lungs every 6 hours as needed for shortness of breath / dyspnea or wheezing    Cough       atorvastatin 10 MG tablet    LIPITOR    90 tablet    TAKE 1 TABLET BY MOUTH EVERY DAY    Hyperlipidemia LDL goal <100       RAJNI CONTOUR NEXT test strip   Generic drug:  blood glucose monitoring     100 strip    USE TO TEST BLOOD SUGARS 3 TIMES DAILY OR AS DIRECTED    Type 2 diabetes mellitus without complication, without long-term current use of insulin (H)       * blood glucose calibration solution    no brand specified    1 each    Use to calibrate blood glucose monitor as directed.    Type 2 diabetes, HbA1c goal < 7% (H), Type 2 diabetes mellitus without complication (H)       * blood glucose calibration solution    no brand specified    1 each     Use to calibrate blood glucose monitor as directed.    Type 2 diabetes mellitus without complication, without long-term current use of insulin (H)       blood glucose lancing device    no brand specified    100 each    Use to test blood sugars 3 times daily or as directed.    Type 2 diabetes mellitus without complication, without long-term current use of insulin (H)       * Blood Glucose Monitoring Suppl Misc     1 each    In vitro    Type 2 diabetes, HbA1c goal < 7% (H)       * blood glucose monitoring meter device kit    no brand specified    1 kit    Use to test blood sugar 3 times daily or as directed.    Type 2 diabetes mellitus without complication, without long-term current use of insulin (H)       cetirizine 10 MG tablet    zyrTEC     Take 10 mg by mouth daily as needed for allergies        citalopram 40 MG tablet    celeXA    90 tablet    TAKE 1 TABLET BY MOUTH DAILY    Major depression in complete remission (H)       clindamycin 300 MG capsule    CLEOCIN    40 capsule    Take 1 capsule (300 mg) by mouth 4 times daily    Tonsillar abscess       cyclobenzaprine 10 MG tablet    FLEXERIL    90 tablet    TAKE 1 TABLET BY MOUTH 3 TIMES DAILY AS NEEDED FOR MUSCLE SPASMS    Fibromyalgia       docusate sodium 100 MG tablet    COLACE    60 tablet    Take 100 mg by mouth daily    Status post hysteroscopic polypectomy       HYDROcodone-acetaminophen 5-325 MG per tablet    NORCO    15 tablet    Take 1 tablet by mouth every 6 hours as needed for severe pain    Status post hysteroscopic polypectomy       ibuprofen 800 MG tablet    ADVIL/MOTRIN    90 tablet    Take 1 tablet (800 mg) by mouth every 8 hours as needed for moderate pain    Status post hysteroscopic polypectomy       insulin pen needle 31G X 5 MM    B-D U/F    100 each    Use 1 pen needles daily or as directed.    Type 2 diabetes mellitus without complication (H)       metFORMIN 500 MG 24 hr tablet    GLUCOPHAGE-XR    360 tablet    TAKE 4 TABLETS BY MOUTH  DAILY WITH BREAKFAST    Type 2 diabetes mellitus without complication, without long-term current use of insulin (H)       norethindrone-ethinyl estradiol 1-20 MG-MCG per tablet    MICROGESTIN 1/20    90 tablet    Take 1 tablet by mouth daily    Encounter for initial prescription of contraceptive pills, Excessive or frequent menstruation       traMADol 50 MG tablet    ULTRAM    60 tablet    TAKE 1-2 TABS BY MOUTH EVERY 8 HOURS AS NEEDED FOR MODERATE PAIN. MAX 8TABS/DAY & 60 TABS/MONTH    Fibromyalgia       * traZODone 50 MG tablet    DESYREL    45 tablet    TAKE 1.5 TABLETS (75 MG) BY MOUTH NIGHTLY AS NEEDED FOR SLEEP    Fibromyalgia       * traZODone 50 MG tablet    DESYREL    45 tablet    Take 1.5 tablets (75 mg) by mouth At Bedtime As needed for sleep    Fibromyalgia       * VICTOZA PEN 18 MG/3ML soln   Generic drug:  liraglutide     18 mL    INJECT 1.2 MG SUBCUTANEOUS DAILY    Type 2 diabetes mellitus without complication, without long-term current use of insulin (H)       * liraglutide 18 MG/3ML soln    VICTOZA PEN    18 mL    Inject 1.2 mg Subcutaneous daily    Type 2 diabetes mellitus without complication, without long-term current use of insulin (H)       * Notice:  This list has 8 medication(s) that are the same as other medications prescribed for you. Read the directions carefully, and ask your doctor or other care provider to review them with you.

## 2018-07-23 NOTE — PROGRESS NOTES
Subjective: 48 year old female   status post hysteroscopy, dilation and curettage/endometrial biopsy, endometrial polypectomy on 07/09/2018, here for incision check.  Doing well, denies fever, significant pain.    Is not taking pain medications.   States some light vaginal bleeding.        - Pt experienced bleeding & cramping for 1 week post-op, has since resolved. She did notice a small clot yesterday after urination, but otherwise no further symptoms.          This document serves as a record of the services and decisions personally performed and made by Georgiana Pizarro DO. It was created on her behalf by Britany Frausto, a trained medical scribe. The creation of this document is based the provider's statements to the medical scribe.  Scribherminia Frausto 11:31 AM, July 23, 2018    Objective:  EXAM:  /74  Temp 99.7  F (37.6  C) (Oral)  Wt 108.1 kg (238 lb 4.8 oz)  LMP 07/01/2018 (Exact Date)  BMI 40.27 kg/m2  Constitutional: healthy, alert and no distress  Gastrointestinal: Abdomen soft, non-tender. Incision intact, no erthema, drainage.        Assessment/Plan: 48 year old female   status post hysteroscopy, dilation and curettage/endometrial biopsy, endometrial polypectomy on 07/09/2018.  V67.00C Surgery Follow-Up Examination  (primary encounter diagnosis)    Plan:   1. Discussed further treatments for management of menorrhagia & dysmenorrhea   - OCP, IUD, Uterine ablation   - Pt desires an oral contraceptive at this time:  Discussed risk of DVT/PE and to call if headaches return  2. Return to clinic in 6 months for follow-up        Rx:  - Microgestin 1-20 mg-mcg 1 tab po QD        The information in this document, created by the medical scribe for me, accurately reflects the services I personally performed and the decisions made by me. I have reviewed and approved this document for accuracy prior to leaving the patient care area.  11:30 AM, 07/23/18    [place]

## 2018-07-23 NOTE — NURSING NOTE
"Chief Complaint   Patient presents with     Surgical Followup     2018 :dilation and curettage/endometrial biopsy, endometrial polypectomy.  Has been feeling good , was traveling back yesterday, chills last night.  Temp elevated today in clinic       Initial /74  Temp 99.7  F (37.6  C) (Oral)  Wt 238 lb 4.8 oz (108.1 kg)  LMP 2018 (Exact Date)  BMI 40.27 kg/m2 Estimated body mass index is 40.27 kg/(m^2) as calculated from the following:    Height as of 18: 5' 4.5\" (1.638 m).    Weight as of this encounter: 238 lb 4.8 oz (108.1 kg).  BP completed using cuff size: large        The following HM Due: NONE      Genesis Yee CMA             "

## 2018-07-24 ENCOUNTER — OFFICE VISIT (OUTPATIENT)
Dept: FAMILY MEDICINE | Facility: CLINIC | Age: 48
End: 2018-07-24
Payer: COMMERCIAL

## 2018-07-24 VITALS
HEIGHT: 65 IN | DIASTOLIC BLOOD PRESSURE: 76 MMHG | HEART RATE: 92 BPM | SYSTOLIC BLOOD PRESSURE: 122 MMHG | OXYGEN SATURATION: 98 % | BODY MASS INDEX: 39.8 KG/M2 | RESPIRATION RATE: 16 BRPM | WEIGHT: 238.9 LBS | TEMPERATURE: 98.8 F

## 2018-07-24 DIAGNOSIS — F32.5 MAJOR DEPRESSION IN COMPLETE REMISSION (H): ICD-10-CM

## 2018-07-24 DIAGNOSIS — E11.9 TYPE 2 DIABETES MELLITUS WITHOUT COMPLICATION, WITHOUT LONG-TERM CURRENT USE OF INSULIN (H): ICD-10-CM

## 2018-07-24 DIAGNOSIS — M79.7 FIBROMYALGIA: Primary | ICD-10-CM

## 2018-07-24 PROCEDURE — 99207 C FOOT EXAM  NO CHARGE: CPT | Performed by: FAMILY MEDICINE

## 2018-07-24 PROCEDURE — 99214 OFFICE O/P EST MOD 30 MIN: CPT | Performed by: FAMILY MEDICINE

## 2018-07-24 NOTE — MR AVS SNAPSHOT
After Visit Summary   7/24/2018    Kaylee Lopez    MRN: [id number]           Patient Information     Date Of Birth          1970        Visit Information        Provider Department      7/24/2018 4:10 PM Irene Castellanos MD [facility]        Today's Diagnoses     Fibromyalgia    -  1    Type 2 diabetes mellitus without complication, without long-term current use of insulin (H)           Follow-ups after your visit        Follow-up notes from your care team     Return in about 3 months (around 10/24/2018) for Diabetes Recheck.      Who to contact     If you have questions or need follow up information about today's clinic visit or your schedule please contact [facility] directly at [phone].  Normal or non-critical lab and imaging results will be communicated to you by MyChart, letter or phone within 4 business days after the clinic has received the results. If you do not hear from us within 7 days, please contact the clinic through [facility] or phone. If you have a critical or abnormal lab result, we will notify you by phone as soon as possible.  Submit refill requests through [facility] or call your pharmacy and they will forward the refill request to us. Please allow 3 business days for your refill to be completed.          Additional Information About Your Visit        MyChart Information     [facility] gives you secure access to your electronic health record. If you see a primary care provider, you can also send messages to your care team and make appointments. If you have questions, please call your primary care clinic.  If you do not have a primary care provider, please call [phone] and they will assist you.        Care EveryWhere ID     This is your Care EveryWhere ID. This could be used by other organizations to access your [place] medical records  [id number]        Your Vitals Were     Pulse Temperature Respirations Height Last Period Pulse  "Oximetry    92 98.8  F (37.1  C) (Oral) 16 5' 4.5\" (1.638 m) 07/01/2018 (Exact Date) 98%    BMI (Body Mass Index)                   40.37 kg/m2            Blood Pressure from Last 3 Encounters:   07/24/18 122/76   07/23/18 124/74   07/09/18 (!) 151/91    Weight from Last 3 Encounters:   07/24/18 238 lb 14.4 oz (108.4 kg)   07/23/18 238 lb 4.8 oz (108.1 kg)   07/09/18 238 lb (108 kg)              We Performed the Following     FOOT EXAM          Today's Medication Changes          These changes are accurate as of 7/24/18  5:10 PM.  If you have any questions, ask your nurse or doctor.               These medicines have changed or have updated prescriptions.        Dose/Directions    traZODone 50 MG tablet   Commonly known as:  DESYREL   This may have changed:  Another medication with the same name was removed. Continue taking this medication, and follow the directions you see here.   Used for:  Fibromyalgia   Changed by:  Irene Castellanos MD        TAKE 1.5 TABLETS (75 MG) BY MOUTH NIGHTLY AS NEEDED FOR SLEEP   Quantity:  45 tablet   Refills:  11                Primary Care Provider Office Phone # Fax #    Irene Castellanos [phone] [address] [facility] [address]        Equal Access to Services     [facility] AH: Hadii aad ku hadasho Soomaali, waaxda luqadaha, qaybta kaalmada adeegyada, gloria godinez. So [facility] [phone].    ATENCIÓN: Si habla español, tiene a garner disposición servicios gratuitos de asistencia lingüística. Llame al [phone].    We comply with applicable federal civil rights laws and [place] laws. We do not discriminate on the basis of race, color, national origin, age, disability, sex, sexual orientation, or gender identity.            Thank you!     Thank you for choosing [facility]  for your care. Our goal is always to provide you with excellent care. Hearing back from our patients is one way we can continue to improve our " services. Please take a few minutes to complete the written survey that you may receive in the mail after your visit with us. Thank you!             Your Updated Medication List - Protect others around you: Learn how to safely use, store and throw away your medicines at www.disposemymeds.org.          This list is accurate as of 7/24/18  5:10 PM.  Always use your most recent med list.                   Brand Name Dispense Instructions for use Diagnosis    ACE NOT PRESCRIBED (INTENTIONAL)     0 each    1 each ACE Inhibitor not prescribed due to Other: not indicated    Type 2 diabetes, HbA1c goal < 7% (H)       albuterol 108 (90 Base) MCG/ACT Inhaler    PROAIR HFA/PROVENTIL HFA/VENTOLIN HFA    1 Inhaler    Inhale 2 puffs into the lungs every 6 hours as needed for shortness of breath / dyspnea or wheezing    Cough       atorvastatin 10 MG tablet    LIPITOR    90 tablet    TAKE 1 TABLET BY MOUTH EVERY DAY    Hyperlipidemia LDL goal <100       RAJNI CONTOUR NEXT test strip   Generic drug:  blood glucose monitoring     100 strip    USE TO TEST BLOOD SUGARS 3 TIMES DAILY OR AS DIRECTED    Type 2 diabetes mellitus without complication, without long-term current use of insulin (H)       * blood glucose calibration solution    no brand specified    1 each    Use to calibrate blood glucose monitor as directed.    Type 2 diabetes, HbA1c goal < 7% (H), Type 2 diabetes mellitus without complication (H)       * blood glucose calibration solution    no brand specified    1 each    Use to calibrate blood glucose monitor as directed.    Type 2 diabetes mellitus without complication, without long-term current use of insulin (H)       blood glucose lancing device    no brand specified    100 each    Use to test blood sugars 3 times daily or as directed.    Type 2 diabetes mellitus without complication, without long-term current use of insulin (H)       * Blood Glucose Monitoring Suppl Misc     1 each    In vitro    Type 2 diabetes,  HbA1c goal < 7% (H)       * blood glucose monitoring meter device kit    no brand specified    1 kit    Use to test blood sugar 3 times daily or as directed.    Type 2 diabetes mellitus without complication, without long-term current use of insulin (H)       cetirizine 10 MG tablet    zyrTEC     Take 10 mg by mouth daily as needed for allergies        citalopram 40 MG tablet    celeXA    90 tablet    TAKE 1 TABLET BY MOUTH DAILY    Major depression in complete remission (H)       clindamycin 300 MG capsule    CLEOCIN    40 capsule    Take 1 capsule (300 mg) by mouth 4 times daily    Tonsillar abscess       cyclobenzaprine 10 MG tablet    FLEXERIL    90 tablet    TAKE 1 TABLET BY MOUTH 3 TIMES DAILY AS NEEDED FOR MUSCLE SPASMS    Fibromyalgia       docusate sodium 100 MG tablet    COLACE    60 tablet    Take 100 mg by mouth daily    Status post hysteroscopic polypectomy       HYDROcodone-acetaminophen 5-325 MG per tablet    NORCO    15 tablet    Take 1 tablet by mouth every 6 hours as needed for severe pain    Status post hysteroscopic polypectomy       ibuprofen 800 MG tablet    ADVIL/MOTRIN    90 tablet    Take 1 tablet (800 mg) by mouth every 8 hours as needed for moderate pain    Status post hysteroscopic polypectomy       insulin pen needle 31G X 5 MM    B-D U/F    100 each    Use 1 pen needles daily or as directed.    Type 2 diabetes mellitus without complication (H)       metFORMIN 500 MG 24 hr tablet    GLUCOPHAGE-XR    360 tablet    TAKE 4 TABLETS BY MOUTH DAILY WITH BREAKFAST    Type 2 diabetes mellitus without complication, without long-term current use of insulin (H)       norethindrone-ethinyl estradiol 1-20 MG-MCG per tablet    MICROGESTIN 1/20    90 tablet    Take 1 tablet by mouth daily    Encounter for initial prescription of contraceptive pills, Excessive or frequent menstruation       traMADol 50 MG tablet    ULTRAM    60 tablet    TAKE 1-2 TABS BY MOUTH EVERY 8 HOURS AS NEEDED FOR MODERATE PAIN.  MAX 8TABS/DAY & 60 TABS/MONTH    Fibromyalgia       traZODone 50 MG tablet    DESYREL    45 tablet    TAKE 1.5 TABLETS (75 MG) BY MOUTH NIGHTLY AS NEEDED FOR SLEEP    Fibromyalgia       * VICTOZA PEN 18 MG/3ML soln   Generic drug:  liraglutide     18 mL    INJECT 1.2 MG SUBCUTANEOUS DAILY    Type 2 diabetes mellitus without complication, without long-term current use of insulin (H)       * liraglutide 18 MG/3ML soln    VICTOZA PEN    18 mL    Inject 1.2 mg Subcutaneous daily    Type 2 diabetes mellitus without complication, without long-term current use of insulin (H)       * Notice:  This list has 6 medication(s) that are the same as other medications prescribed for you. Read the directions carefully, and ask your doctor or other care provider to review them with you.

## 2018-07-24 NOTE — PROGRESS NOTES
SUBJECTIVE:   Kaylee Lopez is a 48 year old female who presents to clinic today for the following health issues:      Depression and Anxiety Follow-Up    Status since last visit: Worsened due to stressors    Other associated symptoms:fatigue     Complicating factors:     Significant life event: Yes-  Anniversary of death of brother and work      Current substance abuse: None    PHQ-9 7/20/2017 1/12/2018 7/2/2018   Total Score 2 2 1   Q9: Suicide Ideation Not at all Not at all Not at all     MICHAEL-7 SCORE 7/20/2017 1/12/2018 7/2/2018   Total Score - - -   Total Score - 1 (minimal anxiety) -   Total Score 4 1 3       PHQ-9  English  PHQ-9   Any Language  MICHAEL-7  Suicide Assessment Five-step Evaluation and Treatment (SAFE-T)    Amount of exercise or physical activity: None    Problems taking medications regularly: dry mouth, constipation , diahrrhea    Medication side effects: none    Diet: regular (no restrictions)      Here to have FMLA paperwork filled out.        Problem list and histories reviewed & adjusted, as indicated.  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)     Migraine with aura and without status migrainosus, not intractable     Elevated blood pressure reading without diagnosis of hypertension     Pulmonary hypertension       Current Outpatient Prescriptions   Medication Sig Dispense Refill     ACE NOT PRESCRIBED, INTENTIONAL, 1 each ACE Inhibitor not prescribed due to Other: not indicated 0  each 0     albuterol (PROAIR HFA/PROVENTIL HFA/VENTOLIN HFA) 108 (90 Base) MCG/ACT Inhaler Inhale 2 puffs into the lungs every 6 hours as needed for shortness of breath / dyspnea or wheezing 1 Inhaler 0     atorvastatin (LIPITOR) 10 MG tablet TAKE 1 TABLET BY MOUTH EVERY DAY 90 tablet 3     RAJNI CONTOUR NEXT test strip USE TO TEST BLOOD SUGARS 3 TIMES DAILY OR AS DIRECTED 100 strip 0     blood glucose (NO BRAND SPECIFIED) lancing device Use to test blood sugars 3 times daily or as directed. 100 each 5     blood glucose calibration (NO BRAND SPECIFIED) solution Use to calibrate blood glucose monitor as directed. 1 each 0     blood glucose calibration (NO BRAND SPECIFIED) solution Use to calibrate blood glucose monitor as directed. 1 each 0     blood glucose monitoring (NO BRAND SPECIFIED) meter device kit Use to test blood sugar 3 times daily or as directed. 1 kit 0     Blood Glucose Monitoring Suppl MISC In vitro 1 each 3     cetirizine (ZYRTEC) 10 MG tablet Take 10 mg by mouth daily as needed for allergies       citalopram (CELEXA) 40 MG tablet TAKE 1 TABLET BY MOUTH DAILY 90 tablet 1     clindamycin (CLEOCIN) 300 MG capsule Take 1 capsule (300 mg) by mouth 4 times daily 40 capsule 0     cyclobenzaprine (FLEXERIL) 10 MG tablet TAKE 1 TABLET BY MOUTH 3 TIMES DAILY AS NEEDED FOR MUSCLE SPASMS 90 tablet 1     docusate sodium (COLACE) 100 MG tablet Take 100 mg by mouth daily 60 tablet 1     HYDROcodone-acetaminophen (NORCO) 5-325 MG per tablet Take 1 tablet by mouth every 6 hours as needed for severe pain 15 tablet 0     ibuprofen (ADVIL/MOTRIN) 800 MG tablet Take 1 tablet (800 mg) by mouth every 8 hours as needed for moderate pain 90 tablet 1     insulin pen needle (B-D U/F) 31G X 5 MM Use 1 pen needles daily or as directed. 100 each 3     liraglutide (VICTOZA PEN) 18 MG/3ML soln Inject 1.2 mg Subcutaneous daily 18 mL 0     metFORMIN (GLUCOPHAGE-XR) 500 MG 24 hr tablet TAKE 4 TABLETS BY MOUTH DAILY WITH BREAKFAST 360  "tablet 0     norethindrone-ethinyl estradiol (MICROGESTIN 1/20) 1-20 MG-MCG per tablet Take 1 tablet by mouth daily 90 tablet 3     traMADol (ULTRAM) 50 MG tablet TAKE 1-2 TABS BY MOUTH EVERY 8 HOURS AS NEEDED FOR MODERATE PAIN. MAX 8TABS/DAY & 60 TABS/MONTH 60 tablet 0     traZODone (DESYREL) 50 MG tablet TAKE 1.5 TABLETS (75 MG) BY MOUTH NIGHTLY AS NEEDED FOR SLEEP 45 tablet 11     traZODone (DESYREL) 50 MG tablet Take 1.5 tablets (75 mg) by mouth At Bedtime As needed for sleep 45 tablet 1     VICTOZA PEN 18 MG/3ML soln INJECT 1.2 MG SUBCUTANEOUS DAILY 18 mL 0       Reviewed and updated as needed this visit by clinical staff  Tobacco  Allergies  Meds  Med Hx  Surg Hx  Fam Hx  Soc Hx      Reviewed and updated as needed this visit by Provider         ROS:  CONSTITUTIONAL:NEGATIVE for fever, chills, change in weight  RESP:NEGATIVE for significant cough or SOB  CV: NEGATIVE for chest pain, palpitations or peripheral edema  MUSCULOSKELETAL: see below  PSYCHIATRIC: see below. Notes several stressors.    Works at [employer].  Stressful.  Had the recent surgery through GYN.   One year anniversary of brother's suicide.     Feeling like citalopram is working OK.    No recent fibro flare.     OBJECTIVE:     /76 (BP Location: Right arm, Cuff Size: Adult Large)  Pulse 92  Temp 98.8  F (37.1  C) (Oral)  Resp 16  Ht 5' 4.5\" (1.638 m)  Wt 238 lb 14.4 oz (108.4 kg)  LMP 07/01/2018 (Exact Date)  SpO2 98%  BMI 40.37 kg/m2  Body mass index is 40.37 kg/(m^2).  GENERAL APPEARANCE: alert and no distress  CV: regular rates and rhythm  MS: no edema   PSYCH: mentation appears normal and affect normal/bright    DP pulse present bilaterally.  No sores or ulcerations.  Little callous.  Monofilament exam normal.    Lab Results   Component Value Date    A1C 5.8 04/23/2018    A1C 5.6 09/18/2017    A1C 5.5 02/27/2017    A1C 5.6 08/22/2016    A1C 7.6 04/29/2016       PHQ-9 SCORE 7/20/2017 1/12/2018 7/2/2018   Total Score - - - "   Total Score MyChart - 2 (Minimal depression) -   Total Score 2 2 1       MICHAEL-7 SCORE 7/20/2017 1/12/2018 7/2/2018   Total Score - - -   Total Score - 1 (minimal anxiety) -   Total Score 4 1 3         Lab Results   Component Value Date    A1C 5.8 04/23/2018    A1C 5.6 09/18/2017    A1C 5.5 02/27/2017    A1C 5.6 08/22/2016    A1C 7.6 04/29/2016             ASSESSMENT/PLAN:     Fibromyalgia  Stable; has been doing well. This is despite stressors. She is trying to work on healthy habits.  Did complete [place] paperwork.    Type 2 diabetes mellitus without complication, without long-term current use of insulin (H)  Discussion around foot care. Her control has been good.   - FOOT EXAM    Major depression in complete remission (H)  Doing well despite stressors. No change in medication.    Irene Castellanos MD, MD  [facility]

## 2018-08-12 ENCOUNTER — OFFICE VISIT (OUTPATIENT)
Dept: URGENT CARE | Facility: URGENT CARE | Age: 48
End: 2018-08-12
Payer: COMMERCIAL

## 2018-08-12 VITALS
DIASTOLIC BLOOD PRESSURE: 82 MMHG | TEMPERATURE: 97.9 F | WEIGHT: 236.7 LBS | SYSTOLIC BLOOD PRESSURE: 126 MMHG | BODY MASS INDEX: 40 KG/M2 | OXYGEN SATURATION: 97 % | HEART RATE: 88 BPM

## 2018-08-12 DIAGNOSIS — R07.0 THROAT PAIN: Primary | ICD-10-CM

## 2018-08-12 PROCEDURE — 99213 OFFICE O/P EST LOW 20 MIN: CPT | Performed by: FAMILY MEDICINE

## 2018-08-12 RX ORDER — CLINDAMYCIN HCL 300 MG
300 CAPSULE ORAL 4 TIMES DAILY
Qty: 40 CAPSULE | Refills: 0 | Status: SHIPPED | OUTPATIENT
Start: 2018-08-12 | End: 2018-11-26

## 2018-08-12 NOTE — PROGRESS NOTES
SUBJECTIVE: 48 year old female with sore throat, myalgias, swollen glands, headache and fever for 7 days. No history of rheumatic fever. Other symptoms: Feel something in her throat voice is changed become more husky.  In addition she has had a history of tonsillar abscess several weeks ago she was treated with antibiotic and patient feels that the same symptoms are back they are not at the intensity that they previously were but seems to be getting increasingly getting worse on a daily basis .  .    OBJECTIVE:   Vitals as noted above.  Appears moderate distress.  Ears: normal  Oropharynx: moderate erythema  Neck: supple and moderate nontender anterior cervical nodes  Lungs: chest clear to IPPA and clear to IPPA  Rapid Strep test is not obtained today.    ASSESSMENT: Pharyngitis; continue the medication of clindamycin.    Still with some erythema in the back of her throat and she says that this was better but now appears to be getting worse we will treat this as if this is a continuation of the previous infection.    PLAN: Per orders. Gargle, use acetaminophen or other OTC analgesic, and take Rx fully as prescribed. Call if other family members develop similar symptoms. See prn.

## 2018-08-16 ENCOUNTER — TRANSFERRED RECORDS (OUTPATIENT)
Dept: HEALTH INFORMATION MANAGEMENT | Facility: CLINIC | Age: 48
End: 2018-08-16

## 2018-08-16 ENCOUNTER — OFFICE VISIT (OUTPATIENT)
Dept: FAMILY MEDICINE | Facility: CLINIC | Age: 48
End: 2018-08-16
Payer: COMMERCIAL

## 2018-08-16 VITALS
SYSTOLIC BLOOD PRESSURE: 139 MMHG | OXYGEN SATURATION: 98 % | WEIGHT: 236 LBS | RESPIRATION RATE: 16 BRPM | BODY MASS INDEX: 39.32 KG/M2 | HEART RATE: 92 BPM | DIASTOLIC BLOOD PRESSURE: 84 MMHG | TEMPERATURE: 99.8 F | HEIGHT: 65 IN

## 2018-08-16 DIAGNOSIS — R03.0 ELEVATED BLOOD PRESSURE READING WITHOUT DIAGNOSIS OF HYPERTENSION: ICD-10-CM

## 2018-08-16 DIAGNOSIS — R09.89 TONSIL SYMPTOM: Primary | ICD-10-CM

## 2018-08-16 DIAGNOSIS — E11.9 TYPE 2 DIABETES MELLITUS WITHOUT COMPLICATION, WITHOUT LONG-TERM CURRENT USE OF INSULIN (H): ICD-10-CM

## 2018-08-16 PROCEDURE — 99214 OFFICE O/P EST MOD 30 MIN: CPT | Performed by: FAMILY MEDICINE

## 2018-08-16 NOTE — MR AVS SNAPSHOT
After Visit Summary   8/16/2018    Kaylee Lopez    MRN: [id number]           Patient Information     Date Of Birth          1970        Visit Information        Provider Department      8/16/2018 8:50 AM Irene Castellanos MD [facility]        Today's Diagnoses     Tonsil symptom    -  1      Care Instructions        Get your bp checked several times and bring in numbers.    I do know the  pharmacies do reliable measurements and they do put it in your chart.  You could just stop in at one; most do it.              Follow-ups after your visit        Additional Services     OTOLARYNGOLOGY REFERRAL       Your provider has referred you to: [facility] [phone]   [email or web address]    Please be aware that coverage of these services is subject to the terms and limitations of your health insurance plan.  Call member services at your health plan with any benefit or coverage questions.      Please bring the following with you to your appointment:    (1) Any X-Rays, CTs or MRIs which have been performed.  Contact the facility where they were done to arrange for  prior to your scheduled appointment.   (2) List of current medications  (3) This referral request   (4) Any documents/labs given to you for this referral                  Follow-up notes from your care team     Return in about 2 months (around 10/16/2018).      Who to contact     If you have questions or need follow up information about today's clinic visit or your schedule please contact [facility] directly at [phone].  Normal or non-critical lab and imaging results will be communicated to you by MyChart, letter or phone within 4 business days after the clinic has received the results. If you do not hear from us within 7 days, please contact the clinic through MyChart or phone. If you have a critical or  "abnormal lab result, we will notify you by phone as soon as possible.  Submit refill requests through [employer] or call your pharmacy and they will forward the refill request to us. Please allow 3 business days for your refill to be completed.          Additional Information About Your Visit        [employer] Information     [employer] gives you secure access to your electronic health record. If you see a primary care provider, you can also send messages to your care team and make appointments. If you have questions, please call your primary care clinic.  If you do not have a primary care provider, please call [phone] and they will assist you.        Care EveryWhere ID     This is your Care EveryWhere ID. This could be used by other organizations to access your [facility] medical records  [id number]        Your Vitals Were     Pulse Temperature Respirations Height Last Period Pulse Oximetry    92 99.8  F (37.7  C) (Oral) 16 5' 4.5\" (1.638 m) 07/30/2018 (Approximate) 98%    BMI (Body Mass Index)                   39.88 kg/m2            Blood Pressure from Last 3 Encounters:   08/16/18 139/84   08/12/18 126/82   07/24/18 122/76    Weight from Last 3 Encounters:   08/16/18 236 lb (107 kg)   08/12/18 236 lb 11.2 oz (107.4 kg)   07/24/18 238 lb 14.4 oz (108.4 kg)              We Performed the Following     OTOLARYNGOLOGY REFERRAL        Primary Care Provider Office Phone # Fax #    Irene Castellanos [phone]       [address] [facility] [address]        Equal Access to Services     [facility]: Hadii aad ku hadasho Soomaali, waaxda luqadaha, qaybta kaalmada adeegyabonnie, gloria padron . So [facility] [phone].    ATENCIÓN: Si habla español, tiene a garner disposición servicios gratuitos de asistencia lingüística. Llame al [phone].    We comply with applicable federal civil rights laws and [place] laws. We do not discriminate on the basis of race, color, national origin, age, " disability, sex, sexual orientation, or gender identity.            Thank you!     Thank you for choosing [facility]  for your care. Our goal is always to provide you with excellent care. Hearing back from our patients is one way we can continue to improve our services. Please take a few minutes to complete the written survey that you may receive in the mail after your visit with us. Thank you!             Your Updated Medication List - Protect others around you: Learn how to safely use, store and throw away your medicines at www.disposemymeds.org.          This list is accurate as of 8/16/18  9:46 AM.  Always use your most recent med list.                   Brand Name Dispense Instructions for use Diagnosis    ACE NOT PRESCRIBED (INTENTIONAL)     0 each    1 each ACE Inhibitor not prescribed due to Other: not indicated    Type 2 diabetes, HbA1c goal < 7% (H)       albuterol 108 (90 Base) MCG/ACT inhaler    PROAIR HFA/PROVENTIL HFA/VENTOLIN HFA    1 Inhaler    Inhale 2 puffs into the lungs every 6 hours as needed for shortness of breath / dyspnea or wheezing    Cough       atorvastatin 10 MG tablet    LIPITOR    90 tablet    TAKE 1 TABLET BY MOUTH EVERY DAY    Hyperlipidemia LDL goal <100       RAJNI CONTOUR NEXT test strip   Generic drug:  blood glucose monitoring     100 strip    USE TO TEST BLOOD SUGARS 3 TIMES DAILY OR AS DIRECTED    Type 2 diabetes mellitus without complication, without long-term current use of insulin (H)       blood glucose calibration solution    no brand specified    1 each    Use to calibrate blood glucose monitor as directed.    Type 2 diabetes mellitus without complication, without long-term current use of insulin (H)       blood glucose lancing device    no brand specified    100 each    Use to test blood sugars 3 times daily or as directed.    Type 2 diabetes mellitus without complication, without long-term current use of insulin (H)       * Blood Glucose Monitoring  Suppl Misc     1 each    In vitro    Type 2 diabetes, HbA1c goal < 7% (H)       * blood glucose monitoring meter device kit    no brand specified    1 kit    Use to test blood sugar 3 times daily or as directed.    Type 2 diabetes mellitus without complication, without long-term current use of insulin (H)       cetirizine 10 MG tablet    zyrTEC     Take 10 mg by mouth daily as needed for allergies        citalopram 40 MG tablet    celeXA    90 tablet    TAKE 1 TABLET BY MOUTH DAILY    Major depression in complete remission (H)       clindamycin 300 MG capsule    CLEOCIN    40 capsule    Take 1 capsule (300 mg) by mouth 4 times daily    Throat pain       cyclobenzaprine 10 MG tablet    FLEXERIL    90 tablet    TAKE 1 TABLET BY MOUTH 3 TIMES DAILY AS NEEDED FOR MUSCLE SPASMS    Fibromyalgia       ibuprofen 800 MG tablet    ADVIL/MOTRIN    90 tablet    Take 1 tablet (800 mg) by mouth every 8 hours as needed for moderate pain    Status post hysteroscopic polypectomy       insulin pen needle 31G X 5 MM    B-D U/F    100 each    Use 1 pen needles daily or as directed.    Type 2 diabetes mellitus without complication (H)       liraglutide 18 MG/3ML soln    VICTOZA PEN    18 mL    Inject 1.2 mg Subcutaneous daily    Type 2 diabetes mellitus without complication, without long-term current use of insulin (H)       metFORMIN 500 MG 24 hr tablet    GLUCOPHAGE-XR    360 tablet    TAKE 4 TABLETS BY MOUTH DAILY WITH BREAKFAST    Type 2 diabetes mellitus without complication, without long-term current use of insulin (H)       norethindrone-ethinyl estradiol 1-20 MG-MCG per tablet    MICROGESTIN 1/20    90 tablet    Take 1 tablet by mouth daily    Encounter for initial prescription of contraceptive pills, Excessive or frequent menstruation       traMADol 50 MG tablet    ULTRAM    60 tablet    TAKE 1-2 TABS BY MOUTH EVERY 8 HOURS AS NEEDED FOR MODERATE PAIN. MAX 8TABS/DAY & 60 TABS/MONTH    Fibromyalgia       traZODone 50 MG tablet     DESYREL    45 tablet    TAKE 1.5 TABLETS (75 MG) BY MOUTH NIGHTLY AS NEEDED FOR SLEEP    Fibromyalgia       * Notice:  This list has 2 medication(s) that are the same as other medications prescribed for you. Read the directions carefully, and ask your doctor or other care provider to review them with you.

## 2018-08-16 NOTE — PROGRESS NOTES
SUBJECTIVE:   Kaylee Lopez is a 48 year old female who presents to clinic today for the following health issues:      ED/UC Followup:    Facility:  [facility]  Date of visit: 08/12/2018  Reason for visit:  myalgias, swollen glands, headache and fever   Current Status: effecting the voice, the abscess has gotten bigger.              Problem list and histories reviewed & adjusted, as indicated.  Additional history:     See under ROS     Patient Active Problem List   Diagnosis     Fibromyalgia     Non-rheumatic tricuspid valve insufficiency     Papanicolaou smear of cervix with atypical squamous cells cannot exclude high grade squamous intraepithelial lesion (ASC-H)     Major depression in complete remission (H)     Narcolepsy     Hyperlipidemia LDL goal <100     Health Care Home     Reflex sympathetic dystrophy     Contraception     Elevated BMI     Chronic pain-Fibromyalgia     Type 2 diabetes mellitus without complication, without long-term current use of insulin (H)     Migraine with aura and without status migrainosus, not intractable     Elevated blood pressure reading without diagnosis of hypertension     Pulmonary hypertension       Current Outpatient Prescriptions   Medication Sig Dispense Refill     ACE NOT PRESCRIBED, INTENTIONAL, 1 each ACE Inhibitor not prescribed due to Other: not indicated 0 each 0     albuterol (PROAIR HFA/PROVENTIL HFA/VENTOLIN HFA) 108 (90 Base) MCG/ACT Inhaler Inhale 2 puffs into the lungs every 6 hours as needed for shortness of breath / dyspnea or wheezing 1 Inhaler 0     atorvastatin (LIPITOR) 10 MG tablet TAKE 1 TABLET BY MOUTH EVERY DAY 90 tablet 3     RAJNI CONTOUR NEXT test strip USE TO TEST BLOOD SUGARS 3 TIMES DAILY OR AS DIRECTED 100 strip 0     blood glucose (NO BRAND SPECIFIED) lancing device Use to test blood sugars 3 times daily or as directed. 100 each 5     blood glucose calibration (NO BRAND SPECIFIED) solution Use to calibrate blood  glucose monitor as directed. 1 each 0     blood glucose monitoring (NO BRAND SPECIFIED) meter device kit Use to test blood sugar 3 times daily or as directed. 1 kit 0     Blood Glucose Monitoring Suppl MISC In vitro 1 each 3     cetirizine (ZYRTEC) 10 MG tablet Take 10 mg by mouth daily as needed for allergies       citalopram (CELEXA) 40 MG tablet TAKE 1 TABLET BY MOUTH DAILY 90 tablet 1     clindamycin (CLEOCIN) 300 MG capsule Take 1 capsule (300 mg) by mouth 4 times daily 40 capsule 0     cyclobenzaprine (FLEXERIL) 10 MG tablet TAKE 1 TABLET BY MOUTH 3 TIMES DAILY AS NEEDED FOR MUSCLE SPASMS 90 tablet 1     ibuprofen (ADVIL/MOTRIN) 800 MG tablet Take 1 tablet (800 mg) by mouth every 8 hours as needed for moderate pain 90 tablet 1     insulin pen needle (B-D U/F) 31G X 5 MM Use 1 pen needles daily or as directed. 100 each 3     liraglutide (VICTOZA PEN) 18 MG/3ML soln Inject 1.2 mg Subcutaneous daily 18 mL 0     metFORMIN (GLUCOPHAGE-XR) 500 MG 24 hr tablet TAKE 4 TABLETS BY MOUTH DAILY WITH BREAKFAST 360 tablet 0     norethindrone-ethinyl estradiol (MICROGESTIN 1/20) 1-20 MG-MCG per tablet Take 1 tablet by mouth daily 90 tablet 3     traMADol (ULTRAM) 50 MG tablet TAKE 1-2 TABS BY MOUTH EVERY 8 HOURS AS NEEDED FOR MODERATE PAIN. MAX 8TABS/DAY & 60 TABS/MONTH 60 tablet 0     traZODone (DESYREL) 50 MG tablet TAKE 1.5 TABLETS (75 MG) BY MOUTH NIGHTLY AS NEEDED FOR SLEEP 45 tablet 11         Reviewed and updated as needed this visit by clinical staff       Reviewed and updated as needed this visit by Provider         ROS:  CONSTITUTIONAL:NEGATIVE for fever, chills, change in weight x believes fever with today's reading.  ENT/MOUTH: see below.  Does have mild congestion currently that she believes to be hayfever.   RESP:NEGATIVE for significant cough or SOB    Had an abscess previously.   Went away within three days of clindamycin.  Was gone.    Last week, started to feel like something in throat; like popcorn  "kernel.  No pain.  Voice froggy.  Did go to  and was placed on [place] again (Sunday).  Was told might recommend a CT.    On since Sunday; seems bigger now.   Has fever; low grade. Notes 99 is high for her.     Taking probiotic now as well.  Has helped with diarrhea.    Notes recurrent tonsillitis when young. But then seemed to get better.    OBJECTIVE:     /82 (BP Location: Right arm, Cuff Size: Adult Large)  Pulse 92  Temp 99.8  F (37.7  C) (Oral)  Resp 16  Ht 5' 4.5\" (1.638 m)  Wt 236 lb (107 kg)  LMP 07/30/2018 (Approximate)  SpO2 98%  BMI 39.88 kg/m2  Body mass index is 39.88 kg/(m^2).     Small white lump right upper aspect tonsil.  GENERAL APPEARANCE: alert and no distress  HENT: there is a white spot, ~ 1/2 cm diameter on the right upper part of that tonsil. Well circumscribed.   RESP: lungs clear to auscultation - no rales, rhonchi or wheezes  CV: regular rates and rhythm  PSYCH: mentation appears normal and affect normal/bright    Lab Results   Component Value Date    A1C 5.8 04/23/2018    A1C 5.6 09/18/2017    A1C 5.5 02/27/2017    A1C 5.6 08/22/2016    A1C 7.6 04/29/2016         ASSESSMENT/PLAN:     Tonsil symptom  White spot. Not so sure it is an abscess; not what I know as a tonsillar abscess. I wonder about debris in the crypt, though it looks like it is firmly attached on the surface. Discussed gargling. Referral to ENT  - OTOLARYNGOLOGY REFERRAL    Elevated blood pressure reading without diagnosis of hypertension  Continue to monitor. Will be coming in in the next few months and we can review.  Discussed low threshhold for starting ACEi.    Type 2 diabetes mellitus without complication, without long-term current use of insulin (H)  As above; will return in the next couple months.       Patient Instructions       Get your bp checked several times and bring in numbers.    I do know the  pharmacies do reliable measurements and they do put it in your chart.  You could just stop in at " one; most do it.          Irene Castellanos MD, MD  [facility]

## 2018-08-16 NOTE — PATIENT INSTRUCTIONS
Get your bp checked several times and bring in numbers.    I do know the  pharmacies do reliable measurements and they do put it in your chart.  You could just stop in at one; most do it.

## 2018-09-05 ENCOUNTER — MYC MEDICAL ADVICE (OUTPATIENT)
Dept: FAMILY MEDICINE | Facility: CLINIC | Age: 48
End: 2018-09-05

## 2018-09-05 DIAGNOSIS — M79.7 FIBROMYALGIA: ICD-10-CM

## 2018-09-05 NOTE — TELEPHONE ENCOUNTER
See letter of 2/22/17.      Copy and pasted the letter into a new letter.  Please edit as you wish.

## 2018-09-05 NOTE — LETTER
[facility]  [address] [facility] [address] [phone]          September 5, 2018        Re:  Kaylee Lopez                                                                                                                [address] [address] [address]            To Whom it May Concern,    Kaylee has fibromyalgia.  I am requesting that she be able to work from home during flare ups and when there is inclement weather.   It is more difficult for her to drive for a prolonged time in difficult traffic which can cause elevated pain and muscle spasms, leading to a flare of her fibromyalgia.    Sincerely,         Irene Castellanos MD

## 2018-09-06 RX ORDER — TRAZODONE HYDROCHLORIDE 50 MG/1
TABLET, FILM COATED ORAL
Qty: 45 TABLET | Refills: 1 | OUTPATIENT
Start: 2018-09-06

## 2018-09-06 NOTE — TELEPHONE ENCOUNTER
Pt has been notified letter is ready to be picked up at the .  Placed letter up at the .  Maggie Beasley CMA

## 2018-09-06 NOTE — TELEPHONE ENCOUNTER
"Requested Prescriptions   Pending Prescriptions Disp Refills     traZODone (DESYREL) 50 MG tablet [Pharmacy Med Name: TRAZODONE 50 MG TABLET]  Last Written Prescription Date:  7/10/18  Last Fill Quantity: 45,  # refills: 11   Last office visit: 8/16/2018 with prescribing provider:  rIene Castellanos MD    Future Office Visit:     45 tablet 1     Sig: TAKE 1.5 TABLETS (75 MG) BY MOUTH AT BEDTIME AS NEEDED FOR SLEEP    Serotonin Modulators Passed    9/5/2018  2:03 AM       Passed - Recent (12 mo) or future (30 days) visit within the authorizing provider's specialty    Patient had office visit in the last 12 months or has a visit in the next 30 days with authorizing provider or within the authorizing provider's specialty.  See \"Patient Info\" tab in inbasket, or \"Choose Columns\" in Meds & Orders section of the refill encounter.           Passed - Patient is age 18 or older       Passed - No active pregnancy on record       Passed - No positive pregnancy test in past 12 months          "

## 2018-09-10 ENCOUNTER — MYC REFILL (OUTPATIENT)
Dept: FAMILY MEDICINE | Facility: CLINIC | Age: 48
End: 2018-09-10

## 2018-09-10 DIAGNOSIS — G89.29 OTHER CHRONIC PAIN: ICD-10-CM

## 2018-09-10 DIAGNOSIS — M79.7 FIBROMYALGIA: ICD-10-CM

## 2018-09-10 RX ORDER — TRAZODONE HYDROCHLORIDE 50 MG/1
TABLET, FILM COATED ORAL
Qty: 45 TABLET | Refills: 11 | Status: CANCELLED | OUTPATIENT
Start: 2018-09-10

## 2018-09-12 DIAGNOSIS — M79.7 FIBROMYALGIA: ICD-10-CM

## 2018-09-12 RX ORDER — TRAMADOL HYDROCHLORIDE 50 MG/1
TABLET ORAL
Qty: 60 TABLET | Refills: 0 | Status: SHIPPED | OUTPATIENT
Start: 2018-09-12 | End: 2018-10-30

## 2018-09-12 NOTE — TELEPHONE ENCOUNTER
Message from MyChart:  Original authorizing provider: Irene Castellanos MD, MD Kaylee Lopez would like a refill of the following medications:  traMADol (ULTRAM) 50 MG tablet [Irene Castellanos MD, MD]  traZODone (DESYREL) 50 MG tablet [Irene Castellanos MD, MD]    Preferred pharmacy: [address]    Comment:

## 2018-09-12 NOTE — TELEPHONE ENCOUNTER
Tramadol  LRF 4/12/18, dispense 60 with no refills  LOV 8/16/18    RX [place] reviewed:  reviewed- no concerns    [place]:  [email or web address]      Routing refill request to provider for review/approval because:  Drug not on the [facility] refill protocol

## 2018-09-13 DIAGNOSIS — M79.7 FIBROMYALGIA: ICD-10-CM

## 2018-09-13 NOTE — TELEPHONE ENCOUNTER
"Requested Prescriptions   Pending Prescriptions Disp Refills     traZODone (DESYREL) 50 MG tablet  Last Written Prescription Date:  7/10/18  Last Fill Quantity: 45,  # refills: 11   Last office visit: 8/16/2018 with prescribing provider:  Irene Castellanos MD    Future Office Visit:     45 tablet 11    Serotonin Modulators Passed    9/13/2018 11:52 AM       Passed - Recent (12 mo) or future (30 days) visit within the authorizing provider's specialty    Patient had office visit in the last 12 months or has a visit in the next 30 days with authorizing provider or within the authorizing provider's specialty.  See \"Patient Info\" tab in inbasket, or \"Choose Columns\" in Meds & Orders section of the refill encounter.           Passed - Patient is age 18 or older       Passed - No active pregnancy on record       Passed - No positive pregnancy test in past 12 months          "

## 2018-09-13 NOTE — TELEPHONE ENCOUNTER
"Requested Prescriptions   Pending Prescriptions Disp Refills     traMADol (ULTRAM) 50 MG tablet [Pharmacy Med Name: TRAMADOL HCL 50 MG TABLET]  Last Written Prescription Date:  9/12/18  Last Fill Quantity: 60,  # refills: 0   Last office visit: 8/16/2018 with prescribing provider:  Irene Castellanos MD    Future Office Visit:     60 tablet 0     Sig: TAKE 1-2 TABS BY MOUTH EVERY 8 HOURS AS NEEDED FOR MODERATE PAIN. MAX 8TBS/DAY AND 60TBS/MONTH    There is no refill protocol information for this order        traZODone (DESYREL) 50 MG tablet [Pharmacy Med Name: TRAZODONE 50 MG TABLET]  Last Written Prescription Date:  7/10/18  Last Fill Quantity: 45,  # refills: 11   Last office visit: 8/16/2018 with prescribing provider:  Irene Castellanos MD    Future Office Visit:     45 tablet 1     Sig: TAKE 1.5 TABLETS (75 MG) BY MOUTH AT BEDTIME AS NEEDED FOR SLEEP    Serotonin Modulators Passed    9/12/2018 10:38 PM       Passed - Recent (12 mo) or future (30 days) visit within the authorizing provider's specialty    Patient had office visit in the last 12 months or has a visit in the next 30 days with authorizing provider or within the authorizing provider's specialty.  See \"Patient Info\" tab in inbasket, or \"Choose Columns\" in Meds & Orders section of the refill encounter.           Passed - Patient is age 18 or older       Passed - No active pregnancy on record       Passed - No positive pregnancy test in past 12 months          "

## 2018-09-14 ENCOUNTER — MYC REFILL (OUTPATIENT)
Dept: FAMILY MEDICINE | Facility: CLINIC | Age: 48
End: 2018-09-14

## 2018-09-14 DIAGNOSIS — M79.7 FIBROMYALGIA: ICD-10-CM

## 2018-09-14 PROBLEM — R87.618 UNEXPLAINED ENDOMETRIAL CELLS ON CERVICAL PAP SMEAR: Status: ACTIVE | Noted: 2018-04-23

## 2018-09-14 RX ORDER — TRAZODONE HYDROCHLORIDE 50 MG/1
TABLET, FILM COATED ORAL
Qty: 45 TABLET | Refills: 11 | Status: CANCELLED | OUTPATIENT
Start: 2018-09-14

## 2018-09-14 NOTE — TELEPHONE ENCOUNTER
Message from [email or web address]:  Original authorizing provider: Irene Castellanos MD, MD Kaylee Lopez would like a refill of the following medications:  traZODone (DESYREL) 50 MG tablet [Irene Castellanos MD, MD]    Preferred pharmacy: [facility] [address] [facility] [address] [facility]    Comment:

## 2018-09-17 DIAGNOSIS — M79.7 FIBROMYALGIA: ICD-10-CM

## 2018-09-17 RX ORDER — TRAMADOL HYDROCHLORIDE 50 MG/1
TABLET ORAL
Qty: 60 TABLET | Refills: 0 | OUTPATIENT
Start: 2018-09-17

## 2018-09-17 RX ORDER — TRAZODONE HYDROCHLORIDE 50 MG/1
TABLET, FILM COATED ORAL
Qty: 45 TABLET | Refills: 1 | OUTPATIENT
Start: 2018-09-17

## 2018-09-17 RX ORDER — TRAZODONE HYDROCHLORIDE 50 MG/1
TABLET, FILM COATED ORAL
Qty: 45 TABLET | Refills: 11 | OUTPATIENT
Start: 2018-09-17

## 2018-09-17 NOTE — TELEPHONE ENCOUNTER
Refills send 7/10/18 for a year.  Called the pharmacy and spoke to Giovanna.  She said on there end it looked like it only had 1 refill.      Gave verbal order to the pharmacist, Abdullahi.

## 2018-09-17 NOTE — TELEPHONE ENCOUNTER
"Requested Prescriptions   Pending Prescriptions Disp Refills     traZODone (DESYREL) 50 MG tablet [Pharmacy Med Name: TRAZODONE 50 MG TABLET]  Last Written Prescription Date:  7/10/18  Last Fill Quantity: 45,  # refills: 11   Last office visit: 8/16/2018 with prescribing provider:  Irene Castellanos MD   Future Office Visit:     45 tablet 1     Sig: TAKE 1.5 TABLETS (75 MG) BY MOUTH AT BEDTIME AS NEEDED FOR SLEEP    Serotonin Modulators Passed    9/17/2018 10:33 AM       Passed - Recent (12 mo) or future (30 days) visit within the authorizing provider's specialty    Patient had office visit in the last 12 months or has a visit in the next 30 days with authorizing provider or within the authorizing provider's specialty.  See \"Patient Info\" tab in inbasket, or \"Choose Columns\" in Meds & Orders section of the refill encounter.           Passed - Patient is age 18 or older       Passed - No active pregnancy on record       Passed - No positive pregnancy test in past 12 months          "

## 2018-09-17 NOTE — TELEPHONE ENCOUNTER
Pt should have a refill on both of these at the same pharmacy.      Tramadol - lrf was 9/12/18, only gets max of 60 a month    Trazodone - lrf was 7/10/18, dispense 45 with 11 refills     Denied to the pharmacy with this note.

## 2018-09-19 RX ORDER — TRAZODONE HYDROCHLORIDE 50 MG/1
TABLET, FILM COATED ORAL
Qty: 45 TABLET | Refills: 1
Start: 2018-09-19

## 2018-09-20 DIAGNOSIS — E78.5 HYPERLIPIDEMIA LDL GOAL <100: ICD-10-CM

## 2018-09-20 DIAGNOSIS — Z79.4 TYPE 2 DIABETES MELLITUS WITHOUT COMPLICATION, WITH LONG-TERM CURRENT USE OF INSULIN (H): ICD-10-CM

## 2018-09-20 DIAGNOSIS — E11.9 TYPE 2 DIABETES MELLITUS WITHOUT COMPLICATION, WITH LONG-TERM CURRENT USE OF INSULIN (H): ICD-10-CM

## 2018-09-20 RX ORDER — ATORVASTATIN CALCIUM 10 MG/1
TABLET, FILM COATED ORAL
Qty: 90 TABLET | Refills: 1 | Status: SHIPPED | OUTPATIENT
Start: 2018-09-20 | End: 2019-03-12

## 2018-09-20 RX ORDER — FLURBIPROFEN SODIUM 0.3 MG/ML
SOLUTION/ DROPS OPHTHALMIC
Qty: 100 EACH | Refills: 3 | Status: SHIPPED | OUTPATIENT
Start: 2018-09-20 | End: 2019-05-24

## 2018-09-20 NOTE — TELEPHONE ENCOUNTER
Prescription approved per [facility] Refill Protocol.  Eneida Cain RN  Message handled by Nurse Triage.

## 2018-09-20 NOTE — TELEPHONE ENCOUNTER
"Requested Prescriptions   Pending Prescriptions Disp Refills     B-D U/F insulin pen needle [Pharmacy Med Name: BD UF MINI PEN NEEDLE [id number]]  3     Sig: USE 1 PEN NEEDLES DAILY OR AS DIRECTED.  Last Written Prescription Date:  10/3/17  Last Fill Quantity: 100,  # refills: 3   Last office visit: 8/16/2018 with prescribing provider:  Jasmin   Future Office Visit:        Diabetic Supplies Protocol Passed    9/20/2018  1:42 PM       Passed - Patient is 18 years of age or older       Passed - Recent (6 mo) or future (30 days) visit within the authorizing provider's specialty    Patient had office visit in the last 6 months or has a visit in the next 30 days with authorizing provider.  See \"Patient Info\" tab in inbasket, or \"Choose Columns\" in Meds & Orders section of the refill encounter.            Attempted to refill medication but the billing code is not specific enough.    Mary Kay Patel RN  EP Triage    "

## 2018-09-26 DIAGNOSIS — E11.9 TYPE 2 DIABETES MELLITUS WITHOUT COMPLICATION, WITHOUT LONG-TERM CURRENT USE OF INSULIN (H): ICD-10-CM

## 2018-09-26 NOTE — TELEPHONE ENCOUNTER
"Requested Prescriptions   Pending Prescriptions Disp Refills     VICTOZA PEN 18 MG/3ML soln [Pharmacy Med Name: VICTOZA 3-ANDREW 18 MG/3 ML PEN]  Last Written Prescription Date:  06/27/18  Last Fill Quantity: 18 mL,  # refills: 0   Last office visit: 8/16/2018 with prescribing provider:  Irene Castellanos MD    Future Office Visit:    0     Sig: INJECT 1.2 MG SUBCUTANEOUS DAILY    GLP-1 Agonists Protocol Passed    9/26/2018  4:11 AM       Passed - Blood pressure less than 140/90 in past 6 months    BP Readings from Last 3 Encounters:   08/16/18 139/84   08/12/18 126/82   07/24/18 122/76                Passed - LDL on file in past 12 months    Recent Labs   Lab Test  03/23/18   0831   LDL  46            Passed - Microalbumin on file in past 12 months    Recent Labs   Lab Test  07/20/17   1631   MICROL  26   UMALCR  8.81            Passed - HgbA1C in past 3 or 6 months    If HgbA1C is 8 or greater, it needs to be on file within the past 3 months.  If less than 8, must be on file within the past 6 months.     Recent Labs   Lab Test  04/23/18   1512   A1C  5.8*            Passed - Patient is age 18 or older       Passed - No active pregnancy on record       Passed - Normal serum creatinine on file in past 12 months    Recent Labs   Lab Test  07/02/18   0910   03/22/16   0924   CR  0.81   < >   --    CREAT   --    --   0.8    < > = values in this interval not displayed.            Passed - No positive pregnancy test in past 12 months       Passed - Recent (6 mo) or future (30 days) visit within the authorizing provider's specialty    Patient had office visit in the last 6 months or has a visit in the next 30 days with authorizing provider.  See \"Patient Info\" tab in inbasket, or \"Choose Columns\" in Meds & Orders section of the refill encounter.              "

## 2018-09-27 RX ORDER — LIRAGLUTIDE 6 MG/ML
INJECTION SUBCUTANEOUS
Qty: 18 ML | Refills: 4 | Status: SHIPPED | OUTPATIENT
Start: 2018-09-27 | End: 2019-04-05

## 2018-09-27 NOTE — TELEPHONE ENCOUNTER
Prescription approved per [facility] [place].    She Purdy RN

## 2018-10-11 ENCOUNTER — OFFICE VISIT (OUTPATIENT)
Dept: FAMILY MEDICINE | Facility: CLINIC | Age: 48
End: 2018-10-11
Payer: COMMERCIAL

## 2018-10-11 VITALS
OXYGEN SATURATION: 98 % | HEIGHT: 65 IN | WEIGHT: 238.8 LBS | SYSTOLIC BLOOD PRESSURE: 132 MMHG | TEMPERATURE: 99.1 F | BODY MASS INDEX: 39.79 KG/M2 | HEART RATE: 82 BPM | RESPIRATION RATE: 16 BRPM | DIASTOLIC BLOOD PRESSURE: 82 MMHG

## 2018-10-11 DIAGNOSIS — R61 NIGHT SWEATS: ICD-10-CM

## 2018-10-11 DIAGNOSIS — M79.7 FIBROMYALGIA: ICD-10-CM

## 2018-10-11 DIAGNOSIS — M62.838 MUSCLE SPASM: ICD-10-CM

## 2018-10-11 DIAGNOSIS — E11.9 TYPE 2 DIABETES MELLITUS WITHOUT COMPLICATION, WITHOUT LONG-TERM CURRENT USE OF INSULIN (H): Primary | ICD-10-CM

## 2018-10-11 DIAGNOSIS — E78.5 HYPERLIPIDEMIA LDL GOAL <100: ICD-10-CM

## 2018-10-11 DIAGNOSIS — R20.2 TINGLING IN EXTREMITIES: ICD-10-CM

## 2018-10-11 DIAGNOSIS — R20.2 TINGLING SENSATION IN FACE: ICD-10-CM

## 2018-10-11 LAB
ERYTHROCYTE [DISTWIDTH] IN BLOOD BY AUTOMATED COUNT: 13.3 % (ref 10–15)
HBA1C MFR BLD: 6.4 % (ref 0–5.6)
HCT VFR BLD AUTO: 37.4 % (ref 35–47)
HGB BLD-MCNC: 12 G/DL (ref 11.7–15.7)
MCH RBC QN AUTO: 28.8 PG (ref 26.5–33)
MCHC RBC AUTO-ENTMCNC: 32.1 G/DL (ref 31.5–36.5)
MCV RBC AUTO: 90 FL (ref 78–100)
PLATELET # BLD AUTO: 461 10E9/L (ref 150–450)
RBC # BLD AUTO: 4.17 10E12/L (ref 3.8–5.2)
VIT B12 SERPL-MCNC: 191 PG/ML (ref 193–986)
WBC # BLD AUTO: 8.4 10E9/L (ref 4–11)

## 2018-10-11 PROCEDURE — 82607 VITAMIN B-12: CPT | Performed by: FAMILY MEDICINE

## 2018-10-11 PROCEDURE — 83735 ASSAY OF MAGNESIUM: CPT | Performed by: FAMILY MEDICINE

## 2018-10-11 PROCEDURE — 99214 OFFICE O/P EST MOD 30 MIN: CPT | Performed by: FAMILY MEDICINE

## 2018-10-11 PROCEDURE — 82550 ASSAY OF CK (CPK): CPT | Performed by: FAMILY MEDICINE

## 2018-10-11 PROCEDURE — 85027 COMPLETE CBC AUTOMATED: CPT | Performed by: FAMILY MEDICINE

## 2018-10-11 PROCEDURE — 82043 UR ALBUMIN QUANTITATIVE: CPT | Performed by: FAMILY MEDICINE

## 2018-10-11 PROCEDURE — 80069 RENAL FUNCTION PANEL: CPT | Performed by: FAMILY MEDICINE

## 2018-10-11 PROCEDURE — 83036 HEMOGLOBIN GLYCOSYLATED A1C: CPT | Performed by: FAMILY MEDICINE

## 2018-10-11 PROCEDURE — 36415 COLL VENOUS BLD VENIPUNCTURE: CPT | Performed by: FAMILY MEDICINE

## 2018-10-11 NOTE — MR AVS SNAPSHOT
After Visit Summary   10/11/2018    Kaylee Lopez    MRN: [id number]           Patient Information     Date Of Birth          1970        Visit Information        Provider Department      10/11/2018 1:10 PM Irene Castellanos MD [facility]        Today's Diagnoses     Type 2 diabetes mellitus without complication, without long-term current use of insulin (H)    -  1    Muscle spasm        Tingling in extremities        Tingling sensation in face        Night sweats        Fibromyalgia           Follow-ups after your visit        Additional Services     NEUROLOGY ADULT REFERRAL       Your provider has referred you for the following:   Consult at [facility]: [facility] [phone]   [email or web address]    Please be aware that coverage of these services is subject to the terms and limitations of your health insurance plan.  Call member services at your health plan with any benefit or coverage questions.      Please bring the following with you to your appointment:    (1) Any X-Rays, CTs or MRIs which have been performed.  Contact the facility where they were done to arrange for  prior to your scheduled appointment.    (2) List of current medications  (3) This referral request   (4) Any documents/labs given to you for this referral                  Follow-up notes from your care team     Return in about 6 months (around 4/11/2019) for Diabetes Recheck; fasting lab prior..      Who to contact     If you have questions or need follow up information about today's clinic visit or your schedule please contact [facility] directly at [phone].  Normal or non-critical lab and imaging results will be communicated to you by MyChart, letter or phone within 4 business days after the clinic has received the results. If you do not hear from us within 7 days, please contact the clinic through MyChart or phone. If you have a critical or  "abnormal lab result, we will notify you by phone as soon as possible.  Submit refill requests through [employer] or call your pharmacy and they will forward the refill request to us. Please allow 3 business days for your refill to be completed.          Additional Information About Your Visit        [employer] Information     [employer] gives you secure access to your electronic health record. If you see a primary care provider, you can also send messages to your care team and make appointments. If you have questions, please call your primary care clinic.  If you do not have a primary care provider, please call [phone] and they will assist you.        Care EveryWhere ID     This is your Care EveryWhere ID. This could be used by other organizations to access your [facility] records  [id number]        Your Vitals Were     Pulse Temperature Respirations Height Pulse Oximetry BMI (Body Mass Index)    82 99.1  F (37.3  C) (Oral) 16 5' 4.5\" (1.638 m) 98% 40.36 kg/m2       Blood Pressure from Last 3 Encounters:   10/11/18 132/82   08/16/18 139/84   08/12/18 126/82    Weight from Last 3 Encounters:   10/11/18 238 lb 12.8 oz (108.3 kg)   08/16/18 236 lb (107 kg)   08/12/18 236 lb 11.2 oz (107.4 kg)              We Performed the Following     **A1C FUTURE anytime     Albumin Random Urine Quantitative with Creat Ratio     CBC with platelets     CK total     Magnesium     NEUROLOGY ADULT REFERRAL     Renal panel (Alb, BUN, Ca, Cl, CO2, Creat, Gluc, Phos, K, Na)     Vitamin B12        Primary Care Provider Office Phone # Fax #    Irene Castellanos [phone] [address]  [facility] [id number]        Equal Access to Services     [facility] CenterHELEN : Hadii aad aubrie machado Soania, waaxda luqadaha, qaybta kaalmada adeegyada, gloria godinez. So [facility] [phone].    ATENCIÓN: Si habla español, tiene a garner disposición servicios gratuitos de asistencia lingüística. Llame al " [phone].    We comply with applicable federal civil rights laws and [place] laws. We do not discriminate on the basis of race, color, national origin, age, disability, sex, sexual orientation, or gender identity.            Thank you!     Thank you for choosing [facility]  for your care. Our goal is always to provide you with excellent care. Hearing back from our patients is one way we can continue to improve our services. Please take a few minutes to complete the written survey that you may receive in the mail after your visit with us. Thank you!             Your Updated Medication List - Protect others around you: Learn how to safely use, store and throw away your medicines at www.disposemymeds.org.          This list is accurate as of 10/11/18  1:56 PM.  Always use your most recent med list.                   Brand Name Dispense Instructions for use Diagnosis    ACE NOT PRESCRIBED (INTENTIONAL)     0 each    1 each ACE Inhibitor not prescribed due to Other: not indicated    Type 2 diabetes, HbA1c goal < 7% (H)       albuterol 108 (90 Base) MCG/ACT inhaler    PROAIR HFA/PROVENTIL HFA/VENTOLIN HFA    1 Inhaler    Inhale 2 puffs into the lungs every 6 hours as needed for shortness of breath / dyspnea or wheezing    Cough       atorvastatin 10 MG tablet    LIPITOR    90 tablet    TAKE 1 TABLET BY MOUTH EVERY DAY    Hyperlipidemia LDL goal <100       B-D U/F 31G X 5 MM   Generic drug:  insulin pen needle     100 each    USE 1 PEN NEEDLES DAILY OR AS DIRECTED.    Type 2 diabetes mellitus without complication, with long-term current use of insulin (H)       RAJNI CONTOUR NEXT test strip   Generic drug:  blood glucose monitoring     100 strip    USE TO TEST BLOOD SUGARS 3 TIMES DAILY OR AS DIRECTED    Type 2 diabetes mellitus without complication, without long-term current use of insulin (H)       blood glucose calibration solution    no brand specified    1 each    Use to calibrate blood glucose  monitor as directed.    Type 2 diabetes mellitus without complication, without long-term current use of insulin (H)       blood glucose lancing device    no brand specified    100 each    Use to test blood sugars 3 times daily or as directed.    Type 2 diabetes mellitus without complication, without long-term current use of insulin (H)       * Blood Glucose Monitoring Suppl Misc     1 each    In vitro    Type 2 diabetes, HbA1c goal < 7% (H)       * blood glucose monitoring meter device kit    no brand specified    1 kit    Use to test blood sugar 3 times daily or as directed.    Type 2 diabetes mellitus without complication, without long-term current use of insulin (H)       cetirizine 10 MG tablet    zyrTEC     Take 10 mg by mouth daily as needed for allergies        citalopram 40 MG tablet    celeXA    90 tablet    TAKE 1 TABLET BY MOUTH DAILY    Major depression in complete remission (H)       clindamycin 300 MG capsule    CLEOCIN    40 capsule    Take 1 capsule (300 mg) by mouth 4 times daily    Throat pain       cyclobenzaprine 10 MG tablet    FLEXERIL    90 tablet    TAKE 1 TABLET BY MOUTH 3 TIMES DAILY AS NEEDED FOR MUSCLE SPASMS    Fibromyalgia       ibuprofen 800 MG tablet    ADVIL/MOTRIN    90 tablet    Take 1 tablet (800 mg) by mouth every 8 hours as needed for moderate pain    Status post hysteroscopic polypectomy       * liraglutide 18 MG/3ML soln    VICTOZA PEN    18 mL    Inject 1.2 mg Subcutaneous daily    Type 2 diabetes mellitus without complication, without long-term current use of insulin (H)       * VICTOZA PEN 18 MG/3ML soln   Generic drug:  liraglutide     18 mL    INJECT 1.2 MG SUBCUTANEOUS DAILY    Type 2 diabetes mellitus without complication, without long-term current use of insulin (H)       metFORMIN 500 MG 24 hr tablet    GLUCOPHAGE-XR    360 tablet    TAKE 4 TABLETS BY MOUTH DAILY WITH BREAKFAST    Type 2 diabetes mellitus without complication, without long-term current use of insulin  (H)       norethindrone-ethinyl estradiol 1-20 MG-MCG per tablet    MICROGESTIN 1/20    90 tablet    Take 1 tablet by mouth daily    Encounter for initial prescription of contraceptive pills, Excessive or frequent menstruation       traMADol 50 MG tablet    ULTRAM    60 tablet    Take 1-2 tabs by mouth every 8 hours as needed for moderate pain.  Max 8 tabs/day & 60 tabs month    Fibromyalgia       traZODone 50 MG tablet    DESYREL    45 tablet    TAKE 1.5 TABLETS (75 MG) BY MOUTH NIGHTLY AS NEEDED FOR SLEEP    Fibromyalgia       * Notice:  This list has 4 medication(s) that are the same as other medications prescribed for you. Read the directions carefully, and ask your doctor or other care provider to review them with you.

## 2018-10-11 NOTE — PROGRESS NOTES
SUBJECTIVE:   Kaylee Lopez is a 48 year old female who presents to clinic today for the following health issues:      Diabetes Follow-up      Patient is checking blood sugars: not at all    Diabetic concerns: None     Symptoms of hypoglycemia (low blood sugar): none     Paresthesias (numbness or burning in feet) or sores: No     Date of last diabetic eye exam: 05/01/2018    BP Readings from Last 2 Encounters:   08/16/18 139/84   08/12/18 126/82     Hemoglobin A1C (%)   Date Value   04/23/2018 5.8 (H)   09/18/2017 5.6     LDL Cholesterol Calculated (mg/dL)   Date Value   03/23/2018 46   09/18/2017 19       Diabetes Management Resources    Amount of exercise or physical activity: limited     Problems taking medications regularly: No    Medication side effects: none    Diet: regular (no restrictions)      Here to discuss tingling in the fingertips, and starts inbetween the eyes and goes up to the forehead. Started x 2 couple months. The forehead started the 09/29/2018.  Having muscle spasms in the arms, legs and buttocks which on the 09/29/2018 along with weakness.  Having extreme fatigue and excessive sweating at bedtime.        Problem list and histories reviewed & adjusted, as indicated.  Additional history:     See under ROS    Patient Active Problem List   Diagnosis     Fibromyalgia     Non-rheumatic tricuspid valve insufficiency     Papanicolaou smear of cervix with atypical squamous cells cannot exclude high grade squamous intraepithelial lesion (ASC-H)     Major depression in complete remission (H)     Narcolepsy     Hyperlipidemia LDL goal <100     Health Care Home     Reflex sympathetic dystrophy     Contraception     Elevated BMI     Chronic pain-Fibromyalgia     Type 2 diabetes mellitus without complication, without long-term current use of insulin (H)     Migraine with aura and without status migrainosus, not intractable     Elevated blood pressure reading without diagnosis of hypertension      Pulmonary hypertension (H)     Unexplained endometrial cells on cervical Pap smear       Current Outpatient Prescriptions   Medication Sig Dispense Refill     ACE NOT PRESCRIBED, INTENTIONAL, 1 each ACE Inhibitor not prescribed due to Other: not indicated 0 each 0     albuterol (PROAIR HFA/PROVENTIL HFA/VENTOLIN HFA) 108 (90 Base) MCG/ACT Inhaler Inhale 2 puffs into the lungs every 6 hours as needed for shortness of breath / dyspnea or wheezing 1 Inhaler 0     atorvastatin (LIPITOR) 10 MG tablet TAKE 1 TABLET BY MOUTH EVERY DAY 90 tablet 1     B-D U/F insulin pen needle USE 1 PEN NEEDLES DAILY OR AS DIRECTED. 100 each 3     RAJNI CONTOUR NEXT test strip USE TO TEST BLOOD SUGARS 3 TIMES DAILY OR AS DIRECTED 100 strip 0     blood glucose (NO BRAND SPECIFIED) lancing device Use to test blood sugars 3 times daily or as directed. 100 each 5     blood glucose calibration (NO BRAND SPECIFIED) solution Use to calibrate blood glucose monitor as directed. 1 each 0     blood glucose monitoring (NO BRAND SPECIFIED) meter device kit Use to test blood sugar 3 times daily or as directed. 1 kit 0     Blood Glucose Monitoring Suppl MISC In vitro 1 each 3     cetirizine (ZYRTEC) 10 MG tablet Take 10 mg by mouth daily as needed for allergies       citalopram (CELEXA) 40 MG tablet TAKE 1 TABLET BY MOUTH DAILY 90 tablet 1     clindamycin (CLEOCIN) 300 MG capsule Take 1 capsule (300 mg) by mouth 4 times daily 40 capsule 0     cyclobenzaprine (FLEXERIL) 10 MG tablet TAKE 1 TABLET BY MOUTH 3 TIMES DAILY AS NEEDED FOR MUSCLE SPASMS 90 tablet 1     ibuprofen (ADVIL/MOTRIN) 800 MG tablet Take 1 tablet (800 mg) by mouth every 8 hours as needed for moderate pain 90 tablet 1     liraglutide (VICTOZA PEN) 18 MG/3ML soln Inject 1.2 mg Subcutaneous daily 18 mL 0     metFORMIN (GLUCOPHAGE-XR) 500 MG 24 hr tablet TAKE 4 TABLETS BY MOUTH DAILY WITH BREAKFAST 360 tablet 0     norethindrone-ethinyl estradiol (MICROGESTIN 1/20) 1-20 MG-MCG per tablet  "Take 1 tablet by mouth daily 90 tablet 3     traMADol (ULTRAM) 50 MG tablet Take 1-2 tabs by mouth every 8 hours as needed for moderate pain.  Max 8 tabs/day & 60 tabs month 60 tablet 0     traZODone (DESYREL) 50 MG tablet TAKE 1.5 TABLETS (75 MG) BY MOUTH NIGHTLY AS NEEDED FOR SLEEP 45 tablet 11     VICTOZA PEN 18 MG/3ML soln INJECT 1.2 MG SUBCUTANEOUS DAILY 18 mL 4       Reviewed and updated as needed this visit by clinical staff       Reviewed and updated as needed this visit by Provider         ROS:  CONSTITUTIONAL:NEGATIVE for fever, chills, change in weight  RESP:NEGATIVE for significant cough or SOB  CV: NEGATIVE for chest pain, palpitations or peripheral edema  MUSCULOSKELETAL: see below  PSYCHIATRIC: NEGATIVE for changes in mood or affect    Has not been able to find monitor. So has not been checking sugars recently.    Last week and a half; has had problems with muscle spasms.  Had been to [place]. Was getting into a vehicle and got a horrible muscle spasm/cramp right calf. Sudden onset.     Next day, did get more.  All last week did as well; in arms, hands, back.    Also increased fatigue.   Horrible night sweats.     Tips of fingers will intermittently tingle in the last couple months.   Last week, more often. Also recently with similar sensation from nose going to forehead; briefly.  Feeling real weak.     Has missed some work recently; did some work from home.    Was doing heat and cold compress.  Drinking water.  Doing stretches.  Flexeril did not help.     Fibro flared. Increased use of Biofreeze and tramadol.  There was something she got from [employer] that helped a little. Did have magnesium in it.    Has improved since last week.    Admits to eating more sugary pops.         OBJECTIVE:     /82 (BP Location: Right arm, Cuff Size: Adult Large)  Pulse 82  Temp 99.1  F (37.3  C) (Oral)  Resp 16  Ht 5' 4.5\" (1.638 m)  Wt 238 lb 12.8 oz (108.3 kg)  SpO2 98%  BMI 40.36 kg/m2  Body mass index " is 40.36 kg/(m^2).  GENERAL APPEARANCE: alert and no distress  RESP: lungs clear to auscultation - no rales, rhonchi or wheezes  CV: regular rates and rhythm  MS: no edema.   PSYCH: mentation appears normal and affect normal/bright    Lab Results   Component Value Date    A1C 6.4 10/11/2018    A1C 5.8 04/23/2018    A1C 5.6 09/18/2017    A1C 5.5 02/27/2017    A1C 5.6 08/22/2016       Recent Labs   Lab Test  03/23/18   0831  09/18/17   0754   04/28/15   0758  04/28/14   0813   CHOL  127  107   < >  166  151   HDL  61  63   < >  56  42*   LDL  46  19   < >  82  81   TRIG  100  124   < >  142  139   CHOLHDLRATIO   --    --    --   3.0  3.6    < > = values in this interval not displayed.     TSH   Date Value Ref Range Status   04/23/2018 2.44 0.40 - 4.00 mU/L Final           ASSESSMENT/PLAN:       Type 2 diabetes mellitus without complication, without long-term current use of insulin (H)  Controlled.   - **A1C FUTURE anytime  - Albumin Random Urine Quantitative with Creat Ratio    Muscle spasm  Uncertain etiology. Will check the following. With the tingling, have referred to Neurology.   - Renal panel (Alb, BUN, Ca, Cl, CO2, Creat, Gluc, Phos, K, Na)  - Magnesium  - CK total  - NEUROLOGY ADULT REFERRAL    Tingling in extremities  As above; uncertain etiology.   - Vitamin B12  - NEUROLOGY ADULT REFERRAL    Tingling sensation in face  Uncertain etiology. As above.   - NEUROLOGY ADULT REFERRAL    Night sweats  Uncertain etiology.   - CBC with platelets  - NEUROLOGY ADULT REFERRAL    Fibromyalgia  She notes recent flare.       Irene Castellanos MD, MD  [facility]

## 2018-10-12 LAB
ALBUMIN SERPL-MCNC: 3.2 G/DL (ref 3.4–5)
ANION GAP SERPL CALCULATED.3IONS-SCNC: 9 MMOL/L (ref 3–14)
BUN SERPL-MCNC: 10 MG/DL (ref 7–30)
CALCIUM SERPL-MCNC: 8.7 MG/DL (ref 8.5–10.1)
CHLORIDE SERPL-SCNC: 100 MMOL/L (ref 94–109)
CK SERPL-CCNC: 82 U/L (ref 30–225)
CO2 SERPL-SCNC: 27 MMOL/L (ref 20–32)
CREAT SERPL-MCNC: 0.81 MG/DL (ref 0.52–1.04)
CREAT UR-MCNC: 39 MG/DL
GFR SERPL CREATININE-BSD FRML MDRD: 76 ML/MIN/1.7M2
GLUCOSE SERPL-MCNC: 114 MG/DL (ref 70–99)
MAGNESIUM SERPL-MCNC: 1.8 MG/DL (ref 1.6–2.3)
MICROALBUMIN UR-MCNC: <5 MG/L
MICROALBUMIN/CREAT UR: NORMAL MG/G CR (ref 0–25)
PHOSPHATE SERPL-MCNC: 3.5 MG/DL (ref 2.5–4.5)
POTASSIUM SERPL-SCNC: 4.1 MMOL/L (ref 3.4–5.3)
SODIUM SERPL-SCNC: 136 MMOL/L (ref 133–144)

## 2018-10-14 ENCOUNTER — TELEPHONE (OUTPATIENT)
Dept: FAMILY MEDICINE | Facility: CLINIC | Age: 48
End: 2018-10-14

## 2018-10-14 DIAGNOSIS — E53.8 VITAMIN B12 DEFICIENCY (NON ANEMIC): Primary | ICD-10-CM

## 2018-10-14 NOTE — TELEPHONE ENCOUNTER
Please call her regarding her B12 level. See if she would prefer to do shots initially; otherwise help her schedule a lab only for about a month.     I did send a [employer] message as well:          Your B12 level is low.    I would recommend that you take 1000 mg B12 daily. I believe this is over the counter, but I can send in a prescription in case it is covered.     I recommend that we recheck in about a month to see if this is improving. Sometimes people don't absorb it and we may have to consider shots.     The other alternative would be to get you boosted with shots and then see if you can do OK orally; let me know if you prefer that.    This might be the cause of some of the tingling.

## 2018-10-15 PROBLEM — D75.839 THROMBOCYTOSIS: Status: ACTIVE | Noted: 2018-10-15

## 2018-10-16 ENCOUNTER — TELEPHONE (OUTPATIENT)
Dept: FAMILY MEDICINE | Facility: CLINIC | Age: 48
End: 2018-10-16

## 2018-10-16 DIAGNOSIS — E53.8 VITAMIN B12 DEFICIENCY (NON ANEMIC): Primary | ICD-10-CM

## 2018-10-16 RX ORDER — CYANOCOBALAMIN (VITAMIN B-12) 1000 MCG
1000 TABLET ORAL DAILY
Qty: 100 TABLET | Refills: 1 | Status: SHIPPED | OUTPATIENT
Start: 2018-10-16 | End: 2019-04-25

## 2018-10-16 NOTE — TELEPHONE ENCOUNTER
Pharmacy wanting Vitamin B 12 ordered NOT extended release. Per pharm they cannot get extended release. Will route to Dr. Johnson dobson ordered 10/14/18.  Mabel SILVA RN

## 2018-10-16 NOTE — TELEPHONE ENCOUNTER
Pharmacy notes: Unable to locate extended release vitamin B12. Please send new Rx allowing non extended release.       Disp Refills Start End PARVEZ   Cyanocobalamin (B-12) 1000 MCG TBCR 100 tablet 1 10/14/2018  --   Sig - Route: Take 1,000 mcg by mouth daily - Oral

## 2018-10-26 ENCOUNTER — HOSPITAL ENCOUNTER (OUTPATIENT)
Dept: MAMMOGRAPHY | Facility: CLINIC | Age: 48
Discharge: HOME | End: 2018-10-26
Attending: FAMILY MEDICINE | Admitting: FAMILY MEDICINE
Payer: COMMERCIAL

## 2018-10-26 DIAGNOSIS — Z12.31 VISIT FOR SCREENING MAMMOGRAM: ICD-10-CM

## 2018-10-26 PROCEDURE — 77063 BREAST TOMOSYNTHESIS BI: CPT

## 2018-10-27 ENCOUNTER — TRANSFERRED RECORDS (OUTPATIENT)
Dept: HEALTH INFORMATION MANAGEMENT | Facility: CLINIC | Age: 48
End: 2018-10-27

## 2018-10-30 ENCOUNTER — MYC REFILL (OUTPATIENT)
Dept: FAMILY MEDICINE | Facility: CLINIC | Age: 48
End: 2018-10-30

## 2018-10-30 DIAGNOSIS — E11.9 TYPE 2 DIABETES MELLITUS WITHOUT COMPLICATION, WITHOUT LONG-TERM CURRENT USE OF INSULIN (H): ICD-10-CM

## 2018-10-30 DIAGNOSIS — M79.7 FIBROMYALGIA: ICD-10-CM

## 2018-10-30 DIAGNOSIS — G89.29 OTHER CHRONIC PAIN: ICD-10-CM

## 2018-10-30 RX ORDER — METFORMIN HCL 500 MG
2000 TABLET, EXTENDED RELEASE 24 HR ORAL
Qty: 360 TABLET | Refills: 1 | Status: SHIPPED | OUTPATIENT
Start: 2018-10-30 | End: 2019-01-29

## 2018-10-30 RX ORDER — CYCLOBENZAPRINE HCL 10 MG
TABLET ORAL
Qty: 90 TABLET | Refills: 1 | Status: SHIPPED | OUTPATIENT
Start: 2018-10-30 | End: 2019-03-25

## 2018-10-30 RX ORDER — TRAMADOL HYDROCHLORIDE 50 MG/1
TABLET ORAL
Qty: 60 TABLET | Refills: 0 | Status: SHIPPED | OUTPATIENT
Start: 2018-10-30 | End: 2019-01-20

## 2018-10-30 NOTE — TELEPHONE ENCOUNTER
Requested Prescriptions   Pending Prescriptions Disp Refills     metFORMIN (GLUCOPHAGE-XR) 500 MG 24 hr tablet [Pharmacy Med Name: METFORMIN  MG TABLET]  Last Written Prescription Date:  10/30/18  Last Fill Quantity: 360,  # refills: 1   Last office visit: 10/11/2018 with prescribing provider:  Irene Castellanos MD    Future Office Visit:   Next 5 appointments (look out 90 days)     Nov 27, 2018  3:00 PM CST   [facility]    [address] [address] [phone]                  360 tablet 0     Sig: TAKE 4 TABLETS BY MOUTH DAILY WITH BREAKFAST    Biguanide Agents Passed    10/30/2018 11:28 AM       Passed - Blood pressure less than 140/90 in past 6 months    BP Readings from Last 3 Encounters:   10/11/18 132/82   08/16/18 139/84   08/12/18 126/82                Passed - Patient has documented LDL within the past 12 mos.    Recent Labs   Lab Test  03/23/18   0831   LDL  46            Passed - Patient has had a Microalbumin in the past 15 mos.    Recent Labs   Lab Test  10/11/18   1309   MICROL  <5   UMALCR  Unable to calculate due to low value            Passed - Patient is age 10 or older       Passed - Patient has documented A1c within the specified period of time.    If HgbA1C is 8 or greater, it needs to be on file within the past 3 months.  If less than 8, must be on file within the past 6 months.     Recent Labs   Lab Test  10/11/18   1309   A1C  6.4*            Passed - Patient's CR is NOT>1.4 OR Patient's EGFR is NOT<45 within past 12 mos.    Recent Labs   Lab Test  10/11/18   1309   GFRESTIMATED  76   GFRESTBLACK  >90       Recent Labs   Lab Test  10/11/18   1309   CR  0.81            Passed - Patient does NOT have a diagnosis of CHF.       Passed - Patient is not pregnant       Passed - Patient has not had a positive pregnancy test within the past 12 mos.        Passed - Recent (6 mo) or future  "(30 days) visit within the authorizing provider's specialty    Patient had office visit in the last 6 months or has a visit in the next 30 days with authorizing provider or within the authorizing provider's specialty.  See \"Patient Info\" tab in inbasket, or \"Choose Columns\" in Meds & Orders section of the refill encounter.            cyclobenzaprine (FLEXERIL) 10 MG tablet [Pharmacy Med Name: CYCLOBENZAPRINE 10 MG TABLET]  Last Written Prescription Date:  4/23/18  Last Fill Quantity: 90,  # refills: 1   Last office visit: 10/11/2018 with prescribing provider:  Irene Castellanos MD    Future Office Visit:   Next 5 appointments (look out 90 days)     Nov 27, 2018  3:00 PM CST   Office Visit with Georgiana Pizarro DO   [facility] ([address] [address]   [phone]                  90 tablet 1     Sig: TAKE 1 TABLET BY MOUTH 3 TIMES DAILY AS NEEDED FOR MUSCLE SPASMS    There is no refill protocol information for this order        traMADol (ULTRAM) 50 MG tablet [Pharmacy Med Name: TRAMADOL HCL 50 MG TABLET]  Last Written Prescription Date:  9/12/18  Last Fill Quantity: 60,  # refills: 0   Last office visit: 10/11/2018 with prescribing provider:  Irene Castellanos MD    Future Office Visit:   Next 5 appointments (look out 90 days)     Nov 27, 2018  3:00 PM CST   Office Visit with Georgiana Pizarro DO   [facility] ([facility]    [address] [place] [address]   [phone]                  60 tablet 0     Sig: TAKE 1-2 TABLETS BY MOUTH EVERY 8 HOURS AS NEEDED FOR MODERATE PAIN--MAX 8TABS/DAY AND 60TABS/MONTH    There is no refill protocol information for this order          "

## 2018-10-30 NOTE — TELEPHONE ENCOUNTER
Flexeril  LRF 4/23/18, dispense 90 with 1 refill  LOV 10/11/18    Metformin  LRF 6/21/18    Tramadol  LRF 9/12/18, dispense 60 with no refills    Requested Prescriptions   Pending Prescriptions Disp Refills     cyclobenzaprine (FLEXERIL) 10 MG tablet 90 tablet 1     Sig: TAKE 1 TABLET BY MOUTH 3 TIMES DAILY AS NEEDED FOR MUSCLE SPASMS    There is no refill protocol information for this order        metFORMIN (GLUCOPHAGE-XR) 500 MG 24 hr tablet 360 tablet 0    Biguanide Agents Passed    10/30/2018 11:52 AM       Passed - Blood pressure less than 140/90 in past 6 months    BP Readings from Last 3 Encounters:   10/11/18 132/82   08/16/18 139/84   08/12/18 126/82                Passed - Patient has documented LDL within the past 12 mos.    Recent Labs   Lab Test  03/23/18   0831   LDL  46            Passed - Patient has had a Microalbumin in the past 15 mos.    Recent Labs   Lab Test  10/11/18   1309   MICROL  <5   UMALCR  Unable to calculate due to low value            Passed - Patient is age 10 or older       Passed - Patient has documented A1c within the specified period of time.    If HgbA1C is 8 or greater, it needs to be on file within the past 3 months.  If less than 8, must be on file within the past 6 months.     Recent Labs   Lab Test  10/11/18   1309   A1C  6.4*            Passed - Patient's CR is NOT>1.4 OR Patient's EGFR is NOT<45 within past 12 mos.    Recent Labs   Lab Test  10/11/18   1309   GFRESTIMATED  76   GFRESTBLACK  >90       Recent Labs   Lab Test  10/11/18   1309   CR  0.81            Passed - Patient does NOT have a diagnosis of CHF.       Passed - Patient is not pregnant       Passed - Patient has not had a positive pregnancy test within the past 12 mos.        Passed - Recent (6 mo) or future (30 days) visit within the authorizing provider's specialty    Patient had office visit in the last 6 months or has a visit in the next 30 days with authorizing provider or within the authorizing  "provider's specialty.  See \"Patient Info\" tab in inbasket, or \"Choose Columns\" in Meds & Orders section of the refill encounter.            traMADol (ULTRAM) 50 MG tablet 60 tablet 0     Sig: Take 1-2 tabs by mouth every 8 hours as needed for moderate pain.  Max 8 tabs/day & 60 tabs month    There is no refill protocol information for this order        Prescription approved per [facility] Refill Protocol for the metformin    RX [place] reviewed:  reviewed- no concerns    [place]:  [email or web address]-[email or web address]    Routing refill request to provider for review/approval because:  Drug not on the [facility] refill protocol for the flexeril and tramadol          "

## 2018-10-30 NOTE — TELEPHONE ENCOUNTER
Message from MyChart:  Original authorizing provider: Ireen Castellanos MD, MD    Kaylee Lopez would like a refill of the following medications:  cyclobenzaprine (FLEXERIL) 10 MG tablet [Irene Castellanos MD, MD]  metFORMIN (GLUCOPHAGE-XR) 500 MG 24 hr tablet [Irene Castellanos MD, MD]  traMADol (ULTRAM) 50 MG tablet [Irene Castellanos MD, MD]    Preferred pharmacy: [address]    Comment:

## 2018-10-31 RX ORDER — METFORMIN HCL 500 MG
TABLET, EXTENDED RELEASE 24 HR ORAL
Qty: 360 TABLET | Refills: 0 | OUTPATIENT
Start: 2018-10-31

## 2018-10-31 RX ORDER — CYCLOBENZAPRINE HCL 10 MG
TABLET ORAL
Qty: 90 TABLET | Refills: 1 | OUTPATIENT
Start: 2018-10-31

## 2018-10-31 RX ORDER — TRAMADOL HYDROCHLORIDE 50 MG/1
TABLET ORAL
Qty: 60 TABLET | Refills: 0 | OUTPATIENT
Start: 2018-10-31

## 2018-11-02 ENCOUNTER — TRANSFERRED RECORDS (OUTPATIENT)
Dept: HEALTH INFORMATION MANAGEMENT | Facility: CLINIC | Age: 48
End: 2018-11-02

## 2018-11-26 ENCOUNTER — OFFICE VISIT (OUTPATIENT)
Dept: FAMILY MEDICINE | Facility: CLINIC | Age: 48
End: 2018-11-26
Payer: COMMERCIAL

## 2018-11-26 VITALS
TEMPERATURE: 98.2 F | BODY MASS INDEX: 41.32 KG/M2 | RESPIRATION RATE: 18 BRPM | HEART RATE: 89 BPM | SYSTOLIC BLOOD PRESSURE: 144 MMHG | OXYGEN SATURATION: 96 % | WEIGHT: 244.5 LBS | DIASTOLIC BLOOD PRESSURE: 94 MMHG

## 2018-11-26 DIAGNOSIS — J06.9 VIRAL UPPER RESPIRATORY TRACT INFECTION: Primary | ICD-10-CM

## 2018-11-26 LAB
DEPRECATED S PYO AG THROAT QL EIA: NORMAL
SPECIMEN SOURCE: NORMAL

## 2018-11-26 PROCEDURE — 87081 CULTURE SCREEN ONLY: CPT | Performed by: NURSE PRACTITIONER

## 2018-11-26 PROCEDURE — 99213 OFFICE O/P EST LOW 20 MIN: CPT | Performed by: NURSE PRACTITIONER

## 2018-11-26 PROCEDURE — 87880 STREP A ASSAY W/OPTIC: CPT | Performed by: NURSE PRACTITIONER

## 2018-11-26 NOTE — LETTER
[facility]  [address] [address]  Phone: [phone]    November 26, 2018        Kaylee Lopez  [address] [address]          To whom it may concern:    RE: Kaylee Lopez    Patient was seen and treated today at our clinic.  Please excuse from work until symptoms resolve.    Please contact me for questions or concerns.      Sincerely,        JEANE Londono Ra CNP

## 2018-11-26 NOTE — MR AVS SNAPSHOT
After Visit Summary   11/26/2018    Kaylee Lopez    MRN: [id number]           Patient Information     Date Of Birth          1970        Visit Information        Provider Department      11/26/2018 10:20 AM Erika Silva Ra, APRN CNP [facility]        Today's Diagnoses     Throat pain    -  1      Care Instructions    Please continue to monitor symptoms.  I would recommend increasing fluids, rest, and steamy humidification.  Please return to clinic if you notice any change or increase in symptoms.              Follow-ups after your visit        Follow-up notes from your care team     Return in about 3 months (around 2/26/2019) for diabetes recheck.      Your next 10 appointments already scheduled     Nov 27, 2018  3:00 PM CST   [facility]iana [facility], [facility]    [id number] [place] [address] [phone]           Bring a current list of meds and any records pertaining to this visit. For Physicals, please bring immunization records and any forms needing to be filled out. Please arrive 10 minutes early to complete paperwork.              Who to contact     If you have questions or need follow up information about today's clinic visit or your schedule please contact [facility] directly at [phone].  Normal or non-critical lab and imaging results will be communicated to you by MyChart, letter or phone within 4 business days after the clinic has received the results. If you do not hear from us within 7 days, please contact the clinic through MyChart or phone. If you have a critical or abnormal lab result, we will notify you by phone as soon as possible.  Submit refill requests through [employer] or call your pharmacy and they will forward the refill request to us. Please allow 3 business days for your refill to be completed.          Additional Information About Your Visit         [employer] Information     [employer] gives you secure access to your electronic health record. If you see a primary care provider, you can also send messages to your care team and make appointments. If you have questions, please call your primary care clinic.  If you do not have a primary care provider, please call [phone] and they will assist you.        Care EveryWhere ID     This is your Care EveryWhere ID. This could be used by other organizations to access your [place] medical records  [id number]        Your Vitals Were     Pulse Temperature Respirations Last Period Pulse Oximetry BMI (Body Mass Index)    89 98.2  F (36.8  C) (Tympanic) 18 11/12/2018 (Approximate) 96% 41.32 kg/m2       Blood Pressure from Last 3 Encounters:   11/26/18 (!) 144/94   10/11/18 132/82   08/16/18 139/84    Weight from Last 3 Encounters:   11/26/18 244 lb 8 oz (110.9 kg)   10/11/18 238 lb 12.8 oz (108.3 kg)   08/16/18 236 lb (107 kg)              We Performed the Following     Beta strep group A culture     Strep, Rapid Screen        Primary Care Provider Office Phone # Fax #    Irene Castellanos [phone]       [address] [facility] [address]        Equal Access to Services     GERARDO CRAWLEY : Hadii aad ku hadasho Soomaali, waaxda luqadaha, qaybta kaalmada adeegyada, waxay idiin haymelanyn renetta padron . So [facility] [phone].    ATENCIÓN: Si habla español, tiene a garner disposición servicios gratuitos de asistencia lingüística. Llame al [phone].    We comply with applicable federal civil rights laws and [place] laws. We do not discriminate on the basis of race, color, national origin, age, disability, sex, sexual orientation, or gender identity.            Thank you!     Thank you for choosing [facility]  for your care. Our goal is always to provide you with excellent care. Hearing back from our patients is one way we can continue to improve our services. Please take a few minutes  to complete the written survey that you may receive in the mail after your visit with us. Thank you!             Your Updated Medication List - Protect others around you: Learn how to safely use, store and throw away your medicines at www.disposemymeds.org.          This list is accurate as of 11/26/18 10:50 AM.  Always use your most recent med list.                   Brand Name Dispense Instructions for use Diagnosis    ACE NOT PRESCRIBED (INTENTIONAL)     0 each    1 each ACE Inhibitor not prescribed due to Other: not indicated    Type 2 diabetes, HbA1c goal < 7% (H)       albuterol 108 (90 Base) MCG/ACT inhaler    PROAIR HFA/PROVENTIL HFA/VENTOLIN HFA    1 Inhaler    Inhale 2 puffs into the lungs every 6 hours as needed for shortness of breath / dyspnea or wheezing    Cough       atorvastatin 10 MG tablet    LIPITOR    90 tablet    TAKE 1 TABLET BY MOUTH EVERY DAY    Hyperlipidemia LDL goal <100       B-12 1000 MCG Tabs     100 tablet    Take 1,000 mcg by mouth daily    Vitamin B12 deficiency (non anemic)       B-D U/F 31G X 5 MM miscellaneous   Generic drug:  insulin pen needle     100 each    USE 1 PEN NEEDLES DAILY OR AS DIRECTED.    Type 2 diabetes mellitus without complication, with long-term current use of insulin (H)       RAJNI CONTOUR NEXT test strip   Generic drug:  blood glucose monitoring     100 strip    USE TO TEST BLOOD SUGARS 3 TIMES DAILY OR AS DIRECTED    Type 2 diabetes mellitus without complication, without long-term current use of insulin (H)       blood glucose calibration solution    no brand specified    1 each    Use to calibrate blood glucose monitor as directed.    Type 2 diabetes mellitus without complication, without long-term current use of insulin (H)       blood glucose lancing device    no brand specified    100 each    Use to test blood sugars 3 times daily or as directed.    Type 2 diabetes mellitus without complication, without long-term current use of insulin (H)       * Blood  Glucose Monitoring Suppl Misc     1 each    In vitro    Type 2 diabetes, HbA1c goal < 7% (H)       * blood glucose monitoring meter device kit    no brand specified    1 kit    Use to test blood sugar 3 times daily or as directed.    Type 2 diabetes mellitus without complication, without long-term current use of insulin (H)       cetirizine 10 MG tablet    zyrTEC     Take 10 mg by mouth daily as needed for allergies        citalopram 40 MG tablet    celeXA    90 tablet    TAKE 1 TABLET BY MOUTH DAILY    Major depression in complete remission (H)       cyclobenzaprine 10 MG tablet    FLEXERIL    90 tablet    TAKE 1 TABLET BY MOUTH 3 TIMES DAILY AS NEEDED FOR MUSCLE SPASMS    Fibromyalgia       ibuprofen 800 MG tablet    ADVIL/MOTRIN    90 tablet    Take 1 tablet (800 mg) by mouth every 8 hours as needed for moderate pain    Status post hysteroscopic polypectomy       metFORMIN 500 MG 24 hr tablet    GLUCOPHAGE-XR    360 tablet    Take 4 tablets (2,000 mg) by mouth daily (with breakfast)    Type 2 diabetes mellitus without complication, without long-term current use of insulin (H)       norethindrone-ethinyl estradiol 1-20 MG-MCG per tablet    MICROGESTIN 1/20    90 tablet    Take 1 tablet by mouth daily    Encounter for initial prescription of contraceptive pills, Excessive or frequent menstruation       traMADol 50 MG tablet    ULTRAM    60 tablet    Take 1-2 tabs by mouth every 8 hours as needed for moderate pain.  Max 8 tabs/day & 60 tabs month    Fibromyalgia       traZODone 50 MG tablet    DESYREL    45 tablet    TAKE 1.5 TABLETS (75 MG) BY MOUTH NIGHTLY AS NEEDED FOR SLEEP    Fibromyalgia       VICTOZA PEN 18 MG/3ML solution   Generic drug:  liraglutide     18 mL    INJECT 1.2 MG SUBCUTANEOUS DAILY    Type 2 diabetes mellitus without complication, without long-term current use of insulin (H)       * Notice:  This list has 2 medication(s) that are the same as other medications prescribed for you. Read the  directions carefully, and ask your doctor or other care provider to review them with you.

## 2018-11-26 NOTE — PROGRESS NOTES
SUBJECTIVE:   Kaylee Lopez is a 48 year old female who presents to clinic today for the following health issues:      RESPIRATORY SYMPTOMS      Duration: 3 days    Description  nasal congestion, sore throat and fever    Severity: moderate    Accompanying signs and symptoms: left ear and neck pain     History (predisposing factors):  none    Precipitating or alleviating factors: None    Therapies tried and outcome:  oral decongestant herbal tea, cough drops.      On Thursday started noticing cold symptoms.  On Friday, sore throat started, it has been worsening,  She has had a low grade temp of 99.3 at home.  Afebrile today.  Has had runny nose.  Feels congested.  Has tried mucinex, cough drops, tea for symptom relief.   She wonders if she has strep throat.  No known exposures.    Also complains of pain in left ear, and in neck, bilaterally.    Has started to feel a little abdominal discomfort, has been having diarrhea since Saturday.    No urinary changes.  No SOB, chest pain.   Hydrating well.    Problem list and histories reviewed & adjusted, as indicated.  Additional history: as documented    Patient Active Problem List   Diagnosis     Fibromyalgia     Non-rheumatic tricuspid valve insufficiency     Papanicolaou smear of cervix with atypical squamous cells cannot exclude high grade squamous intraepithelial lesion (ASC-H)     Major depression in complete remission (H)     Narcolepsy     Hyperlipidemia LDL goal <100     Health Care Home     Reflex sympathetic dystrophy     Contraception     Elevated BMI     Chronic pain-Fibromyalgia     Type 2 diabetes mellitus without complication, without long-term current use of insulin (H)     Migraine with aura and without status migrainosus, not intractable     Elevated blood pressure reading without diagnosis of hypertension     Pulmonary hypertension (H)     Unexplained endometrial cells on cervical Pap smear     Thrombocytosis (H)     Past Surgical History:    Procedure Laterality Date     Angiogram  3/2011    No pulmonary hypertension     COLONOSCOPY  2008    normal     DILATION AND CURETTAGE, OPERATIVE HYSTEROSCOPY WITH MORCELLATOR, COMBINED N/A 2018    Procedure: COMBINED DILATION AND CURETTAGE, OPERATIVE HYSTEROSCOPY WITH MORCELLATOR;  Hysteroscopy, polypectomy  with myosure, dilation and curettage, endometrial biopsy ;  Surgeon: Georgiana Pizarro DO;  Location: [facility] OR     LAPAROSCOPIC CHOLECYSTECTOMY  2000     SURGICAL HISTORY OF -       essure procedure for contraception     TCA for abnormal pap         Social History   Substance Use Topics     Smoking status: Never Smoker     Smokeless tobacco: Never Used     Alcohol use 0.0 oz/week     0 Standard drinks or equivalent per week      Comment: rare     Family History   Problem Relation Age of Onset     Respiratory Father      asthma     Arthritis Father      hips     Depression Father      Alcohol/Drug Father      Diabetes Paternal Grandfather      Cerebrovascular Disease Paternal Grandfather      Hypertension Paternal Grandfather      Cancer - colorectal Paternal Grandmother      Arthritis Paternal Grandmother      hips     Colon Cancer Paternal Grandmother      Prostate Cancer Maternal Grandfather      Hypertension Maternal Grandfather      Cancer Maternal Grandmother      pancreatic     Arthritis Maternal Grandmother      hips     Depression Maternal Grandmother      Hypertension Maternal Grandmother      Other Cancer Maternal Grandmother      Gallbladder Disease Maternal Grandmother      Depression Mother      Rashes/Skin Problems Mother      Rosacea     Arthritis Mother      osteoarthritis in hands and knees     Hypertension Mother      Depression Maternal Uncle      bipolar     Respiratory Brother      sleep apnea     Mental Illness Brother      Cancer - colorectal Maternal Uncle      Cerebrovascular Disease Maternal Uncle       of massive stroke--no heart problems     Hypertension Son             Reviewed and updated as needed this visit by clinical staff       Reviewed and updated as needed this visit by Provider         ROS:  SEE HPI    OBJECTIVE:     BP (!) 144/94 (BP Location: Right arm, Patient Position: Chair, Cuff Size: Adult Large)  Pulse 89  Temp 98.2  F (36.8  C) (Tympanic)  Resp 18  Wt 244 lb 8 oz (110.9 kg)  LMP 11/12/2018 (Approximate)  SpO2 96%  BMI 41.32 kg/m2  Body mass index is 41.32 kg/(m^2).  GENERAL: healthy, alert and no distress  HENT: ear canals and TM's normal, nose and mouth without ulcers or lesions  RESP: lungs clear to auscultation - no rales, rhonchi or wheezes  CV: regular rate and rhythm, normal S1 S2, no S3 or S4, no murmur, click or rub  ABDOMEN: soft, nontender, no hepatosplenomegaly, no masses and bowel sounds normal  PSYCH: mentation appears normal, affect normal/bright    Diagnostic Test Results:  Results for orders placed or performed in visit on 11/26/18 (from the past 24 hour(s))   Strep, Rapid Screen   Result Value Ref Range    Specimen Description Throat     Rapid Strep A Screen       NEGATIVE: No Group A streptococcal antigen detected by immunoassay, await culture report.       ASSESSMENT/PLAN:     1. Viral upper respiratory tract infection  48 y.o female presenting with a 3 day history of sore throat, cough, sinus congestion. Strep screen negative. Symptom management at this time, will rtc if patient not feeling better by end of week.  - Strep, Rapid Screen  - Beta strep group A culture    JEANE Londono Ra, CNP  [facility]

## 2018-11-27 LAB
BACTERIA SPEC CULT: NORMAL
SPECIMEN SOURCE: NORMAL

## 2018-11-30 ENCOUNTER — TRANSFERRED RECORDS (OUTPATIENT)
Dept: HEALTH INFORMATION MANAGEMENT | Facility: CLINIC | Age: 48
End: 2018-11-30

## 2018-12-19 DIAGNOSIS — F32.5 MAJOR DEPRESSION IN COMPLETE REMISSION (H): ICD-10-CM

## 2018-12-19 NOTE — TELEPHONE ENCOUNTER
"Requested Prescriptions   Pending Prescriptions Disp Refills     citalopram (CELEXA) 40 MG tablet [Pharmacy Med Name: CITALOPRAM HBR 40 MG TABLET]    Last Written Prescription Date:  6/27/2018  Last Fill Quantity: 90,  # refills: 1   Last office visit: 11/26/2018 with prescribing provider:  Irene Castellanos     Future Office Visit:     90 tablet 1     Sig: TAKE 1 TABLET BY MOUTH DAILY    SSRIs Protocol Passed - 12/19/2018  2:29 AM       Passed - PHQ-9 score less than 5 in past 6 months    Please review last PHQ-9 score.     PHQ-9 SCORE 7/20/2017 1/12/2018 7/2/2018   PHQ-9 Total Score - - -   PHQ-9 Total Score MyChart - 2 (Minimal depression) -   PHQ-9 Total Score 2 2 1     MICHAEL-7 SCORE 7/20/2017 1/12/2018 7/2/2018   Total Score - - -   Total Score - 1 (minimal anxiety) -   Total Score 4 1 3                Passed - Patient is age 18 or older       Passed - No active pregnancy on record       Passed - No positive pregnancy test in last 12 months       Passed - Recent (6 mo) or future (30 days) visit within the authorizing provider's specialty    Patient had office visit in the last 6 months or has a visit in the next 30 days with authorizing provider or within the authorizing provider's specialty.  See \"Patient Info\" tab in inbasket, or \"Choose Columns\" in Meds & Orders section of the refill encounter.              "

## 2018-12-20 RX ORDER — CITALOPRAM HYDROBROMIDE 40 MG/1
TABLET ORAL
Qty: 90 TABLET | Refills: 0 | Status: SHIPPED | OUTPATIENT
Start: 2018-12-20 | End: 2019-03-20

## 2018-12-20 NOTE — TELEPHONE ENCOUNTER
Prescription approved per [facility], [facility] or [facility] refill protocol.  See High alert drug interaction at refill with Trazodone  Rhonda CHAIDEZ RN - Triage  [facility]

## 2019-01-20 DIAGNOSIS — M79.7 FIBROMYALGIA: ICD-10-CM

## 2019-01-21 RX ORDER — TRAMADOL HYDROCHLORIDE 50 MG/1
TABLET ORAL
Qty: 60 TABLET | Refills: 0 | Status: SHIPPED | OUTPATIENT
Start: 2019-01-21 | End: 2019-03-01

## 2019-01-21 NOTE — TELEPHONE ENCOUNTER
Requested Prescriptions   Pending Prescriptions Disp Refills     traMADol (ULTRAM) 50 MG tablet [Pharmacy Med Name: TRAMADOL HCL 50 MG TABLET] 60 tablet 0     Sig: TAKE 1 TO 2 TABLETS BY MOUTH EVERY 8HRS AS NEEDED FOR MODERATE PAIN. MAX 8 TABS/DAY & 60 TABS/MONTH    There is no refill protocol information for this order        Last Written Prescription Date:  10/30/18  Last Fill Quantity: 60,  # refills: 0   Last office visit: 11/26/2018 with prescribing provider:  Erika Silva Ra, APRN CNP    Future Office Visit:

## 2019-01-21 NOTE — TELEPHONE ENCOUNTER
Routing refill request to provider for review/approval because:  Drug not on the [facility] refill protocol     Mariajose Ahmadi RN

## 2019-01-29 ENCOUNTER — MYC REFILL (OUTPATIENT)
Dept: FAMILY MEDICINE | Facility: CLINIC | Age: 49
End: 2019-01-29

## 2019-01-29 DIAGNOSIS — E11.9 TYPE 2 DIABETES MELLITUS WITHOUT COMPLICATION, WITHOUT LONG-TERM CURRENT USE OF INSULIN (H): ICD-10-CM

## 2019-01-30 RX ORDER — METFORMIN HCL 500 MG
2000 TABLET, EXTENDED RELEASE 24 HR ORAL
Qty: 360 TABLET | Refills: 0 | Status: SHIPPED | OUTPATIENT
Start: 2019-01-30 | End: 2019-04-05

## 2019-03-01 DIAGNOSIS — Z79.4 TYPE 2 DIABETES MELLITUS WITHOUT COMPLICATION, WITH LONG-TERM CURRENT USE OF INSULIN (H): ICD-10-CM

## 2019-03-01 DIAGNOSIS — E11.9 TYPE 2 DIABETES MELLITUS WITHOUT COMPLICATION, WITH LONG-TERM CURRENT USE OF INSULIN (H): ICD-10-CM

## 2019-03-01 DIAGNOSIS — M79.7 FIBROMYALGIA: ICD-10-CM

## 2019-03-04 RX ORDER — FLURBIPROFEN SODIUM 0.3 MG/ML
SOLUTION/ DROPS OPHTHALMIC
Start: 2019-03-04

## 2019-03-04 NOTE — TELEPHONE ENCOUNTER
"Requested Prescriptions   Pending Prescriptions Disp Refills     B-D U/F insulin pen needle [Pharmacy Med Name: BD UF MINI PEN NEEDLE [id number]]  Last Written Prescription Date:  9/20/18  Last Fill Quantity: 100,  # refills: 3   Last office visit: 11/26/2018 with prescribing provider:  Erika Silva Ra, APRN CNP   Future Office Visit:      0     Sig: USE ONCE DAILY AS DIRECTED    Diabetic Supplies Protocol Passed - 3/1/2019  4:49 PM       Passed - Medication is active on med list       Passed - Patient is 18 years of age or older       Passed - Recent (6 mo) or future (30 days) visit within the authorizing provider's specialty    Patient had office visit in the last 6 months or has a visit in the next 30 days with authorizing provider.  See \"Patient Info\" tab in inbasket, or \"Choose Columns\" in Meds & Orders section of the refill encounter.              "

## 2019-03-04 NOTE — TELEPHONE ENCOUNTER
Requested Prescriptions   Pending Prescriptions Disp Refills     traMADol (ULTRAM) 50 MG tablet [Pharmacy Med Name: TRAMADOL HCL 50 MG TABLET] 60 tablet 0     Sig: TAKE 1-2 TBS BY MOUTH EVERY 8 HRS AS NEEDED FOR MODERATE PAIN MAX 8 TABS/24 HOURS, MAX 60 TBS/MONTH    There is no refill protocol information for this order        Last Written Prescription Date:  1/21/19  Last Fill Quantity: 60,  # refills: 0   Last office visit: 11/26/2018 with prescribing provider:  Erika Silva Ra, APRN CNP    Future Office Visit:

## 2019-03-05 RX ORDER — TRAMADOL HYDROCHLORIDE 50 MG/1
TABLET ORAL
Qty: 60 TABLET | Refills: 0 | Status: SHIPPED | OUTPATIENT
Start: 2019-03-05 | End: 2019-05-11

## 2019-03-05 NOTE — TELEPHONE ENCOUNTER
In my outbox.     She does need appointment in April; can you help her schedule?     Diabetic and med check...

## 2019-03-05 NOTE — TELEPHONE ENCOUNTER
Routing refill request to provider for review/approval because:  Drug not on the [facility], [facility] or [facility] refill protocol or controlled substance    Rhonda CHAIDEZ RN - Triage  [facility]

## 2019-03-07 ENCOUNTER — OFFICE VISIT (OUTPATIENT)
Dept: PEDIATRICS | Facility: CLINIC | Age: 49
End: 2019-03-07
Payer: COMMERCIAL

## 2019-03-07 VITALS
DIASTOLIC BLOOD PRESSURE: 90 MMHG | HEIGHT: 65 IN | WEIGHT: 239.5 LBS | SYSTOLIC BLOOD PRESSURE: 158 MMHG | TEMPERATURE: 97.1 F | HEART RATE: 107 BPM | OXYGEN SATURATION: 98 % | BODY MASS INDEX: 39.9 KG/M2

## 2019-03-07 DIAGNOSIS — J20.9 ACUTE BRONCHITIS, UNSPECIFIED ORGANISM: Primary | ICD-10-CM

## 2019-03-07 DIAGNOSIS — R03.0 ELEVATED BLOOD PRESSURE READING WITHOUT DIAGNOSIS OF HYPERTENSION: ICD-10-CM

## 2019-03-07 DIAGNOSIS — E11.9 TYPE 2 DIABETES MELLITUS WITHOUT COMPLICATION, WITHOUT LONG-TERM CURRENT USE OF INSULIN (H): ICD-10-CM

## 2019-03-07 DIAGNOSIS — R05.9 COUGH: ICD-10-CM

## 2019-03-07 PROCEDURE — 99214 OFFICE O/P EST MOD 30 MIN: CPT | Performed by: INTERNAL MEDICINE

## 2019-03-07 RX ORDER — ALBUTEROL SULFATE 90 UG/1
2 AEROSOL, METERED RESPIRATORY (INHALATION) EVERY 6 HOURS PRN
Qty: 1 INHALER | Refills: 0 | Status: SHIPPED | OUTPATIENT
Start: 2019-03-07 | End: 2021-06-18

## 2019-03-07 RX ORDER — CODEINE PHOSPHATE AND GUAIFENESIN 10; 100 MG/5ML; MG/5ML
1-2 SOLUTION ORAL EVERY 4 HOURS PRN
Qty: 120 ML | Refills: 0 | Status: SHIPPED | OUTPATIENT
Start: 2019-03-07 | End: 2019-04-05

## 2019-03-07 RX ORDER — DOXYCYCLINE 100 MG/1
100 CAPSULE ORAL 2 TIMES DAILY
Qty: 20 CAPSULE | Refills: 0 | Status: SHIPPED | OUTPATIENT
Start: 2019-03-07 | End: 2019-04-05

## 2019-03-07 RX ORDER — CODEINE PHOSPHATE AND GUAIFENESIN 10; 100 MG/5ML; MG/5ML
1-2 SOLUTION ORAL EVERY 4 HOURS PRN
Qty: 120 ML | Refills: 0 | Status: SHIPPED | OUTPATIENT
Start: 2019-03-07 | End: 2019-03-07

## 2019-03-07 ASSESSMENT — MIFFLIN-ST. JEOR: SCORE: 1714.3

## 2019-03-07 NOTE — PATIENT INSTRUCTIONS
1. Doxycyline twice a day for 10 days  2. Albuterol as needed - put a refill on file at the pharmacy  3. Codeine cough medicine for at night  4. Follow-up in 1 week if not improving

## 2019-03-07 NOTE — LETTER
March 7, 2019      Kaylee Lopez  [address] [address] [facility] [address]        To Whom It May Concern:    Kaylee Lopez was seen in our clinic today for illness. She was diagnosed with bronchitis, given medications and recommended to rest until better.    Please let me know if you have any questions.    Sincerely,        Rhonda Smith MD

## 2019-03-07 NOTE — PROGRESS NOTES
"  SUBJECTIVE:   Kaylee Lopez is a 49 year old female who presents to clinic today for the following health issues:      RESPIRATORY SYMPTOMS      Duration: 1 week    Description  rhinorrhea, cough, wheezing, chills, ear pain left, headache, fatigue/malaise, hoarse voice, myalgias and nausea    Severity: severe    Accompanying signs and symptoms: chest pain on breathing in    History (predisposing factors):  none    Precipitating or alleviating factors: None    Therapies tried and outcome:  rest and fluids guaifenesin humidity      {additional problems for provider to add:658701}    Problem list and histories reviewed & adjusted, as indicated.  Additional history: {NONE - AS DOCUMENTED:880655::\"as documented\"}    {HIST REVIEW/ LINKS 2:631822}    Reviewed and updated as needed this visit by clinical staff       Reviewed and updated as needed this visit by Provider         {PROVIDER CHARTING PREFERENCE:515755}  "

## 2019-03-07 NOTE — PROGRESS NOTES
SUBJECTIVE:   Kaylee Lopez is a 49 year old female who presents to clinic today for the following health issues:    RESPIRATORY SYMPTOMS      Duration: 1 week    Description  rhinorrhea, cough, wheezing, chills, ear pain left, headache, fatigue/malaise, hoarse voice, myalgias and nausea    Severity: severe    Accompanying signs and symptoms: chest pain on breathing in    History (predisposing factors):  none    Precipitating or alleviating factors: None    Therapies tried and outcome:  rest and fluids guaifenesin humidity    48 y/o F with h/o DM, HLP and fibromyalgia presents with 1 week of cold symptoms. Started as a bad cold over the last week. Cough has progressed into deep bronchial cough. Cough mildly productive with yellow/green sputum. Cough worse when lies down and has rattly sounds in lungs. Has history of bronchitis. Can't get rid of the cough when it gets like this without antibiotics. Had low grade fevers in 99 range. Has used albuterol about 3 times a day and helps a little bit with cough. Can't sleep at night. Has used tessalon in past and not effective. When this bad, usually needs to use cough medicine with codeine. Has codeine allergy listed in chart, but only has symptoms with tylenol with codeine. Had to come home early from work yesterday due to fatigue and cough. Mom was recently hospitalized with stroke and now in TCU. Has increased stress.    Reviewed and updated as needed this visit by clinical staff  Tobacco  Allergies  Meds  Problems  Med Hx  Surg Hx  Fam Hx  Soc Hx        Reviewed and updated as needed this visit by Provider  Tobacco  Allergies  Meds  Problems  Med Hx  Surg Hx  Fam Hx       -------------------------------------    ROS:  Constitutional, HEENT, cardiovascular, pulmonary, gi and gu systems are negative, except as otherwise noted.    OBJECTIVE:                                                    /90 (BP Location: Right arm, Patient Position:  "Sitting, Cuff Size: Adult Large)   Pulse 107   Temp 97.1  F (36.2  C) (Tympanic)   Ht 1.638 m (5' 4.5\")   Wt 108.6 kg (239 lb 8 oz)   SpO2 98%   BMI 40.48 kg/m     Body mass index is 40.48 kg/m .  General Appearance: tired appearing, frequently coughing with deep cough, alert and no distress  Eyes:   no discharge, erythema.  Normal pupils.  Right Ear: normal: no effusions, no erythema, normal landmarks  Left Ear: clear effusion  Nose: congested  Sinuses:  not tender  Oropharynx: clear PND  Neck: Supple.  No adenopathy, no asymmetry, masses  Respiratory: lungs clear to auscultation - no rales, rhonchi or wheezes. Frequent deep, bronchial cough.  Cardiovascular: regular rate and rhythm, normal S1 S2, no S3 or S4 and no murmur, click or rub.  No peripheral edema.  Skin: no rashes or lesions.  Well perfused and normal turgor.    Diagnostic Test Results:  none      ASSESSMENT/PLAN:                                                      (J20.9) Acute bronchitis, unspecified organism  (primary encounter diagnosis)  Comment: a week of worsening symptoms with deep, frequent cough. At increased risk for bacterial infection with underlying diabetes. No focal findings on exam.  Plan: albuterol (PROAIR HFA/PROVENTIL HFA/VENTOLIN         HFA) 108 (90 Base) MCG/ACT inhaler, doxycycline        hyclate (VIBRAMYCIN) 100 MG capsule,         guaiFENesin-codeine (ROBITUSSIN AC) 100-10         MG/5ML solution, D  - doxycyline bid for 10 days (will avoid azithro with her citalopram)  - albuterol as needed  - cough medicine with codeine for at night - reviewed previous fills and  and no concerns  - f/u in 1 week if not improving, sooner if worsening symptoms.    (R05) Cough  Plan: albuterol (PROAIR HFA/PROVENTIL HFA/VENTOLIN         HFA) 108 (90 Base) MCG/ACT inhaler,         guaiFENesin-codeine (ROBITUSSIN AC) 100-10         MG/5ML solution,     (E11.9) Type 2 diabetes mellitus without complication, without long-term current use of " "insulin (H)  Comment: at increased risk for bacterial infection  Plan: treat as above  - has f/u with Dr. Castellanos in a month    (R03.0) Elevated blood pressure reading without diagnosis of hypertension  Comment: elevated today, likely exacerbated by illness  Plan:   - recheck at visit in 1 month    BMI:   Estimated body mass index is 40.48 kg/m  as calculated from the following:    Height as of this encounter: 1.638 m (5' 4.5\").    Weight as of this encounter: 108.6 kg (239 lb 8 oz).   Weight management plan: Discussed healthy diet and exercise guidelines      FUTURE APPOINTMENTS:       - Follow-up visit in 1 week if not improving, 1 month for routine follow-up    [facility] PRAVEENA          "

## 2019-03-12 DIAGNOSIS — E78.5 HYPERLIPIDEMIA LDL GOAL <100: ICD-10-CM

## 2019-03-12 RX ORDER — ATORVASTATIN CALCIUM 10 MG/1
TABLET, FILM COATED ORAL
Qty: 30 TABLET | Refills: 0 | Status: SHIPPED | OUTPATIENT
Start: 2019-03-12 | End: 2019-04-09

## 2019-03-12 NOTE — TELEPHONE ENCOUNTER
"Requested Prescriptions   Pending Prescriptions Disp Refills     atorvastatin (LIPITOR) 10 MG tablet [Pharmacy Med Name: ATORVASTATIN 10 MG TABLET]  Last Written Prescription Date:  9/20/18  Last Fill Quantity: 90,  # refills: 1   Last office visit: 11/26/2018 with prescribing provider:     Erika Silva Ra, APRN CNP        Future Office Visit:   Next 5 appointments (look out 90 days)    Apr 05, 2019 10:30 AM CDT  SHORT with Irene Castellanos MD  [place] [id number] [facility] [address] [phone]          90 tablet 1     Sig: TAKE 1 TABLET BY MOUTH EVERY DAY    Statins Protocol Passed - 3/12/2019  1:48 AM       Passed - LDL on file in past 12 months    Recent Labs   Lab Test 03/23/18  0831   LDL 46            Passed - No abnormal creatine kinase in past 12 months    Recent Labs   Lab Test 10/11/18  1309   CKT 82               Passed - Recent (12 mo) or future (30 days) visit within the authorizing provider's specialty    Patient had office visit in the last 12 months or has a visit in the next 30 days with authorizing provider or within the authorizing provider's specialty.  See \"Patient Info\" tab in inbasket, or \"Choose Columns\" in Meds & Orders section of the refill encounter.             Passed - Medication is active on med list       Passed - Patient is age 18 or older       Passed - No active pregnancy on record       Passed - No positive pregnancy test in past 12 months          "

## 2019-03-12 NOTE — TELEPHONE ENCOUNTER
Medication is being filled for a one time refill only as patient is due for labs.  future lab orders placed..  30 day refill given.  Please call and assist with scheduling appointment prior to next refill   Rhonda CHAIDEZ RN - Triage  [facility]

## 2019-03-18 DIAGNOSIS — F32.5 MAJOR DEPRESSION IN COMPLETE REMISSION (H): ICD-10-CM

## 2019-03-18 NOTE — TELEPHONE ENCOUNTER
"Requested Prescriptions   Pending Prescriptions Disp Refills     citalopram (CELEXA) 40 MG tablet  Last Written Prescription Date:  12/20/18  Last Fill Quantity: 90,  # refills: 0   Last office visit: 3/7/19 with prescribing provider:  Rhonda Smith MD   Future Office Visit:   Next 5 appointments (look out 90 days)    Apr 05, 2019 10:30 AM CDT  SHORT with Irene Castellanos MD  [address] [address] [phone]          90 tablet 0     Sig: Take 1 tablet (40 mg) by mouth daily    SSRIs Protocol Failed - 3/18/2019  2:31 PM       Failed - PHQ-9 score less than 5 in past 6 months    Please review last PHQ-9 score.   PHQ-9 SCORE 7/20/2017 1/12/2018 7/2/2018   PHQ-9 Total Score - - -   PHQ-9 Total Score MyChart - 2 (Minimal depression) -   PHQ-9 Total Score 2 2 1     MICHAEL-7 SCORE 7/20/2017 1/12/2018 7/2/2018   Total Score - - -   Total Score - 1 (minimal anxiety) -   Total Score 4 1 3                Passed - Medication is active on med list       Passed - Patient is age 18 or older       Passed - No active pregnancy on record       Passed - No positive pregnancy test in last 12 months       Passed - Recent (6 mo) or future (30 days) visit within the authorizing provider's specialty    Patient had office visit in the last 6 months or has a visit in the next 30 days with authorizing provider or within the authorizing provider's specialty.  See \"Patient Info\" tab in inbasket, or \"Choose Columns\" in Meds & Orders section of the refill encounter.              "

## 2019-03-20 RX ORDER — CITALOPRAM HYDROBROMIDE 40 MG/1
40 TABLET ORAL DAILY
Qty: 30 TABLET | Refills: 0 | Status: ON HOLD | OUTPATIENT
Start: 2019-03-20 | End: 2019-06-06

## 2019-03-20 NOTE — TELEPHONE ENCOUNTER
LOV with Dr. Castellanos was 10/11/18    Medication is being filled for 1 time refill only due to:  due for updated phq9, has appt scheduled for 4/5/19

## 2019-03-25 ENCOUNTER — MYC REFILL (OUTPATIENT)
Dept: FAMILY MEDICINE | Facility: CLINIC | Age: 49
End: 2019-03-25

## 2019-03-25 DIAGNOSIS — M79.7 FIBROMYALGIA: ICD-10-CM

## 2019-03-26 RX ORDER — CYCLOBENZAPRINE HCL 10 MG
TABLET ORAL
Qty: 90 TABLET | Refills: 1 | Status: SHIPPED | OUTPATIENT
Start: 2019-03-26 | End: 2019-07-03

## 2019-03-28 ENCOUNTER — TELEPHONE (OUTPATIENT)
Dept: FAMILY MEDICINE | Facility: CLINIC | Age: 49
End: 2019-03-28

## 2019-03-28 NOTE — TELEPHONE ENCOUNTER
Reason for call:  Other   Patient called regarding (reason for call): FMLA FORMS  Additional comments: Faxed forms from [employer] received for FMLA.      Please complete and return to patient per forms.      Call patient to see if she would like mailed or picked up.     Phone number to reach patient:  Home number on file [phone] (home)    Best Time:  any    Can we leave a detailed message on this number?  NO

## 2019-03-29 NOTE — TELEPHONE ENCOUNTER
She has an appointment next week; can this be completed then?     If not, I am assuming this is similar to when we completed it previously...   They do ask for dates I have seen/treated her, and the most recent date would be more current if we do it when she is here.

## 2019-04-05 ENCOUNTER — OFFICE VISIT (OUTPATIENT)
Dept: FAMILY MEDICINE | Facility: CLINIC | Age: 49
End: 2019-04-05
Payer: COMMERCIAL

## 2019-04-05 VITALS
HEIGHT: 65 IN | WEIGHT: 242.4 LBS | DIASTOLIC BLOOD PRESSURE: 100 MMHG | TEMPERATURE: 98.4 F | SYSTOLIC BLOOD PRESSURE: 162 MMHG | RESPIRATION RATE: 16 BRPM | HEART RATE: 95 BPM | OXYGEN SATURATION: 97 % | BODY MASS INDEX: 40.39 KG/M2

## 2019-04-05 DIAGNOSIS — M79.7 FIBROMYALGIA: ICD-10-CM

## 2019-04-05 DIAGNOSIS — E66.01 MORBID OBESITY (H): ICD-10-CM

## 2019-04-05 DIAGNOSIS — E11.9 TYPE 2 DIABETES MELLITUS WITHOUT COMPLICATION, WITHOUT LONG-TERM CURRENT USE OF INSULIN (H): Primary | ICD-10-CM

## 2019-04-05 DIAGNOSIS — I10 HYPERTENSION GOAL BP (BLOOD PRESSURE) < 130/80: ICD-10-CM

## 2019-04-05 DIAGNOSIS — D75.839 THROMBOCYTOSIS: ICD-10-CM

## 2019-04-05 DIAGNOSIS — E53.8 VITAMIN B12 DEFICIENCY (NON ANEMIC): ICD-10-CM

## 2019-04-05 DIAGNOSIS — F41.9 ANXIETY: ICD-10-CM

## 2019-04-05 DIAGNOSIS — E78.5 HYPERLIPIDEMIA LDL GOAL <100: ICD-10-CM

## 2019-04-05 DIAGNOSIS — F32.5 MAJOR DEPRESSION IN COMPLETE REMISSION (H): ICD-10-CM

## 2019-04-05 LAB
ALBUMIN SERPL-MCNC: 3.3 G/DL (ref 3.4–5)
ALP SERPL-CCNC: 114 U/L (ref 40–150)
ALT SERPL W P-5'-P-CCNC: 15 U/L (ref 0–50)
ANION GAP SERPL CALCULATED.3IONS-SCNC: 8 MMOL/L (ref 3–14)
AST SERPL W P-5'-P-CCNC: 10 U/L (ref 0–45)
BILIRUB SERPL-MCNC: 0.3 MG/DL (ref 0.2–1.3)
BUN SERPL-MCNC: 10 MG/DL (ref 7–30)
CALCIUM SERPL-MCNC: 9 MG/DL (ref 8.5–10.1)
CHLORIDE SERPL-SCNC: 105 MMOL/L (ref 94–109)
CHOLEST SERPL-MCNC: 148 MG/DL
CO2 SERPL-SCNC: 26 MMOL/L (ref 20–32)
CREAT SERPL-MCNC: 0.8 MG/DL (ref 0.52–1.04)
ERYTHROCYTE [DISTWIDTH] IN BLOOD BY AUTOMATED COUNT: 13.5 % (ref 10–15)
GFR SERPL CREATININE-BSD FRML MDRD: 88 ML/MIN/{1.73_M2}
GLUCOSE SERPL-MCNC: 153 MG/DL (ref 70–99)
HBA1C MFR BLD: 7.2 % (ref 0–5.6)
HCT VFR BLD AUTO: 38.6 % (ref 35–47)
HDLC SERPL-MCNC: 57 MG/DL
HGB BLD-MCNC: 12.4 G/DL (ref 11.7–15.7)
LDLC SERPL CALC-MCNC: 56 MG/DL
MCH RBC QN AUTO: 29 PG (ref 26.5–33)
MCHC RBC AUTO-ENTMCNC: 32.1 G/DL (ref 31.5–36.5)
MCV RBC AUTO: 90 FL (ref 78–100)
NONHDLC SERPL-MCNC: 91 MG/DL
PLATELET # BLD AUTO: 489 10E9/L (ref 150–450)
POTASSIUM SERPL-SCNC: 3.9 MMOL/L (ref 3.4–5.3)
PROT SERPL-MCNC: 7.6 G/DL (ref 6.8–8.8)
RBC # BLD AUTO: 4.27 10E12/L (ref 3.8–5.2)
SODIUM SERPL-SCNC: 139 MMOL/L (ref 133–144)
TRIGL SERPL-MCNC: 174 MG/DL
VIT B12 SERPL-MCNC: 537 PG/ML (ref 193–986)
WBC # BLD AUTO: 7.8 10E9/L (ref 4–11)

## 2019-04-05 PROCEDURE — 82607 VITAMIN B-12: CPT | Performed by: FAMILY MEDICINE

## 2019-04-05 PROCEDURE — 80053 COMPREHEN METABOLIC PANEL: CPT | Performed by: FAMILY MEDICINE

## 2019-04-05 PROCEDURE — 83036 HEMOGLOBIN GLYCOSYLATED A1C: CPT | Performed by: FAMILY MEDICINE

## 2019-04-05 PROCEDURE — 99214 OFFICE O/P EST MOD 30 MIN: CPT | Performed by: FAMILY MEDICINE

## 2019-04-05 PROCEDURE — 36415 COLL VENOUS BLD VENIPUNCTURE: CPT | Performed by: FAMILY MEDICINE

## 2019-04-05 PROCEDURE — 85027 COMPLETE CBC AUTOMATED: CPT | Performed by: FAMILY MEDICINE

## 2019-04-05 PROCEDURE — 80061 LIPID PANEL: CPT | Performed by: FAMILY MEDICINE

## 2019-04-05 RX ORDER — METFORMIN HCL 500 MG
2000 TABLET, EXTENDED RELEASE 24 HR ORAL
Qty: 360 TABLET | Refills: 0 | Status: SHIPPED | OUTPATIENT
Start: 2019-04-05 | End: 2019-08-03

## 2019-04-05 RX ORDER — BUSPIRONE HYDROCHLORIDE 5 MG/1
5 TABLET ORAL 2 TIMES DAILY
Qty: 150 TABLET | Refills: 0 | Status: SHIPPED | OUTPATIENT
Start: 2019-04-05 | End: 2019-05-07

## 2019-04-05 RX ORDER — LIRAGLUTIDE 6 MG/ML
1.8 INJECTION SUBCUTANEOUS DAILY
Qty: 18 ML | Refills: 4 | Status: SHIPPED | OUTPATIENT
Start: 2019-04-05 | End: 2019-11-26

## 2019-04-05 RX ORDER — LISINOPRIL 5 MG/1
5 TABLET ORAL DAILY
Qty: 30 TABLET | Refills: 0 | Status: SHIPPED | OUTPATIENT
Start: 2019-04-05 | End: 2019-04-27

## 2019-04-05 ASSESSMENT — ANXIETY QUESTIONNAIRES
3. WORRYING TOO MUCH ABOUT DIFFERENT THINGS: NEARLY EVERY DAY
7. FEELING AFRAID AS IF SOMETHING AWFUL MIGHT HAPPEN: NEARLY EVERY DAY
6. BECOMING EASILY ANNOYED OR IRRITABLE: NEARLY EVERY DAY
GAD7 TOTAL SCORE: 18
IF YOU CHECKED OFF ANY PROBLEMS ON THIS QUESTIONNAIRE, HOW DIFFICULT HAVE THESE PROBLEMS MADE IT FOR YOU TO DO YOUR WORK, TAKE CARE OF THINGS AT HOME, OR GET ALONG WITH OTHER PEOPLE: SOMEWHAT DIFFICULT
2. NOT BEING ABLE TO STOP OR CONTROL WORRYING: MORE THAN HALF THE DAYS
5. BEING SO RESTLESS THAT IT IS HARD TO SIT STILL: SEVERAL DAYS
1. FEELING NERVOUS, ANXIOUS, OR ON EDGE: NEARLY EVERY DAY

## 2019-04-05 ASSESSMENT — PATIENT HEALTH QUESTIONNAIRE - PHQ9
SUM OF ALL RESPONSES TO PHQ QUESTIONS 1-9: 15
5. POOR APPETITE OR OVEREATING: NEARLY EVERY DAY

## 2019-04-05 ASSESSMENT — MIFFLIN-ST. JEOR: SCORE: 1727.46

## 2019-04-05 NOTE — PROGRESS NOTES
SUBJECTIVE:   Kaylee Lopez is a 49 year old female who presents to clinic today for the following health issues:      Diabetes Follow-up      Patient is checking blood sugars: not at all    Diabetic concerns: None     Symptoms of hypoglycemia (low blood sugar): none     Paresthesias (numbness or burning in feet) or sores: No     Date of last diabetic eye exam: 05/01/2018    Diabetes Management Resources    Hyperlipidemia Follow-Up      Rate your low fat/cholesterol diet?: good    Taking statin?  Yes     Other lipid medications/supplements?:  none    Hypertension Follow-up      Outpatient blood pressures are not being checked.    Low Salt Diet: not monitoring salt    BP Readings from Last 2 Encounters:   03/07/19 158/90   11/26/18 (!) 144/94     Hemoglobin A1C (%)   Date Value   04/05/2019 7.2 (H)   10/11/2018 6.4 (H)     LDL Cholesterol Calculated (mg/dL)   Date Value   03/23/2018 46   09/18/2017 19       Amount of exercise or physical activity: walking daily     Problems taking medications regularly: No    Medication side effects: none    Diet: regular (no restrictions)            Problem list and histories reviewed & adjusted, as indicated.  Additional history:     See under ROS     Patient Active Problem List   Diagnosis     Fibromyalgia     Non-rheumatic tricuspid valve insufficiency     Papanicolaou smear of cervix with atypical squamous cells cannot exclude high grade squamous intraepithelial lesion (ASC-H)     Major depression in complete remission (H)     Narcolepsy     Hyperlipidemia LDL goal <100     Health Care Home     Reflex sympathetic dystrophy     Contraception     Elevated BMI     Chronic pain-Fibromyalgia     Type 2 diabetes mellitus without complication, without long-term current use of insulin (H)     Migraine with aura and without status migrainosus, not intractable     Elevated blood pressure reading without diagnosis of hypertension     Pulmonary hypertension (H)     Unexplained  endometrial cells on cervical Pap smear     Thrombocytosis (H)       Current Outpatient Medications   Medication Sig Dispense Refill     ACE NOT PRESCRIBED, INTENTIONAL, 1 each ACE Inhibitor not prescribed due to Other: not indicated 0 each 0     albuterol (PROAIR HFA/PROVENTIL HFA/VENTOLIN HFA) 108 (90 Base) MCG/ACT inhaler Inhale 2 puffs into the lungs every 6 hours as needed for shortness of breath / dyspnea or wheezing 1 Inhaler 0     atorvastatin (LIPITOR) 10 MG tablet TAKE 1 TABLET BY MOUTH EVERY DAY 30 tablet 0     B-D U/F insulin pen needle USE 1 PEN NEEDLES DAILY OR AS DIRECTED. 100 each 3     RAJNI CONTOUR NEXT test strip USE TO TEST BLOOD SUGARS 3 TIMES DAILY OR AS DIRECTED 100 strip 0     blood glucose (NO BRAND SPECIFIED) lancing device Use to test blood sugars 3 times daily or as directed. 100 each 5     blood glucose calibration (NO BRAND SPECIFIED) solution Use to calibrate blood glucose monitor as directed. 1 each 0     blood glucose monitoring (NO BRAND SPECIFIED) meter device kit Use to test blood sugar 3 times daily or as directed. 1 kit 0     Blood Glucose Monitoring Suppl MISC In vitro 1 each 3     cetirizine (ZYRTEC) 10 MG tablet Take 10 mg by mouth daily as needed for allergies       citalopram (CELEXA) 40 MG tablet Take 1 tablet (40 mg) by mouth daily 30 tablet 0     Cyanocobalamin (B-12) 1000 MCG TABS Take 1,000 mcg by mouth daily 100 tablet 1     cyclobenzaprine (FLEXERIL) 10 MG tablet TAKE 1 TABLET BY MOUTH 3 TIMES DAILY AS NEEDED FOR MUSCLE SPASMS 90 tablet 1     ibuprofen (ADVIL/MOTRIN) 800 MG tablet Take 1 tablet (800 mg) by mouth every 8 hours as needed for moderate pain 90 tablet 1     metFORMIN (GLUCOPHAGE-XR) 500 MG 24 hr tablet Take 4 tablets (2,000 mg) by mouth daily (with breakfast) 360 tablet 0     norethindrone-ethinyl estradiol (MICROGESTIN 1/20) 1-20 MG-MCG per tablet Take 1 tablet by mouth daily 90 tablet 3     traMADol (ULTRAM) 50 MG tablet TAKE 1-2 TBS BY MOUTH EVERY 8  "HRS AS NEEDED FOR MODERATE PAIN MAX 8 TABS/24 HOURS, MAX 60 TBS/MONTH 60 tablet 0     traZODone (DESYREL) 50 MG tablet TAKE 1.5 TABLETS (75 MG) BY MOUTH NIGHTLY AS NEEDED FOR SLEEP 45 tablet 11     VICTOZA PEN 18 MG/3ML soln INJECT 1.2 MG SUBCUTANEOUS DAILY 18 mL 4         Reviewed and updated as needed this visit by clinical staff  Tobacco  Allergies  Meds  Med Hx  Surg Hx  Fam Hx  Soc Hx      Reviewed and updated as needed this visit by Provider         ROS:  CONSTITUTIONAL:NEGATIVE for fever, chills, change in weight  RESP:NEGATIVE for significant cough or SOB  CV: NEGATIVE for chest pain, palpitations or peripheral edema  MUSCULOSKELETAL: NEGATIVE for significant arthralgias or myalgia  PSYCHIATRIC: see below.    Anxiety level has been high.   Feeling near panic attack. More than previously.    Mother in law had massive stroke.  Work is stressful.    Using tips she had learned in therapy in the past, but still feeling this way.    Did get a cuff monitor.   Will start monitoring at home.     Not eating as well.       OBJECTIVE:     BP (!) 162/100 (BP Location: Right arm, Cuff Size: Adult Large)   Pulse 95   Temp 98.4  F (36.9  C) (Oral)   Resp 16   Ht 1.638 m (5' 4.5\")   Wt 110 kg (242 lb 6.4 oz)   SpO2 97%   BMI 40.97 kg/m    Body mass index is 40.97 kg/m .  GENERAL APPEARANCE: alert and no distress  RESP: lungs clear to auscultation - no rales, rhonchi or wheezes  CV: regular rates and rhythm  MS: no edema.  PSYCH: mentation appears normal and affect normal/worried    Lab Results   Component Value Date    A1C 7.2 04/05/2019    A1C 6.4 10/11/2018    A1C 5.8 04/23/2018    A1C 5.6 09/18/2017    A1C 5.5 02/27/2017         PHQ-9 SCORE 1/12/2018 7/2/2018 4/5/2019   PHQ-9 Total Score - - -   PHQ-9 Total Score MyChart 2 (Minimal depression) - -   PHQ-9 Total Score 2 1 15       MICHAEL-7 SCORE 1/12/2018 7/2/2018 4/5/2019   Total Score - - -   Total Score 1 (minimal anxiety) - -   Total Score 1 3 18 "       ASSESSMENT/PLAN:     1. Type 2 diabetes mellitus without complication, without long-term current use of insulin (H)  Not at optimal control. She notes she has not been eating as well.  Increasing dose of liraglutide. She will hopefully be getting back into some of the lifestyle things.  - Comprehensive metabolic panel  - **A1C FUTURE anytime  - Lipid panel reflex to direct LDL Fasting  - metFORMIN (GLUCOPHAGE-XR) 500 MG 24 hr tablet; Take 4 tablets (2,000 mg) by mouth daily (with breakfast)  Dispense: 360 tablet; Refill: 0  - liraglutide (VICTOZA PEN) 18 MG/3ML solution; Inject 1.8 mg Subcutaneous daily  Dispense: 18 mL; Refill: 4    2. Anxiety  This has been a barrier for her recently.  Discussed some options, including adding wellbutrin, counseling, collaborative care. At this time, will try adding buspar.  - busPIRone (BUSPAR) 5 MG tablet; Take 1 tablet (5 mg) by mouth 2 times daily . May increase by 5 mg increments every three days as directed to a max of 3 po tid  Dispense: 150 tablet; Refill: 0    3. Thrombocytosis (H)  Repeat   - **CBC with platelets FUTURE anytime    4. Morbid obesity (H)  She notes she has been eating poorly recently; will try to work at things. She was surprised at her HgbA1C. We did go up on liraglutide.    5. Major depression in complete remission (H)  As above.     6. Hyperlipidemia LDL goal <100  Continue to follow.   - Comprehensive metabolic panel  - Lipid panel reflex to direct LDL Fasting    7. Vitamin B12 deficiency (non anemic)    - Vitamin B12    8. Hypertension goal BP (blood pressure) < 130/80  Elevated today.  Added ACEi; Discussed potential side effects, including cough and rare occurrence of angioedema. Will monitor kidney function and potassium.  - lisinopril (PRINIVIL/ZESTRIL) 5 MG tablet; Take 1 tablet (5 mg) by mouth daily  Dispense: 30 tablet; Refill: 0    9. Fibromyalgia  Sign and redate [place] paperwork from previously. Nothing has changed; her work has said this  would be OK.          Irene Castellanos MD, MD  [facility]

## 2019-04-06 ASSESSMENT — ANXIETY QUESTIONNAIRES: GAD7 TOTAL SCORE: 18

## 2019-04-09 DIAGNOSIS — E78.5 HYPERLIPIDEMIA LDL GOAL <100: ICD-10-CM

## 2019-04-09 NOTE — TELEPHONE ENCOUNTER
"Requested Prescriptions   Pending Prescriptions Disp Refills     atorvastatin (LIPITOR) 10 MG tablet [Pharmacy Med Name: ATORVASTATIN 10 MG TABLET]  Last Written Prescription Date:  3/12/19  Last Fill Quantity: 30,  # refills: 0    Last office visit: 4/5/2019 with prescribing provider:  Irene Castellanos MD       Future Office Visit:   Next 5 appointments (look out 90 days)    May 07, 2019  3:10 PM CDT  Pepe Burch with Irene Castellanos MD  [address] [id number] [phone]          30 tablet 0     Sig: TAKE 1 TABLET BY MOUTH EVERY DAY       Statins Protocol Passed - 4/9/2019  1:30 AM        Passed - LDL on file in past 12 months     Recent Labs   Lab Test 04/05/19  1016   LDL 56             Passed - No abnormal creatine kinase in past 12 months     Recent Labs   Lab Test 10/11/18  1309   CKT 82                Passed - Recent (12 mo) or future (30 days) visit within the authorizing provider's specialty     Patient had office visit in the last 12 months or has a visit in the next 30 days with authorizing provider or within the authorizing provider's specialty.  See \"Patient Info\" tab in inbasket, or \"Choose Columns\" in Meds & Orders section of the refill encounter.              Passed - Medication is active on med list        Passed - Patient is age 18 or older        Passed - No active pregnancy on record        Passed - No positive pregnancy test in past 12 months          "

## 2019-04-11 RX ORDER — ATORVASTATIN CALCIUM 10 MG/1
TABLET, FILM COATED ORAL
Qty: 90 TABLET | Refills: 3 | Status: SHIPPED | OUTPATIENT
Start: 2019-04-11 | End: 2019-08-30

## 2019-04-11 NOTE — TELEPHONE ENCOUNTER
Prescription approved per [facility], [facility] or [facility] refill protocol.  Rhonda CHAIDEZ RN - Triage  [facility]

## 2019-04-17 DIAGNOSIS — D75.839 THROMBOCYTOSIS: ICD-10-CM

## 2019-04-17 LAB
COPATH REPORT: NORMAL
DIFFERENTIAL METHOD BLD: ABNORMAL
EOSINOPHIL # BLD AUTO: 0.1 10E9/L (ref 0–0.7)
EOSINOPHIL NFR BLD AUTO: 1 %
ERYTHROCYTE [DISTWIDTH] IN BLOOD BY AUTOMATED COUNT: 13.2 % (ref 10–15)
HCT VFR BLD AUTO: 38.6 % (ref 35–47)
HGB BLD-MCNC: 12.5 G/DL (ref 11.7–15.7)
LYMPHOCYTES # BLD AUTO: 1.6 10E9/L (ref 0.8–5.3)
LYMPHOCYTES NFR BLD AUTO: 28 %
MCH RBC QN AUTO: 29.5 PG (ref 26.5–33)
MCHC RBC AUTO-ENTMCNC: 32.4 G/DL (ref 31.5–36.5)
MCV RBC AUTO: 91 FL (ref 78–100)
MONOCYTES # BLD AUTO: 0.5 10E9/L (ref 0–1.3)
MONOCYTES NFR BLD AUTO: 9 %
NEUTROPHILS # BLD AUTO: 3.4 10E9/L (ref 1.6–8.3)
NEUTROPHILS NFR BLD AUTO: 62 %
PLATELET # BLD AUTO: 475 10E9/L (ref 150–450)
RBC # BLD AUTO: 4.24 10E12/L (ref 3.8–5.2)
RBC MORPH BLD: NORMAL
RETICS # AUTO: 60.8 10E9/L (ref 25–95)
RETICS/RBC NFR AUTO: 1.4 % (ref 0.5–2)
WBC # BLD AUTO: 5.6 10E9/L (ref 4–11)

## 2019-04-17 PROCEDURE — 36415 COLL VENOUS BLD VENIPUNCTURE: CPT | Performed by: FAMILY MEDICINE

## 2019-04-17 PROCEDURE — 85025 COMPLETE CBC W/AUTO DIFF WBC: CPT | Performed by: FAMILY MEDICINE

## 2019-04-17 PROCEDURE — 85045 AUTOMATED RETICULOCYTE COUNT: CPT | Performed by: FAMILY MEDICINE

## 2019-04-17 PROCEDURE — 85060 BLOOD SMEAR INTERPRETATION: CPT | Performed by: FAMILY MEDICINE

## 2019-04-19 DIAGNOSIS — N93.9 ABNORMAL UTERINE BLEEDING (AUB): ICD-10-CM

## 2019-04-23 ENCOUNTER — MYC MEDICAL ADVICE (OUTPATIENT)
Dept: FAMILY MEDICINE | Facility: CLINIC | Age: 49
End: 2019-04-23

## 2019-04-23 DIAGNOSIS — D75.839 THROMBOCYTOSIS: Primary | ICD-10-CM

## 2019-04-23 NOTE — TELEPHONE ENCOUNTER
Please review the  message from pt. Pended onc/hem referral. Please advise on plan of care.     Lab result notes from 4/21/19:  Your platelets are still mildly elevated.   To pursue further, I would have you visit with a Hematologist.   I am thinking that most likely this is more reactive; related to some inflammation or something, but I don't honestly know.     If you don't see a hematologist now, I would thing we should continue to monitor your platelets...     Pauline, RN  Triage Nurse

## 2019-04-24 NOTE — TELEPHONE ENCOUNTER
ONCOLOGY INTAKE: Records Information      APPT INFORMATION:  Referring provider:  Irene Castellanos,  Referring provider s clinic:  [facility]  Reason for visit/diagnosis:  Thrombocytosis    RECORDS INFORMATION:  Were the records received with the referral (via [employer])? No - Internal Referral    Has patient been seen for any external appt for this diagnosis? Per PT, no outside records    ADDITIONAL INFORMATION:  NA

## 2019-04-24 NOTE — TELEPHONE ENCOUNTER
Spoke with patient and gave her the information provided in referral. Also mychart message sent.     Jaja Mcgraw RN Flex

## 2019-04-25 ENCOUNTER — TELEPHONE (OUTPATIENT)
Dept: OBGYN | Facility: CLINIC | Age: 49
End: 2019-04-25

## 2019-04-25 DIAGNOSIS — E53.8 VITAMIN B12 DEFICIENCY (NON ANEMIC): ICD-10-CM

## 2019-04-25 NOTE — TELEPHONE ENCOUNTER
"Requested Prescriptions   Pending Prescriptions Disp Refills     cyanocobalamin (VITAMIN B-12) 1000 MCG tablet [Pharmacy Med Name: VITAMIN B-12 1,000 MCG TABLET]  Last Written Prescription Date:  3/26/19  Last Fill Quantity: 90,  # refills: 1    Last office visit: 4/5/2019 with prescribing provider:  Irene Castellanos MD       Future Office Visit:   Next 5 appointments (look out 90 days)    May 07, 2019  3:10 PM CDT  Pepe Burch with Irene Castellanos MD  [address] [address]  [phone]          100 tablet 0     Sig: TAKE 1 TABLET BY MOUTH DAILY       Vitamin Supplements (Adult) Protocol Passed - 4/25/2019  1:25 AM        Passed - High dose Vitamin D not ordered        Passed - Recent (12 mo) or future (30 days) visit within the authorizing provider's specialty     Patient had office visit in the last 12 months or has a visit in the next 30 days with authorizing provider or within the authorizing provider's specialty.  See \"Patient Info\" tab in inbasket, or \"Choose Columns\" in Meds & Orders section of the refill encounter.              Passed - Medication is active on med list          "

## 2019-04-25 NOTE — TELEPHONE ENCOUNTER
Procedure name(s) - multi select robotic assisted total laparscopic hysterectomy, bilateral salpingectomy, cystoscopy    Reason for procedure abnormal uterine bleeding, fibroids, ESSURE    Surgeon: Juarez    Is this a multi surgeon case? No    Laterality Bilateral    Request for additional equipment Other (see comments) None   Anesthesia General    Initiate Pre-op orders for above procedure: Yes, as ordered in Epic Additional orders noted there also   Location of Case: [facility]    Surgical Assistant Jasmina    Surgeon Procedure Time (incision to closure) in minutes (per procedure as applicable) 120    Note:  Surgical Case Time Needed (in minutes)   Date of Surgery (Requested):  pt unsure about timing, June? or May?    Patient Class (for admit prior to surgery, specify number of days in comments): Same day (hospital outpatient)    Why can t this outpatient surgery be done at the [facility] or  ASC? robotic    Post-Op Appointment 1.5 week    Vendor Needed? No

## 2019-04-26 NOTE — TELEPHONE ENCOUNTER
Type of surgery: robotic assisted total laparoscopic hysterectomy, bilateral salpingectomy, cystoscopy  Location of surgery: [facility]  Date and time of surgery: 6/5/19 @ 7:45 am  Surgeon: Dr. Pizarro  Pre-Op Appt Date: Patient advised to schedule.  Post-Op Appt Date: 6/17/19   Packet sent out: Yes  Pre-cert/Authorization completed:  No  Date: 4/26/19

## 2019-04-27 DIAGNOSIS — I10 HYPERTENSION GOAL BP (BLOOD PRESSURE) < 130/80: ICD-10-CM

## 2019-04-27 NOTE — TELEPHONE ENCOUNTER
"Requested Prescriptions   Pending Prescriptions Disp Refills     lisinopril (PRINIVIL/ZESTRIL) 5 MG tablet [Pharmacy Med Name: LISINOPRIL 5 MG TABLET] 30 tablet 0     Sig: TAKE 1 TABLET BY MOUTH EVERY DAY  Last Written Prescription Date:  04/05/2019  Last Fill Quantity: 30 tablet,  # refills: 0    Last office visit: 4/5/2019 with prescribing provider:  Irene Castellanos MD       Future Office Visit:   Next 5 appointments (look out 90 days)    May 07, 2019  3:10 PM CDT  Pepe Burch with Irene Casetllanos MD  [facility] ([facility]) [address] [facility] [address]  [phone]   Jun 17, 2019  1:00 PM CDT  SHORT with Georgiana Pizarro DO  [facility] ([facility]) [address] [facility] [address]  [phone]                ACE Inhibitors (Including Combos) Protocol Failed - 4/27/2019 10:34 AM        Failed - Blood pressure under 140/90 in past 12 months     BP Readings from Last 3 Encounters:   04/05/19 (!) 162/100   03/07/19 158/90   11/26/18 (!) 144/94                 Passed - Recent (12 mo) or future (30 days) visit within the authorizing provider's specialty     Patient had office visit in the last 12 months or has a visit in the next 30 days with authorizing provider or within the authorizing provider's specialty.  See \"Patient Info\" tab in inbasket, or \"Choose Columns\" in Meds & Orders section of the refill encounter.              Passed - Medication is active on med list        Passed - Patient is age 18 or older        Passed - No active pregnancy on record        Passed - Normal serum creatinine on file in past 12 months     Recent Labs   Lab Test 04/05/19  1016  03/22/16  0924   CR 0.80   < >  --    CREAT  --   --  0.8    < > = values in this interval not displayed.             Passed - Normal serum potassium on file in past 12 months     Recent Labs   Lab Test 04/05/19  1016   POTASSIUM 3.9             Passed - No " positive pregnancy test within past 12 months

## 2019-04-29 RX ORDER — LANOLIN ALCOHOL/MO/W.PET/CERES
CREAM (GRAM) TOPICAL
Qty: 100 TABLET | Refills: 3 | Status: SHIPPED | OUTPATIENT
Start: 2019-04-29 | End: 2020-05-14

## 2019-04-29 NOTE — TELEPHONE ENCOUNTER
Prescription approved per [facility] Refill Protocol.  Eneida Cain RN  Message handled by Nurse Triage.

## 2019-04-30 NOTE — TELEPHONE ENCOUNTER
RECORDS STATUS - ALL OTHER DIAGNOSIS      RECORDS RECEIVED FROM: [facility]   DATE RECEIVED: 4/30/19   NOTES STATUS DETAILS   OFFICE NOTE from referring provider 4/23/19: Dr. Irene Conley   OFFICE NOTE from medical oncologist     DISCHARGE SUMMARY from hospital     DISCHARGE REPORT from the ER     OPERATIVE REPORT     MEDICATION LIST     CLINICAL TRIAL TREATMENTS TO DATE     LABS     PATHOLOGY REPORTS     ANYTHING RELATED TO DIAGNOSIS  Epic   GENONOMIC TESTING     TYPE:     IMAGING (NEED IMAGES & REPORT)     CT SCANS     MRI     MAMMO     ULTRASOUND     PET

## 2019-05-01 RX ORDER — LISINOPRIL 5 MG/1
TABLET ORAL
Qty: 30 TABLET | Refills: 0 | Status: SHIPPED | OUTPATIENT
Start: 2019-05-01 | End: 2019-05-07

## 2019-05-01 NOTE — TELEPHONE ENCOUNTER
Medication is being filled for 1 time refill only due to:  Patient needs to be seen because due for BP and med recheck after adding on ace inhibitor. . patient has upcoming appointment scheduled 5/7/19 with pcp    Mariajose Ahmadi RN

## 2019-05-02 DIAGNOSIS — F41.9 ANXIETY: ICD-10-CM

## 2019-05-02 RX ORDER — BUSPIRONE HYDROCHLORIDE 10 MG/1
TABLET ORAL
Qty: 75 TABLET | Refills: 0
Start: 2019-05-02

## 2019-05-02 NOTE — TELEPHONE ENCOUNTER
"Requested Prescriptions   Pending Prescriptions Disp Refills     busPIRone (BUSPAR) 10 MG tablet [Pharmacy Med Name: BUSPIRONE HCL 10 MG TABLET]  Last Written Prescription Date:  4/5/19  Last Fill Quantity: 150,  # refills: 0    Last office visit: 4/5/2019 with prescribing provider:  Irene Castellanos MD       Future Office Visit:   Next 5 appointments (look out 90 days)    May 07, 2019  3:10 PM CDT  MyChart Long with Irene Castellanos MD  [facility] ([facility] [address] [address] [address]  [phone]   May 24, 2019 10:30 AM CDT  MyChart Long with Irene Castellanos MD  [facility] ([facility]) [address] [address] [facility] [address]  [phone]   Jun 17, 2019  1:00 PM CDT  SHORT with Georgiana Pizarro DO  [facility] ([facility]) [address] [facility] [address]  [phone]          75 tablet 0     Sig: TAKE 1/2 TAB BY MOUTH 2XDAILY MAY INCREASE BY 5MG EVERY 3 DAYS AS DIRECTED MAX 1.5 TABS 3X/DAY       Atypical Antidepressants Protocol Passed - 5/2/2019  1:44 AM        Passed - Recent (12 mo) or future (30 days) visit within the authorizing provider's specialty     Patient had office visit in the last 12 months or has a visit in the next 30 days with authorizing provider or within the authorizing provider's specialty.  See \"Patient Info\" tab in inbasket, or \"Choose Columns\" in Meds & Orders section of the refill encounter.              Passed - Medication active on med list        Passed - Patient is age 18 or older        Passed - No active pregnancy on record        Passed - No positive pregnancy test in past 12 mos          "

## 2019-05-06 NOTE — PROGRESS NOTES
SUBJECTIVE:   Kaylee Lopez is a 49 year old female who presents to clinic today for the following   health issues:        Medication Followup of Buspar and Lisinopril    Taking Medication as prescribed: yes    Side Effects:  Nausea     Medication Helping Symptoms:  Buspar is effective for anxiety. Not taking Blood pressure readings.           Additional history:     Reviewed  and updated as needed this visit by clinical staff         Reviewed and updated as needed this visit by Provider         See under ROS     Patient Active Problem List   Diagnosis     Fibromyalgia     Non-rheumatic tricuspid valve insufficiency     Papanicolaou smear of cervix with atypical squamous cells cannot exclude high grade squamous intraepithelial lesion (ASC-H)     Major depression in complete remission (H)     Narcolepsy     Hyperlipidemia LDL goal <100     Health Care Home     Reflex sympathetic dystrophy     Contraception     Elevated BMI     Chronic pain-Fibromyalgia     Type 2 diabetes mellitus without complication, without long-term current use of insulin (H)     Migraine with aura and without status migrainosus, not intractable     Elevated blood pressure reading without diagnosis of hypertension     Pulmonary hypertension (H)     Unexplained endometrial cells on cervical Pap smear     Thrombocytosis (H)       Current Outpatient Medications   Medication Sig Dispense Refill     ACE NOT PRESCRIBED, INTENTIONAL, 1 each ACE Inhibitor not prescribed due to Other: not indicated 0 each 0     albuterol (PROAIR HFA/PROVENTIL HFA/VENTOLIN HFA) 108 (90 Base) MCG/ACT inhaler Inhale 2 puffs into the lungs every 6 hours as needed for shortness of breath / dyspnea or wheezing 1 Inhaler 0     atorvastatin (LIPITOR) 10 MG tablet TAKE 1 TABLET BY MOUTH EVERY DAY 90 tablet 3     B-D U/F insulin pen needle USE 1 PEN NEEDLES DAILY OR AS DIRECTED. 100 each 3     RAJNI CONTOUR NEXT test strip USE TO TEST BLOOD SUGARS 3 TIMES DAILY OR AS  DIRECTED 100 strip 0     blood glucose (NO BRAND SPECIFIED) lancing device Use to test blood sugars 3 times daily or as directed. 100 each 5     blood glucose calibration (NO BRAND SPECIFIED) solution Use to calibrate blood glucose monitor as directed. 1 each 0     blood glucose monitoring (NO BRAND SPECIFIED) meter device kit Use to test blood sugar 3 times daily or as directed. 1 kit 0     Blood Glucose Monitoring Suppl MISC In vitro 1 each 3     busPIRone (BUSPAR) 5 MG tablet Take 1 tablet (5 mg) by mouth 2 times daily . May increase by 5 mg increments every three days as directed to a max of 3 po tid 150 tablet 0     cetirizine (ZYRTEC) 10 MG tablet Take 10 mg by mouth daily as needed for allergies       citalopram (CELEXA) 40 MG tablet Take 1 tablet (40 mg) by mouth daily 30 tablet 0     cyanocobalamin (VITAMIN B-12) 1000 MCG tablet TAKE 1 TABLET BY MOUTH DAILY 100 tablet 3     cyclobenzaprine (FLEXERIL) 10 MG tablet TAKE 1 TABLET BY MOUTH 3 TIMES DAILY AS NEEDED FOR MUSCLE SPASMS 90 tablet 1     ibuprofen (ADVIL/MOTRIN) 800 MG tablet Take 1 tablet (800 mg) by mouth every 8 hours as needed for moderate pain 90 tablet 1     liraglutide (VICTOZA PEN) 18 MG/3ML solution Inject 1.8 mg Subcutaneous daily 18 mL 4     lisinopril (PRINIVIL/ZESTRIL) 5 MG tablet TAKE 1 TABLET BY MOUTH EVERY DAY 30 tablet 0     metFORMIN (GLUCOPHAGE-XR) 500 MG 24 hr tablet Take 4 tablets (2,000 mg) by mouth daily (with breakfast) 360 tablet 0     norethindrone-ethinyl estradiol (MICROGESTIN 1/20) 1-20 MG-MCG per tablet Take 1 tablet by mouth daily 90 tablet 3     traMADol (ULTRAM) 50 MG tablet TAKE 1-2 TBS BY MOUTH EVERY 8 HRS AS NEEDED FOR MODERATE PAIN MAX 8 TABS/24 HOURS, MAX 60 TBS/MONTH 60 tablet 0     traZODone (DESYREL) 50 MG tablet TAKE 1.5 TABLETS (75 MG) BY MOUTH NIGHTLY AS NEEDED FOR SLEEP 45 tablet 11         ROS:  CONSTITUTIONAL:NEGATIVE for fever, chills, change in weight  RESP:NEGATIVE for significant cough or SOB  CV:  "NEGATIVE for chest pain, palpitations or peripheral edema  MUSCULOSKELETAL: NEGATIVE for significant arthralgias or myalgia  PSYCHIATRIC: see below    No panic attack since went on Buspar.    Did have cough; went off the med (lisinopril) x 3 days. Cough still there.  Started taking again. Cough is now less.    Regarding Buspar, taking 10 mg bid. (they did not have 5 mg tabs)  This seems to be working well.     Did see Dr. Pizarro. Will have hysterectomy in June.    Will see Hematologist on 5/14.      OBJECTIVE:     /72 (BP Location: Right arm, Cuff Size: Adult Large)   Pulse 105   Temp 99  F (37.2  C) (Oral)   Resp 16   Ht 1.638 m (5' 4.5\")   Wt 108.2 kg (238 lb 8 oz)   SpO2 96%   BMI 40.31 kg/m    Body mass index is 40.31 kg/m .  GENERAL APPEARANCE: alert and no distress  CV: regular rates and rhythm  MS: no edema.   PSYCH: mentation appears normal and affect normal/bright    PHQ-9 SCORE 7/2/2018 4/5/2019 5/7/2019   PHQ-9 Total Score - - -   PHQ-9 Total Score MyChart - - -   PHQ-9 Total Score 1 15 1       MICHAEL-7 SCORE 7/2/2018 4/5/2019 5/7/2019   Total Score - - -   Total Score - - -   Total Score 3 18 4             ASSESSMENT/PLAN:     1. Hypertension goal BP (blood pressure) < 130/80  Doing well on lisinopril. Started last visit. Continue current dosing.  - Basic metabolic panel  - lisinopril (PRINIVIL/ZESTRIL) 5 MG tablet; Take 1 tablet (5 mg) by mouth daily  Dispense: 90 tablet; Refill: 1    2. Anxiety  Doing well on the Buspar. Anticipate continuing. Surveys in 6 months.  - busPIRone (BUSPAR) 10 MG tablet; Take 1 tablet (10 mg) by mouth 2 times daily  Dispense: 180 tablet; Refill: 1      Irene Castellanos MD, MD  [facility]      "

## 2019-05-07 ENCOUNTER — OFFICE VISIT (OUTPATIENT)
Dept: FAMILY MEDICINE | Facility: CLINIC | Age: 49
End: 2019-05-07
Payer: COMMERCIAL

## 2019-05-07 VITALS
BODY MASS INDEX: 39.74 KG/M2 | WEIGHT: 238.5 LBS | HEIGHT: 65 IN | HEART RATE: 105 BPM | OXYGEN SATURATION: 96 % | RESPIRATION RATE: 16 BRPM | TEMPERATURE: 99 F | DIASTOLIC BLOOD PRESSURE: 72 MMHG | SYSTOLIC BLOOD PRESSURE: 128 MMHG

## 2019-05-07 DIAGNOSIS — I10 HYPERTENSION GOAL BP (BLOOD PRESSURE) < 130/80: Primary | ICD-10-CM

## 2019-05-07 DIAGNOSIS — F41.9 ANXIETY: ICD-10-CM

## 2019-05-07 PROCEDURE — 80048 BASIC METABOLIC PNL TOTAL CA: CPT | Performed by: FAMILY MEDICINE

## 2019-05-07 PROCEDURE — 99214 OFFICE O/P EST MOD 30 MIN: CPT | Performed by: FAMILY MEDICINE

## 2019-05-07 PROCEDURE — 36415 COLL VENOUS BLD VENIPUNCTURE: CPT | Performed by: FAMILY MEDICINE

## 2019-05-07 RX ORDER — BUSPIRONE HYDROCHLORIDE 10 MG/1
10 TABLET ORAL 2 TIMES DAILY
Qty: 180 TABLET | Refills: 1 | Status: SHIPPED | OUTPATIENT
Start: 2019-05-07 | End: 2019-11-04

## 2019-05-07 RX ORDER — LISINOPRIL 5 MG/1
5 TABLET ORAL DAILY
Qty: 90 TABLET | Refills: 1 | Status: SHIPPED | OUTPATIENT
Start: 2019-05-07 | End: 2019-08-30

## 2019-05-07 ASSESSMENT — ANXIETY QUESTIONNAIRES
3. WORRYING TOO MUCH ABOUT DIFFERENT THINGS: SEVERAL DAYS
6. BECOMING EASILY ANNOYED OR IRRITABLE: SEVERAL DAYS
GAD7 TOTAL SCORE: 4
IF YOU CHECKED OFF ANY PROBLEMS ON THIS QUESTIONNAIRE, HOW DIFFICULT HAVE THESE PROBLEMS MADE IT FOR YOU TO DO YOUR WORK, TAKE CARE OF THINGS AT HOME, OR GET ALONG WITH OTHER PEOPLE: SOMEWHAT DIFFICULT
2. NOT BEING ABLE TO STOP OR CONTROL WORRYING: NOT AT ALL
1. FEELING NERVOUS, ANXIOUS, OR ON EDGE: SEVERAL DAYS
7. FEELING AFRAID AS IF SOMETHING AWFUL MIGHT HAPPEN: NOT AT ALL
5. BEING SO RESTLESS THAT IT IS HARD TO SIT STILL: NOT AT ALL

## 2019-05-07 ASSESSMENT — PATIENT HEALTH QUESTIONNAIRE - PHQ9
SUM OF ALL RESPONSES TO PHQ QUESTIONS 1-9: 1
5. POOR APPETITE OR OVEREATING: SEVERAL DAYS

## 2019-05-07 ASSESSMENT — MIFFLIN-ST. JEOR: SCORE: 1709.77

## 2019-05-08 LAB
ANION GAP SERPL CALCULATED.3IONS-SCNC: 8 MMOL/L (ref 3–14)
BUN SERPL-MCNC: 15 MG/DL (ref 7–30)
CALCIUM SERPL-MCNC: 9.1 MG/DL (ref 8.5–10.1)
CHLORIDE SERPL-SCNC: 106 MMOL/L (ref 94–109)
CO2 SERPL-SCNC: 23 MMOL/L (ref 20–32)
CREAT SERPL-MCNC: 0.83 MG/DL (ref 0.52–1.04)
GFR SERPL CREATININE-BSD FRML MDRD: 84 ML/MIN/{1.73_M2}
GLUCOSE SERPL-MCNC: 177 MG/DL (ref 70–99)
POTASSIUM SERPL-SCNC: 4.4 MMOL/L (ref 3.4–5.3)
SODIUM SERPL-SCNC: 137 MMOL/L (ref 133–144)

## 2019-05-08 ASSESSMENT — ANXIETY QUESTIONNAIRES: GAD7 TOTAL SCORE: 4

## 2019-05-11 ENCOUNTER — MYC REFILL (OUTPATIENT)
Dept: FAMILY MEDICINE | Facility: CLINIC | Age: 49
End: 2019-05-11

## 2019-05-11 DIAGNOSIS — M79.7 FIBROMYALGIA: ICD-10-CM

## 2019-05-11 DIAGNOSIS — G89.29 CHRONIC PAIN: ICD-10-CM

## 2019-05-14 ENCOUNTER — HOSPITAL ENCOUNTER (OUTPATIENT)
Facility: CLINIC | Age: 49
Setting detail: SPECIMEN
Discharge: HOME | End: 2019-05-14
Attending: FAMILY MEDICINE | Admitting: FAMILY MEDICINE
Payer: COMMERCIAL

## 2019-05-14 ENCOUNTER — PRE VISIT (OUTPATIENT)
Dept: ONCOLOGY | Facility: CLINIC | Age: 49
End: 2019-05-14

## 2019-05-14 ENCOUNTER — ONCOLOGY VISIT (OUTPATIENT)
Dept: ONCOLOGY | Facility: CLINIC | Age: 49
End: 2019-05-14
Attending: FAMILY MEDICINE
Payer: COMMERCIAL

## 2019-05-14 VITALS
DIASTOLIC BLOOD PRESSURE: 79 MMHG | WEIGHT: 236.4 LBS | BODY MASS INDEX: 39.39 KG/M2 | HEIGHT: 65 IN | TEMPERATURE: 98.2 F | HEART RATE: 89 BPM | RESPIRATION RATE: 16 BRPM | OXYGEN SATURATION: 93 % | SYSTOLIC BLOOD PRESSURE: 149 MMHG

## 2019-05-14 DIAGNOSIS — D75.839 THROMBOCYTOSIS: Primary | ICD-10-CM

## 2019-05-14 LAB
FERRITIN SERPL-MCNC: 12 NG/ML (ref 8–252)
IRON SATN MFR SERPL: 16 % (ref 15–46)
IRON SERPL-MCNC: 91 UG/DL (ref 35–180)
TIBC SERPL-MCNC: 584 UG/DL (ref 240–430)

## 2019-05-14 PROCEDURE — 81403 MOPATH PROCEDURE LEVEL 4: CPT | Performed by: INTERNAL MEDICINE

## 2019-05-14 PROCEDURE — 83540 ASSAY OF IRON: CPT | Performed by: INTERNAL MEDICINE

## 2019-05-14 PROCEDURE — 81219 CALR GENE COM VARIANTS: CPT | Performed by: INTERNAL MEDICINE

## 2019-05-14 PROCEDURE — 82728 ASSAY OF FERRITIN: CPT | Performed by: INTERNAL MEDICINE

## 2019-05-14 PROCEDURE — 36415 COLL VENOUS BLD VENIPUNCTURE: CPT

## 2019-05-14 PROCEDURE — G0463 HOSPITAL OUTPT CLINIC VISIT: HCPCS

## 2019-05-14 PROCEDURE — 83550 IRON BINDING TEST: CPT | Performed by: INTERNAL MEDICINE

## 2019-05-14 PROCEDURE — 84238 ASSAY NONENDOCRINE RECEPTOR: CPT | Performed by: INTERNAL MEDICINE

## 2019-05-14 PROCEDURE — 99204 OFFICE O/P NEW MOD 45 MIN: CPT | Performed by: INTERNAL MEDICINE

## 2019-05-14 RX ORDER — TRAMADOL HYDROCHLORIDE 50 MG/1
TABLET ORAL
Qty: 60 TABLET | Refills: 0 | Status: SHIPPED | OUTPATIENT
Start: 2019-05-14 | End: 2019-08-08

## 2019-05-14 ASSESSMENT — MIFFLIN-ST. JEOR: SCORE: 1700.24

## 2019-05-14 ASSESSMENT — PAIN SCALES - GENERAL: PAINLEVEL: MODERATE PAIN (4)

## 2019-05-14 NOTE — TELEPHONE ENCOUNTER
Unable to fill per standing order routed to Dr. Castellanos for approval.  **[place]  reviewed- last filled: 3/5, 1/22  Narc agreement: #[id number] for 30 days

## 2019-05-14 NOTE — NURSING NOTE
"Oncology Rooming Note    May 14, 2019 12:59 PM   Kaylee Lopez is a 49 year old female who presents for:    Chief Complaint   Patient presents with     Oncology Clinic Visit     New Patient Consult     Initial Vitals: /79   Pulse 89   Temp 98.2  F (36.8  C) (Tympanic)   Resp 16   Ht 1.638 m (5' 4.5\")   Wt 107.2 kg (236 lb 6.4 oz)   SpO2 93%   BMI 39.95 kg/m   Estimated body mass index is 39.95 kg/m  as calculated from the following:    Height as of this encounter: 1.638 m (5' 4.5\").    Weight as of this encounter: 107.2 kg (236 lb 6.4 oz). Body surface area is 2.21 meters squared.  Moderate Pain (4) Comment: Data Unavailable   No LMP recorded.  Allergies reviewed: Yes  Medications reviewed: Yes    Medications: Medication refills not needed today.  Pharmacy name entered into [employer]: [facility] [id number] [place] [facility] [address] [facility]    Clinical concerns: New Patient Consult       Mabel Latham CMA              "

## 2019-05-14 NOTE — TELEPHONE ENCOUNTER
Rx Faxed to preferred pharmacy in this current encounter.    Jamal Ga CMA ([facility])

## 2019-05-14 NOTE — LETTER
5/14/2019         RE: Kaylee Lopez  [address]  [facility] [address]        Dear Colleague,    Thank you for referring your patient, Kaylee Lopez, to the [facility]. Please see a copy of my visit note below.    [facility]    NEW PATIENT VISIT NOTE    PATIENT NAME: Kaylee Lopez MRN # [id number]  DATE OF VISIT: May 14, 2019 YOB: 1970    REFERRING PROVIDER: Irene Castellanos MD  [address] [place] [address]     HISTORY OF PRESENT ILLNESS       Kaylee is alone at this visit and history is as per patient. She notes that she had CBC drawn by her PCP for scheduled visits and was noted to have elevated counts twice in a row (10/11/18 - 461k and 4/5/19 it was 489k). She was further worked up for her elevated PLT counts with PS which confirmed thrombocytosis. She has been referred to hematology clinic for her thrombocytosis.     She has a hysterectomy planned for June and she would dislike to reschedule it.     She has fibromyalgia, tricuspid valve disorder. She has pain all the time. Some days are better than others. She has continued to work full time and her fibromyalgia has not affected her work .     She has been diagnosed with HTN. She attributes this to stress at work. She has anxiety and depression. She has been started on medications for these.     She also has DM TII.     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        PAST MEDICAL HISTORY     Past Medical History:   Diagnosis Date     Abnormal Papanicolaou smear of cervix and cervical HPV 2003    late 2003, early 2004; treated with TCA     Depression      Diabetes mellitus      Fibromyalgia      Heart murmur     tricuspid valve disorder     Inflammatory arthritis     ?; has been discharged from Rheumatology     Narcolepsy 11/1/2010    doing well without medication.      Non-rheumatic tricuspid valve insufficiency     antibiotic prophylax Problem list name updated by automated process. Provider to review      Obesity      Pulmonary hypertension (H) 5/2/2010    on ECHO, but normal right heart cath 2011     Reflex sympathetic dystrophy     right foot ; related to stress fracture     TRICUSPID VALVE DISEASE (regurg)     sees Cardiology           CURRENT OUTPATIENT MEDICATIONS     Current Outpatient Medications   Medication Sig     ACE NOT PRESCRIBED, INTENTIONAL, 1 each ACE Inhibitor not prescribed due to Other: not indicated     albuterol (PROAIR HFA/PROVENTIL HFA/VENTOLIN HFA) 108 (90 Base) MCG/ACT inhaler Inhale 2 puffs into the lungs every 6 hours as needed for shortness of breath / dyspnea or wheezing     atorvastatin (LIPITOR) 10 MG tablet TAKE 1 TABLET BY MOUTH EVERY DAY     B-D U/F insulin pen needle USE 1 PEN NEEDLES DAILY OR AS DIRECTED.     RAJNI CONTOUR NEXT test strip USE TO TEST BLOOD SUGARS 3 TIMES DAILY OR AS DIRECTED     blood glucose (NO BRAND SPECIFIED) lancing device Use to test blood sugars 3 times daily or as directed.     blood glucose calibration (NO BRAND SPECIFIED) solution Use to calibrate blood glucose monitor as directed.     blood glucose monitoring (NO BRAND SPECIFIED) meter device kit Use to test blood sugar 3 times daily or as directed.     Blood Glucose Monitoring Suppl MISC In vitro     busPIRone (BUSPAR) 10 MG tablet Take 1 tablet (10 mg) by mouth 2 times daily     cetirizine (ZYRTEC) 10 MG tablet Take 10 mg by mouth daily as needed for allergies     citalopram (CELEXA) 40 MG tablet Take 1 tablet (40 mg) by mouth daily     cyanocobalamin (VITAMIN B-12) 1000 MCG tablet TAKE 1 TABLET BY MOUTH DAILY     cyclobenzaprine (FLEXERIL) 10 MG tablet TAKE 1 TABLET BY MOUTH 3 TIMES DAILY AS NEEDED FOR MUSCLE SPASMS     ibuprofen (ADVIL/MOTRIN) 800 MG tablet Take 1 tablet (800 mg) by mouth every 8 hours as needed for moderate pain     liraglutide (VICTOZA PEN) 18  MG/3ML solution Inject 1.8 mg Subcutaneous daily     lisinopril (PRINIVIL/ZESTRIL) 5 MG tablet Take 1 tablet (5 mg) by mouth daily     metFORMIN (GLUCOPHAGE-XR) 500 MG 24 hr tablet Take 4 tablets (2,000 mg) by mouth daily (with breakfast)     norethindrone-ethinyl estradiol (MICROGESTIN ) 1-20 MG-MCG per tablet Take 1 tablet by mouth daily     traZODone (DESYREL) 50 MG tablet TAKE 1.5 TABLETS (75 MG) BY MOUTH NIGHTLY AS NEEDED FOR SLEEP     traMADol (ULTRAM) 50 MG tablet TAKE 1-2 TBS BY MOUTH EVERY 8 HRS AS NEEDED FOR MODERATE PAIN MAX 8 TABS/24 HOURS, MAX 60 TBS/MONTH     No current facility-administered medications for this visit.         ALLERGIES      Allergies   Allergen Reactions     Codeine Nausea     Nausea (with tylenol/codeine)        SOCIAL HISTORY   She is  - has a boy and girl - 22 and 20 yrs old. She lives with her . 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     She does not smoke and is never smoker. She drinks rarely. She denies recreational drug use.      FAMILY HISTORY   - Heart disease - maternal and paternal grand father   - Paternal grand father  of CVA at 51  - Paternal grandmother and maternal grandfather had colon cancer -      Family History   Problem Relation Age of Onset     Respiratory Father         asthma     Arthritis Father         hips     Depression Father      Alcohol/Drug Father      Diabetes Paternal Grandfather      Cerebrovascular Disease Paternal Grandfather      Hypertension Paternal Grandfather      Cancer - colorectal Paternal Grandmother      Arthritis Paternal Grandmother         hips     Colon Cancer Paternal Grandmother      Prostate Cancer Maternal Grandfather      Hypertension Maternal Grandfather      Cancer Maternal Grandmother         pancreatic     Arthritis Maternal Grandmother         hips     Depression Maternal Grandmother      Hypertension Maternal Grandmother       Other Cancer Maternal Grandmother      Gallbladder Disease Maternal Grandmother      Depression Mother      Rashes/Skin Problems Mother         Rosacea     Arthritis Mother         osteoarthritis in hands and knees     Hypertension Mother      Depression Maternal Uncle         bipolar     Respiratory Brother         sleep apnea     Mental Illness Brother      Cancer - colorectal Maternal Uncle      Cerebrovascular Disease Maternal Uncle          of massive stroke--no heart problems     Hypertension Son         REVIEW OF SYSTEMS   As above in the HPI, o/w complete 12-point ROS was negative.     PHYSICAL EXAM   B/P: 149/79, T: 98.2, P: 89, R: 16  @LASTSAO2(4)@  Wt Readings from Last 3 Encounters:   19 107.2 kg (236 lb 6.4 oz)   19 108.2 kg (238 lb 8 oz)   19 110 kg (242 lb 6.4 oz)     GEN: NAD  HEENT: PERRL, EOMI, no icterus, injection or pallor. Oropharynx is clear.  NECK: no cervical or supraclavicular lymphadenopathy  LUNGS: clear bilaterally  CV: regular, no murmurs, rubs, or gallops  ABDOMEN: soft, non-tender, non-distended, normal bowel sounds, no hepatosplenomegaly by percussion or palpation  EXT: warm, well perfused, no edema  NEURO: alert  SKIN: no rashes     LABORATORY AND IMAGING STUDIES     Recent Labs   Lab Test 19  1545 19  1016 10/11/18  1309 18  0910 17  0754    139 136 136 139   POTASSIUM 4.4 3.9 4.1 4.4 4.0   CHLORIDE 106 105 100 102 101   CO2 23 26 27 29 28   ANIONGAP 8 8 9 5 10   BUN 15 10 10 13 13   CR 0.83 0.80 0.81 0.81 0.80   * 153* 114* 129* 104*   KIMBERLEE 9.1 9.0 8.7 8.4* 8.9     Recent Labs   Lab Test 10/11/18  1309 10/30/15  1318   MAG 1.8 2.0   PHOS 3.5  --      Recent Labs   Lab Test 19  0823 19  1016 10/11/18  1309 18  0910 10/30/15  1319 12  0830   WBC 5.6 7.8 8.4  --  8.7 7.3   HGB 12.5 12.4 12.0 11.8 13.5 13.2   * 489* 461*  --  419 357   MCV 91 90 90  --  92 90   NEUTROPHIL 62.0  --   --   --   --   75.1*     Recent Labs   Lab Test 04/05/19  1016 10/11/18  1309 03/23/18  0831 09/18/17  0754 08/22/16  0745   BILITOTAL 0.3  --   --  0.6 0.3   ALKPHOS 114  --   --  138 143   ALT 15  --  20 23 21   AST 10  --   --  15 9   ALBUMIN 3.3* 3.2*  --  3.7 3.8     TSH   Date Value Ref Range Status   04/23/2018 2.44 0.40 - 4.00 mU/L Final   02/27/2017 2.66 0.40 - 4.00 mU/L Final   03/10/2016 1.60 0.40 - 4.00 mU/L Final     No results for input(s): CEA in the last 83471 hours.  Results for orders placed or performed in visit on 04/19/19   US Pelvic Complete with Transvaginal    Narrative    ULTRASOUND - PELVIC GYN  [facility]  Obstetrics & Gynecology  [address]  [place] [address]  Tel: [phone]     Referring MD: Georgiana Pizarro DO  Primary Clinic: [facility]     ===================================     CLINICAL INFORMATION     Indications for ultrasound:   Bleeding/Menses - AUb     LMP: 16 Apr 19    Hormones: OCP      Measurements:  Uterus: 8.1 x 5.2 x 4.5 cm.     Position is retroverted.  Contour is irreg w myomata:   1) anterior mid 2.4 x 1.7 x 1.5 cm.     Endo cav: 9.1 mm         Distorted  Cervix: wnl     Right ovary: nv    Left ovary:   nv     Cul de sac: no free fluid     *Other findings:      ===================================  Complete pelvic ultrasound utilizing both abdominal and vaginal   transducers. Small myoma, affecting cavity of uterus.    CARMINE MURDOCK M.D. .       Lab Results   Component Value Date    PATH  04/17/2019     Patient Name: VANCE CONNER  MR#: [id number]  Specimen #: [id number]  Collected: 4/17/2019  Received: 4/17/2019  Reported: 4/17/2019 13:56  Ordering Phy(s): LINETTE PAGAN    For improved result formatting, select 'View Enhanced Report Format' under   Linked Documents section.    TEST(S):  Peripheral Smear Morphology    FINAL DIAGNOSIS:  Peripheral blood.  - Thrombocytosis.    COMMENT:  The thrombocytosis is nonspecific and can be associated  with multiple   conditions including but not limited to  physiologic, stress, rebound, inflammatory conditions, infection, asplenic   or hyposplenic state, iron  deficiency, neoplasms, myeloproliferative neoplasms, etc.  If reactive   conditions of thrombocytosis are  excluded and the thrombocytosis is sustained, testing peripheral blood for   myeloproliferative next generation  sequencing molecular panel would be suggested as an initial evaluation for   a potential myeloproliferative  neoplasm/essential  thrombocythemia.  Clinical correlation is required.    Electronically signed out by:    Zacarais Borrero M.D.    CLINICAL HISTORY:  Thrombocytosis.    PERIPHERAL BLOOD DATA:    PERIPHERAL BLOOD DATA  Patient Value (Reference Range >18 year old female)  5.6 . . .WBC   (4.0-11.0 x 10*9/L)  4.24 . . .RBC   (3.8-5.2 x 10*12/L)  12.5 . . .HGB   (11.7-15.7 g/dL)  38.6 . . .HCT   (35.0-47.0 %)  91 . . .MCV   (78-100fL)  29.5 . . .MCH   (26.5-33.0 pg)  32.4 . . .MCHC   (31.5-36.5 g/dL)  13.2 . . .RDW   (10.0-15.0 %)  475 . . .PLT   (150-450 x 10*9/L)  1.4 . . .Retic   (0.5-2.0%)    PERIPHERAL BLOOD DIFFERENTIAL  (Reference ranges >18 year old female)    Percent  62. .Neutrophils, segmented and bands   (40 - 75)  28. .Lymphocytes   (20 - 48)  9. .Monocytes   (0 - 12)  1. .Eosinophils   (0 - 6)  0. .Basophils   (0 - 2)    Absolute  3.4. .Neutrophils, segmented and bands    (1.6 - 8.3 x 10*9/L)  1.6. .Lymphocytes    (0.8 - 5.3 x 10*9/L)  0.5. .Monocytes    (0 -1.3 x 10*9/L)  0.1. .Eosinophils    (0 - 0.7 x 10*9/L)  0. .Basophils    (0 - 0.2 x 10*9/L)    PERIPHERAL MORPHOLOGY:    ERYTHROCYTES: Appear normochromic normocytic and do not show significant   anisopoikilocytosis.    LEUKOCYTES: Granulocytes appear predominantly mature and segmented without   significant left shift.  Lymphocytes are small.  Monocytes appear mature.  No obvious dysplastic   changes are seen.  No blasts are  identified.    PLATELETS: Appear  increased.    CPT Codes:  A: 50121-FDAQ    TESTING LAB LOCATION:  [facility]  [address] [place]  [address] [phone]    COLLECTION SITE:  Client:  [facility]  Location:  RMLAB (R)         ASSESSMENT    1. Thrombocytosis  2. HTN, DM TII, Obesity, menorrhagia with planned hysterectomy, fibromyalgia  3. ECOG PS 1    DISCUSSION   I explained John that PLT are our first line of defense against bleeding to death. She has borderline elevated PLT counts.     PLT counts are elevated in any forms of stress as historically the greatest challenge for mankind had been infections and bleeding to death. So any physical or even psychological stress could raise the PLT counts by release of endogenous corticosteroids. She has been struggling with menorrhagia which could possibly explain the elevation in her PLT counts.     Alternately the PLT counts could be elevated in primary disorder of blood called essential thrombocythemia which is part of myeloproliferative disorders. We could get next-generation sequencing to identify mutations associated with myeloproliferative disorders.  Even if she had myeloproliferative disorder, most patients with this condition have a near-normal life span.  The majority of patients are over 50 years old. This condition is associated with an increased risk of blood clotting, both in the venous and the arterial system.  This can lead to lower extremity deep vein thrombosis, i.e., blood clot in the leg, or pulmonary embolism, i.e., clots in the vessels of the lung.  Besides these, it is also associated with strokes, which are arterial blood clots.  When the platelet counts are very high, it can also cause problems with bleeding.  Most of the bleeding and thrombosis events are seen at much higher PLT counts. She only has marginal PLT elevation.      Aspirin is used to prevent the blood clots.  However, it is indicated in patients who are at higher risk  of blood clots.  Hydroxyurea can be used to lower the platelet counts and decrease all of these side effects.     I will also check for iron panel for her as this could reflect iron deficiency from her blood loss.      PLAN   - I will work her up for thrombocytosis and iron deficiency  - I will see her in a few weeks to review results.     Over 45 min of direct face to face time spent with patient with more than 50% time spent in counseling and coordinating care.      Jorge A Delgado  Adj ,  Division of Hematology, Oncology & Transplantation  [facility].       Again, thank you for allowing me to participate in the care of your patient.        Sincerely,        Jorge A Delgado MD

## 2019-05-14 NOTE — PROGRESS NOTES
[facility] CANCER CLINIC    NEW PATIENT VISIT NOTE    PATIENT NAME: Kaylee Lopez MRN # [id number]  DATE OF VISIT: May 14, 2019 YOB: 1970    REFERRING PROVIDER: Irene Castellanos [place]  [id number] [address]     HISTORY OF PRESENT ILLNESS       Kaylee is alone at this visit and history is as per patient. She notes that she had CBC drawn by her PCP for scheduled visits and was noted to have elevated counts twice in a row (10/11/18 - 461k and 4/5/19 it was 489k). She was further worked up for her elevated PLT counts with PS which confirmed thrombocytosis. She has been referred to hematology clinic for her thrombocytosis.     She has a hysterectomy planned for June and she would dislike to reschedule it.     She has fibromyalgia, tricuspid valve disorder. She has pain all the time. Some days are better than others. She has continued to work full time and her fibromyalgia has not affected her work .     She has been diagnosed with HTN. She attributes this to stress at work. She has anxiety and depression. She has been started on medications for these.     She also has DM TII.     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        PAST MEDICAL HISTORY     Past Medical History:   Diagnosis Date     Abnormal Papanicolaou smear of cervix and cervical HPV 2003    late 2003, early 2004; treated with TCA     Depression      Diabetes mellitus      Fibromyalgia      Heart murmur     tricuspid valve disorder     Inflammatory arthritis     ?; has been discharged from Rheumatology     Narcolepsy 11/1/2010    doing well without medication.     Non-rheumatic tricuspid valve insufficiency     antibiotic prophylax Problem list name updated by automated process. Provider to review      Obesity      Pulmonary hypertension (H) 5/2/2010    on ECHO, but normal right heart cath 2011     Reflex sympathetic  dystrophy     right foot ; related to stress fracture     TRICUSPID VALVE DISEASE (regurg)     sees Cardiology           CURRENT OUTPATIENT MEDICATIONS     Current Outpatient Medications   Medication Sig     ACE NOT PRESCRIBED, INTENTIONAL, 1 each ACE Inhibitor not prescribed due to Other: not indicated     albuterol (PROAIR HFA/PROVENTIL HFA/VENTOLIN HFA) 108 (90 Base) MCG/ACT inhaler Inhale 2 puffs into the lungs every 6 hours as needed for shortness of breath / dyspnea or wheezing     atorvastatin (LIPITOR) 10 MG tablet TAKE 1 TABLET BY MOUTH EVERY DAY     B-D U/F insulin pen needle USE 1 PEN NEEDLES DAILY OR AS DIRECTED.     [employer] CONTOUR NEXT test strip USE TO TEST BLOOD SUGARS 3 TIMES DAILY OR AS DIRECTED     blood glucose (NO BRAND SPECIFIED) lancing device Use to test blood sugars 3 times daily or as directed.     blood glucose calibration (NO BRAND SPECIFIED) solution Use to calibrate blood glucose monitor as directed.     blood glucose monitoring (NO BRAND SPECIFIED) meter device kit Use to test blood sugar 3 times daily or as directed.     Blood Glucose Monitoring Suppl MISC In vitro     busPIRone (BUSPAR) 10 MG tablet Take 1 tablet (10 mg) by mouth 2 times daily     cetirizine (ZYRTEC) 10 MG tablet Take 10 mg by mouth daily as needed for allergies     citalopram (CELEXA) 40 MG tablet Take 1 tablet (40 mg) by mouth daily     cyanocobalamin (VITAMIN B-12) 1000 MCG tablet TAKE 1 TABLET BY MOUTH DAILY     cyclobenzaprine (FLEXERIL) 10 MG tablet TAKE 1 TABLET BY MOUTH 3 TIMES DAILY AS NEEDED FOR MUSCLE SPASMS     ibuprofen (ADVIL/MOTRIN) 800 MG tablet Take 1 tablet (800 mg) by mouth every 8 hours as needed for moderate pain     liraglutide (VICTOZA PEN) 18 MG/3ML solution Inject 1.8 mg Subcutaneous daily     lisinopril (PRINIVIL/ZESTRIL) 5 MG tablet Take 1 tablet (5 mg) by mouth daily     metFORMIN (GLUCOPHAGE-XR) 500 MG 24 hr tablet Take 4 tablets (2,000 mg) by mouth daily (with breakfast)      norethindrone-ethinyl estradiol (MICROGESTIN ) 1-20 MG-MCG per tablet Take 1 tablet by mouth daily     traZODone (DESYREL) 50 MG tablet TAKE 1.5 TABLETS (75 MG) BY MOUTH NIGHTLY AS NEEDED FOR SLEEP     traMADol (ULTRAM) 50 MG tablet TAKE 1-2 TBS BY MOUTH EVERY 8 HRS AS NEEDED FOR MODERATE PAIN MAX 8 TABS/24 HOURS, MAX 60 TBS/MONTH     No current facility-administered medications for this visit.         ALLERGIES      Allergies   Allergen Reactions     Codeine Nausea     Nausea (with tylenol/codeine)        SOCIAL HISTORY   She is  - has a boy and girl - 22 and 20 yrs old. She lives with her . 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     She does not smoke and is never smoker. She drinks rarely. She denies recreational drug use.      FAMILY HISTORY   - Heart disease - maternal and paternal grand father   - Paternal grand father  of CVA at 51  - Paternal grandmother and maternal grandfather had colon cancer -      Family History   Problem Relation Age of Onset     Respiratory Father         asthma     Arthritis Father         hips     Depression Father      Alcohol/Drug Father      Diabetes Paternal Grandfather      Cerebrovascular Disease Paternal Grandfather      Hypertension Paternal Grandfather      Cancer - colorectal Paternal Grandmother      Arthritis Paternal Grandmother         hips     Colon Cancer Paternal Grandmother      Prostate Cancer Maternal Grandfather      Hypertension Maternal Grandfather      Cancer Maternal Grandmother         pancreatic     Arthritis Maternal Grandmother         hips     Depression Maternal Grandmother      Hypertension Maternal Grandmother      Other Cancer Maternal Grandmother      Gallbladder Disease Maternal Grandmother      Depression Mother      Rashes/Skin Problems Mother         Rosacea     Arthritis Mother         osteoarthritis in hands and knees     Hypertension Mother       Depression Maternal Uncle         bipolar     Respiratory Brother         sleep apnea     Mental Illness Brother      Cancer - colorectal Maternal Uncle      Cerebrovascular Disease Maternal Uncle          of massive stroke--no heart problems     Hypertension Son         REVIEW OF SYSTEMS   As above in the HPI, o/w complete 12-point ROS was negative.     PHYSICAL EXAM   B/P: 149/79, T: 98.2, P: 89, R: 16  @LASTSAO2(4)@  Wt Readings from Last 3 Encounters:   19 107.2 kg (236 lb 6.4 oz)   19 108.2 kg (238 lb 8 oz)   19 110 kg (242 lb 6.4 oz)     GEN: NAD  HEENT: PERRL, EOMI, no icterus, injection or pallor. Oropharynx is clear.  NECK: no cervical or supraclavicular lymphadenopathy  LUNGS: clear bilaterally  CV: regular, no murmurs, rubs, or gallops  ABDOMEN: soft, non-tender, non-distended, normal bowel sounds, no hepatosplenomegaly by percussion or palpation  EXT: warm, well perfused, no edema  NEURO: alert  SKIN: no rashes     LABORATORY AND IMAGING STUDIES     Recent Labs   Lab Test 19  1545 19  1016 10/11/18  1309 18  0910 17  0754    139 136 136 139   POTASSIUM 4.4 3.9 4.1 4.4 4.0   CHLORIDE 106 105 100 102 101   CO2 23 26 27 29 28   ANIONGAP 8 8 9 5 10   BUN 15 10 10 13 13   CR 0.83 0.80 0.81 0.81 0.80   * 153* 114* 129* 104*   KIMBERLEE 9.1 9.0 8.7 8.4* 8.9     Recent Labs   Lab Test 10/11/18  1309 10/30/15  1318   MAG 1.8 2.0   PHOS 3.5  --      Recent Labs   Lab Test 19  0823 19  1016 10/11/18  1309 18  0910 10/30/15  1319 12  0830   WBC 5.6 7.8 8.4  --  8.7 7.3   HGB 12.5 12.4 12.0 11.8 13.5 13.2   * 489* 461*  --  419 357   MCV 91 90 90  --  92 90   NEUTROPHIL 62.0  --   --   --   --  75.1*     Recent Labs   Lab Test 19  1016 10/11/18  1309 18  0831 17  0754 16  0745   BILITOTAL 0.3  --   --  0.6 0.3   ALKPHOS 114  --   --  138 143   ALT 15  --  20 23 21   AST 10  --   --  15 9   ALBUMIN 3.3* 3.2*  --   3.7 3.8     TSH   Date Value Ref Range Status   04/23/2018 2.44 0.40 - 4.00 mU/L Final   02/27/2017 2.66 0.40 - 4.00 mU/L Final   03/10/2016 1.60 0.40 - 4.00 mU/L Final     No results for input(s): CEA in the last 35889 hours.  Results for orders placed or performed in visit on 04/19/19   US Pelvic Complete with Transvaginal    Narrative    ULTRASOUND - PELVIC GYN  [facility]  Obstetrics & Gynecology  [address]  [place] [address]  Tel: [phone]     Referring MD: Georgiana Pizarro DO  Primary Clinic: [facility]     ===================================     CLINICAL INFORMATION     Indications for ultrasound:   Bleeding/Menses - AUb     LMP: 16 Apr 19    Hormones: OCP      Measurements:  Uterus: 8.1 x 5.2 x 4.5 cm.     Position is retroverted.  Contour is irreg w myomata:   1) anterior mid 2.4 x 1.7 x 1.5 cm.     Endo cav: 9.1 mm         Distorted  Cervix: wnl     Right ovary: nv    Left ovary:   nv     Cul de sac: no free fluid     *Other findings:      ===================================  Complete pelvic ultrasound utilizing both abdominal and vaginal   transducers. Small myoma, affecting cavity of uterus.    CARMINE MURDOCK M.D. .       Lab Results   Component Value Date    PATH  04/17/2019     Patient Name: VANCE CONNER  MR#: [id number]  Specimen #: [id number]  Collected: 4/17/2019  Received: 4/17/2019  Reported: 4/17/2019 13:56  Ordering Phy(s): LINETTE PAGAN    For improved result formatting, select 'View Enhanced Report Format' under   Linked Documents section.    TEST(S):  Peripheral Smear Morphology    FINAL DIAGNOSIS:  Peripheral blood.  - Thrombocytosis.    COMMENT:  The thrombocytosis is nonspecific and can be associated with multiple   conditions including but not limited to  physiologic, stress, rebound, inflammatory conditions, infection, asplenic   or hyposplenic state, iron  deficiency, neoplasms, myeloproliferative neoplasms, etc.  If reactive   conditions  of thrombocytosis are  excluded and the thrombocytosis is sustained, testing peripheral blood for   myeloproliferative next generation  sequencing molecular panel would be suggested as an initial evaluation for   a potential myeloproliferative  neoplasm/essential  thrombocythemia.  Clinical correlation is required.    Electronically signed out by:    Zacarias Borrero M.D.    CLINICAL HISTORY:  Thrombocytosis.    PERIPHERAL BLOOD DATA:    PERIPHERAL BLOOD DATA  Patient Value (Reference Range >18 year old female)  5.6 . . .WBC   (4.0-11.0 x 10*9/L)  4.24 . . .RBC   (3.8-5.2 x 10*12/L)  12.5 . . .HGB   (11.7-15.7 g/dL)  38.6 . . .HCT   (35.0-47.0 %)  91 . . .MCV   (78-100fL)  29.5 . . .MCH   (26.5-33.0 pg)  32.4 . . .MCHC   (31.5-36.5 g/dL)  13.2 . . .RDW   (10.0-15.0 %)  475 . . .PLT   (150-450 x 10*9/L)  1.4 . . .Retic   (0.5-2.0%)    PERIPHERAL BLOOD DIFFERENTIAL  (Reference ranges >18 year old female)    Percent  62. .Neutrophils, segmented and bands   (40 - 75)  28. .Lymphocytes   (20 - 48)  9. .Monocytes   (0 - 12)  1. .Eosinophils   (0 - 6)  0. .Basophils   (0 - 2)    Absolute  3.4. .Neutrophils, segmented and bands    (1.6 - 8.3 x 10*9/L)  1.6. .Lymphocytes    (0.8 - 5.3 x 10*9/L)  0.5. .Monocytes    (0 -1.3 x 10*9/L)  0.1. .Eosinophils    (0 - 0.7 x 10*9/L)  0. .Basophils    (0 - 0.2 x 10*9/L)    PERIPHERAL MORPHOLOGY:    ERYTHROCYTES: Appear normochromic normocytic and do not show significant   anisopoikilocytosis.    LEUKOCYTES: Granulocytes appear predominantly mature and segmented without   significant left shift.  Lymphocytes are small.  Monocytes appear mature.  No obvious dysplastic   changes are seen.  No blasts are  identified.    PLATELETS: Appear increased.    CPT Codes:  A: 29132-SCLT    TESTING LAB LOCATION:  [facility]  [address] [place]  [address] [phone]    COLLECTION SITE:  Client:  [facility]  Location:  [facility] (R)         ASSESSMENT     1. Thrombocytosis  2. HTN, DM TII, Obesity, menorrhagia with planned hysterectomy, fibromyalgia  3. ECOG PS 1    DISCUSSION   I explained John that PLT are our first line of defense against bleeding to death. She has borderline elevated PLT counts.     PLT counts are elevated in any forms of stress as historically the greatest challenge for mankind had been infections and bleeding to death. So any physical or even psychological stress could raise the PLT counts by release of endogenous corticosteroids. She has been struggling with menorrhagia which could possibly explain the elevation in her PLT counts.     Alternately the PLT counts could be elevated in primary disorder of blood called essential thrombocythemia which is part of myeloproliferative disorders. We could get next-generation sequencing to identify mutations associated with myeloproliferative disorders.  Even if she had myeloproliferative disorder, most patients with this condition have a near-normal life span.  The majority of patients are over 50 years old. This condition is associated with an increased risk of blood clotting, both in the venous and the arterial system.  This can lead to lower extremity deep vein thrombosis, i.e., blood clot in the leg, or pulmonary embolism, i.e., clots in the vessels of the lung.  Besides these, it is also associated with strokes, which are arterial blood clots.  When the platelet counts are very high, it can also cause problems with bleeding.  Most of the bleeding and thrombosis events are seen at much higher PLT counts. She only has marginal PLT elevation.      Aspirin is used to prevent the blood clots.  However, it is indicated in patients who are at higher risk of blood clots.  Hydroxyurea can be used to lower the platelet counts and decrease all of these side effects.     I will also check for iron panel for her as this could reflect iron deficiency from her blood loss.      PLAN   - I will work her up for  thrombocytosis and iron deficiency  - I will see her in a few weeks to review results.     Over 45 min of direct face to face time spent with patient with more than 50% time spent in counseling and coordinating care.      Jorge A Velasquez ,  Division of Hematology, Oncology & Transplantation  [facility].

## 2019-05-15 LAB — STFR SERPL-SCNC: 2.6 MG/L (ref 1.9–4.4)

## 2019-05-24 ENCOUNTER — OFFICE VISIT (OUTPATIENT)
Dept: FAMILY MEDICINE | Facility: CLINIC | Age: 49
End: 2019-05-24
Payer: COMMERCIAL

## 2019-05-24 VITALS
HEIGHT: 65 IN | RESPIRATION RATE: 16 BRPM | SYSTOLIC BLOOD PRESSURE: 130 MMHG | TEMPERATURE: 98.3 F | OXYGEN SATURATION: 99 % | DIASTOLIC BLOOD PRESSURE: 78 MMHG | WEIGHT: 240.2 LBS | HEART RATE: 100 BPM | BODY MASS INDEX: 40.02 KG/M2

## 2019-05-24 DIAGNOSIS — N85.8 UTERINE MASS: ICD-10-CM

## 2019-05-24 DIAGNOSIS — E11.9 TYPE 2 DIABETES MELLITUS WITHOUT COMPLICATION, WITHOUT LONG-TERM CURRENT USE OF INSULIN (H): ICD-10-CM

## 2019-05-24 DIAGNOSIS — R10.2 PELVIC PAIN IN FEMALE: ICD-10-CM

## 2019-05-24 DIAGNOSIS — D75.839 THROMBOCYTOSIS: ICD-10-CM

## 2019-05-24 DIAGNOSIS — Z01.818 PREOP GENERAL PHYSICAL EXAM: Primary | ICD-10-CM

## 2019-05-24 DIAGNOSIS — N92.4 EXCESSIVE BLEEDING IN PREMENOPAUSAL PERIOD: ICD-10-CM

## 2019-05-24 LAB
ANION GAP SERPL CALCULATED.3IONS-SCNC: 5 MMOL/L (ref 3–14)
BUN SERPL-MCNC: 13 MG/DL (ref 7–30)
CALCIUM SERPL-MCNC: 9.2 MG/DL (ref 8.5–10.1)
CHLORIDE SERPL-SCNC: 103 MMOL/L (ref 94–109)
CO2 SERPL-SCNC: 28 MMOL/L (ref 20–32)
COPATH REPORT: NORMAL
CREAT SERPL-MCNC: 0.78 MG/DL (ref 0.52–1.04)
ERYTHROCYTE [DISTWIDTH] IN BLOOD BY AUTOMATED COUNT: 13.1 % (ref 10–15)
GFR SERPL CREATININE-BSD FRML MDRD: 90 ML/MIN/{1.73_M2}
GLUCOSE SERPL-MCNC: 200 MG/DL (ref 70–99)
HCT VFR BLD AUTO: 36.9 % (ref 35–47)
HGB BLD-MCNC: 11.8 G/DL (ref 11.7–15.7)
MCH RBC QN AUTO: 29.4 PG (ref 26.5–33)
MCHC RBC AUTO-ENTMCNC: 32 G/DL (ref 31.5–36.5)
MCV RBC AUTO: 92 FL (ref 78–100)
PLATELET # BLD AUTO: 457 10E9/L (ref 150–450)
POTASSIUM SERPL-SCNC: 4.1 MMOL/L (ref 3.4–5.3)
RBC # BLD AUTO: 4.01 10E12/L (ref 3.8–5.2)
SODIUM SERPL-SCNC: 136 MMOL/L (ref 133–144)
WBC # BLD AUTO: 7.4 10E9/L (ref 4–11)

## 2019-05-24 PROCEDURE — 85027 COMPLETE CBC AUTOMATED: CPT | Performed by: FAMILY MEDICINE

## 2019-05-24 PROCEDURE — 80048 BASIC METABOLIC PNL TOTAL CA: CPT | Performed by: FAMILY MEDICINE

## 2019-05-24 PROCEDURE — 36415 COLL VENOUS BLD VENIPUNCTURE: CPT | Performed by: FAMILY MEDICINE

## 2019-05-24 PROCEDURE — 99214 OFFICE O/P EST MOD 30 MIN: CPT | Performed by: FAMILY MEDICINE

## 2019-05-24 ASSESSMENT — MIFFLIN-ST. JEOR: SCORE: 1717.48

## 2019-05-24 NOTE — PATIENT INSTRUCTIONS
Before Your Surgery      Call your surgeon if there is any change in your health. This includes signs of a cold or flu (such as a sore throat, runny nose, cough, rash or fever).    Do not smoke, drink alcohol or take over the counter medicine (unless your surgeon or primary care doctor tells you to) for the 24 hours before and after surgery.    If you take prescribed drugs: Follow your doctor s orders about which medicines to take and which to stop until after surgery.    Eating and drinking prior to surgery: follow the instructions from your surgeon    Take a shower or bath the night before surgery. Use the soap your surgeon gave you to gently clean your skin. If you do not have soap from your surgeon, use your regular soap. Do not shave or scrub the surgery site.  Wear clean pajamas and have clean sheets on your bed.     Do not take ibuprofen for the 10 days prior to surgery.  Hold all your medications on the morning of surgery.

## 2019-05-24 NOTE — PROGRESS NOTES
[facility]  [address] [facility] [id number] [phone]  Dept: [phone]    PRE-OP EVALUATION:  Today's date: 2019    Kaylee Lopez (: 1970) presents for pre-operative evaluation assessment as requested by Dr. Georgiana Pizarro.  She requires evaluation and anesthesia risk assessment prior to undergoing surgery/procedure for treatment of  robotic assisted total laparoscopic hysterectomy bilateral salpingectomy.    Fax number for surgical facility: [facility]  Primary Physician: Irene Castellanos  Type of Anesthesia Anticipated: General    Patient has a Health Care Directive or Living Will:  NO    Preop Questions 2019   Who is doing your surgery? Dr. Georgiana Pizarro   What are you having done? Hysterectomy   Date of Surgery/Procedure: 19   Facility or Hospital where procedure/surgery will be performed: [facility]   1.  Do you have a history of Heart attack, stroke, stent, coronary bypass surgery, or other heart surgery? No   2.  Do you ever have any pain or discomfort in your chest? No   3.  Do you have a history of  Heart Failure? No   4.   Are you troubled by shortness of breath when:  walking on a level surface, or up a slight hill, or at night? No   5.  Do you currently have a cold, bronchitis or other respiratory infection? No   6.  Do you have a cough, shortness of breath, or wheezing? No   7.  Do you sometimes get pains in the calves of your legs when you walk? YES - notes due to deconditioning and her fibromyalgia.   8. Do you or anyone in your family have previous history of blood clots? UNKNOWN -    9.  Do you or does anyone in your family have a serious bleeding problem such as prolonged bleeding following surgeries or cuts? No   10. Have you ever had problems with anemia or been told to take iron pills? No   11. Have you had any abnormal blood loss such as black, tarry or bloody stools, or abnormal vaginal bleeding? No   12.  Have you ever had a blood transfusion? No   13. Have you or any of your relatives ever had problems with anesthesia? No   14. Do you have sleep apnea, excessive snoring or daytime drowsiness? No   15. Do you have any prosthetic heart valves? No   16. Do you have prosthetic joints? No   17. Is there any chance that you may be pregnant? No         HPI:     HPI related to upcoming procedure: pelvic pain and mass on ultrasound.    See problem list for active medical problems.  Problems all longstanding and stable, except as noted/documented.  See ROS for pertinent symptoms related to these conditions.                                                                                                                                                          .    MEDICAL HISTORY:     Patient Active Problem List    Diagnosis Date Noted     Health Care Home 07/06/2012     Priority: High           Diagnosis Specialist Due to be seen Following   Tricuspid valve Cardiology Annually; due in ~ May 2013    Narcolepsy  Sleep; [facility]; Dr. Yevgeniy noe   Fibromyalgia Rheum nurses About due cyclobenzaprene             DX V65.8 REPLACED WITH [facility] (04/08/2013)       Anxiety 05/07/2019     Priority: Medium     Thrombocytosis (H) 10/15/2018     Priority: Medium     Component      Latest Ref Rng & Units 10/11/2018   WBC      4.0 - 11.0 10e9/L 8.4   RBC Count      3.8 - 5.2 10e12/L 4.17   Hemoglobin      11.7 - 15.7 g/dL 12.0   Hematocrit      35.0 - 47.0 % 37.4   MCV      78 - 100 fl 90   MCH      26.5 - 33.0 pg 28.8   MCHC      31.5 - 36.5 g/dL 32.1   RDW      10.0 - 15.0 % 13.3   Platelet Count      150 - 450 10e9/L 461 (H)          Unexplained endometrial cells on cervical Pap smear 04/23/2018     Priority: Medium     4/23/18 Unexplained endometrial cells on pap.   6/18/18 EMB= Normal       Pulmonary hypertension (H) 02/27/2017     Priority: Medium     Elevated blood pressure reading without diagnosis of hypertension  05/09/2016     Priority: Medium     Migraine with aura and without status migrainosus, not intractable 05/03/2016     Priority: Medium     Type 2 diabetes mellitus without complication, without long-term current use of insulin (H) 10/29/2015     Priority: Medium     Chronic pain-Fibromyalgia 05/05/2015     Priority: Medium     Patient is followed by Irene Castellanos MD for ongoing prescription of pain medication.  All refills should be approved by this provider, or covering partner.    Medication(s): tramadol.   Maximum quantity per month: 60  Clinic visit frequency required: Q 6  months     Controlled substance agreement on file: Yes       Date(s): 5/15/15    Pain Clinic evaluation in the past: No    DIRE Total Score(s):  No flowsheet data found.    Pain Clinic evaluation in the past: No    Last [facility] website verification: 5/14/18(RN)   [email or web address]         Elevated BMI 12/15/2014     Priority: Medium     Contraception 10/29/2013     Priority: Medium     Reflex sympathetic dystrophy      Priority: Medium     right foot ; related to stress fracture       Hyperlipidemia LDL goal <100 07/27/2011     Priority: Medium     Narcolepsy 11/01/2010     Priority: Medium     Major depression in complete remission (H) 05/01/2010     Priority: Medium     Papanicolaou smear of cervix with atypical squamous cells cannot exclude high grade squamous intraepithelial lesion (ASC-H) 04/27/2010     Priority: Medium     Late 2003; treated with TCA.        Non-rheumatic tricuspid valve insufficiency      Priority: Medium     antibiotic prophylax  Problem list name updated by automated process. Provider to review       Fibromyalgia 10/15/2005     Priority: Medium     Problem list name updated by automated process. Provider to review        Past Medical History:   Diagnosis Date     Abnormal Papanicolaou smear of cervix and cervical HPV 2003    late 2003, early 2004; treated with TCA     Depression      Diabetes mellitus       Fibromyalgia      Heart murmur     tricuspid valve disorder     Inflammatory arthritis     ?; has been discharged from Rheumatology     Narcolepsy 11/1/2010    doing well without medication.     Non-rheumatic tricuspid valve insufficiency     antibiotic prophylax Problem list name updated by automated process. Provider to review      Obesity      Pulmonary hypertension (H) 5/2/2010    on ECHO, but normal right heart cath 2011     Reflex sympathetic dystrophy     right foot ; related to stress fracture     TRICUSPID VALVE DISEASE (regurg)     sees Cardiology      Past Surgical History:   Procedure Laterality Date     Angiogram  3/2011    No pulmonary hypertension     COLONOSCOPY  1/2008    normal     DILATION AND CURETTAGE, OPERATIVE HYSTEROSCOPY WITH MORCELLATOR, COMBINED N/A 7/9/2018    Procedure: COMBINED DILATION AND CURETTAGE, OPERATIVE HYSTEROSCOPY WITH MORCELLATOR;  Hysteroscopy, polypectomy  with myosure, dilation and curettage, endometrial biopsy ;  Surgeon: Georgiana Pizarro DO;  Location: [facility] OR     LAPAROSCOPIC CHOLECYSTECTOMY  7/2000     SURGICAL HISTORY OF -       essure procedure for contraception     TCA for abnormal pap  2004     Current Outpatient Medications   Medication Sig Dispense Refill     albuterol (PROAIR HFA/PROVENTIL HFA/VENTOLIN HFA) 108 (90 Base) MCG/ACT inhaler Inhale 2 puffs into the lungs every 6 hours as needed for shortness of breath / dyspnea or wheezing 1 Inhaler 0     atorvastatin (LIPITOR) 10 MG tablet TAKE 1 TABLET BY MOUTH EVERY DAY 90 tablet 3     busPIRone (BUSPAR) 10 MG tablet Take 1 tablet (10 mg) by mouth 2 times daily 180 tablet 1     cetirizine (ZYRTEC) 10 MG tablet Take 10 mg by mouth daily as needed for allergies       citalopram (CELEXA) 40 MG tablet Take 1 tablet (40 mg) by mouth daily 30 tablet 0     cyanocobalamin (VITAMIN B-12) 1000 MCG tablet TAKE 1 TABLET BY MOUTH DAILY 100 tablet 3     cyclobenzaprine (FLEXERIL) 10 MG tablet TAKE 1 TABLET BY MOUTH 3 TIMES  "DAILY AS NEEDED FOR MUSCLE SPASMS 90 tablet 1     ibuprofen (ADVIL/MOTRIN) 800 MG tablet Take 1 tablet (800 mg) by mouth every 8 hours as needed for moderate pain 90 tablet 1     liraglutide (VICTOZA PEN) 18 MG/3ML solution Inject 1.8 mg Subcutaneous daily 18 mL 4     lisinopril (PRINIVIL/ZESTRIL) 5 MG tablet Take 1 tablet (5 mg) by mouth daily 90 tablet 1     metFORMIN (GLUCOPHAGE-XR) 500 MG 24 hr tablet Take 4 tablets (2,000 mg) by mouth daily (with breakfast) 360 tablet 0     norethindrone-ethinyl estradiol (MICROGESTIN 1/20) 1-20 MG-MCG per tablet Take 1 tablet by mouth daily 90 tablet 3     traMADol (ULTRAM) 50 MG tablet TAKE 1-2 TBS BY MOUTH EVERY 8 HRS AS NEEDED FOR MODERATE PAIN MAX 8 TABS/24 HOURS, MAX 60 TBS/MONTH 60 tablet 0     traZODone (DESYREL) 50 MG tablet TAKE 1.5 TABLETS (75 MG) BY MOUTH NIGHTLY AS NEEDED FOR SLEEP 45 tablet 11     OTC products: None, except as noted above    Allergies   Allergen Reactions     Codeine Nausea     Nausea (with tylenol/codeine)      Latex Allergy: NO    Social History     Tobacco Use     Smoking status: Never Smoker     Smokeless tobacco: Never Used   Substance Use Topics     Alcohol use: Yes     Alcohol/week: 0.0 oz     Comment: rare     History   Drug Use No       REVIEW OF SYSTEMS:   CONSTITUTIONAL: NEGATIVE for fever, chills, change in weight  ENT/MOUTH: NEGATIVE for ear, mouth and throat problems  RESP: NEGATIVE for significant cough or SOB  CV: NEGATIVE for chest pain, palpitations or peripheral edema  No nausea, vomitting or change in bowel habits.  No urinary symptoms.    Not checking blood often regarding her diabetes.  Did increase victoza after last value.      EXAM:   /78 (BP Location: Right arm, Cuff Size: Adult Large)   Pulse 100   Temp 98.3  F (36.8  C) (Oral)   Resp 16   Ht 1.638 m (5' 4.5\")   Wt 109 kg (240 lb 3.2 oz)   SpO2 99%   BMI 40.59 kg/m    GENERAL APPEARANCE: alert and no distress  HENT: ear canals and TM's normal and nose and " mouth without ulcers or lesions  RESP: lungs clear to auscultation - no rales, rhonchi or wheezes  CV: regular rates and rhythm  ABDOMEN: soft, nontender, no HSM or masses and bowel sounds normal  MS: no edema.   NEURO: Normal strength and tone, sensory exam grossly normal, mentation intact and speech normal  PSYCH: mentation appears normal and affect normal/bright    Lab Results   Component Value Date    A1C 7.2 04/05/2019    A1C 6.4 10/11/2018    A1C 5.8 04/23/2018    A1C 5.6 09/18/2017    A1C 5.5 02/27/2017         DIAGNOSTICS:     Labs Resulted Today:   Results for orders placed or performed in visit on 05/24/19   Basic metabolic panel   Result Value Ref Range    Sodium 136 133 - 144 mmol/L    Potassium 4.1 3.4 - 5.3 mmol/L    Chloride 103 94 - 109 mmol/L    Carbon Dioxide 28 20 - 32 mmol/L    Anion Gap 5 3 - 14 mmol/L    Glucose 200 (H) 70 - 99 mg/dL    Urea Nitrogen 13 7 - 30 mg/dL    Creatinine 0.78 0.52 - 1.04 mg/dL    GFR Estimate 90 >60 mL/min/[1.73_m2]    GFR Estimate If Black >90 >60 mL/min/[1.73_m2]    Calcium 9.2 8.5 - 10.1 mg/dL   CBC with platelets   Result Value Ref Range    WBC 7.4 4.0 - 11.0 10e9/L    RBC Count 4.01 3.8 - 5.2 10e12/L    Hemoglobin 11.8 11.7 - 15.7 g/dL    Hematocrit 36.9 35.0 - 47.0 %    MCV 92 78 - 100 fl    MCH 29.4 26.5 - 33.0 pg    MCHC 32.0 31.5 - 36.5 g/dL    RDW 13.1 10.0 - 15.0 %    Platelet Count 457 (H) 150 - 450 10e9/L       Recent Labs   Lab Test 05/07/19  1545 04/17/19  0823 04/05/19  1016 10/11/18  1309  04/20/12  0905   HGB  --  12.5 12.4 12.0   < >  --    PLT  --  475* 489* 461*   < >  --    INR  --   --   --   --   --  1.16*     --  139 136   < >  --    POTASSIUM 4.4  --  3.9 4.1   < >  --    CR 0.83  --  0.80 0.81   < >  --    A1C  --   --  7.2* 6.4*   < >  --     < > = values in this interval not displayed.        IMPRESSION:   Reason for surgery/procedure: pelvic pain, heavy bleeding and uterine mass    The proposed surgical procedure is considered  INTERMEDIATE risk.    REVISED CARDIAC RISK INDEX  The patient has the following serious cardiovascular risks for perioperative complications such as (MI, PE, VFib and 3  AV Block):  No serious cardiac risks  INTERPRETATION: 1 risks: Class II (low risk - 0.9% complication rate)    The patient has the following additional risks for perioperative complications:  Morbid obesity    1. Preop general physical exam    - Basic metabolic panel  - CBC with platelets    2. Uterine mass  Anticipating surgery  - Basic metabolic panel  - CBC with platelets    3. Pelvic pain in female  Anticipating surgery  - CBC with platelets    4. Excessive bleeding in premenopausal period  Anticipating surgery.  - CBC with platelets    5. Type 2 diabetes mellitus without complication, without long-term current use of insulin (H)  Recent HgbA1C not optimal; have adjusted medication. Follow up in July.     6. Thrombocytosis (H)  Has recently seen Hematology; will follow up prior to surgery. She notes she was told she should not have to worry about clots.      RECOMMENDATIONS:         --Regarding medication:    Do not take ibuprofen for the 10 days prior to surgery.  Hold all your medications on the morning of surgery.      Diabetes Medication Use  -----Hold usual oral and non-insulin diabetic meds (e.g. Metformin, Actos, Glipizide) while NPO.       APPROVAL GIVEN to proceed with proposed procedure, without further diagnostic evaluation       Signed Electronically by: Irene Castellanos MD, MD    Copy of this evaluation report is provided to requesting physician.    [facility] Preop Guidelines    Revised Cardiac Risk Index

## 2019-05-28 ENCOUNTER — HOSPITAL ENCOUNTER (OUTPATIENT)
Facility: CLINIC | Age: 49
Setting detail: SPECIMEN
End: 2019-05-28
Attending: INTERNAL MEDICINE
Payer: COMMERCIAL

## 2019-05-28 ENCOUNTER — TELEPHONE (OUTPATIENT)
Dept: OBGYN | Facility: CLINIC | Age: 49
End: 2019-05-28

## 2019-05-28 ENCOUNTER — ONCOLOGY VISIT (OUTPATIENT)
Dept: ONCOLOGY | Facility: CLINIC | Age: 49
End: 2019-05-28
Attending: INTERNAL MEDICINE
Payer: COMMERCIAL

## 2019-05-28 VITALS
SYSTOLIC BLOOD PRESSURE: 156 MMHG | BODY MASS INDEX: 39.95 KG/M2 | DIASTOLIC BLOOD PRESSURE: 76 MMHG | HEART RATE: 106 BPM | HEIGHT: 65 IN | RESPIRATION RATE: 16 BRPM | WEIGHT: 239.8 LBS | TEMPERATURE: 98.7 F | OXYGEN SATURATION: 96 %

## 2019-05-28 DIAGNOSIS — D50.0 IRON DEFICIENCY ANEMIA DUE TO CHRONIC BLOOD LOSS: ICD-10-CM

## 2019-05-28 DIAGNOSIS — D75.839 THROMBOCYTOSIS: Primary | ICD-10-CM

## 2019-05-28 PROCEDURE — 99213 OFFICE O/P EST LOW 20 MIN: CPT | Performed by: INTERNAL MEDICINE

## 2019-05-28 PROCEDURE — G0463 HOSPITAL OUTPT CLINIC VISIT: HCPCS

## 2019-05-28 ASSESSMENT — MIFFLIN-ST. JEOR: SCORE: 1715.67

## 2019-05-28 NOTE — TELEPHONE ENCOUNTER
----- Message from Irene Castellanos MD sent at 5/24/2019 11:19 AM CDT -----  Hi!    I was just wanting to clarify moving forward (regarding need for paps).    She is of the understanding that her cervix and ovaries will still be there.    With this listed as total hysterectomy... Are you planning on removing the cervix?  (If so, we should let her know... And also wonder about future paps with her history of abnormal).    Thanks!  Irene

## 2019-05-28 NOTE — PROGRESS NOTES
[facility]  HEMATOLOGY AND ONCOLOGY    FOLLOW-UP VISIT NOTE    PATIENT NAME: Kaylee Lopez MRN # [id number]  DATE OF VISIT: May 28, 2019 YOB: 1970        SUBJECTIVE   Kaylee Lopez is being followed for thrombocytosis and iron deficiency anemia    Kaylee is being seen with labs to review results for her work up of thrombocytosis.      PAST MEDICAL HISTORY     Past Medical History:   Diagnosis Date     Abnormal Papanicolaou smear of cervix and cervical HPV 2003    late 2003, early 2004; treated with TCA     Depression      Diabetes mellitus      Fibromyalgia      Heart murmur     tricuspid valve disorder     Inflammatory arthritis     ?; has been discharged from Rheumatology     Narcolepsy 11/1/2010    doing well without medication.     Non-rheumatic tricuspid valve insufficiency     antibiotic prophylax Problem list name updated by automated process. Provider to review      Obesity      Pulmonary hypertension (H) 5/2/2010    on ECHO, but normal right heart cath 2011     Reflex sympathetic dystrophy     right foot ; related to stress fracture     TRICUSPID VALVE DISEASE (regurg)     sees Cardiology          CURRENT OUTPATIENT MEDICATIONS     Current Outpatient Medications   Medication Sig     albuterol (PROAIR HFA/PROVENTIL HFA/VENTOLIN HFA) 108 (90 Base) MCG/ACT inhaler Inhale 2 puffs into the lungs every 6 hours as needed for shortness of breath / dyspnea or wheezing     atorvastatin (LIPITOR) 10 MG tablet TAKE 1 TABLET BY MOUTH EVERY DAY     busPIRone (BUSPAR) 10 MG tablet Take 1 tablet (10 mg) by mouth 2 times daily     cetirizine (ZYRTEC) 10 MG tablet Take 10 mg by mouth daily as needed for allergies     citalopram (CELEXA) 40 MG tablet Take 1 tablet (40 mg) by mouth daily     cyanocobalamin (VITAMIN B-12) 1000 MCG tablet TAKE 1 TABLET BY MOUTH DAILY     cyclobenzaprine (FLEXERIL) 10 MG tablet TAKE 1 TABLET BY MOUTH 3 TIMES DAILY AS NEEDED FOR MUSCLE SPASMS     ibuprofen  "(ADVIL/MOTRIN) 800 MG tablet Take 1 tablet (800 mg) by mouth every 8 hours as needed for moderate pain     liraglutide (VICTOZA PEN) 18 MG/3ML solution Inject 1.8 mg Subcutaneous daily     lisinopril (PRINIVIL/ZESTRIL) 5 MG tablet Take 1 tablet (5 mg) by mouth daily     metFORMIN (GLUCOPHAGE-XR) 500 MG 24 hr tablet Take 4 tablets (2,000 mg) by mouth daily (with breakfast)     norethindrone-ethinyl estradiol (MICROGESTIN 1/20) 1-20 MG-MCG per tablet Take 1 tablet by mouth daily     traMADol (ULTRAM) 50 MG tablet TAKE 1-2 TBS BY MOUTH EVERY 8 HRS AS NEEDED FOR MODERATE PAIN MAX 8 TABS/24 HOURS, MAX 60 TBS/MONTH     traZODone (DESYREL) 50 MG tablet TAKE 1.5 TABLETS (75 MG) BY MOUTH NIGHTLY AS NEEDED FOR SLEEP     No current facility-administered medications for this visit.         ALLERGIES      Allergies   Allergen Reactions     Codeine Nausea     Nausea (with tylenol/codeine)        REVIEW OF SYSTEMS   As above in the HPI, o/w complete 12-point ROS was negative.     PHYSICAL EXAM   /76   Pulse 106   Temp 98.7  F (37.1  C) (Tympanic)   Resp 16   Ht 1.638 m (5' 4.5\")   Wt 108.8 kg (239 lb 12.8 oz)   SpO2 96%   BMI 40.53 kg/m    GEN: NAD  HEENT: PERRL, EOMI, no icterus, injection or pallor. Oropharynx is clear.  LYMPHATICs: no cervical or supraclavicular lymphadenopathy; no other abn lymphadenopathy  PULMONARY: clear with good air entry bilaterally  CARDIOVASCULAR: regular, no murmurs, rubs, or gallops  GASTROINTESTINAL: soft, non-tender, non-distended, normal bowel sounds, no hepatosplenomegaly by percussion or palpation  MUSCULOSKELTAL: warm, well perfused, no edema  NEURO: awake, alert and oriented to time place and person, cranial nerves intact - II - XII, no focal neurologic deficits  SKIN: no rashes     LABORATORY AND IMAGING STUDIES     Recent Labs   Lab Test 05/24/19  1137 05/07/19  1545 04/05/19  1016 10/11/18  1309 07/02/18  0910    137 139 136 136   POTASSIUM 4.1 4.4 3.9 4.1 4.4   CHLORIDE " 103 106 105 100 102   CO2 28 23 26 27 29   ANIONGAP 5 8 8 9 5   BUN 13 15 10 10 13   CR 0.78 0.83 0.80 0.81 0.81   * 177* 153* 114* 129*   KIMBERLEE 9.2 9.1 9.0 8.7 8.4*     Recent Labs   Lab Test 10/11/18  1309 10/30/15  1318   MAG 1.8 2.0   PHOS 3.5  --      Recent Labs   Lab Test 05/24/19  1137 04/17/19  0823 04/05/19  1016 10/11/18  1309 07/02/18  0910 10/30/15  1319 04/20/12  0830   WBC 7.4 5.6 7.8 8.4  --  8.7 7.3   HGB 11.8 12.5 12.4 12.0 11.8 13.5 13.2   * 475* 489* 461*  --  419 357   MCV 92 91 90 90  --  92 90   NEUTROPHIL  --  62.0  --   --   --   --  75.1*     Recent Labs   Lab Test 04/05/19  1016 10/11/18  1309 03/23/18  0831 09/18/17  0754 08/22/16  0745   BILITOTAL 0.3  --   --  0.6 0.3   ALKPHOS 114  --   --  138 143   ALT 15  --  20 23 21   AST 10  --   --  15 9   ALBUMIN 3.3* 3.2*  --  3.7 3.8     TSH   Date Value Ref Range Status   04/23/2018 2.44 0.40 - 4.00 mU/L Final   02/27/2017 2.66 0.40 - 4.00 mU/L Final   03/10/2016 1.60 0.40 - 4.00 mU/L Final        Recent Labs   Lab Test 05/24/19  1137 04/17/19  0823 04/05/19  1016 10/11/18  1309 07/02/18  0910   B12  --   --  537 191*  --    HGB 11.8 12.5 12.4 12.0 11.8   RETICABSCT  --  60.8  --   --   --    RETP  --  1.4  --   --   --      Recent Labs   Lab Test 05/14/19  1410   LEONIE 12   IRON 91   *   IRONSAT 16      ASSESSMENT AND PLAN   1. Thrombocytosis  2. HTN, DM TII, Obesity, menorrhagia with planned hysterectomy, fibromyalgia  3. ECOG PS 1    Kaylee is alone at this visit. I explained her that she continues to have thrombocytosis which is only marginal elevation in her PLT counts. Her work up for myeloproliferative disorder was negative. She does not have any of the mutation involving JAK2, CALR or MPL genes.     She does have iron deficiency which does lead to thrombocytosis. Her ferritin levels are low at 12 ng/ml, her TIBC is markedly elevated at 584 very consistent with iron deficiency. Her free iron is relatively normal which  reflects good iron intake and absorption. Despite this robust iron level, her percent saturation at lower limits of normal at 16%.     I have suggested that she try oral iron supplementation to replenish her stores. I suggested that she take her iron with vitamin C to aid absorption. Iron tends to cause constipation. I would suggest that she try this after she has recovered some from her surgery. She has been losing iron due to her menorrhagia and this should resolve after hysterectomy.     I will like to see her in 6 months with labs including iron panel to see her recovery from iron deficiency and resolution of PLT counts back to baseline.

## 2019-05-28 NOTE — NURSING NOTE
"Oncology Rooming Note    May 28, 2019 2:25 PM   Kaylee Lopez is a 49 year old female who presents for:    Chief Complaint   Patient presents with     Oncology Clinic Visit     Thrombocytosis     Initial Vitals: /76   Pulse 106   Temp 98.7  F (37.1  C) (Tympanic)   Resp 16   Ht 1.638 m (5' 4.5\")   Wt 108.8 kg (239 lb 12.8 oz)   SpO2 96%   BMI 40.53 kg/m   Estimated body mass index is 40.53 kg/m  as calculated from the following:    Height as of this encounter: 1.638 m (5' 4.5\").    Weight as of this encounter: 108.8 kg (239 lb 12.8 oz). Body surface area is 2.23 meters squared.  Data Unavailable Comment: Data Unavailable   No LMP recorded.  Allergies reviewed: Yes  Medications reviewed: Yes    Medications: Medication refills not needed today.  Pharmacy name entered into [facility]: [id number] [place] [address]    Clinical concerns: Follow Up       Mabel Latham CMA              "

## 2019-05-28 NOTE — LETTER
5/28/2019         RE: Kaylee Lopez  [address] [facility] [address]        Dear Colleague,    Thank you for referring your patient, Kaylee Lopez, to the [facility]CER [facility]. Please see a copy of my visit note below.    [facility]  HEMATOLOGY AND ONCOLOGY    FOLLOW-UP VISIT NOTE    PATIENT NAME: Kaylee Lopez MRN # [id number]  DATE OF VISIT: May 28, 2019 YOB: 1970        SUBJECTIVE   Kaylee Lopez is being followed for thrombocytosis and iron deficiency anemia    Kaylee is being seen with labs to review results for her work up of thrombocytosis.      PAST MEDICAL HISTORY     Past Medical History:   Diagnosis Date     Abnormal Papanicolaou smear of cervix and cervical HPV 2003    late 2003, early 2004; treated with TCA     Depression      Diabetes mellitus      Fibromyalgia      Heart murmur     tricuspid valve disorder     Inflammatory arthritis     ?; has been discharged from Rheumatology     Narcolepsy 11/1/2010    doing well without medication.     Non-rheumatic tricuspid valve insufficiency     antibiotic prophylax Problem list name updated by automated process. Provider to review      Obesity      Pulmonary hypertension (H) 5/2/2010    on ECHO, but normal right heart cath 2011     Reflex sympathetic dystrophy     right foot ; related to stress fracture     TRICUSPID VALVE DISEASE (regurg)     sees Cardiology          CURRENT OUTPATIENT MEDICATIONS     Current Outpatient Medications   Medication Sig     albuterol (PROAIR HFA/PROVENTIL HFA/VENTOLIN HFA) 108 (90 Base) MCG/ACT inhaler Inhale 2 puffs into the lungs every 6 hours as needed for shortness of breath / dyspnea or wheezing     atorvastatin (LIPITOR) 10 MG tablet TAKE 1 TABLET BY MOUTH EVERY DAY     busPIRone (BUSPAR) 10 MG tablet Take 1 tablet (10 mg) by mouth 2 times daily     cetirizine (ZYRTEC) 10 MG tablet Take 10 mg by mouth daily as needed for allergies      "citalopram (CELEXA) 40 MG tablet Take 1 tablet (40 mg) by mouth daily     cyanocobalamin (VITAMIN B-12) 1000 MCG tablet TAKE 1 TABLET BY MOUTH DAILY     cyclobenzaprine (FLEXERIL) 10 MG tablet TAKE 1 TABLET BY MOUTH 3 TIMES DAILY AS NEEDED FOR MUSCLE SPASMS     ibuprofen (ADVIL/MOTRIN) 800 MG tablet Take 1 tablet (800 mg) by mouth every 8 hours as needed for moderate pain     liraglutide (VICTOZA PEN) 18 MG/3ML solution Inject 1.8 mg Subcutaneous daily     lisinopril (PRINIVIL/ZESTRIL) 5 MG tablet Take 1 tablet (5 mg) by mouth daily     metFORMIN (GLUCOPHAGE-XR) 500 MG 24 hr tablet Take 4 tablets (2,000 mg) by mouth daily (with breakfast)     norethindrone-ethinyl estradiol (MICROGESTIN 1/20) 1-20 MG-MCG per tablet Take 1 tablet by mouth daily     traMADol (ULTRAM) 50 MG tablet TAKE 1-2 TBS BY MOUTH EVERY 8 HRS AS NEEDED FOR MODERATE PAIN MAX 8 TABS/24 HOURS, MAX 60 TBS/MONTH     traZODone (DESYREL) 50 MG tablet TAKE 1.5 TABLETS (75 MG) BY MOUTH NIGHTLY AS NEEDED FOR SLEEP     No current facility-administered medications for this visit.         ALLERGIES      Allergies   Allergen Reactions     Codeine Nausea     Nausea (with tylenol/codeine)        REVIEW OF SYSTEMS   As above in the HPI, o/w complete 12-point ROS was negative.     PHYSICAL EXAM   /76   Pulse 106   Temp 98.7  F (37.1  C) (Tympanic)   Resp 16   Ht 1.638 m (5' 4.5\")   Wt 108.8 kg (239 lb 12.8 oz)   SpO2 96%   BMI 40.53 kg/m     GEN: NAD  HEENT: PERRL, EOMI, no icterus, injection or pallor. Oropharynx is clear.  LYMPHATICs: no cervical or supraclavicular lymphadenopathy; no other abn lymphadenopathy  PULMONARY: clear with good air entry bilaterally  CARDIOVASCULAR: regular, no murmurs, rubs, or gallops  GASTROINTESTINAL: soft, non-tender, non-distended, normal bowel sounds, no hepatosplenomegaly by percussion or palpation  MUSCULOSKELTAL: warm, well perfused, no edema  NEURO: awake, alert and oriented to time place and person, cranial " nerves intact - II - XII, no focal neurologic deficits  SKIN: no rashes     LABORATORY AND IMAGING STUDIES     Recent Labs   Lab Test 05/24/19  1137 05/07/19  1545 04/05/19  1016 10/11/18  1309 07/02/18  0910    137 139 136 136   POTASSIUM 4.1 4.4 3.9 4.1 4.4   CHLORIDE 103 106 105 100 102   CO2 28 23 26 27 29   ANIONGAP 5 8 8 9 5   BUN 13 15 10 10 13   CR 0.78 0.83 0.80 0.81 0.81   * 177* 153* 114* 129*   KIMBERLEE 9.2 9.1 9.0 8.7 8.4*     Recent Labs   Lab Test 10/11/18  1309 10/30/15  1318   MAG 1.8 2.0   PHOS 3.5  --      Recent Labs   Lab Test 05/24/19  1137 04/17/19  0823 04/05/19  1016 10/11/18  1309 07/02/18  0910 10/30/15  1319 04/20/12  0830   WBC 7.4 5.6 7.8 8.4  --  8.7 7.3   HGB 11.8 12.5 12.4 12.0 11.8 13.5 13.2   * 475* 489* 461*  --  419 357   MCV 92 91 90 90  --  92 90   NEUTROPHIL  --  62.0  --   --   --   --  75.1*     Recent Labs   Lab Test 04/05/19  1016 10/11/18  1309 03/23/18  0831 09/18/17  0754 08/22/16  0745   BILITOTAL 0.3  --   --  0.6 0.3   ALKPHOS 114  --   --  138 143   ALT 15  --  20 23 21   AST 10  --   --  15 9   ALBUMIN 3.3* 3.2*  --  3.7 3.8     TSH   Date Value Ref Range Status   04/23/2018 2.44 0.40 - 4.00 mU/L Final   02/27/2017 2.66 0.40 - 4.00 mU/L Final   03/10/2016 1.60 0.40 - 4.00 mU/L Final        Recent Labs   Lab Test 05/24/19  1137 04/17/19  0823 04/05/19  1016 10/11/18  1309 07/02/18  0910   B12  --   --  537 191*  --    HGB 11.8 12.5 12.4 12.0 11.8   RETICABSCT  --  60.8  --   --   --    RETP  --  1.4  --   --   --      Recent Labs   Lab Test 05/14/19  1410   LEONIE 12   IRON 91   *   IRONSAT 16      ASSESSMENT AND PLAN   1. Thrombocytosis  2. HTN, DM TII, Obesity, menorrhagia with planned hysterectomy, fibromyalgia  3. ECOG PS 1    Kaylee is alone at this visit. I explained her that she continues to have thrombocytosis which is only marginal elevation in her PLT counts. Her work up for myeloproliferative disorder was negative. She does not have any  of the mutation involving JAK2, CALR or MPL genes.     She does have iron deficiency which does lead to thrombocytosis. Her ferritin levels are low at 12 ng/ml, her TIBC is markedly elevated at 584 very consistent with iron deficiency. Her free iron is relatively normal which reflects good iron intake and absorption. Despite this robust iron level, her percent saturation at lower limits of normal at 16%.     I have suggested that she try oral iron supplementation to replenish her stores. I suggested that she take her iron with vitamin C to aid absorption. Iron tends to cause constipation. I would suggest that she try this after she has recovered some from her surgery. She has been losing iron due to her menorrhagia and this should resolve after hysterectomy.     I will like to see her in 6 months with labs including iron panel to see her recovery from iron deficiency and resolution of PLT counts back to baseline.     Again, thank you for allowing me to participate in the care of your patient.        Sincerely,        Jorge A Delgado MD

## 2019-05-28 NOTE — PATIENT INSTRUCTIONS
Taking Iron for Anemia    Your body needs plenty of iron to make red blood cells. Iron can be given in pill form or by IV. Some people receive it both ways.       If you take iron pills, you may need extra fiber in your diet to prevent constipation. Good sources of fiber include fresh foods that are low in potassium.     Take iron as directed  If you take iron pills, follow these tips:    Take with vitamin C for improved absorption    Take iron pills as often as directed. Do not skip doses.    Take iron between meals if you use phosphate binders at mealtime. If taken together, the iron can get caught by the binder and won't be absorbed.     Eat high-fiber foods to help control constipation. Fresh fruits and vegetables that are low in potassium make good choices.    Ask your healthcare provider about using a stool softener if constipation becomes a frequent problem.    Talk with your healthcare provider if you begin to have stomach or intestinal upset.    Iron pills can make your stools dark or black colored. This does not mean your health is affected.      Date Last Reviewed: 1/1/2017 2000-2018 [employer]. [address] [place] [address], [place] [address]. All rights reserved. This information is not intended as a substitute for professional medical care. Always follow your healthcare professional's instructions.

## 2019-05-28 NOTE — TELEPHONE ENCOUNTER
D/w patient   She ok taking the whole uterus (cervix involved).    Will not have to do pap smears!       Dr. Georgiana Pizarro, DO    Obstetrics and Gynecology  [facility]

## 2019-06-04 ENCOUNTER — TELEPHONE (OUTPATIENT)
Dept: OBGYN | Facility: CLINIC | Age: 49
End: 2019-06-04

## 2019-06-04 NOTE — TELEPHONE ENCOUNTER
Pt has surgery tomorrow.    Pt drank 10oz of laxative with 16 oz of water at 1:30pm.  No BM yet, pt making sure this is okay?  At what point should she be concerned that she has not had a BM?    Roseanne ROY R.N.  [facility]

## 2019-06-04 NOTE — TELEPHONE ENCOUNTER
Please let her know this is fine, ok if no bowel movement   Dr. Georgiana Pizarro, DO    Obstetrics and Gynecology  [facility]

## 2019-06-05 ENCOUNTER — HOSPITAL ENCOUNTER (OUTPATIENT)
Facility: CLINIC | Age: 49
Discharge: HOME | End: 2019-06-06
Attending: FAMILY MEDICINE | Admitting: FAMILY MEDICINE
Payer: COMMERCIAL

## 2019-06-05 ENCOUNTER — ANESTHESIA (OUTPATIENT)
Dept: SURGERY | Facility: CLINIC | Age: 49
End: 2019-06-05
Payer: COMMERCIAL

## 2019-06-05 ENCOUNTER — ANESTHESIA EVENT (OUTPATIENT)
Dept: SURGERY | Facility: CLINIC | Age: 49
End: 2019-06-05
Payer: COMMERCIAL

## 2019-06-05 DIAGNOSIS — G89.18 POST-OP PAIN: ICD-10-CM

## 2019-06-05 DIAGNOSIS — Z90.710 S/P HYSTERECTOMY: Primary | ICD-10-CM

## 2019-06-05 LAB
ABO + RH BLD: NORMAL
ABO + RH BLD: NORMAL
BLD GP AB SCN SERPL QL: NORMAL
BLOOD BANK CMNT PATIENT-IMP: NORMAL
GLUCOSE BLDC GLUCOMTR-MCNC: 180 MG/DL (ref 70–99)
GLUCOSE BLDC GLUCOMTR-MCNC: 202 MG/DL (ref 70–99)
HCG UR QL: NEGATIVE
HGB BLD-MCNC: 12.5 G/DL (ref 11.7–15.7)
SPECIMEN EXP DATE BLD: NORMAL

## 2019-06-05 PROCEDURE — 25000132 ZZH RX MED GY IP 250 OP 250 PS 637: Performed by: ANESTHESIOLOGY

## 2019-06-05 PROCEDURE — 88307 TISSUE EXAM BY PATHOLOGIST: CPT | Mod: 26 | Performed by: FAMILY MEDICINE

## 2019-06-05 PROCEDURE — 85018 HEMOGLOBIN: CPT | Performed by: FAMILY MEDICINE

## 2019-06-05 PROCEDURE — 25000125 ZZHC RX 250: Performed by: FAMILY MEDICINE

## 2019-06-05 PROCEDURE — 25800030 ZZH RX IP 258 OP 636: Performed by: ANESTHESIOLOGY

## 2019-06-05 PROCEDURE — C1765 ADHESION BARRIER: HCPCS | Performed by: FAMILY MEDICINE

## 2019-06-05 PROCEDURE — 86900 BLOOD TYPING SEROLOGIC ABO: CPT | Performed by: FAMILY MEDICINE

## 2019-06-05 PROCEDURE — 25000128 H RX IP 250 OP 636: Performed by: ANESTHESIOLOGY

## 2019-06-05 PROCEDURE — 25000132 ZZH RX MED GY IP 250 OP 250 PS 637: Performed by: FAMILY MEDICINE

## 2019-06-05 PROCEDURE — 25000128 H RX IP 250 OP 636: Performed by: NURSE ANESTHETIST, CERTIFIED REGISTERED

## 2019-06-05 PROCEDURE — 81025 URINE PREGNANCY TEST: CPT | Performed by: FAMILY MEDICINE

## 2019-06-05 PROCEDURE — 36000087 ZZH SURGERY LEVEL 8 EA 15 ADDTL MIN: Performed by: FAMILY MEDICINE

## 2019-06-05 PROCEDURE — 25000128 H RX IP 250 OP 636: Performed by: FAMILY MEDICINE

## 2019-06-05 PROCEDURE — 58571 TLH W/T/O 250 G OR LESS: CPT | Performed by: FAMILY MEDICINE

## 2019-06-05 PROCEDURE — 71000013 ZZH RECOVERY PHASE 1 LEVEL 1 EA ADDTL HR: Performed by: FAMILY MEDICINE

## 2019-06-05 PROCEDURE — 88307 TISSUE EXAM BY PATHOLOGIST: CPT | Performed by: FAMILY MEDICINE

## 2019-06-05 PROCEDURE — 25800025 ZZH RX 258: Performed by: FAMILY MEDICINE

## 2019-06-05 PROCEDURE — 27210794 ZZH OR GENERAL SUPPLY STERILE: Performed by: FAMILY MEDICINE

## 2019-06-05 PROCEDURE — S2900 ROBOTIC SURGICAL SYSTEM: HCPCS | Performed by: FAMILY MEDICINE

## 2019-06-05 PROCEDURE — 36000085 ZZH SURGERY LEVEL 8 1ST 30 MIN: Performed by: FAMILY MEDICINE

## 2019-06-05 PROCEDURE — 82962 GLUCOSE BLOOD TEST: CPT | Mod: 91

## 2019-06-05 PROCEDURE — 37000009 ZZH ANESTHESIA TECHNICAL FEE, EACH ADDTL 15 MIN: Performed by: FAMILY MEDICINE

## 2019-06-05 PROCEDURE — 37000008 ZZH ANESTHESIA TECHNICAL FEE, 1ST 30 MIN: Performed by: FAMILY MEDICINE

## 2019-06-05 PROCEDURE — 71000012 ZZH RECOVERY PHASE 1 LEVEL 1 FIRST HR: Performed by: FAMILY MEDICINE

## 2019-06-05 PROCEDURE — 36415 COLL VENOUS BLD VENIPUNCTURE: CPT | Performed by: FAMILY MEDICINE

## 2019-06-05 PROCEDURE — 86850 RBC ANTIBODY SCREEN: CPT | Performed by: FAMILY MEDICINE

## 2019-06-05 PROCEDURE — 25000125 ZZHC RX 250: Performed by: NURSE ANESTHETIST, CERTIFIED REGISTERED

## 2019-06-05 PROCEDURE — 25000128 H RX IP 250 OP 636

## 2019-06-05 PROCEDURE — 40000306 ZZH STATISTIC PRE PROC ASSESS II: Performed by: FAMILY MEDICINE

## 2019-06-05 PROCEDURE — 86901 BLOOD TYPING SEROLOGIC RH(D): CPT | Performed by: FAMILY MEDICINE

## 2019-06-05 RX ORDER — IBUPROFEN 600 MG/1
600 TABLET, FILM COATED ORAL EVERY 6 HOURS PRN
Status: DISCONTINUED | OUTPATIENT
Start: 2019-06-06 | End: 2019-06-06

## 2019-06-05 RX ORDER — NEOSTIGMINE METHYLSULFATE 1 MG/ML
VIAL (ML) INJECTION PRN
Status: DISCONTINUED | OUTPATIENT
Start: 2019-06-05 | End: 2019-06-05

## 2019-06-05 RX ORDER — LIDOCAINE 40 MG/G
CREAM TOPICAL
Status: DISCONTINUED | OUTPATIENT
Start: 2019-06-05 | End: 2019-06-06 | Stop reason: HOSPADM

## 2019-06-05 RX ORDER — ONDANSETRON 4 MG/1
4 TABLET, ORALLY DISINTEGRATING ORAL EVERY 6 HOURS PRN
Status: DISCONTINUED | OUTPATIENT
Start: 2019-06-05 | End: 2019-06-06 | Stop reason: HOSPADM

## 2019-06-05 RX ORDER — ACETAMINOPHEN 325 MG/1
975 TABLET ORAL ONCE
Status: COMPLETED | OUTPATIENT
Start: 2019-06-05 | End: 2019-06-05

## 2019-06-05 RX ORDER — SODIUM CHLORIDE, SODIUM LACTATE, POTASSIUM CHLORIDE, CALCIUM CHLORIDE 600; 310; 30; 20 MG/100ML; MG/100ML; MG/100ML; MG/100ML
INJECTION, SOLUTION INTRAVENOUS CONTINUOUS
Status: DISCONTINUED | OUTPATIENT
Start: 2019-06-05 | End: 2019-06-06 | Stop reason: HOSPADM

## 2019-06-05 RX ORDER — ONDANSETRON 2 MG/ML
INJECTION INTRAMUSCULAR; INTRAVENOUS PRN
Status: DISCONTINUED | OUTPATIENT
Start: 2019-06-05 | End: 2019-06-05

## 2019-06-05 RX ORDER — NALOXONE HYDROCHLORIDE 0.4 MG/ML
.1-.4 INJECTION, SOLUTION INTRAMUSCULAR; INTRAVENOUS; SUBCUTANEOUS
Status: DISCONTINUED | OUTPATIENT
Start: 2019-06-05 | End: 2019-06-05 | Stop reason: HOSPADM

## 2019-06-05 RX ORDER — DEXAMETHASONE SODIUM PHOSPHATE 4 MG/ML
INJECTION, SOLUTION INTRA-ARTICULAR; INTRALESIONAL; INTRAMUSCULAR; INTRAVENOUS; SOFT TISSUE PRN
Status: DISCONTINUED | OUTPATIENT
Start: 2019-06-05 | End: 2019-06-05

## 2019-06-05 RX ORDER — LIDOCAINE 40 MG/G
CREAM TOPICAL
Status: DISCONTINUED | OUTPATIENT
Start: 2019-06-05 | End: 2019-06-05 | Stop reason: HOSPADM

## 2019-06-05 RX ORDER — ONDANSETRON 2 MG/ML
4 INJECTION INTRAMUSCULAR; INTRAVENOUS EVERY 6 HOURS PRN
Status: DISCONTINUED | OUTPATIENT
Start: 2019-06-05 | End: 2019-06-06 | Stop reason: HOSPADM

## 2019-06-05 RX ORDER — SODIUM CHLORIDE, SODIUM LACTATE, POTASSIUM CHLORIDE, CALCIUM CHLORIDE 600; 310; 30; 20 MG/100ML; MG/100ML; MG/100ML; MG/100ML
INJECTION, SOLUTION INTRAVENOUS CONTINUOUS
Status: DISCONTINUED | OUTPATIENT
Start: 2019-06-05 | End: 2019-06-05 | Stop reason: HOSPADM

## 2019-06-05 RX ORDER — FENTANYL CITRATE 50 UG/ML
INJECTION, SOLUTION INTRAMUSCULAR; INTRAVENOUS PRN
Status: DISCONTINUED | OUTPATIENT
Start: 2019-06-05 | End: 2019-06-05

## 2019-06-05 RX ORDER — DOCUSATE SODIUM 100 MG/1
100 CAPSULE, LIQUID FILLED ORAL 2 TIMES DAILY
Qty: 90 CAPSULE | Refills: 3 | Status: SHIPPED | OUTPATIENT
Start: 2019-06-05 | End: 2019-08-30

## 2019-06-05 RX ORDER — FENTANYL CITRATE 50 UG/ML
25-50 INJECTION, SOLUTION INTRAMUSCULAR; INTRAVENOUS EVERY 5 MIN PRN
Status: DISCONTINUED | OUTPATIENT
Start: 2019-06-05 | End: 2019-06-05 | Stop reason: HOSPADM

## 2019-06-05 RX ORDER — KETOROLAC TROMETHAMINE 30 MG/ML
30 INJECTION, SOLUTION INTRAMUSCULAR; INTRAVENOUS EVERY 6 HOURS
Status: DISCONTINUED | OUTPATIENT
Start: 2019-06-05 | End: 2019-06-06

## 2019-06-05 RX ORDER — MEPERIDINE HYDROCHLORIDE 25 MG/ML
12.5 INJECTION INTRAMUSCULAR; INTRAVENOUS; SUBCUTANEOUS
Status: DISCONTINUED | OUTPATIENT
Start: 2019-06-05 | End: 2019-06-05 | Stop reason: HOSPADM

## 2019-06-05 RX ORDER — OXYCODONE HYDROCHLORIDE 5 MG/1
5 TABLET ORAL EVERY 6 HOURS PRN
Qty: 30 TABLET | Refills: 0 | Status: SHIPPED | OUTPATIENT
Start: 2019-06-05 | End: 2019-11-26

## 2019-06-05 RX ORDER — HYDROXYZINE HYDROCHLORIDE 25 MG/1
25 TABLET, FILM COATED ORAL EVERY 6 HOURS PRN
Status: DISCONTINUED | OUTPATIENT
Start: 2019-06-05 | End: 2019-06-06 | Stop reason: HOSPADM

## 2019-06-05 RX ORDER — METOPROLOL TARTRATE 1 MG/ML
1-2 INJECTION, SOLUTION INTRAVENOUS EVERY 5 MIN PRN
Status: DISCONTINUED | OUTPATIENT
Start: 2019-06-05 | End: 2019-06-05 | Stop reason: HOSPADM

## 2019-06-05 RX ORDER — ONDANSETRON 4 MG/1
4 TABLET, ORALLY DISINTEGRATING ORAL EVERY 30 MIN PRN
Status: DISCONTINUED | OUTPATIENT
Start: 2019-06-05 | End: 2019-06-05

## 2019-06-05 RX ORDER — KETOROLAC TROMETHAMINE 30 MG/ML
30 INJECTION, SOLUTION INTRAMUSCULAR; INTRAVENOUS EVERY 6 HOURS PRN
Status: DISCONTINUED | OUTPATIENT
Start: 2019-06-05 | End: 2019-06-05 | Stop reason: HOSPADM

## 2019-06-05 RX ORDER — KETOROLAC TROMETHAMINE 30 MG/ML
30 INJECTION, SOLUTION INTRAMUSCULAR; INTRAVENOUS ONCE
Status: COMPLETED | OUTPATIENT
Start: 2019-06-05 | End: 2019-06-05

## 2019-06-05 RX ORDER — CEFAZOLIN SODIUM 2 G/100ML
2 INJECTION, SOLUTION INTRAVENOUS
Status: COMPLETED | OUTPATIENT
Start: 2019-06-05 | End: 2019-06-05

## 2019-06-05 RX ORDER — PROPOFOL 10 MG/ML
INJECTION, EMULSION INTRAVENOUS CONTINUOUS PRN
Status: DISCONTINUED | OUTPATIENT
Start: 2019-06-05 | End: 2019-06-05

## 2019-06-05 RX ORDER — OXYCODONE HYDROCHLORIDE 5 MG/1
5-10 TABLET ORAL
Status: DISCONTINUED | OUTPATIENT
Start: 2019-06-05 | End: 2019-06-06 | Stop reason: HOSPADM

## 2019-06-05 RX ORDER — ACETAMINOPHEN 325 MG/1
650 TABLET ORAL EVERY 6 HOURS PRN
Status: DISCONTINUED | OUTPATIENT
Start: 2019-06-05 | End: 2019-06-06 | Stop reason: HOSPADM

## 2019-06-05 RX ORDER — CLINDAMYCIN PHOSPHATE 900 MG/50ML
900 INJECTION, SOLUTION INTRAVENOUS ONCE
Status: COMPLETED | OUTPATIENT
Start: 2019-06-05 | End: 2019-06-05

## 2019-06-05 RX ORDER — NALOXONE HYDROCHLORIDE 0.4 MG/ML
.1-.4 INJECTION, SOLUTION INTRAMUSCULAR; INTRAVENOUS; SUBCUTANEOUS
Status: DISCONTINUED | OUTPATIENT
Start: 2019-06-05 | End: 2019-06-06 | Stop reason: HOSPADM

## 2019-06-05 RX ORDER — FENTANYL CITRATE 50 UG/ML
25-50 INJECTION, SOLUTION INTRAMUSCULAR; INTRAVENOUS
Status: DISCONTINUED | OUTPATIENT
Start: 2019-06-05 | End: 2019-06-05 | Stop reason: HOSPADM

## 2019-06-05 RX ORDER — BUPIVACAINE HYDROCHLORIDE 2.5 MG/ML
INJECTION, SOLUTION INFILTRATION; PERINEURAL PRN
Status: DISCONTINUED | OUTPATIENT
Start: 2019-06-05 | End: 2019-06-05 | Stop reason: HOSPADM

## 2019-06-05 RX ORDER — ALBUTEROL SULFATE 0.83 MG/ML
2.5 SOLUTION RESPIRATORY (INHALATION) EVERY 4 HOURS PRN
Status: DISCONTINUED | OUTPATIENT
Start: 2019-06-05 | End: 2019-06-05 | Stop reason: HOSPADM

## 2019-06-05 RX ORDER — HYDROMORPHONE HYDROCHLORIDE 1 MG/ML
0.2 INJECTION, SOLUTION INTRAMUSCULAR; INTRAVENOUS; SUBCUTANEOUS
Status: DISCONTINUED | OUTPATIENT
Start: 2019-06-05 | End: 2019-06-05 | Stop reason: DRUGHIGH

## 2019-06-05 RX ORDER — PHENAZOPYRIDINE HYDROCHLORIDE 200 MG/1
200 TABLET, FILM COATED ORAL ONCE
Status: COMPLETED | OUTPATIENT
Start: 2019-06-05 | End: 2019-06-05

## 2019-06-05 RX ORDER — PROPOFOL 10 MG/ML
INJECTION, EMULSION INTRAVENOUS PRN
Status: DISCONTINUED | OUTPATIENT
Start: 2019-06-05 | End: 2019-06-05

## 2019-06-05 RX ORDER — CEFAZOLIN SODIUM 1 G/3ML
1 INJECTION, POWDER, FOR SOLUTION INTRAMUSCULAR; INTRAVENOUS SEE ADMIN INSTRUCTIONS
Status: DISCONTINUED | OUTPATIENT
Start: 2019-06-05 | End: 2019-06-05 | Stop reason: HOSPADM

## 2019-06-05 RX ORDER — HYDRALAZINE HYDROCHLORIDE 20 MG/ML
2.5-5 INJECTION INTRAMUSCULAR; INTRAVENOUS EVERY 10 MIN PRN
Status: DISCONTINUED | OUTPATIENT
Start: 2019-06-05 | End: 2019-06-05 | Stop reason: HOSPADM

## 2019-06-05 RX ORDER — ONDANSETRON 2 MG/ML
4 INJECTION INTRAMUSCULAR; INTRAVENOUS EVERY 30 MIN PRN
Status: DISCONTINUED | OUTPATIENT
Start: 2019-06-05 | End: 2019-06-05

## 2019-06-05 RX ORDER — HYDROMORPHONE HYDROCHLORIDE 1 MG/ML
INJECTION, SOLUTION INTRAMUSCULAR; INTRAVENOUS; SUBCUTANEOUS
Status: COMPLETED
Start: 2019-06-05 | End: 2019-06-05

## 2019-06-05 RX ORDER — DIAZEPAM 10 MG/2ML
2.5 INJECTION, SOLUTION INTRAMUSCULAR; INTRAVENOUS
Status: COMPLETED | OUTPATIENT
Start: 2019-06-05 | End: 2019-06-05

## 2019-06-05 RX ORDER — IBUPROFEN 800 MG/1
800 TABLET, FILM COATED ORAL EVERY 8 HOURS PRN
Qty: 90 TABLET | Refills: 1 | Status: SHIPPED | OUTPATIENT
Start: 2019-06-05 | End: 2019-08-30

## 2019-06-05 RX ORDER — OXYCODONE HYDROCHLORIDE 5 MG/1
5 TABLET ORAL EVERY 4 HOURS PRN
Status: DISCONTINUED | OUTPATIENT
Start: 2019-06-05 | End: 2019-06-05

## 2019-06-05 RX ORDER — HYDROMORPHONE HYDROCHLORIDE 1 MG/ML
0.5 INJECTION, SOLUTION INTRAMUSCULAR; INTRAVENOUS; SUBCUTANEOUS
Status: DISCONTINUED | OUTPATIENT
Start: 2019-06-05 | End: 2019-06-06 | Stop reason: HOSPADM

## 2019-06-05 RX ORDER — GLYCOPYRROLATE 0.2 MG/ML
INJECTION, SOLUTION INTRAMUSCULAR; INTRAVENOUS PRN
Status: DISCONTINUED | OUTPATIENT
Start: 2019-06-05 | End: 2019-06-05

## 2019-06-05 RX ORDER — DIAZEPAM 10 MG/2ML
5 INJECTION, SOLUTION INTRAMUSCULAR; INTRAVENOUS
Status: DISCONTINUED | OUTPATIENT
Start: 2019-06-05 | End: 2019-06-05 | Stop reason: HOSPADM

## 2019-06-05 RX ORDER — BUPIVACAINE HYDROCHLORIDE AND EPINEPHRINE 5; 5 MG/ML; UG/ML
INJECTION, SOLUTION PERINEURAL PRN
Status: DISCONTINUED | OUTPATIENT
Start: 2019-06-05 | End: 2019-06-05 | Stop reason: HOSPADM

## 2019-06-05 RX ADMIN — HYDROMORPHONE HYDROCHLORIDE 0.5 MG: 1 INJECTION, SOLUTION INTRAMUSCULAR; INTRAVENOUS; SUBCUTANEOUS at 10:51

## 2019-06-05 RX ADMIN — KETOROLAC TROMETHAMINE 30 MG: 30 INJECTION, SOLUTION INTRAMUSCULAR at 15:07

## 2019-06-05 RX ADMIN — HYDROMORPHONE HYDROCHLORIDE 1 MG: 1 INJECTION, SOLUTION INTRAMUSCULAR; INTRAVENOUS; SUBCUTANEOUS at 07:57

## 2019-06-05 RX ADMIN — KETOROLAC TROMETHAMINE 30 MG: 30 INJECTION, SOLUTION INTRAMUSCULAR at 07:57

## 2019-06-05 RX ADMIN — ONDANSETRON 4 MG: 2 INJECTION INTRAMUSCULAR; INTRAVENOUS at 07:57

## 2019-06-05 RX ADMIN — FENTANYL CITRATE 50 MCG: 50 INJECTION, SOLUTION INTRAMUSCULAR; INTRAVENOUS at 12:10

## 2019-06-05 RX ADMIN — OXYCODONE HYDROCHLORIDE 5 MG: 5 TABLET ORAL at 15:56

## 2019-06-05 RX ADMIN — ACETAMINOPHEN 650 MG: 325 TABLET, FILM COATED ORAL at 17:03

## 2019-06-05 RX ADMIN — FENTANYL CITRATE 50 MCG: 50 INJECTION, SOLUTION INTRAMUSCULAR; INTRAVENOUS at 12:01

## 2019-06-05 RX ADMIN — HYDROMORPHONE HYDROCHLORIDE 0.5 MG: 1 INJECTION, SOLUTION INTRAMUSCULAR; INTRAVENOUS; SUBCUTANEOUS at 12:41

## 2019-06-05 RX ADMIN — HYDROXYZINE HYDROCHLORIDE 25 MG: 25 TABLET ORAL at 17:04

## 2019-06-05 RX ADMIN — RANITIDINE 150 MG: 150 TABLET ORAL at 20:14

## 2019-06-05 RX ADMIN — CEFAZOLIN SODIUM 2 G: 2 INJECTION, SOLUTION INTRAVENOUS at 08:08

## 2019-06-05 RX ADMIN — ACETAMINOPHEN 650 MG: 325 TABLET, FILM COATED ORAL at 23:40

## 2019-06-05 RX ADMIN — SODIUM CHLORIDE, POTASSIUM CHLORIDE, SODIUM LACTATE AND CALCIUM CHLORIDE: 600; 310; 30; 20 INJECTION, SOLUTION INTRAVENOUS at 07:53

## 2019-06-05 RX ADMIN — GLYCOPYRROLATE 0.2 MG: 0.2 INJECTION, SOLUTION INTRAMUSCULAR; INTRAVENOUS at 07:57

## 2019-06-05 RX ADMIN — HYDROMORPHONE HYDROCHLORIDE 0.5 MG: 1 INJECTION, SOLUTION INTRAMUSCULAR; INTRAVENOUS; SUBCUTANEOUS at 18:12

## 2019-06-05 RX ADMIN — DEXAMETHASONE SODIUM PHOSPHATE 4 MG: 4 INJECTION, SOLUTION INTRA-ARTICULAR; INTRALESIONAL; INTRAMUSCULAR; INTRAVENOUS; SOFT TISSUE at 07:57

## 2019-06-05 RX ADMIN — FENTANYL CITRATE 50 MCG: 50 INJECTION, SOLUTION INTRAMUSCULAR; INTRAVENOUS at 11:03

## 2019-06-05 RX ADMIN — PHENAZOPYRIDINE 200 MG: 200 TABLET ORAL at 06:41

## 2019-06-05 RX ADMIN — HYDROMORPHONE HYDROCHLORIDE 0.5 MG: 1 INJECTION, SOLUTION INTRAMUSCULAR; INTRAVENOUS; SUBCUTANEOUS at 11:19

## 2019-06-05 RX ADMIN — OXYCODONE HYDROCHLORIDE 10 MG: 5 TABLET ORAL at 22:29

## 2019-06-05 RX ADMIN — OXYCODONE HYDROCHLORIDE 5 MG: 5 TABLET ORAL at 11:45

## 2019-06-05 RX ADMIN — GLYCOPYRROLATE 0.8 MG: 0.2 INJECTION, SOLUTION INTRAMUSCULAR; INTRAVENOUS at 09:28

## 2019-06-05 RX ADMIN — HYDROMORPHONE HYDROCHLORIDE 0.2 MG: 1 INJECTION, SOLUTION INTRAMUSCULAR; INTRAVENOUS; SUBCUTANEOUS at 15:49

## 2019-06-05 RX ADMIN — FENTANYL CITRATE 50 MCG: 50 INJECTION, SOLUTION INTRAMUSCULAR; INTRAVENOUS at 13:30

## 2019-06-05 RX ADMIN — ACETAMINOPHEN 975 MG: 325 TABLET, FILM COATED ORAL at 06:40

## 2019-06-05 RX ADMIN — FENTANYL CITRATE 100 MCG: 50 INJECTION, SOLUTION INTRAMUSCULAR; INTRAVENOUS at 07:57

## 2019-06-05 RX ADMIN — Medication 5 MG: at 09:28

## 2019-06-05 RX ADMIN — PROPOFOL 50 MCG/KG/MIN: 10 INJECTION, EMULSION INTRAVENOUS at 07:57

## 2019-06-05 RX ADMIN — CLINDAMYCIN PHOSPHATE 900 MG: 18 INJECTION, SOLUTION INTRAVENOUS at 07:53

## 2019-06-05 RX ADMIN — OXYCODONE HYDROCHLORIDE 5 MG: 5 TABLET ORAL at 18:56

## 2019-06-05 RX ADMIN — Medication 2.5 MG: at 13:46

## 2019-06-05 RX ADMIN — KETOROLAC TROMETHAMINE 30 MG: 30 INJECTION, SOLUTION INTRAMUSCULAR at 21:31

## 2019-06-05 RX ADMIN — MIDAZOLAM 2 MG: 1 INJECTION INTRAMUSCULAR; INTRAVENOUS at 07:53

## 2019-06-05 RX ADMIN — HYDROMORPHONE HYDROCHLORIDE 0.5 MG: 1 INJECTION, SOLUTION INTRAMUSCULAR; INTRAVENOUS; SUBCUTANEOUS at 12:57

## 2019-06-05 RX ADMIN — HYDROXYZINE HYDROCHLORIDE 25 MG: 25 TABLET ORAL at 23:40

## 2019-06-05 RX ADMIN — FENTANYL CITRATE 50 MCG: 50 INJECTION, SOLUTION INTRAMUSCULAR; INTRAVENOUS at 10:50

## 2019-06-05 RX ADMIN — PROPOFOL 200 MG: 10 INJECTION, EMULSION INTRAVENOUS at 07:57

## 2019-06-05 RX ADMIN — ROCURONIUM BROMIDE 50 MG: 10 INJECTION INTRAVENOUS at 07:57

## 2019-06-05 ASSESSMENT — MIFFLIN-ST. JEOR
SCORE: 1707.5
SCORE: 1764.65

## 2019-06-05 NOTE — PROVIDER NOTIFICATION
Discussed with Dr. Reynoso, received pt from PACU continues to report spasming type of pain to left ABD.  Received order to increase IV dilaudid to 0.5mg, give atarax prn, tylenol prn and scheduled toradol.    Reported to  That pt is stable, vitals are WDL.  Able to make needs known and appears to be at ease between waves of pain  Call Dr. Reynoso if further concerns  Page [phone]

## 2019-06-05 NOTE — OP NOTE
Pre-procedure diagnosis: 49 year old  y/o  female with abnormal uterine bleeding   Post-procedure diagnosis:  49 year old  y/o  female with abnormal uterine bleeding  SURGEON: Georgiana Pizarro DO.   Assistant:  Pebbles Randolph SA  PROCEDURES:   1. Robotic-assisted total laparoscopic hysterectomy.   2. Bilateral salpingectomy   3. Cystoscopy.   4. Adhesion prevention with Seprafilm/Slurry.   COMPLICATIONS: None.   ESTIMATED BLOOD LOSS: 25 ml.   IV FLUIDS: 1200 cc of lactated Ringer's.   COMPLICATIONS: None apparent at time of procedure.   FINDINGS: Approximately 10-week size uterus. Normal bowel and liver. The appendix was not visualized.   Uterus normal upon appearance, normal bilateral tubes and ovaries  Findings upon cystoscopy include normal bladder epithelium, bilateral extrusion of pyridium from the right and left ureteral orifice.   INDICATION: Kaylee Lopez is very pleasant 49 year old year-old,  , who comes in with abnormal uterine bleeding, previous endometrial sampling with normal pathology on 2018 .   Risks, benefits alternative are discussed, she desires definitive surgical management.  Body mass index is 40.22 kg/m .  Risks of infection, bleeding and injury to bowel and bladder discussed with patient.    PATHOLOGY FINDINGS: pending  DESCRIPTION OF PROCEDURE: After informed consent was signed, the patient was taken to the operating room where she was placed in dorsal supine position, placed under general anesthesia without difficulty. The patient was prepped and draped in normal sterile fashion and placed in dorsal lithotomy position. A time out was performed. Exam under anesthesia reveals a 10 week size anteverted uterus. The patient was prepped and draped in normal sterile fashion. Full timeout was performed. The patient was given Ancef, clindamycin preoperatively. A Escobedo catheter was placed. Maryknoll speculum was placed in the patient's vaginal vault. Anterior lip  of cervix was grasped with a single-tooth tenaculum, and uterus was sounded with pipelle to 10 cm, then dilatation of the cervical os was performed. A figure-of-eight suture of 0 monocryl is placed on the anterior lip of the cervix, and then the large VCare manipulator was placed. The bivalve speculum was removed. Attention was turned to the patient's abdomen, where the Addy clamps were clamped on umbilicus and a Veress needle was placed directly supraumbilically. The saline is placed to flow through, and it flowed through nicely. Intraoperative pressure when the gas was placed on was noted to be 1 mmHg, 3.5 liters of CO2 was insufflated. An 12 mm incision was made with the scalpel in the umbilicus, and the 12 mm trocar was placed directly. Confirmation of intraabdominal placement is obtained via laparoscope. Two 8 mm incisions were made bilaterally 2 cm anterior and superior to the ASIS and then the ports were placed. An air-seal assistant port was placed in the right medial lateral port. Peritoneal mapping was performed with lidocaine before all of this. The patient was then placed into Trendelenburg, and the bowel was swept. The robot was docked, and the instruments were placed.  The ureters are identified prior to beginning and peristalsis is noted. A bilateral salpingectomy is performed. Attention was then turned toward the right uterine ovarian ligament and round ligament, which are identified, coagulated and incised. Anterior and posterior leaf of the broad ligament are incised. The uterine artery is identified and incised. The bladder flap was created. Attention then turned to the left side, where the left uteroovarian ligament and the round ligament were identified, coagulated and incised. The anterior and posterior leaf of the broad ligament on the left side was then incised, and the uterine artery is identified, coagulated and incised. The bladder flap was then created across as I dissected down. The  colpotomy was then made with monopolar cautery on cut power, and when this was completely done, the uterus was delivered through the vagina. The vaginal cuff was re-approximated in the midline in a figure-of-8 stitch using 0-monocryl suture.  Then the vaginal cuff was closed with a O V-lock suture in a running fashion from the right vaginal cuff to the left, which was performed incorporating the uterosacral ligaments in to the closure and then bladder peritoneum was re-approximated to the posterior peritoneum with a continuation of the same suture. Hemostasis was assured with cautery and irrigation.  Hemostasis is noted. Bilateral ureteral peristalsis is noted.  I then scrubbed back in. The robot was undocked. Lidocaine mixture and seprafilm slurry was placed into the abdomen. The 12 mm port was removed and the fascia was closed with a melinda-wong device.  Then the remaining trocars were then removed, and the skin was closed with a subq 4-0 Monocryl tissue and Dermabond was applied. Sponge, lap and needle counts were correct x2. I then removed the del real catheter and performed a cystoscopy. I visualized bilateral extrusion of pyridium colored urine from the right and left ureteral orifice, along with normal bladder epithelium, with normal distention of the bladder. Cystoscopy was terminated. The vaginal cuff was inspected and found to be hemostatic with excellent vaginal support.   Sponge, lab and needle counts were correct x 2   The patient tolerated the procedure well.   Dr. Georgiana Pizarro, DO   OB/GYN   [facility]

## 2019-06-05 NOTE — PHARMACY-ADMISSION MEDICATION HISTORY
Medication reconciliation/reorder completed as per pre-admit ADA Wilson    Prior to Admission medications    Medication Sig Last Dose Taking? Auth Provider   atorvastatin (LIPITOR) 10 MG tablet TAKE 1 TABLET BY MOUTH EVERY DAY 6/4/2019 at Unknown time Yes Irene Castellanos MD   busPIRone (BUSPAR) 10 MG tablet Take 1 tablet (10 mg) by mouth 2 times daily 6/4/2019 at Unknown time Yes Irene Castellanos MD   cetirizine (ZYRTEC) 10 MG tablet Take 10 mg by mouth daily as needed for allergies Past Week at Unknown time Yes Reported, Patient   citalopram (CELEXA) 40 MG tablet Take 1 tablet (40 mg) by mouth daily 6/4/2019 at Unknown time Yes Irene Castellanos MD   cyanocobalamin (VITAMIN B-12) 1000 MCG tablet TAKE 1 TABLET BY MOUTH DAILY 6/4/2019 at Unknown time Yes Irene Castellanos MD   cyclobenzaprine (FLEXERIL) 10 MG tablet TAKE 1 TABLET BY MOUTH 3 TIMES DAILY AS NEEDED FOR MUSCLE SPASMS 6/4/2019 at Unknown time Yes Irene Castellanos MD   docusate sodium (COLACE) 100 MG capsule Take 1 capsule (100 mg) by mouth 2 times daily  Yes Georgiana Pizarro DO   ibuprofen (ADVIL/MOTRIN) 800 MG tablet Take 1 tablet (800 mg) by mouth every 8 hours as needed for moderate pain  Yes Georgiana Pizarro DO   ibuprofen (ADVIL/MOTRIN) 800 MG tablet Take 1 tablet (800 mg) by mouth every 8 hours as needed for moderate pain Past Month at Unknown time Yes Georgiana Pizarro DO   liraglutide (VICTOZA PEN) 18 MG/3ML solution Inject 1.8 mg Subcutaneous daily 6/4/2019 at Unknown time Yes Irene Castellanos MD   lisinopril (PRINIVIL/ZESTRIL) 5 MG tablet Take 1 tablet (5 mg) by mouth daily 6/4/2019 at Unknown time Yes Irene Castellanos MD   metFORMIN (GLUCOPHAGE-XR) 500 MG 24 hr tablet Take 4 tablets (2,000 mg) by mouth daily (with breakfast) 6/4/2019 at Unknown time Yes Irene Castellanos MD   norethindrone-ethinyl estradiol (MICROGESTIN 1/20) 1-20 MG-MCG per tablet Take 1 tablet by mouth daily 6/4/2019 at Unknown time Yes Georgiana Pizarro, DO    oxyCODONE (ROXICODONE) 5 MG tablet Take 1 tablet (5 mg) by mouth every 6 hours as needed  Yes Georgiana Pizarro DO   traMADol (ULTRAM) 50 MG tablet TAKE 1-2 TBS BY MOUTH EVERY 8 HRS AS NEEDED FOR MODERATE PAIN MAX 8 TABS/24 HOURS, MAX 60 TBS/MONTH 6/4/2019 at Unknown time Yes Irene Castellanos MD   traZODone (DESYREL) 50 MG tablet TAKE 1.5 TABLETS (75 MG) BY MOUTH NIGHTLY AS NEEDED FOR SLEEP 6/4/2019 at Unknown time Yes Irene Castellanos MD   albuterol (PROAIR HFA/PROVENTIL HFA/VENTOLIN HFA) 108 (90 Base) MCG/ACT inhaler Inhale 2 puffs into the lungs every 6 hours as needed for shortness of breath / dyspnea or wheezing More than a month at Unknown time  Rhonda Smith MD

## 2019-06-05 NOTE — ANESTHESIA PREPROCEDURE EVALUATION
Anesthesia Pre-Procedure Evaluation    Patient: Kaylee Lopez   MRN: [id number] : 1970          Preoperative Diagnosis: abnormal uterine bleeding  fibroids, essure    Procedure(s):  robotic assisted total laparoscopic hysterectomy bilateral salpingectomy  CYSTOSCOPY    Past Medical History:   Diagnosis Date     Abnormal Papanicolaou smear of cervix and cervical HPV     late , early ; treated with TCA     Depression      Diabetes mellitus      Fibromyalgia      Heart murmur     tricuspid valve disorder     Inflammatory arthritis     ?; has been discharged from Rheumatology     Narcolepsy 2010    doing well without medication.     Non-rheumatic tricuspid valve insufficiency     antibiotic prophylax Problem list name updated by automated process. Provider to review      Obesity      Other chronic pain      Pulmonary hypertension (H) 2010    on ECHO, but normal right heart cath      Reflex sympathetic dystrophy     right foot ; related to stress fracture     TRICUSPID VALVE DISEASE (regurg)     sees Cardiology      Past Surgical History:   Procedure Laterality Date     Angiogram  3/2011    No pulmonary hypertension     COLONOSCOPY  2008    normal     DILATION AND CURETTAGE, OPERATIVE HYSTEROSCOPY WITH MORCELLATOR, COMBINED N/A 2018    Procedure: COMBINED DILATION AND CURETTAGE, OPERATIVE HYSTEROSCOPY WITH MORCELLATOR;  Hysteroscopy, polypectomy  with myosure, dilation and curettage, endometrial biopsy ;  Surgeon: Georgiana Pizarro DO;  Location: [facility] OR     ENT SURGERY      [place] teeth removal     LAPAROSCOPIC CHOLECYSTECTOMY  2000     SURGICAL HISTORY OF -       essure procedure for contraception     TCA for abnormal pap       Anesthesia Evaluation     . Pt has had prior anesthetic.            ROS/MED HX    ENT/Pulmonary:     (+)asthma , . .    Neurologic:     (+)migraines,     Cardiovascular:     (+) ----. : . . . :. valvular problems/murmurs TR:. pulmonary  "hypertension,       METS/Exercise Tolerance:     Hematologic:         Musculoskeletal:         GI/Hepatic:        (-) GERD   Renal/Genitourinary:         Endo:     (+) type II DM Obesity, .      Psychiatric:         Infectious Disease:         Malignancy:         Other:                          Physical Exam      Airway   Mallampati: III  TM distance: >3 FB  Neck ROM: full    Dental     Cardiovascular   Rhythm and rate: regular and normal      Pulmonary    breath sounds clear to auscultation            Lab Results   Component Value Date    WBC 7.4 05/24/2019    HGB 12.5 06/05/2019    HCT 36.9 05/24/2019     (H) 05/24/2019    CRP 4.6 10/15/2005    SED 19 10/30/2015     05/24/2019    POTASSIUM 4.1 05/24/2019    CHLORIDE 103 05/24/2019    CO2 28 05/24/2019    BUN 13 05/24/2019    CR 0.78 05/24/2019     (H) 05/24/2019    KIMBERLEE 9.2 05/24/2019    PHOS 3.5 10/11/2018    MAG 1.8 10/11/2018    ALBUMIN 3.3 (L) 04/05/2019    PROTTOTAL 7.6 04/05/2019    ALT 15 04/05/2019    AST 10 04/05/2019    ALKPHOS 114 04/05/2019    BILITOTAL 0.3 04/05/2019    LIPASE 109 04/20/2012    PTT 30 04/20/2012    INR 1.16 (H) 04/20/2012    TSH 2.44 04/23/2018    HCG Negative 06/05/2019       Preop Vitals  BP Readings from Last 3 Encounters:   06/05/19 141/86   05/28/19 156/76   05/24/19 130/78    Pulse Readings from Last 3 Encounters:   06/05/19 96   05/28/19 106   05/24/19 100      Resp Readings from Last 3 Encounters:   06/05/19 16   05/28/19 16   05/24/19 16    SpO2 Readings from Last 3 Encounters:   05/28/19 96%   05/24/19 99%   05/14/19 93%      Temp Readings from Last 1 Encounters:   06/05/19 99.3  F (37.4  C) (Temporal)    Ht Readings from Last 1 Encounters:   06/05/19 1.638 m (5' 4.5\")      Wt Readings from Last 1 Encounters:   06/05/19 108 kg (238 lb)    Estimated body mass index is 40.22 kg/m  as calculated from the following:    Height as of this encounter: 1.638 m (5' 4.5\").    Weight as of this encounter: 108 kg (238 " lb).       Anesthesia Plan      History & Physical Review  History and physical reviewed and following examination; no interval change.    ASA Status:  3 .    NPO Status:  > 8 hours    Plan for General and ETT with Intravenous and Propofol induction. Maintenance will be Balanced.    PONV prophylaxis:  Ondansetron (or other 5HT-3)       Postoperative Care  Postoperative pain management:  IV analgesics, Oral pain medications and Multi-modal analgesia.      Consents  Anesthetic plan, risks, benefits and alternatives discussed with:  Patient..                 Keenan Johnson MD                    .

## 2019-06-05 NOTE — PROGRESS NOTES
Updated Dr. Pizarro d/t patient's poor pain control. Patient continues to have pain 6-7/10 and looks uncomfortable despite medications given in PACU. Dr. Pizarro will place observation orders.

## 2019-06-05 NOTE — ANESTHESIA POSTPROCEDURE EVALUATION
Patient: Kaylee Lopez    Procedure(s):  robotic assisted total laparoscopic hysterectomy bilateral salpingectomy and cystoscopy  CYSTOSCOPY    Diagnosis:abnormal uterine bleeding  fibroids, essure  Diagnosis Additional Information: Abnormal uterine bleeding  Dr. Pizarro    Anesthesia Type:  General, ETT    Note:  Anesthesia Post Evaluation    Patient location during evaluation: PACU  Patient participation: Able to fully participate in evaluation  Level of consciousness: awake  Pain management: adequate  Airway patency: patent  Cardiovascular status: acceptable  Respiratory status: acceptable  Hydration status: acceptable  PONV: none             Last vitals:  Vitals:    06/05/19 1000 06/05/19 1005 06/05/19 1010   BP: 157/75 154/73 148/80   Pulse: 97 97 95   Resp: 16 12 16   Temp:      SpO2: 99% 99% 99%         Electronically Signed By: Yariel Patel MD  June 5, 2019  10:37 AM

## 2019-06-05 NOTE — BRIEF OP NOTE
[facility]    Brief Operative Note    Pre-operative diagnosis: abnormal uterine bleeding  fibroids, essure  Post-operative diagnosis 48 y/o female with abnormal uterine bleeding s/p robotic assisted total laparoscopic hysterectomy, bilateral salpingectomy, cystoscopy  Procedure: Procedure(s):  robotic assisted total laparoscopic hysterectomy bilateral salpingectomy and cystoscopy  CYSTOSCOPY  Surgeon: Surgeon(s) and Role:  Panel 1:     * Georgiana Pizarro DO - Primary  Panel 2:     * Georgiana Pizarro DO - Primary  Anesthesia: General   Estimated blood loss: 25 ml   Drains: None  Specimens:   ID Type Source Tests Collected by Time Destination   A :  Tissue Uterus, Cervix and Bilateral Fallopian Tubes SURGICAL PATHOLOGY EXAM Georgiana Pizarro DO 6/5/2019  9:11 AM      Findings:   uterus 10 weeks size, anteverted, normal bilateral tubes and ovaries, normal bowel and liver, normal findings on cystosocpy with bilateral ureteral eflux.  Complications: None.  Implants:  * No implants in log *

## 2019-06-05 NOTE — DISCHARGE SUMMARY
50 y/o female with abnormal uterine bleeding s/p robotic assisted total laparoscopic hysterectomy, bilateral salpingectomy, cystoscopy  EBL 25 ml   Doing well, discharge home POD # 1 observed overnight due to abdominal muscle spasm   Much better now     Dr. Georgiana Pizarro, DO    Obstetrics and Gynecology  [facility] [place] [facility] [place]

## 2019-06-05 NOTE — DISCHARGE INSTRUCTIONS
Maximum acetaminophen (Tylenol) dose from all sources should not exceed 4 grams (4000 mg) per day.    You also had pyridium at 6:40am yesterday.  This may cause your urine to be orange in color temporarily.    Follow up in 1-2 weeks   Call [phone] for appointment   Please page me at [phone] by texting your name and number to the pager number.  If I do not answer please call the answering service @ [phone].  I really want to know if anything is not going as expected!    Call and ask to be seen or talk to a doctor for the following (You can go to the ob triage button on answering service line [phone]):        Increased bleeding, fever, general unwell feeling or increased pain.   Please call for any reason or concern!     Dr. Georgiana Pizarro, DO    OB/GYN   [facility] and [facility]        GENERAL ANESTHESIA OR SEDATION ADULT DISCHARGE INSTRUCTIONS   SPECIAL PRECAUTIONS FOR 24 HOURS AFTER SURGERY    IT IS NOT UNUSUAL TO FEEL LIGHT-HEADED OR FAINT, UP TO 24 HOURS AFTER SURGERY OR WHILE TAKING PAIN MEDICATION.  IF YOU HAVE THESE SYMPTOMS; SIT FOR A FEW MINUTES BEFORE STANDING AND HAVE SOMEONE ASSIST YOU WHEN YOU GET UP TO WALK OR USE THE BATHROOM.    YOU SHOULD REST AND RELAX FOR THE NEXT 24 HOURS AND YOU MUST MAKE ARRANGEMENTS TO HAVE SOMEONE STAY WITH YOU FOR AT LEAST 24 HOURS AFTER YOUR DISCHARGE.  AVOID HAZARDOUS AND STRENUOUS ACTIVITIES.  DO NOT MAKE IMPORTANT DECISIONS FOR 24 HOURS.    DO NOT DRIVE ANY VEHICLE OR OPERATE MECHANICAL EQUIPMENT FOR 24 HOURS FOLLOWING THE END OF YOUR SURGERY.  EVEN THOUGH YOU MAY FEEL NORMAL, YOUR REACTIONS MAY BE AFFECTED BY THE MEDICATION YOU HAVE RECEIVED.    DO NOT DRINK ALCOHOLIC BEVERAGES FOR 24 HOURS FOLLOWING YOUR SURGERY.    DRINK CLEAR LIQUIDS (APPLE JUICE, GINGER ALE, 7-UP, BROTH, ETC.).  PROGRESS TO YOUR REGULAR DIET AS YOU FEEL ABLE.    YOU MAY HAVE A DRY MOUTH, A SORE THROAT, MUSCLES ACHES OR TROUBLE SLEEPING.  THESE SHOULD GO  AWAY AFTER 24 HOURS.    CALL YOUR DOCTOR FOR ANY OF THE FOLLOWING:  SIGNS OF INFECTION (FEVER, GROWING TENDERNESS AT THE SURGERY SITE, A LARGE AMOUNT OF DRAINAGE OR BLEEDING, SEVERE PAIN, FOUL-SMELLING DRAINAGE, REDNESS OR SWELLING.    IT HAS BEEN OVER 8 TO 10 HOURS SINCE SURGERY AND YOU ARE STILL NOT ABLE TO URINATE (PASS WATER).         LAPAROSCOPIC HYSTERECTOMY DISCHARGE INSTRUCTIONS  DR. YAW PROCTOR M.D.   CLINIC PHONE NUMBER:  [phone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

## 2019-06-05 NOTE — PROGRESS NOTES
Patient continues to have severe, intermittent pain on the left side of abdomen. Updated Dr. Patel on pain control while in PACU - see orders. Update Dr. Pizarro again before transferring patient to observation unit. Dr. Pizarro aware of patients poor pain control. No new orders received. Report given to observation unit and patient transferred.

## 2019-06-05 NOTE — ANESTHESIA CARE TRANSFER NOTE
Patient: Kaylee Lopez    Procedure(s):  robotic assisted total laparoscopic hysterectomy bilateral salpingectomy and cystoscopy  CYSTOSCOPY    Diagnosis: abnormal uterine bleeding  fibroids, essure  Diagnosis Additional Information: No value filed.    Anesthesia Type:   General, ETT     Note:  Airway :Face Mask  Patient transferred to:PACU  Handoff Report: Identifed the Patient, Identified the Reponsible Provider, Reviewed the pertinent medical history, Discussed the surgical course, Reviewed Intra-OP anesthesia mangement and issues during anesthesia, Set expectations for post-procedure period and Allowed opportunity for questions and acknowledgement of understanding      Vitals: (Last set prior to Anesthesia Care Transfer)    CRNA VITALS  6/5/2019 0922 - 6/5/2019 1002      6/5/2019             Pulse:  102    SpO2:  100 %    Resp Rate (observed):  13                Electronically Signed By: JEANE Polo CRNA  June 5, 2019  10:02 AM

## 2019-06-06 VITALS
DIASTOLIC BLOOD PRESSURE: 79 MMHG | OXYGEN SATURATION: 97 % | RESPIRATION RATE: 16 BRPM | SYSTOLIC BLOOD PRESSURE: 137 MMHG | HEART RATE: 97 BPM | TEMPERATURE: 96.6 F | BODY MASS INDEX: 41.75 KG/M2 | WEIGHT: 250.6 LBS | HEIGHT: 65 IN

## 2019-06-06 LAB
COPATH REPORT: NORMAL
GLUCOSE BLDC GLUCOMTR-MCNC: 210 MG/DL (ref 70–99)

## 2019-06-06 PROCEDURE — 82962 GLUCOSE BLOOD TEST: CPT

## 2019-06-06 PROCEDURE — 25800030 ZZH RX IP 258 OP 636: Performed by: FAMILY MEDICINE

## 2019-06-06 PROCEDURE — 25000132 ZZH RX MED GY IP 250 OP 250 PS 637: Performed by: FAMILY MEDICINE

## 2019-06-06 PROCEDURE — 25000128 H RX IP 250 OP 636: Performed by: FAMILY MEDICINE

## 2019-06-06 RX ORDER — HYDROXYZINE HYDROCHLORIDE 25 MG/1
25 TABLET, FILM COATED ORAL 3 TIMES DAILY PRN
Qty: 60 TABLET | Refills: 0 | Status: SHIPPED | OUTPATIENT
Start: 2019-06-06 | End: 2019-11-26

## 2019-06-06 RX ORDER — IBUPROFEN 600 MG/1
600 TABLET, FILM COATED ORAL EVERY 6 HOURS PRN
Status: DISCONTINUED | OUTPATIENT
Start: 2019-06-06 | End: 2019-06-06 | Stop reason: HOSPADM

## 2019-06-06 RX ADMIN — KETOROLAC TROMETHAMINE 30 MG: 30 INJECTION, SOLUTION INTRAMUSCULAR at 02:59

## 2019-06-06 RX ADMIN — RANITIDINE 150 MG: 150 TABLET ORAL at 08:51

## 2019-06-06 RX ADMIN — IBUPROFEN 600 MG: 600 TABLET ORAL at 11:41

## 2019-06-06 RX ADMIN — ACETAMINOPHEN 650 MG: 325 TABLET, FILM COATED ORAL at 07:04

## 2019-06-06 RX ADMIN — SODIUM CHLORIDE, POTASSIUM CHLORIDE, SODIUM LACTATE AND CALCIUM CHLORIDE: 600; 310; 30; 20 INJECTION, SOLUTION INTRAVENOUS at 01:23

## 2019-06-06 RX ADMIN — OXYCODONE HYDROCHLORIDE 10 MG: 5 TABLET ORAL at 05:05

## 2019-06-06 RX ADMIN — OXYCODONE HYDROCHLORIDE 10 MG: 5 TABLET ORAL at 08:51

## 2019-06-06 RX ADMIN — OXYCODONE HYDROCHLORIDE 10 MG: 5 TABLET ORAL at 01:23

## 2019-06-06 RX ADMIN — HYDROXYZINE HYDROCHLORIDE 25 MG: 25 TABLET ORAL at 07:04

## 2019-06-06 ASSESSMENT — ACTIVITIES OF DAILY LIVING (ADL)
RETIRED_COMMUNICATION: 0-->UNDERSTANDS/COMMUNICATES WITHOUT DIFFICULTY
DRESS: 0-->INDEPENDENT
SWALLOWING: 0-->SWALLOWS FOODS/LIQUIDS WITHOUT DIFFICULTY
TRANSFERRING: 0-->INDEPENDENT
COGNITION: 0 - NO COGNITION ISSUES REPORTED
FALL_HISTORY_WITHIN_LAST_SIX_MONTHS: NO
BATHING: 0-->INDEPENDENT
AMBULATION: 0-->INDEPENDENT
RETIRED_EATING: 0-->INDEPENDENT
TOILETING: 0-->INDEPENDENT

## 2019-06-06 NOTE — PLAN OF CARE
Patient's After Visit Summary was reviewed with patient and spouse.   Patient verbalized understanding of After Visit Summary, recommended follow up and was given an opportunity to ask questions.   Discharge medications sent home with patient/family: YES - ibuprofen, docusate, oxycodone, and atarax.    Discharged with spouse.    AVS thoroughly reviewed with patient and spouse. Discharged in stable condition with .    OBSERVATION patient END time: 12:45PM

## 2019-06-06 NOTE — PLAN OF CARE
PRIMARY DIAGNOSIS: lap hyst with BSO, cysto  OUTPATIENT/OBSERVATION GOALS TO BE MET BEFORE DISCHARGE:  1. Stable vital signs Yes  2. Tolerating diet:Yes  3. Pain controlled with oral pain medications:  No, IV pain medications  4. Positive bowel sounds:  Yes  5. Voiding without difficulty:  Yes  6. Able to ambulate:  Yes  7. Provider specific discharge goals met:  No    Discharge Planner Nurse   Safe discharge environment identified: Yes  Barriers to discharge: No       Entered by: Mahnaz Gallardo 06/05/2019 11:47 PM     Please review provider order for any additional goals.   Nurse to notify provider when observation goals have been met and patient is ready for discharge.    Pt alert and oriented, up with standby assist in the room. Pt on room air, lap sites intact with minimal drainage. Pt taking oxycodone, atarax and tylenol with scheduled toradol. Pt motivated to stay off IV pain medications.

## 2019-06-06 NOTE — PLAN OF CARE
PRIMARY DIAGNOSIS: lap hyst with BSO, cysto  OUTPATIENT/OBSERVATION GOALS TO BE MET BEFORE DISCHARGE:  1. Stable vital signs Yes  2. Tolerating diet:Yes  3. Pain controlled with oral pain medications: Yes, plus IV toradol  4. Positive bowel sounds:  Yes  5. Voiding without difficulty:  Yes  6. Able to ambulate:  Yes  7. Provider specific discharge goals met:  No     Discharge Planner Nurse   Safe discharge environment identified: Yes  Barriers to discharge: No       Entered by: Mahnaz Gallardo 06/05/2019 11:47 PM  Please review provider order for any additional goals.   Nurse to notify provider when observation goals have been met and patient is ready for discharge.     Pt alert and oriented, up with standby assist in the room. Pt on room air, lap sites intact with minimal drainage. Pt taking oxycodone, atarax and tylenol with scheduled toradol. Pt motivated to stay off IV pain medications.

## 2019-06-06 NOTE — PLAN OF CARE
PRIMARY DIAGNOSIS: Lap hyst with bilateral salpingectomy and cystoscopy  OUTPATIENT/OBSERVATION GOALS TO BE MET BEFORE DISCHARGE:  1. Stable vital signs Yes  2. Tolerating diet:Yes  3. Pain controlled with oral pain medications:  No - pt. Did dilaudid at 1856 for 8-10 pain, pain moving down between 6-8.  Pt. Motivated to switch to oral pain medication.  4. Positive bowel sounds:  Yes  5. Voiding without difficulty:  Yes  6. Able to ambulate:  Yes - pt. Walked hallways with 2 wheeled walker and SBA.  Pt.'s pain between 6-8.   7. Provider specific discharge goals met:  No, pt. Still needing IV pain medication    Discharge Planner Nurse   Safe discharge environment identified: Yes  Barriers to discharge: No       Entered by: Yuri Roberts 06/05/2019 8:29 PM     Please review provider order for any additional goals.   Nurse to notify provider when observation goals have been met and patient is ready for discharge.

## 2019-06-06 NOTE — PLAN OF CARE
"PRIMARY DIAGNOSIS: S/P Lap hyst with bilateral salpingectomy and cystoscopy  OUTPATIENT/OBSERVATION GOALS TO BE MET BEFORE DISCHARGE:  1. Stable vital signs: Yes  2. Tolerating diet: Yes  3. Pain controlled with oral pain medications: Yes  4. Positive bowel sounds: Yes  5. Voiding without difficulty: Yes  6. Able to ambulate: Yes  7. Provider specific discharge goals met: Yes    Discharge Planner Nurse   Safe discharge environment identified: Yes  Barriers to discharge: No       Entered by: Ngoc Raphael 06/06/2019        VSS. Pain controlled with ibuprofen, atarax, tylenol, and oxycodone. Patient feels \"much better\" than yesterday with pain control regimen of ibuprofen, tylenol, atarax, and oxycodone. Has not passed gas, BS active x 4. Denies nausea. Endorses some \"gas pains\" but states they are tolerable. Tolerating regular diet. Ambulated in halls without issue. Will discharge this afternoon when  can provide ride.     Please review provider order for any additional goals.   Nurse to notify provider when observation goals have been met and patient is ready for discharge.  "

## 2019-06-06 NOTE — PLAN OF CARE
"PRIMARY DIAGNOSIS: S/P Lap hyst with bilateral salpingectomy and cystoscopy  OUTPATIENT/OBSERVATION GOALS TO BE MET BEFORE DISCHARGE:  1. Stable vital signs: Yes  2. Tolerating diet: Yes  3. Pain controlled with oral pain medications: Yes  4. Positive bowel sounds: Yes  5. Voiding without difficulty: Yes  6. Able to ambulate: Yes  7. Provider specific discharge goals met: Yes    Discharge Planner Nurse   Safe discharge environment identified: Yes  Barriers to discharge: No       Entered by: Ngoc Raphael 06/06/2019        VSS. Oxycodone given for 5/10 abdominal pain with decrease in pain after. Patient feels \"much better\" than yesterday with pain control regimen of ibuprofen, tylenol, atarax, and oxycodone. Has not passed gas, BS active x 4. Denies nausea. Tolerating regular diet. Ambulated in halls without issue. Likely discharge this afternoon after OB sees patient.  will provide ride.      Please review provider order for any additional goals.   Nurse to notify provider when observation goals have been met and patient is ready for discharge.  "

## 2019-06-06 NOTE — PROGRESS NOTES
[facility] Gynecology Post-Op / Progress Note         Assessment and Plan:    Assessment:   Post-operative day #1  RA TLH, bilateral salpingectomy, cystoscopy      Doing well.  Pain well-controlled.      Plan:   discharge home,   Pain controlled now down to level 4   Feels ready to go home   Ambulating, eating well           Interval History:   Doing well.  Continues to improve.  Pain is well-controlled.  No fevers.            Significant Problems:    None          Review of Systems:    CONSTITUTIONAL: NEGATIVE for fever, chills, change in weight  INTEGUMENTARY/SKIN: NEGATIVE for worrisome rashes, moles or lesions  EYES: NEGATIVE for vision changes or irritation  ENT/MOUTH: NEGATIVE for ear, mouth and throat problems  RESP: NEGATIVE for significant cough or SOB  BREAST: NEGATIVE for masses, tenderness or discharge  CV: NEGATIVE for chest pain, palpitations or peripheral edema  GI: NEGATIVE for nausea, abdominal pain, heartburn, or change in bowel habits  : NEGATIVE for frequency, dysuria, or hematuria  MUSCULOSKELETAL: NEGATIVE for significant arthralgias or myalgia  NEURO: NEGATIVE for weakness, dizziness or paresthesias  ENDOCRINE: NEGATIVE for temperature intolerance, skin/hair changes  HEME: NEGATIVE for bleeding problems  PSYCHIATRIC: NEGATIVE for changes in mood or affect          Medications:   All medications related to the patient's surgery have been reviewed  -          Physical Exam:     All vitals stable  Patient Vitals for the past 12 hrs:   BP Temp Temp src Heart Rate Resp SpO2   06/06/19 0851 -- -- -- -- 16 --   06/06/19 0725 137/65 97.6  F (36.4  C) Oral 89 16 96 %   06/06/19 0550 -- -- -- -- 16 --   06/06/19 0300 125/67 98  F (36.7  C) Oral 91 10 97 %   06/06/19 0208 -- -- -- -- 16 --   06/06/19 0030 -- -- -- -- 16 --   06/05/19 2339 147/70 98  F (36.7  C) Oral 85 14 97 %     Wounds clean and dry with minimal or no drainage.  Surrounding skin with minimal erythema.          Data:     All  laboratory data related to this surgery reviewed  Hemoglobin   Date Value Ref Range Status   06/05/2019 12.5 11.7 - 15.7 g/dL Final   05/24/2019 11.8 11.7 - 15.7 g/dL Final   04/17/2019 12.5 11.7 - 15.7 g/dL Final   04/05/2019 12.4 11.7 - 15.7 g/dL Final   10/11/2018 12.0 11.7 - 15.7 g/dL Final     -    Georgiana Pizarro DO

## 2019-06-17 ENCOUNTER — OFFICE VISIT (OUTPATIENT)
Dept: OBGYN | Facility: CLINIC | Age: 49
End: 2019-06-17
Payer: COMMERCIAL

## 2019-06-17 VITALS
TEMPERATURE: 99.1 F | WEIGHT: 236.9 LBS | SYSTOLIC BLOOD PRESSURE: 124 MMHG | HEIGHT: 65 IN | DIASTOLIC BLOOD PRESSURE: 70 MMHG | BODY MASS INDEX: 39.47 KG/M2

## 2019-06-17 DIAGNOSIS — Z90.710 S/P HYSTERECTOMY: ICD-10-CM

## 2019-06-17 DIAGNOSIS — R23.2 HOT FLASHES: Primary | ICD-10-CM

## 2019-06-17 LAB
ERYTHROCYTE [DISTWIDTH] IN BLOOD BY AUTOMATED COUNT: 12.9 % (ref 10–15)
HCT VFR BLD AUTO: 37.5 % (ref 35–47)
HGB BLD-MCNC: 12.2 G/DL (ref 11.7–15.7)
MCH RBC QN AUTO: 29.1 PG (ref 26.5–33)
MCHC RBC AUTO-ENTMCNC: 32.5 G/DL (ref 31.5–36.5)
MCV RBC AUTO: 90 FL (ref 78–100)
PLATELET # BLD AUTO: 457 10E9/L (ref 150–450)
RBC # BLD AUTO: 4.19 10E12/L (ref 3.8–5.2)
WBC # BLD AUTO: 7.4 10E9/L (ref 4–11)

## 2019-06-17 PROCEDURE — 85027 COMPLETE CBC AUTOMATED: CPT | Performed by: FAMILY MEDICINE

## 2019-06-17 PROCEDURE — 99024 POSTOP FOLLOW-UP VISIT: CPT | Performed by: FAMILY MEDICINE

## 2019-06-17 PROCEDURE — 36415 COLL VENOUS BLD VENIPUNCTURE: CPT | Performed by: FAMILY MEDICINE

## 2019-06-17 RX ORDER — OXYCODONE HYDROCHLORIDE 5 MG/1
5 TABLET ORAL
Qty: 12 TABLET | Refills: 0 | Status: SHIPPED | OUTPATIENT
Start: 2019-06-17 | End: 2019-08-30

## 2019-06-17 ASSESSMENT — MIFFLIN-ST. JEOR: SCORE: 1702.51

## 2019-06-17 NOTE — PATIENT INSTRUCTIONS
Return in 8 weeks for exam     Dr. Georgiana Pizarro,     Obstetrics and Gynecology  [facility] [place] [facility] [place]

## 2019-06-17 NOTE — PROGRESS NOTES
Subjective: 49 year old female   status post hysterectomy on 06/05/2019, here for incision check.  Doing well, denies fever, significant pain.  Is taking pain medications, took last Oxycodone last night, has been taking Ibuprofen.  States no vaginal bleeding.      Pt states that her fibromyalgia prevents her from regulating her body temperature. Reports having a low-grade fever. She reports no issues with BM. Has not been taking Tylenol, would like a few more stronger pain killers. She got a letter from her job stating that she needs to return to work in 4 weeks- pt is wondering if she will be able to lift 20+lbs. Pt would like to work from home 07/07/19-07/19/19.    Pt reports that she is     This document serves as a record of the services and decisions personally performed and made by Georgiana Pizarro DO. It was created on his/her behalf by Jean Paul Pressley, trained medical scribe. The creation of this document is based the provider's statements to the medical scribes.    Scribe Jean Paul Pressley 10:04 AM, June 17, 2019    Objective:  EXAM:  There were no vitals taken for this visit.  Constitutional: healthy, alert and no distress  GYN PELVIC: NEGATIVE, normal external genitalia,  normal vaginal mucosa, vaginal cuff well-healed    Assessment/Plan: 49 year old female @OB@ status post hysterectomy on 06/05/2019.  V67.00C Surgery Follow-Up Examination  (primary encounter diagnosis)  Comment:    Plan:   1)  Recommended to return in 8 weeks for incision recheck.  2)  Filled out FMLA for her workplace, pt desires to work from home 07/07-07/19. No weight baring restrictions after 07/17/19.   3)  Will check CBC levels today as pt reports a low-grade fever, if elevated, will likely start on antibiotics.  4)  Discussed left hand pain with activity- recommended watchful monitoring for 2 weeks, if not resolved will recommend hand physical therapy   5)  Recommended to alternate between Ibuprofen and Tylenol according  to the package directions.   6)  12 refills of Oxycodone 5MG provided. Recommended to take before bed.    Rx:  1)  12 refills of Oxycodone 5MG, take 1 tablet per mouth nightly as needed for pain.     The information in this document, created by the medical scribe for me, accurately reflects the services I personally performed and the decisions made by me. I have reviewed and approved this document for accuracy prior to leaving the patient care area.  Georgiana Pizarro DO  10:09 AM, June 17, 2019    Dr. Georgiana Pizarro, DO    Obstetrics and Gynecology  [facility]

## 2019-06-17 NOTE — NURSING NOTE
"Chief Complaint   Patient presents with     Surgical Followup     hysterectomy 19--pt still sore--incisions are ok--some pain on right incision       Initial /70 (BP Location: Right arm, Patient Position: Sitting, Cuff Size: Adult Large)   Temp 99.1  F (37.3  C) (Oral)   Ht 1.638 m (5' 4.5\")   Wt 107.5 kg (236 lb 14.4 oz)   BMI 40.04 kg/m   Estimated body mass index is 40.04 kg/m  as calculated from the following:    Height as of this encounter: 1.638 m (5' 4.5\").    Weight as of this encounter: 107.5 kg (236 lb 14.4 oz).  BP completed using cuff size: large    Questioned patient about current smoking habits.  Pt. has never smoked.          The following HM Due: NONE             "

## 2019-07-03 DIAGNOSIS — M79.7 FIBROMYALGIA: ICD-10-CM

## 2019-07-03 RX ORDER — CYCLOBENZAPRINE HCL 10 MG
TABLET ORAL
Qty: 90 TABLET | Refills: 1 | Status: SHIPPED | OUTPATIENT
Start: 2019-07-03 | End: 2019-09-05

## 2019-07-03 NOTE — TELEPHONE ENCOUNTER
Cyclobenzaprine 10 mg      Last Written Prescription Date:  3/26/19  Last Fill Quantity: 90,   # refills: 1  Last Office Visit: 5/24/19   Future Office visit:       Routing refill request to provider for review/approval because:  Drug not on the [facility], [facility] or [facility] refill protocol or controlled substance  Pt due for appt end of July.     Mabel SILVA RN

## 2019-08-03 DIAGNOSIS — E11.9 TYPE 2 DIABETES MELLITUS WITHOUT COMPLICATION, WITHOUT LONG-TERM CURRENT USE OF INSULIN (H): ICD-10-CM

## 2019-08-03 RX ORDER — METFORMIN HCL 500 MG
2000 TABLET, EXTENDED RELEASE 24 HR ORAL
Qty: 360 TABLET | Refills: 0 | Status: SHIPPED | OUTPATIENT
Start: 2019-08-03 | End: 2019-11-02

## 2019-08-03 NOTE — TELEPHONE ENCOUNTER
Requested Prescriptions   Pending Prescriptions Disp Refills     metFORMIN (GLUCOPHAGE-XR) 500 MG 24 hr tablet [Pharmacy Med Name: METFORMIN HCL  MG TABLET] 360 tablet 0     Sig: TAKE 4 TABLETS (2,000 MG) BY MOUTH DAILY (WITH BREAKFAST)  Last Written Prescription Date:  4/5/19  Last Fill Quantity: 360 tab,  # refills: 0   Last office visit: 5/24/2019 with prescribing provider:  Jasmin   Future Office Visit:         Biguanide Agents Passed - 8/3/2019 12:26 AM        Passed - Blood pressure less than 140/90 in past 6 months     BP Readings from Last 3 Encounters:   06/17/19 124/70   06/06/19 137/79   05/28/19 156/76                 Passed - Patient has documented LDL within the past 12 mos.     Recent Labs   Lab Test 04/05/19  1016   LDL 56             Passed - Patient has had a Microalbumin in the past 15 mos.     Recent Labs   Lab Test 10/11/18  1309   MICROL <5   UMALCR Unable to calculate due to low value             Passed - Patient is age 10 or older        Passed - Patient has documented A1c within the specified period of time.     If HgbA1C is 8 or greater, it needs to be on file within the past 3 months.  If less than 8, must be on file within the past 6 months.     Recent Labs   Lab Test 04/05/19  1016   A1C 7.2*             Passed - Patient's CR is NOT>1.4 OR Patient's EGFR is NOT<45 within past 12 mos.     Recent Labs   Lab Test 05/24/19  1137   GFRESTIMATED 90   GFRESTBLACK >90       Recent Labs   Lab Test 05/24/19  1137   CR 0.78             Passed - Patient does NOT have a diagnosis of CHF.        Passed - Medication is active on med list        Passed - Patient is not pregnant        Passed - Patient has not had a positive pregnancy test within the past 12 mos.         Passed - Recent (6 mo) or future (30 days) visit within the authorizing provider's specialty     Patient had office visit in the last 6 months or has a visit in the next 30 days with authorizing provider or within the authorizing  "provider's specialty.  See \"Patient Info\" tab in inbasket, or \"Choose Columns\" in Meds & Orders section of the refill encounter.              "

## 2019-08-03 NOTE — TELEPHONE ENCOUNTER
Prescription approved per [facility] Refill Protocol.    Sherlyn Dexter RN   8/3/2019  3:16 PM

## 2019-08-04 DIAGNOSIS — M79.7 FIBROMYALGIA: ICD-10-CM

## 2019-08-04 RX ORDER — TRAZODONE HYDROCHLORIDE 50 MG/1
TABLET, FILM COATED ORAL
Qty: 45 TABLET | Refills: 1 | Status: SHIPPED | OUTPATIENT
Start: 2019-08-04 | End: 2019-10-01

## 2019-08-04 NOTE — TELEPHONE ENCOUNTER
"Requested Prescriptions   Pending Prescriptions Disp Refills     traZODone (DESYREL) 50 MG tablet [Pharmacy Med Name: TRAZODONE 50 MG TABLET] 45 tablet 1     Sig: TAKE 1 AND 1/2 TABLET BY MOUTH EVERY NIGHT AT BEDTIME AS NEEDED FOR SLEEP.  Last Written Prescription Date:  06/06/2019  Last Fill Quantity: 45 tablet,  # refills: 1   Last office visit: 5/24/2019 with prescribing provider:  Irene Castellanos MD    Future Office Visit:           Serotonin Modulators Passed - 8/4/2019 12:23 AM        Passed - Recent (12 mo) or future (30 days) visit within the authorizing provider's specialty     Patient had office visit in the last 12 months or has a visit in the next 30 days with authorizing provider or within the authorizing provider's specialty.  See \"Patient Info\" tab in inbasket, or \"Choose Columns\" in Meds & Orders section of the refill encounter.              Passed - Medication is active on med list        Passed - Patient is age 18 or older        Passed - No active pregnancy on record        Passed - No positive pregnancy test in past 12 months          "

## 2019-08-05 NOTE — TELEPHONE ENCOUNTER
Prescription approved per [facility] [place] Protocol.    Sherlyn Dexter RN  8/4/2019  10:01 PM

## 2019-08-07 ENCOUNTER — HOSPITAL ENCOUNTER (OUTPATIENT)
Dept: CARDIOLOGY | Facility: CLINIC | Age: 49
Discharge: HOME | End: 2019-08-07
Attending: INTERNAL MEDICINE | Admitting: INTERNAL MEDICINE
Payer: COMMERCIAL

## 2019-08-07 DIAGNOSIS — I07.9 DISEASE OF TRICUSPID VALVE: ICD-10-CM

## 2019-08-07 DIAGNOSIS — I27.20 PULMONARY HYPERTENSION (H): ICD-10-CM

## 2019-08-07 PROCEDURE — 93306 TTE W/DOPPLER COMPLETE: CPT | Mod: 26 | Performed by: INTERNAL MEDICINE

## 2019-08-07 PROCEDURE — 25500064 ZZH RX 255 OP 636: Performed by: INTERNAL MEDICINE

## 2019-08-07 PROCEDURE — 40000264 ECHOCARDIOGRAM COMPLETE

## 2019-08-07 RX ADMIN — HUMAN ALBUMIN MICROSPHERES AND PERFLUTREN 9 ML: 10; .22 INJECTION, SOLUTION INTRAVENOUS at 09:30

## 2019-08-08 ENCOUNTER — MYC MEDICAL ADVICE (OUTPATIENT)
Dept: FAMILY MEDICINE | Facility: CLINIC | Age: 49
End: 2019-08-08

## 2019-08-08 ENCOUNTER — OFFICE VISIT (OUTPATIENT)
Dept: OBGYN | Facility: CLINIC | Age: 49
End: 2019-08-08
Payer: COMMERCIAL

## 2019-08-08 ENCOUNTER — MYC REFILL (OUTPATIENT)
Dept: FAMILY MEDICINE | Facility: CLINIC | Age: 49
End: 2019-08-08

## 2019-08-08 VITALS
HEIGHT: 65 IN | WEIGHT: 234.4 LBS | DIASTOLIC BLOOD PRESSURE: 78 MMHG | BODY MASS INDEX: 39.05 KG/M2 | SYSTOLIC BLOOD PRESSURE: 118 MMHG

## 2019-08-08 DIAGNOSIS — G89.29 OTHER CHRONIC PAIN: ICD-10-CM

## 2019-08-08 DIAGNOSIS — M79.7 FIBROMYALGIA: ICD-10-CM

## 2019-08-08 DIAGNOSIS — Z90.710 S/P HYSTERECTOMY: Primary | ICD-10-CM

## 2019-08-08 PROCEDURE — 99024 POSTOP FOLLOW-UP VISIT: CPT | Performed by: FAMILY MEDICINE

## 2019-08-08 ASSESSMENT — MIFFLIN-ST. JEOR: SCORE: 1691.17

## 2019-08-08 NOTE — PATIENT INSTRUCTIONS
Ok to resume intercourse   Follow up yearly for exams or sooner as needed   Dr. Georgiana Pizarro, DO    Obstetrics and Gynecology  [facility] [place]

## 2019-08-08 NOTE — PROGRESS NOTES
"Subjective: 49 year old female   status post robotic assisted total laparoscopic hysterectomy bilateral salpingectomy and cystoscopy on 6/05/2019, here for incision check.  Doing well, denies fever, significant pain.    Is not taking pain medications.   States some light vaginal bleeding.      This document serves as a record of the services and decisions personally performed and made by Georgiana Pizarro DO. It was created on her behalf by Jeane Kirkpatrick, a trained medical scribe. The creation of this document is based on the provider's statements to the medical scribe.  Jeane Kirkpatrick 2:30 PM August 8, 2019    Objective:  EXAM:  /78 (BP Location: Left arm, Patient Position: Sitting, Cuff Size: Adult Large)   Ht 1.638 m (5' 4.5\")   Wt 106.3 kg (234 lb 6.4 oz)   BMI 39.61 kg/m    Constitutional: healthy, alert and no distress  Gastrointestinal: Abdomen soft, non-tender. Incision intact, no erthema, drainage.  GYN PELVIC: NEGATIVE, normal external genitalia,  normal vaginal mucosa, vaginal cuff well-healed      Assessment/Plan: 49 year old female @OB@ status post robotic assisted total laparoscopic hysterectomy bilateral salpingectomy and cystoscopy on 6/05/2019.  V67.00C Surgery Follow-Up Examination  (primary encounter diagnosis)  Comment:    Plan: Return for annual gyn physical     The information in this document, created by the medical scribe for me, accurately reflects the services I personally performed and the decisions made by me. I have reviewed and approved this document for accuracy prior to leaving the patient care area.  August 8, 2019 2:33 PM    Dr. Georgiana Pizarro DO    Obstetrics and Gynecology  [facility]             "

## 2019-08-08 NOTE — NURSING NOTE
"Chief Complaint   Patient presents with     Surgical Followup     10 weeks--doing good       Initial /78 (BP Location: Left arm, Patient Position: Sitting, Cuff Size: Adult Large)   Ht 1.638 m (5' 4.5\")   Wt 106.3 kg (234 lb 6.4 oz)   BMI 39.61 kg/m   Estimated body mass index is 39.61 kg/m  as calculated from the following:    Height as of this encounter: 1.638 m (5' 4.5\").    Weight as of this encounter: 106.3 kg (234 lb 6.4 oz).  BP completed using cuff size: large    Questioned patient about current smoking habits.  Pt. has never smoked.          The following HM Due: NONE               "

## 2019-08-08 NOTE — LETTER
[facility]  [address] [address] [address]  Phone: [phone]    August 9, 2019        Kaylee Lopez  [address] [address] [address]          To whom it may concern:    RE: Kaylee Page has fibromyalgia.  I am requesting that she be able to work from home during flare ups and when there is inclement weather.   It is more difficult for her to drive for a prolonged time in difficult traffic which can cause elevated pain and muscle spasms, leading to a flare of her fibromyalgia.      Please contact me for questions or concerns.      Sincerely,      Wu Dillon PA-C

## 2019-08-09 RX ORDER — TRAMADOL HYDROCHLORIDE 50 MG/1
TABLET ORAL
Qty: 60 TABLET | Refills: 0 | Status: SHIPPED | OUTPATIENT
Start: 2019-08-09 | End: 2019-09-13

## 2019-08-09 NOTE — TELEPHONE ENCOUNTER
Tramadol 50 mg    Last Written Prescription Date:  5/14/2019  Last Fill Quantity: 60,  # refills: 0   Last office visit: 5/24/2019 with prescribing provider:  RTC 2 months for diabetic visit.   Future Office Visit:   Next 5 appointments (look out 90 days)    Nov 04, 2019  9:15 AM CST  Return Visit with Frederic Ag MD  [facility] [address] [address] [address] [phone]         Last 4 fills per [place] ;    5/14/2019, 3/5/2019, 1/22/2019, 11/1/2018 qty 60 per .    Patient is followed by Irene Castellanos MD for ongoing prescription of pain medication.  All refills should be approved by this provider, or covering partner.    Medication(s): tramadol.   Maximum quantity per month: 60  Clinic visit frequency required: Q 6  months     Controlled substance agreement on file: Yes       Date(s): 5/15/15    Pain Clinic evaluation in the past: No    DIRE Total Score(s):  No flowsheet data found.    Pain Clinic evaluation in the past: No    Last [facility] website verification: 8/9/2019 per RN   [email or web address]    Please print,sign and fax to pharmacy.    Belkys Calvin RN

## 2019-08-12 ENCOUNTER — MYC MEDICAL ADVICE (OUTPATIENT)
Dept: FAMILY MEDICINE | Facility: CLINIC | Age: 49
End: 2019-08-12

## 2019-08-12 NOTE — TELEPHONE ENCOUNTER
Sent information to the abstraction team to satisfy the HM.  Informed pt via [email or web address] message to have eye clinic send the reports so we can put into chart while at the appointment.  Maggie Beasley [facility]

## 2019-08-15 ENCOUNTER — TRANSFERRED RECORDS (OUTPATIENT)
Dept: HEALTH INFORMATION MANAGEMENT | Facility: CLINIC | Age: 49
End: 2019-08-15

## 2019-08-18 ENCOUNTER — TRANSFERRED RECORDS (OUTPATIENT)
Dept: HEALTH INFORMATION MANAGEMENT | Facility: CLINIC | Age: 49
End: 2019-08-18

## 2019-08-18 LAB — RETINOPATHY: NORMAL

## 2019-08-30 ENCOUNTER — OFFICE VISIT (OUTPATIENT)
Dept: CARDIOLOGY | Facility: CLINIC | Age: 49
End: 2019-08-30
Payer: COMMERCIAL

## 2019-08-30 VITALS
HEART RATE: 99 BPM | DIASTOLIC BLOOD PRESSURE: 73 MMHG | HEIGHT: 65 IN | SYSTOLIC BLOOD PRESSURE: 109 MMHG | WEIGHT: 235.9 LBS | BODY MASS INDEX: 39.3 KG/M2

## 2019-08-30 DIAGNOSIS — I07.9 DISEASE OF TRICUSPID VALVE: ICD-10-CM

## 2019-08-30 DIAGNOSIS — E66.01 MORBID OBESITY (H): ICD-10-CM

## 2019-08-30 DIAGNOSIS — M79.7 FIBROMYALGIA: ICD-10-CM

## 2019-08-30 DIAGNOSIS — I10 HYPERTENSION GOAL BP (BLOOD PRESSURE) < 130/80: ICD-10-CM

## 2019-08-30 DIAGNOSIS — E78.5 HYPERLIPIDEMIA LDL GOAL <100: ICD-10-CM

## 2019-08-30 DIAGNOSIS — E11.9 TYPE 2 DIABETES MELLITUS WITHOUT COMPLICATION, WITHOUT LONG-TERM CURRENT USE OF INSULIN (H): ICD-10-CM

## 2019-08-30 DIAGNOSIS — I27.20 PULMONARY HYPERTENSION (H): Primary | ICD-10-CM

## 2019-08-30 PROCEDURE — 99214 OFFICE O/P EST MOD 30 MIN: CPT | Performed by: INTERNAL MEDICINE

## 2019-08-30 RX ORDER — ATORVASTATIN CALCIUM 10 MG/1
10 TABLET, FILM COATED ORAL DAILY
Qty: 90 TABLET | Refills: 3 | Status: SHIPPED | OUTPATIENT
Start: 2019-08-30 | End: 2020-12-29

## 2019-08-30 RX ORDER — LISINOPRIL 5 MG/1
5 TABLET ORAL DAILY
Qty: 90 TABLET | Refills: 3 | Status: SHIPPED | OUTPATIENT
Start: 2019-08-30 | End: 2020-11-21

## 2019-08-30 ASSESSMENT — MIFFLIN-ST. JEOR: SCORE: 1697.98

## 2019-08-30 NOTE — PROGRESS NOTES
HPI and Plan:   See dictation    Orders Placed This Encounter   Procedures     CT Chest w/o & w Contrast     Follow-Up with Cardiologist     Echocardiogram Complete     General PFT Lab (Please always keep checked)     Pulmonary Function Test       Orders Placed This Encounter   Medications     atorvastatin (LIPITOR) 10 MG tablet     Sig: Take 1 tablet (10 mg) by mouth daily     Dispense:  90 tablet     Refill:  3     lisinopril (PRINIVIL/ZESTRIL) 5 MG tablet     Sig: Take 1 tablet (5 mg) by mouth daily     Dispense:  90 tablet     Refill:  3       Medications Discontinued During This Encounter   Medication Reason     docusate sodium (COLACE) 100 MG capsule Stopped by Patient     ibuprofen (ADVIL/MOTRIN) 800 MG tablet Medication Reconciliation Clean Up     oxyCODONE (ROXICODONE) 5 MG tablet Medication Reconciliation Clean Up     atorvastatin (LIPITOR) 10 MG tablet Reorder     lisinopril (PRINIVIL/ZESTRIL) 5 MG tablet Reorder         Encounter Diagnoses   Name Primary?     Pulmonary hypertension (H) Yes     Disease of tricuspid valve      Hyperlipidemia LDL goal <100      Hypertension goal BP (blood pressure) < 130/80      Morbid obesity (H)      Fibromyalgia      Type 2 diabetes mellitus without complication, without long-term current use of insulin (H)        CURRENT MEDICATIONS:  Current Outpatient Medications   Medication Sig Dispense Refill     albuterol (PROAIR HFA/PROVENTIL HFA/VENTOLIN HFA) 108 (90 Base) MCG/ACT inhaler Inhale 2 puffs into the lungs every 6 hours as needed for shortness of breath / dyspnea or wheezing 1 Inhaler 0     atorvastatin (LIPITOR) 10 MG tablet Take 1 tablet (10 mg) by mouth daily 90 tablet 3     busPIRone (BUSPAR) 10 MG tablet Take 1 tablet (10 mg) by mouth 2 times daily 180 tablet 1     cetirizine (ZYRTEC) 10 MG tablet Take 10 mg by mouth daily as needed for allergies       citalopram (CELEXA) 40 MG tablet TAKE 1 TABLET BY MOUTH EVERY DAY 90 tablet 1     cyanocobalamin (VITAMIN B-12)  1000 MCG tablet TAKE 1 TABLET BY MOUTH DAILY 100 tablet 3     cyclobenzaprine (FLEXERIL) 10 MG tablet TAKE 1 TABLET BY MOUTH THREE TIMES A DAY AS NEEDED FOR MUSCLE SPASMS 90 tablet 1     ibuprofen (ADVIL/MOTRIN) 800 MG tablet Take 1 tablet (800 mg) by mouth every 8 hours as needed for moderate pain 90 tablet 1     liraglutide (VICTOZA PEN) 18 MG/3ML solution Inject 1.8 mg Subcutaneous daily 18 mL 4     lisinopril (PRINIVIL/ZESTRIL) 5 MG tablet Take 1 tablet (5 mg) by mouth daily 90 tablet 3     metFORMIN (GLUCOPHAGE-XR) 500 MG 24 hr tablet TAKE 4 TABLETS (2,000 MG) BY MOUTH DAILY (WITH BREAKFAST) 360 tablet 0     traMADol (ULTRAM) 50 MG tablet TAKE 1-2 TBS BY MOUTH EVERY 8 HRS AS NEEDED FOR MODERATE PAIN MAX 8 TABS/24 HOURS, MAX 60 TBS/MONTH 60 tablet 0     traZODone (DESYREL) 50 MG tablet TAKE 1 AND 1/2 TABLET BY MOUTH EVERY NIGHT AT BEDTIME AS NEEDED FOR SLEEP. 45 tablet 1     hydrOXYzine (ATARAX) 25 MG tablet Take 1 tablet (25 mg) by mouth 3 times daily as needed for other (muscle spasm) (Patient not taking: Reported on 8/8/2019) 60 tablet 0     norethindrone-ethinyl estradiol (MICROGESTIN 1/20) 1-20 MG-MCG per tablet Take 1 tablet by mouth daily (Patient not taking: Reported on 6/17/2019) 90 tablet 3     oxyCODONE (ROXICODONE) 5 MG tablet Take 1 tablet (5 mg) by mouth every 6 hours as needed (Patient not taking: Reported on 6/17/2019) 30 tablet 0       ALLERGIES     Allergies   Allergen Reactions     Codeine Nausea     Nausea (with tylenol/codeine)       PAST MEDICAL HISTORY:  Past Medical History:   Diagnosis Date     Abnormal Papanicolaou smear of cervix and cervical HPV 2003    late 2003, early 2004; treated with TCA     Depression      Diabetes mellitus      Fibromyalgia      Heart murmur     tricuspid valve disorder     Inflammatory arthritis     ?; has been discharged from Rheumatology     Narcolepsy 11/1/2010    doing well without medication.     Non-rheumatic tricuspid valve insufficiency     antibiotic  prophylax Problem list name updated by automated process. Provider to review      Obesity      Other chronic pain      Pulmonary hypertension (H) 5/2/2010    on ECHO, but normal right heart cath 2011     Reflex sympathetic dystrophy     right foot ; related to stress fracture     TRICUSPID VALVE DISEASE (regurg)     sees Cardiology        PAST SURGICAL HISTORY:  Past Surgical History:   Procedure Laterality Date     Angiogram  3/2011    No pulmonary hypertension     COLONOSCOPY  1/2008    normal     CYSTOSCOPY N/A 6/5/2019    Procedure: CYSTOSCOPY;  Surgeon: Georgiana Pizarro DO;  Location: [facility] OR     DAVINCI HYSTERECTOMY TOTAL, BILATERAL SALPINGO-OOPHORECTOMY, COMBINED Bilateral 6/5/2019    Procedure: robotic assisted total laparoscopic hysterectomy bilateral salpingectomy and cystoscopy;  Surgeon: Georgiana Pizarro DO;  Location: [facility] OR     DILATION AND CURETTAGE, OPERATIVE HYSTEROSCOPY WITH MORCELLATOR, COMBINED N/A 7/9/2018    Procedure: COMBINED DILATION AND CURETTAGE, OPERATIVE HYSTEROSCOPY WITH MORCELLATOR;  Hysteroscopy, polypectomy  with myosure, dilation and curettage, endometrial biopsy ;  Surgeon: Georgiana Pizarro DO;  Location: [facility] OR     ENT SURGERY      wisdon teeth removal     LAPAROSCOPIC CHOLECYSTECTOMY  7/2000     SURGICAL HISTORY OF -       essure procedure for contraception     TCA for abnormal pap  2004       FAMILY HISTORY:  Family History   Problem Relation Age of Onset     Respiratory Father         asthma     Arthritis Father         hips     Depression Father      Alcohol/Drug Father      Diabetes Paternal Grandfather      Cerebrovascular Disease Paternal Grandfather      Hypertension Paternal Grandfather      Cancer - colorectal Paternal Grandmother      Arthritis Paternal Grandmother         hips     Colon Cancer Paternal Grandmother      Prostate Cancer Maternal Grandfather      Hypertension Maternal Grandfather      Cancer Maternal Grandmother         pancreatic     Arthritis  Maternal Grandmother         hips     Depression Maternal Grandmother      Hypertension Maternal Grandmother      Other Cancer Maternal Grandmother      Gallbladder Disease Maternal Grandmother      Depression Mother      Rashes/Skin Problems Mother         Rosacea     Arthritis Mother         osteoarthritis in hands and knees     Hypertension Mother      Depression Maternal Uncle         bipolar     Respiratory Brother         sleep apnea     Mental Illness Brother      Cancer - colorectal Maternal Uncle      Cerebrovascular Disease Maternal Uncle          of massive stroke--no heart problems     Hypertension Son        SOCIAL HISTORY:  Social History     Socioeconomic History     Marital status:      Spouse name: None     Number of children: 2     Years of education: 13+     Highest education level: None   Occupational History     Occupation: ; new job starting      Employer: NONE    Social Needs     Financial resource strain: None     Food insecurity:     Worry: None     Inability: None     Transportation needs:     Medical: None     Non-medical: None   Tobacco Use     Smoking status: Never Smoker     Smokeless tobacco: Never Used   Substance and Sexual Activity     Alcohol use: Yes     Alcohol/week: 0.0 oz     Comment: rare     Drug use: Never     Sexual activity: Yes     Partners: Male     Comment: essure procedure   Lifestyle     Physical activity:     Days per week: None     Minutes per session: None     Stress: None   Relationships     Social connections:     Talks on phone: None     Gets together: None     Attends Taoist service: None     Active member of club or organization: None     Attends meetings of clubs or organizations: None     Relationship status: None     Intimate partner violence:     Fear of current or ex partner: None     Emotionally abused: None     Physically abused: None     Forced sexual activity: None   Other Topics Concern      Service Not  "Asked     Blood Transfusions Not Asked     Caffeine Concern No     Comment: soda- 1 a day     Occupational Exposure Not Asked     Hobby Hazards Not Asked     Sleep Concern Not Asked     Stress Concern Not Asked     Weight Concern Not Asked     Special Diet No     Comment: watches; fruits and veggies throughout the day.      Back Care Not Asked     Exercise No     Comment: working with fibromyalgia nurses; anticipating more.      Bike Helmet Not Asked     Seat Belt Not Asked     Self-Exams Not Asked     Parent/sibling w/ CABG, MI or angioplasty before 65F 55M? No   Social History Narrative     None       Review of Systems:  Skin:  Negative       Eyes:  Positive for glasses    ENT:  Negative      Respiratory:  Negative       Cardiovascular:  Negative      Gastroenterology: Negative      Genitourinary:  Negative      Musculoskeletal:  Positive for fibromyalgia;arthritis inflammatory arthritis  Neurologic:  Positive for migraine headaches    Psychiatric:  Positive for depression    Heme/Lymph/Imm:  Positive for allergies    Endocrine:  Positive for diabetes      Physical Exam:  Vitals: /73   Pulse 99   Ht 1.638 m (5' 4.5\")   Wt 107 kg (235 lb 14.4 oz)   BMI 39.87 kg/m      Constitutional:  cooperative, alert and oriented, well developed, well nourished, in no acute distress morbidly obese      Skin:  warm and dry to the touch, no apparent skin lesions or masses noted          Head:  normocephalic, no masses or lesions        Eyes:  pupils equal and round, conjunctivae and lids unremarkable, sclera white, no xanthalasma, EOMS intact, no nystagmus        Lymph:      ENT:  no pallor or cyanosis, dentition good        Neck:  carotid pulses are full and equal bilaterally, JVP normal, no carotid bruit        Respiratory:  normal breath sounds, clear to auscultation, normal A-P diameter, normal symmetry, normal respiratory excursion, no use of accessory muscles         Cardiac: regular rhythm, normal S1/S2, no S3 or " S4, apical impulse not displaced, no murmurs, gallops or rubs                pulses full and equal, no bruits auscultated                                        GI:  abdomen soft, non-tender, BS normoactive, no mass, no HSM, no bruits        Extremities and Muscular Skeletal:  no deformities, clubbing, cyanosis, erythema observed;no edema              Neurological:  no gross motor deficits;affect appropriate        Psych:  Alert and Oriented x 3        CC  No referring provider defined for this encounter.

## 2019-08-30 NOTE — PROGRESS NOTES
Service Date: 08/30/2019      REFERRING PHYSICIAN:  Dr. Irene Castellanos.      HISTORY OF PRESENT ILLNESS:  It was my pleasure to see your patient, Kaylee Lopez, in followup.  She is a pleasant patient whom I have been following for tricuspid regurgitation, palpitations and pulmonary hypertension which has been variable.  I went through all of her visits going back to around 2008.  If you remember, this patient initially was felt to have severe pulmonary hypertension, but when she underwent right heart catheterization, the pulmonary pressures at that time were normal.  Her degree of tricuspid regurgitation has varied from trace to moderate.  She has also had a history of palpitations and these appear to be due to PVCs based upon event monitoring.  She also has a significant degree of obesity and is in the morbidly obese range with a BMI of 39.87.  She also has a history of hyperlipidemia and diabetes mellitus.  Since I last saw her, she did have a laparoscopic bilateral salpingectomy and a total hysterectomy.  That was performed on 06/05 if this year.  She has not been complaining of any palpitations or shortness of breath.  The echocardiogram shows that the degree of tricuspid regurgitation is mild, but the degree of pulmonary hypertension is higher at 42 mmHg plus right atrial pressure.  This is a higher than it was in 03/2018 when the pulmonary artery pressures were 33.4 mmHg plus right atrial pressure.  In 2017 the pressures were 37.9 mmHg plus right atrial pressure.  In the past, I believe around 2325-4645, she had a simple spirometry performed.  I looked back through her chart.  I have not seen a CT of the chest.  One of the worries, of course, when we see higher pulmonary pressures in a postop patient is the possibility of pulmonary embolism, although she has not any symptoms to suggest that.  Her systemic pressures were normal today at 109/73.  Her lipids on 04/05 were very good with the exception of mild  hypertriglyceridemia of 174.  Her LDL is 56, HDL 57 and total cholesterol of 148.  She is taking atorvastatin 10 mg per day for primary prevention.  Her liver function tests were normal at 15.      IMPRESSION:   1.  Pulmonary hypertension.  Her pulmonary pressures are in the moderate range.  The cause of this is unclear, but she did have an operation recently and one would like to make sure that she has not developed thromboembolism.  We will also check pulmonary function.   2.  Palpitations.  She has had no palpitations recently.   3.  Tricuspid regurgitation.  The degree of tricuspid regurgitation is now in the mild range.  It had previously been in the moderate range.   4.  Morbid obesity.   5.  Diabetes mellitus.   6.  Fibromyalgia, which has flared up recently and she is using a cane.   7.  Mild hypertriglyceridemia with excellent lipids elsewhere.      PLAN:   1.  We will continue the patient on her present medications.   2.  We will obtain a CT of her chest with contrast to determine if there is any evidence of pulmonary embolism.   3.  We will also obtain pulmonary function testing with DLCO to determine if she has any lung issues that would cause pulmonary hypertension.  She tells me that she has been checked in the past for obstructive sleep apnea because she does snore occasionally and she had no evidence of that on testing in the past.  We will let her know the results of those investigations that we have performed.  If they are abnormal, of course, we will have her seen soon.  If everything is normal, we will see her back again in 1 year's time and we will repeat the echo at that stage to follow pulmonary pressures and tricuspid regurgitation.  As always, she has been told to contact us if she has any questions or any concerns.      cc:   Irene Castellanos MD    [facility]    [address] [place]  [address]         PIPO SOARES MD, FACC             D: 08/30/2019   T:  2019   MT: KIRSTEN      Name:     VANCE CONNER   MRN:      [id number]        Account:      [id number]   :      1970           Service Date: 2019      Document: [id number]

## 2019-08-30 NOTE — LETTER
8/30/2019    [place]  [address] [place] [address]    RE: Kaylee Lopez       Dear Colleague,    I had the pleasure of seeing Kaylee Lopez in the [facility].    HPI and Plan:   See dictation    Orders Placed This Encounter   Procedures     CT Chest w/o & w Contrast     Follow-Up with Cardiologist     Echocardiogram Complete     General PFT Lab (Please always keep checked)     Pulmonary Function Test       Orders Placed This Encounter   Medications     atorvastatin (LIPITOR) 10 MG tablet     Sig: Take 1 tablet (10 mg) by mouth daily     Dispense:  90 tablet     Refill:  3     lisinopril (PRINIVIL/ZESTRIL) 5 MG tablet     Sig: Take 1 tablet (5 mg) by mouth daily     Dispense:  90 tablet     Refill:  3       Medications Discontinued During This Encounter   Medication Reason     docusate sodium (COLACE) 100 MG capsule Stopped by Patient     ibuprofen (ADVIL/MOTRIN) 800 MG tablet Medication Reconciliation Clean Up     oxyCODONE (ROXICODONE) 5 MG tablet Medication Reconciliation Clean Up     atorvastatin (LIPITOR) 10 MG tablet Reorder     lisinopril (PRINIVIL/ZESTRIL) 5 MG tablet Reorder         Encounter Diagnoses   Name Primary?     Pulmonary hypertension (H) Yes     Disease of tricuspid valve      Hyperlipidemia LDL goal <100      Hypertension goal BP (blood pressure) < 130/80      Morbid obesity (H)      Fibromyalgia      Type 2 diabetes mellitus without complication, without long-term current use of insulin (H)        CURRENT MEDICATIONS:  Current Outpatient Medications   Medication Sig Dispense Refill     albuterol (PROAIR HFA/PROVENTIL HFA/VENTOLIN HFA) 108 (90 Base) MCG/ACT inhaler Inhale 2 puffs into the lungs every 6 hours as needed for shortness of breath / dyspnea or wheezing 1 Inhaler 0     atorvastatin (LIPITOR) 10 MG tablet Take 1 tablet (10 mg) by mouth daily 90 tablet 3     busPIRone (BUSPAR) 10 MG tablet Take 1 tablet (10 mg) by mouth 2  times daily 180 tablet 1     cetirizine (ZYRTEC) 10 MG tablet Take 10 mg by mouth daily as needed for allergies       citalopram (CELEXA) 40 MG tablet TAKE 1 TABLET BY MOUTH EVERY DAY 90 tablet 1     cyanocobalamin (VITAMIN B-12) 1000 MCG tablet TAKE 1 TABLET BY MOUTH DAILY 100 tablet 3     cyclobenzaprine (FLEXERIL) 10 MG tablet TAKE 1 TABLET BY MOUTH THREE TIMES A DAY AS NEEDED FOR MUSCLE SPASMS 90 tablet 1     ibuprofen (ADVIL/MOTRIN) 800 MG tablet Take 1 tablet (800 mg) by mouth every 8 hours as needed for moderate pain 90 tablet 1     liraglutide (VICTOZA PEN) 18 MG/3ML solution Inject 1.8 mg Subcutaneous daily 18 mL 4     lisinopril (PRINIVIL/ZESTRIL) 5 MG tablet Take 1 tablet (5 mg) by mouth daily 90 tablet 3     metFORMIN (GLUCOPHAGE-XR) 500 MG 24 hr tablet TAKE 4 TABLETS (2,000 MG) BY MOUTH DAILY (WITH BREAKFAST) 360 tablet 0     traMADol (ULTRAM) 50 MG tablet TAKE 1-2 TBS BY MOUTH EVERY 8 HRS AS NEEDED FOR MODERATE PAIN MAX 8 TABS/24 HOURS, MAX 60 TBS/MONTH 60 tablet 0     traZODone (DESYREL) 50 MG tablet TAKE 1 AND 1/2 TABLET BY MOUTH EVERY NIGHT AT BEDTIME AS NEEDED FOR SLEEP. 45 tablet 1     hydrOXYzine (ATARAX) 25 MG tablet Take 1 tablet (25 mg) by mouth 3 times daily as needed for other (muscle spasm) (Patient not taking: Reported on 8/8/2019) 60 tablet 0     norethindrone-ethinyl estradiol (MICROGESTIN 1/20) 1-20 MG-MCG per tablet Take 1 tablet by mouth daily (Patient not taking: Reported on 6/17/2019) 90 tablet 3     oxyCODONE (ROXICODONE) 5 MG tablet Take 1 tablet (5 mg) by mouth every 6 hours as needed (Patient not taking: Reported on 6/17/2019) 30 tablet 0       ALLERGIES     Allergies   Allergen Reactions     Codeine Nausea     Nausea (with tylenol/codeine)       PAST MEDICAL HISTORY:  Past Medical History:   Diagnosis Date     Abnormal Papanicolaou smear of cervix and cervical HPV 2003    late 2003, early 2004; treated with TCA     Depression      Diabetes mellitus      Fibromyalgia      Heart  murmur     tricuspid valve disorder     Inflammatory arthritis     ?; has been discharged from Rheumatology     Narcolepsy 11/1/2010    doing well without medication.     Non-rheumatic tricuspid valve insufficiency     antibiotic prophylax Problem list name updated by automated process. Provider to review      Obesity      Other chronic pain      Pulmonary hypertension (H) 5/2/2010    on ECHO, but normal right heart cath 2011     Reflex sympathetic dystrophy     right foot ; related to stress fracture     TRICUSPID VALVE DISEASE (regurg)     sees Cardiology        PAST SURGICAL HISTORY:  Past Surgical History:   Procedure Laterality Date     Angiogram  3/2011    No pulmonary hypertension     COLONOSCOPY  1/2008    normal     CYSTOSCOPY N/A 6/5/2019    Procedure: CYSTOSCOPY;  Surgeon: Georgiana Pizarro DO;  Location: [facility] OR     DAVINCI HYSTERECTOMY TOTAL, BILATERAL SALPINGO-OOPHORECTOMY, COMBINED Bilateral 6/5/2019    Procedure: robotic assisted total laparoscopic hysterectomy bilateral salpingectomy and cystoscopy;  Surgeon: Georgiana Pizarro DO;  Location: [facility] OR     DILATION AND CURETTAGE, OPERATIVE HYSTEROSCOPY WITH MORCELLATOR, COMBINED N/A 7/9/2018    Procedure: COMBINED DILATION AND CURETTAGE, OPERATIVE HYSTEROSCOPY WITH MORCELLATOR;  Hysteroscopy, polypectomy  with myosure, dilation and curettage, endometrial biopsy ;  Surgeon: Georgiana Pizarro DO;  Location: [facility] OR     ENT SURGERY      wisdon teeth removal     LAPAROSCOPIC CHOLECYSTECTOMY  7/2000     SURGICAL HISTORY OF -       essure procedure for contraception     TCA for abnormal pap  2004       FAMILY HISTORY:  Family History   Problem Relation Age of Onset     Respiratory Father         asthma     Arthritis Father         hips     Depression Father      Alcohol/Drug Father      Diabetes Paternal Grandfather      Cerebrovascular Disease Paternal Grandfather      Hypertension Paternal Grandfather      Cancer - colorectal Paternal Grandmother       Arthritis Paternal Grandmother         hips     Colon Cancer Paternal Grandmother      Prostate Cancer Maternal Grandfather      Hypertension Maternal Grandfather      Cancer Maternal Grandmother         pancreatic     Arthritis Maternal Grandmother         hips     Depression Maternal Grandmother      Hypertension Maternal Grandmother      Other Cancer Maternal Grandmother      Gallbladder Disease Maternal Grandmother      Depression Mother      Rashes/Skin Problems Mother         Rosacea     Arthritis Mother         osteoarthritis in hands and knees     Hypertension Mother      Depression Maternal Uncle         bipolar     Respiratory Brother         sleep apnea     Mental Illness Brother      Cancer - colorectal Maternal Uncle      Cerebrovascular Disease Maternal Uncle          of massive stroke--no heart problems     Hypertension Son        SOCIAL HISTORY:  Social History     Socioeconomic History     Marital status:      Spouse name: None     Number of children: 2     Years of education: 13+     Highest education level: None   Occupational History     Occupation: ; new job starting      Employer: NONE    Social Needs     Financial resource strain: None     Food insecurity:     Worry: None     Inability: None     Transportation needs:     Medical: None     Non-medical: None   Tobacco Use     Smoking status: Never Smoker     Smokeless tobacco: Never Used   Substance and Sexual Activity     Alcohol use: Yes     Alcohol/week: 0.0 oz     Comment: rare     Drug use: Never     Sexual activity: Yes     Partners: Male     Comment: essure procedure   Lifestyle     Physical activity:     Days per week: None     Minutes per session: None     Stress: None   Relationships     Social connections:     Talks on phone: None     Gets together: None     Attends Islam service: None     Active member of club or organization: None     Attends meetings of clubs or organizations: None      "Relationship status: None     Intimate partner violence:     Fear of current or ex partner: None     Emotionally abused: None     Physically abused: None     Forced sexual activity: None   Other Topics Concern      Service Not Asked     Blood Transfusions Not Asked     Caffeine Concern No     Comment: soda- 1 a day     Occupational Exposure Not Asked     Hobby Hazards Not Asked     Sleep Concern Not Asked     Stress Concern Not Asked     Weight Concern Not Asked     Special Diet No     Comment: watches; fruits and veggies throughout the day.      Back Care Not Asked     Exercise No     Comment: working with fibromyalgia nurses; anticipating more.      Bike Helmet Not Asked     Seat Belt Not Asked     Self-Exams Not Asked     Parent/sibling w/ CABG, MI or angioplasty before 65F 55M? No   Social History Narrative     None       Review of Systems:  Skin:  Negative       Eyes:  Positive for glasses    ENT:  Negative      Respiratory:  Negative       Cardiovascular:  Negative      Gastroenterology: Negative      Genitourinary:  Negative      Musculoskeletal:  Positive for fibromyalgia;arthritis inflammatory arthritis  Neurologic:  Positive for migraine headaches    Psychiatric:  Positive for depression    Heme/Lymph/Imm:  Positive for allergies    Endocrine:  Positive for diabetes      Physical Exam:  Vitals: /73   Pulse 99   Ht 1.638 m (5' 4.5\")   Wt 107 kg (235 lb 14.4 oz)   BMI 39.87 kg/m       Constitutional:  cooperative, alert and oriented, well developed, well nourished, in no acute distress morbidly obese      Skin:  warm and dry to the touch, no apparent skin lesions or masses noted          Head:  normocephalic, no masses or lesions        Eyes:  pupils equal and round, conjunctivae and lids unremarkable, sclera white, no xanthalasma, EOMS intact, no nystagmus        Lymph:      ENT:  no pallor or cyanosis, dentition good        Neck:  carotid pulses are full and equal bilaterally, JVP normal, " no carotid bruit        Respiratory:  normal breath sounds, clear to auscultation, normal A-P diameter, normal symmetry, normal respiratory excursion, no use of accessory muscles         Cardiac: regular rhythm, normal S1/S2, no S3 or S4, apical impulse not displaced, no murmurs, gallops or rubs                pulses full and equal, no bruits auscultated                                        GI:  abdomen soft, non-tender, BS normoactive, no mass, no HSM, no bruits        Extremities and Muscular Skeletal:  no deformities, clubbing, cyanosis, erythema observed;no edema              Neurological:  no gross motor deficits;affect appropriate        Psych:  Alert and Oriented x 3        CC  No referring provider defined for this encounter.                Thank you for allowing me to participate in the care of your patient.      Sincerely,     Frederic Ag MD, MD     [facility]    cc:   No referring provider defined for this encounter.

## 2019-08-30 NOTE — LETTER
8/30/2019      [place]  [address]  [facility] [address]      RE: Kaylee Lopez       Dear Colleague,    I had the pleasure of seeing Kaylee Lopez in the [facility].    Service Date: 08/30/2019      REFERRING PHYSICIAN:  Dr. Irene Castellanos.      HISTORY OF PRESENT ILLNESS:  It was my pleasure to see your patient, Kaylee Loepz, in followup.  She is a pleasant patient whom I have been following for tricuspid regurgitation, palpitations and pulmonary hypertension which has been variable.  I went through all of her visits going back to around 2008.  If you remember, this patient initially was felt to have severe pulmonary hypertension, but when she underwent right heart catheterization, the pulmonary pressures at that time were normal.  Her degree of tricuspid regurgitation has varied from trace to moderate.  She has also had a history of palpitations and these appear to be due to PVCs based upon event monitoring.  She also has a significant degree of obesity and is in the morbidly obese range with a BMI of 39.87.  She also has a history of hyperlipidemia and diabetes mellitus.  Since I last saw her, she did have a laparoscopic bilateral salpingectomy and a total hysterectomy.  That was performed on 06/05 if this year.  She has not been complaining of any palpitations or shortness of breath.  The echocardiogram shows that the degree of tricuspid regurgitation is mild, but the degree of pulmonary hypertension is higher at 42 mmHg plus right atrial pressure.  This is a higher than it was in 03/2018 when the pulmonary artery pressures were 33.4 mmHg plus right atrial pressure.  In 2017 the pressures were 37.9 mmHg plus right atrial pressure.  In the past, I believe around 0470-3441, she had a simple spirometry performed.  I looked back through her chart.  I have not seen a CT of the chest.  One of the worries, of course, when we see higher pulmonary pressures  in a postop patient is the possibility of pulmonary embolism, although she has not any symptoms to suggest that.  Her systemic pressures were normal today at 109/73.  Her lipids on 04/05 were very good with the exception of mild hypertriglyceridemia of 174.  Her LDL is 56, HDL 57 and total cholesterol of 148.  She is taking atorvastatin 10 mg per day for primary prevention.  Her liver function tests were normal at 15.      IMPRESSION:   1.  Pulmonary hypertension.  Her pulmonary pressures are in the moderate range.  The cause of this is unclear, but she did have an operation recently and one would like to make sure that she has not developed thromboembolism.  We will also check pulmonary function.   2.  Palpitations.  She has had no palpitations recently.   3.  Tricuspid regurgitation.  The degree of tricuspid regurgitation is now in the mild range.  It had previously been in the moderate range.   4.  Morbid obesity.   5.  Diabetes mellitus.   6.  Fibromyalgia, which has flared up recently and she is using a cane.   7.  Mild hypertriglyceridemia with excellent lipids elsewhere.      PLAN:   1.  We will continue the patient on her present medications.   2.  We will obtain a CT of her chest with contrast to determine if there is any evidence of pulmonary embolism.   3.  We will also obtain pulmonary function testing with DLCO to determine if she has any lung issues that would cause pulmonary hypertension.  She tells me that she has been checked in the past for obstructive sleep apnea because she does snore occasionally and she had no evidence of that on testing in the past.  We will let her know the results of those investigations that we have performed.  If they are abnormal, of course, we will have her seen soon.  If everything is normal, we will see her back again in 1 year's time and we will repeat the echo at that stage to follow pulmonary pressures and tricuspid regurgitation.  As always, she has been told to  contact us if she has any questions or any concerns.      cc:   Irene Castellanos MD    [facility]    [address] [place] [address]         PIPO SOARES MD, [facility]             D: 2019   T: 2019   MT: KIRSTEN      Name:     VANCE CONNER   MRN:      [id number]        Account:      [id number]   :      1970           Service Date: 2019      Document: [id number]         Outpatient Encounter Medications as of 2019   Medication Sig Dispense Refill     albuterol (PROAIR HFA/PROVENTIL HFA/VENTOLIN HFA) 108 (90 Base) MCG/ACT inhaler Inhale 2 puffs into the lungs every 6 hours as needed for shortness of breath / dyspnea or wheezing 1 Inhaler 0     atorvastatin (LIPITOR) 10 MG tablet Take 1 tablet (10 mg) by mouth daily 90 tablet 3     busPIRone (BUSPAR) 10 MG tablet Take 1 tablet (10 mg) by mouth 2 times daily 180 tablet 1     cetirizine (ZYRTEC) 10 MG tablet Take 10 mg by mouth daily as needed for allergies       citalopram (CELEXA) 40 MG tablet TAKE 1 TABLET BY MOUTH EVERY DAY 90 tablet 1     cyanocobalamin (VITAMIN B-12) 1000 MCG tablet TAKE 1 TABLET BY MOUTH DAILY 100 tablet 3     cyclobenzaprine (FLEXERIL) 10 MG tablet TAKE 1 TABLET BY MOUTH THREE TIMES A DAY AS NEEDED FOR MUSCLE SPASMS 90 tablet 1     ibuprofen (ADVIL/MOTRIN) 800 MG tablet Take 1 tablet (800 mg) by mouth every 8 hours as needed for moderate pain 90 tablet 1     liraglutide (VICTOZA PEN) 18 MG/3ML solution Inject 1.8 mg Subcutaneous daily 18 mL 4     lisinopril (PRINIVIL/ZESTRIL) 5 MG tablet Take 1 tablet (5 mg) by mouth daily 90 tablet 3     metFORMIN (GLUCOPHAGE-XR) 500 MG 24 hr tablet TAKE 4 TABLETS (2,000 MG) BY MOUTH DAILY (WITH BREAKFAST) 360 tablet 0     traMADol (ULTRAM) 50 MG tablet TAKE 1-2 TBS BY MOUTH EVERY 8 HRS AS NEEDED FOR MODERATE PAIN MAX 8 TABS/24 HOURS, MAX 60 TBS/MONTH 60 tablet 0     traZODone (DESYREL) 50 MG tablet TAKE 1 AND 1/2 TABLET BY MOUTH EVERY NIGHT AT BEDTIME  AS NEEDED FOR SLEEP. 45 tablet 1     hydrOXYzine (ATARAX) 25 MG tablet Take 1 tablet (25 mg) by mouth 3 times daily as needed for other (muscle spasm) (Patient not taking: Reported on 8/8/2019) 60 tablet 0     norethindrone-ethinyl estradiol (MICROGESTIN 1/20) 1-20 MG-MCG per tablet Take 1 tablet by mouth daily (Patient not taking: Reported on 6/17/2019) 90 tablet 3     oxyCODONE (ROXICODONE) 5 MG tablet Take 1 tablet (5 mg) by mouth every 6 hours as needed (Patient not taking: Reported on 6/17/2019) 30 tablet 0     [DISCONTINUED] atorvastatin (LIPITOR) 10 MG tablet TAKE 1 TABLET BY MOUTH EVERY DAY 90 tablet 3     [DISCONTINUED] docusate sodium (COLACE) 100 MG capsule Take 1 capsule (100 mg) by mouth 2 times daily (Patient not taking: Reported on 6/17/2019) 90 capsule 3     [DISCONTINUED] ibuprofen (ADVIL/MOTRIN) 800 MG tablet Take 1 tablet (800 mg) by mouth every 8 hours as needed for moderate pain 90 tablet 1     [DISCONTINUED] lisinopril (PRINIVIL/ZESTRIL) 5 MG tablet Take 1 tablet (5 mg) by mouth daily 90 tablet 1     [DISCONTINUED] oxyCODONE (ROXICODONE) 5 MG tablet Take 1 tablet (5 mg) by mouth nightly as needed for pain 12 tablet 0     No facility-administered encounter medications on file as of 8/30/2019.        Again, thank you for allowing me to participate in the care of your patient.      Sincerely,    Frederic Ag MD, MD     [facility]

## 2019-09-04 ENCOUNTER — HOSPITAL ENCOUNTER (OUTPATIENT)
Dept: CARDIOLOGY | Facility: CLINIC | Age: 49
Discharge: HOME | End: 2019-09-04
Attending: INTERNAL MEDICINE | Admitting: INTERNAL MEDICINE
Payer: COMMERCIAL

## 2019-09-04 DIAGNOSIS — I27.20 PULMONARY HYPERTENSION (H): ICD-10-CM

## 2019-09-04 LAB
CREAT BLD-MCNC: 0.9 MG/DL (ref 0.52–1.04)
GFR SERPL CREATININE-BSD FRML MDRD: 67 ML/MIN/{1.73_M2}

## 2019-09-04 PROCEDURE — 25000128 H RX IP 250 OP 636: Performed by: INTERNAL MEDICINE

## 2019-09-04 PROCEDURE — 71275 CT ANGIOGRAPHY CHEST: CPT

## 2019-09-04 PROCEDURE — 82565 ASSAY OF CREATININE: CPT

## 2019-09-04 RX ORDER — IOPAMIDOL 755 MG/ML
500 INJECTION, SOLUTION INTRAVASCULAR ONCE
Status: COMPLETED | OUTPATIENT
Start: 2019-09-04 | End: 2019-09-04

## 2019-09-04 RX ORDER — ACYCLOVIR 200 MG/1
0-1 CAPSULE ORAL
Status: DISCONTINUED | OUTPATIENT
Start: 2019-09-04 | End: 2019-09-05 | Stop reason: HOSPADM

## 2019-09-04 RX ADMIN — IOPAMIDOL 500 ML: 755 INJECTION, SOLUTION INTRAVENOUS at 13:19

## 2019-09-05 DIAGNOSIS — M79.7 FIBROMYALGIA: ICD-10-CM

## 2019-09-05 NOTE — TELEPHONE ENCOUNTER
Requested Prescriptions   Pending Prescriptions Disp Refills     cyclobenzaprine (FLEXERIL) 10 MG tablet [Pharmacy Med Name: CYCLOBENZAPRINE 10 MG TABLET] 90 tablet 1     Sig: TAKE 1 TABLET BY MOUTH THREE TIMES A DAY AS NEEDED FOR MUSCLE SPASMS       There is no refill protocol information for this order        Last Written Prescription Date:  7/3/19  Last Fill Quantity: 90,  # refills: 1   Last office visit: 5/24/2019 with prescribing provider:  Irene Castellanos MD   Future Office Visit:   Next 5 appointments (look out 90 days)    Nov 22, 2019  8:00 AM CST  Return Visit with  ONCOLOGY NURSE  [facility] ([facility]) [facility]  [address]  [place] [address]  [phone]   Nov 29, 2019 10:15 AM CST  Return Visit with Jorge A Delgado MD  [facility] ([facility]) [facility]  [address]  [place] [address]  [phone]

## 2019-09-09 RX ORDER — CYCLOBENZAPRINE HCL 10 MG
TABLET ORAL
Qty: 90 TABLET | Refills: 1 | Status: SHIPPED | OUTPATIENT
Start: 2019-09-09 | End: 2019-12-05

## 2019-09-09 NOTE — TELEPHONE ENCOUNTER
Routing refill request to provider for review/approval because:  Drug not on the [facility] refill protocol     Susan Cruz RN on 9/9/2019 at 11:02 AM

## 2019-09-12 ENCOUNTER — HOSPITAL ENCOUNTER (OUTPATIENT)
Dept: RESPIRATORY THERAPY | Facility: CLINIC | Age: 49
Discharge: HOME | End: 2019-09-12
Attending: INTERNAL MEDICINE | Admitting: INTERNAL MEDICINE
Payer: COMMERCIAL

## 2019-09-12 DIAGNOSIS — I27.20 PULMONARY HYPERTENSION (H): ICD-10-CM

## 2019-09-12 LAB
DLCOCOR-%PRED-PRE: 108 %
DLCOCOR-PRE: 23.36 ML/MIN/MMHG
DLCOUNC-%PRED-PRE: 104 %
DLCOUNC-PRE: 22.45 ML/MIN/MMHG
DLCOUNC-PRED: 21.49 ML/MIN/MMHG
ERV-%PRED-PRE: 186 %
ERV-PRE: 0.86 L
ERV-PRED: 0.46 L
EXPTIME-PRE: 6.37 SEC
FEF2575-%PRED-PRE: 100 %
FEF2575-PRE: 2.91 L/SEC
FEF2575-PRED: 2.89 L/SEC
FEFMAX-%PRED-PRE: 98 %
FEFMAX-PRE: 6.78 L/SEC
FEFMAX-PRED: 6.87 L/SEC
FEV1-%PRED-PRE: 101 %
FEV1-PRE: 2.89 L
FEV1FEV6-PRE: 82 %
FEV1FEV6-PRED: 83 %
FEV1FVC-PRE: 82 %
FEV1FVC-PRED: 81 %
FEV1SVC-PRE: 82 %
FEV1SVC-PRED: 80 %
FIFMAX-PRE: 5.85 L/SEC
FRCPLETH-%PRED-PRE: 96 %
FRCPLETH-PRE: 2.61 L
FRCPLETH-PRED: 2.7 L
FVC-%PRED-PRE: 99 %
FVC-PRE: 3.54 L
FVC-PRED: 3.56 L
GAW-%PRED-PRE: 23 %
GAW-PRE: 0.24 L/S/CMH2O
GAW-PRED: 1.03 L/S/CMH2O
IC-%PRED-PRE: 86 %
IC-PRE: 2.68 L
IC-PRED: 3.11 L
RVPLETH-%PRED-PRE: 102 %
RVPLETH-PRE: 1.75 L
RVPLETH-PRED: 1.71 L
SGAW-%PRED-PRE: 94 %
SGAW-PRE: 0.1 1/CMH2O*S
SGAW-PRED: 0.1 1/CMH2O*S
SRAW-%PRED-PRE: 219 %
SRAW-PRE: 10.43 CMH2O*S
SRAW-PRED: 4.76 CMH2O*S
TLCPLETH-%PRED-PRE: 106 %
TLCPLETH-PRE: 5.3 L
TLCPLETH-PRED: 4.98 L
VA-%PRED-PRE: 97 %
VA-PRE: 4.84 L
VC-%PRED-PRE: 99 %
VC-PRE: 3.55 L
VC-PRED: 3.58 L

## 2019-09-12 PROCEDURE — 94010 BREATHING CAPACITY TEST: CPT

## 2019-09-12 PROCEDURE — 94726 PLETHYSMOGRAPHY LUNG VOLUMES: CPT

## 2019-09-12 PROCEDURE — 94729 DIFFUSING CAPACITY: CPT

## 2019-09-12 NOTE — PROGRESS NOTES
PFT Note: Patient completed pulmonary function testing with spirometry, lung volumes and diffusion. Good patient effort and cooperation.The results of this test met the ATS standards for acceptability and repeatability.Hgb result of 12.2 was used from 6/17/2019 for Diffusion.

## 2019-09-13 ENCOUNTER — MYC REFILL (OUTPATIENT)
Dept: FAMILY MEDICINE | Facility: CLINIC | Age: 49
End: 2019-09-13

## 2019-09-13 DIAGNOSIS — F11.90 CHRONIC, CONTINUOUS USE OF OPIOIDS: Primary | ICD-10-CM

## 2019-09-13 DIAGNOSIS — G89.29 OTHER CHRONIC PAIN: ICD-10-CM

## 2019-09-13 DIAGNOSIS — M79.7 FIBROMYALGIA: ICD-10-CM

## 2019-09-17 RX ORDER — TRAMADOL HYDROCHLORIDE 50 MG/1
TABLET ORAL
Qty: 60 TABLET | Refills: 0 | Status: SHIPPED | OUTPATIENT
Start: 2019-09-17 | End: 2019-11-26

## 2019-09-17 NOTE — TELEPHONE ENCOUNTER
Type of outreach:  Sent [employer] message.  Health Maintenance Due   Topic Date Due     URINE DRUG SCREEN  1970     PREVENTIVE CARE VISIT  05/03/2017     TSH W/FREE T4 REFLEX  04/23/2019     EYE EXAM  05/01/2019     DIABETIC FOOT EXAM  07/24/2019     MICROALBUMIN  10/11/2019     Due for diabetes OV, eye exam, fasting physical.  Anu Cosme CMA ([facility])

## 2019-09-17 NOTE — TELEPHONE ENCOUNTER
Tramadol 50 mg      Last Written Prescription Date:  8/9/19  Last Fill Quantity: 60,   # refills: 0  Last Office Visit: 7/24/19  Future Office visit:    Next 5 appointments (look out 90 days)    Nov 22, 2019  8:00 AM CST  Return Visit with  ONCOLOGY NURSE  [facility] ([facility]) [facility]  [address]  [facility] [address]  [phone]   Nov 29, 2019 10:15 AM CST  Return Visit with Jorge A Delgado MD  [facility] ([facility]) [facility]  [address]  [facility] [address]  [phone]           Routing refill request to provider for review/approval because:  Drug not on the [facility], [facility] or  Health refill protocol or controlled substance  Pt DUE for appt 7/24/19.  (Will route to TC's to assist in scheduling as well)     : 8/10, 5/14, 35

## 2019-09-17 NOTE — TELEPHONE ENCOUNTER
Please help her schedule a diabetic check.    -------------------------------------------------            Lab Results   Component Value Date    A1C 7.2 04/05/2019    A1C 6.4 10/11/2018    A1C 5.8 04/23/2018    A1C 5.6 09/18/2017    A1C 5.5 02/27/2017

## 2019-09-30 ENCOUNTER — HEALTH MAINTENANCE LETTER (OUTPATIENT)
Age: 49
End: 2019-09-30

## 2019-09-30 ENCOUNTER — TELEPHONE (OUTPATIENT)
Dept: CARDIOLOGY | Facility: CLINIC | Age: 49
End: 2019-09-30

## 2019-09-30 NOTE — TELEPHONE ENCOUNTER
"Reviewed CT chest showing  IMPRESSION:  1. No pulmonary emboli identified. Main pulmonary artery is normal in size.  2. Thoracic aorta is normal in size and appearance.  3. Exam is otherwise unremarkable.    Reviewed PFT's showing   INTERPRETATION:     1. Normal mechanics   2. Normal volumes   3. Normal gas transfer     Per office note dated 8/30/19 Dr. Ag recommended, \"We will obtain a CT of her chest with contrast to determine if there is any evidence of pulmonary embolism...We will also obtain pulmonary function testing with DLCO to determine if she has any lung issues that would cause pulmonary hypertension.\"     Will message Dr. Ag to review. Leah CABALLERO   "

## 2019-09-30 NOTE — PROCEDURES
Procedure Date: 2019      Please see medical chart for graphs and statistics related to this report.       REFERRING PHYSICIAN:  Frederic Hills TECHNICIAN:   Darcie Quinn      DIAGNOSIS:  Shortness of Breath; Pulmonary Hypertension post-surgery      HEIGHT:  64.25Ins WEIGHT:  235.00Lbs      DYSPNEA:  On hills and stairs                                                                COUGH:   No Cough   WHEEZE:  No Wheeze                                                                      TOBACCO PROD:  Never smoked      MEDICATIONS:  Albuterol prn      POST-TEST COMMENTS:   Good patient effort and cooperation. The results of testing meet ATS criteria for acceptability and repeatability. Hgb result of 12.2 was used from 2019 for Diffusion.        INTERPRETATION:     1. Normal mechanics   2. Normal volumes   3. Normal gas transfer         SURY ESPARZA MD             D: 2019   T: 2019   MT: ASHLY      Name:     VANCE CONNER   MRN:      [id number]        Account:        [id number]   :      1970           Procedure Date: 2019      Document: [id number]       cc: [facility]       Irene Castellanos MD

## 2019-10-01 DIAGNOSIS — M79.7 FIBROMYALGIA: ICD-10-CM

## 2019-10-01 RX ORDER — TRAZODONE HYDROCHLORIDE 50 MG/1
TABLET, FILM COATED ORAL
Qty: 45 TABLET | Refills: 1 | Status: SHIPPED | OUTPATIENT
Start: 2019-10-01 | End: 2019-12-05

## 2019-10-01 NOTE — TELEPHONE ENCOUNTER
"Requested Prescriptions   Pending Prescriptions Disp Refills     traZODone (DESYREL) 50 MG tablet [Pharmacy Med Name: TRAZODONE 50 MG TABLET] 45 tablet 1     Sig: TAKE 1 AND 1/2 TABLET BY MOUTH EVERY NIGHT AT BEDTIME AS NEEDED FOR SLEEP.   Last Written Prescription Date:  8/4/19  Last Fill Quantity: 45,  # refills: 1   Last office visit: 5/24/2019 with prescribing provider:  Irene Castellanos MD   Future Office Visit:   Next 5 appointments (look out 90 days)    Nov 22, 2019  8:00 AM CST  Return Visit with  ONCOLOGY NURSE  [facility] ([facility]) [facility]  [address]  [facility] [address]  [phone]   Nov 26, 2019  7:30 AM CST  Office Visit with Irene Castellanos MD  [facility] ([address] [id number] [phone]   Nov 29, 2019 10:15 AM CST  Return Visit with Jorge A Delgado MD  [facility] ([facility]) [facility]  [address]  [facility] [address]  [phone]             Serotonin Modulators Passed - 10/1/2019  1:57 AM        Passed - Recent (12 mo) or future (30 days) visit within the authorizing provider's specialty     Patient has had an office visit with the authorizing provider or a provider within the authorizing providers department within the previous 12 mos or has a future within next 30 days. See \"Patient Info\" tab in inbasket, or \"Choose Columns\" in Meds & Orders section of the refill encounter.              Passed - Medication is active on med list        Passed - Patient is age 18 or older        Passed - No active pregnancy on record        Passed - No positive pregnancy test in past 12 months          "

## 2019-10-11 NOTE — TELEPHONE ENCOUNTER
Attempted to call pt with results of CT chest & PFT's, results left on secure voicemail for pt to call back with questions or concerns. Leah CABALLERO

## 2019-10-28 ENCOUNTER — HEALTH MAINTENANCE LETTER (OUTPATIENT)
Age: 49
End: 2019-10-28

## 2019-11-02 DIAGNOSIS — E11.9 TYPE 2 DIABETES MELLITUS WITHOUT COMPLICATION, WITHOUT LONG-TERM CURRENT USE OF INSULIN (H): ICD-10-CM

## 2019-11-03 NOTE — TELEPHONE ENCOUNTER
Requested Prescriptions   Pending Prescriptions Disp Refills     metFORMIN (GLUCOPHAGE-XR) 500 MG 24 hr tablet [Pharmacy Med Name: METFORMIN HCL  MG TABLET] 360 tablet 0     Sig: TAKE 4 TABLETS (2,000 MG) BY MOUTH DAILY (WITH BREAKFAST)  Last Written Prescription Date: 8/3/19  Last Fill Quantity: 360 tab,  # refills: 0   Last office visit: 5/24/2019 with prescribing provider:  Jasmin   Future Office Visit:   Next 5 appointments (look out 90 days)    Nov 22, 2019  8:00 AM CST  Return Visit with  ONCOLOGY NURSE  [facility] ([facility]) [facility]  [address]  [facility] [address]  [phone]   Nov 26, 2019  7:30 AM CST  Office Visit with [facility]anos MD  [facility] [address] [place] [address] [phone]   Nov 29, 2019 10:15 AM CST  Return Visit with Jorge A Delgado MD  [facility] ([facility]) [facility]  [address]  [facility] [address]  [phone]             Biguanide Agents Failed - 11/2/2019  1:20 AM        Failed - Patient has documented A1c within the specified period of time.     If HgbA1C is 8 or greater, it needs to be on file within the past 3 months.  If less than 8, must be on file within the past 6 months.     Recent Labs   Lab Test 04/05/19  1016   A1C 7.2*             Passed - Blood pressure less than 140/90 in past 6 months     BP Readings from Last 3 Encounters:   08/30/19 109/73   08/08/19 118/78   06/17/19 124/70                 Passed - Patient has documented LDL within the past 12 mos.     Recent Labs   Lab Test 04/05/19  1016   LDL 56             Passed - Patient has had a Microalbumin in the past 15 mos.     Recent Labs   Lab Test 10/11/18  1309   MICROL <5   UMALCR Unable to calculate due to low value             Passed - Patient is age 10 or older        Passed - Patient's CR is NOT>1.4  "OR Patient's EGFR is NOT<45 within past 12 mos.     Recent Labs   Lab Test 09/04/19  0918   GFRESTIMATED 67   GFRESTBLACK 81       Recent Labs   Lab Test 05/24/19  1137   CR 0.78             Passed - Patient does NOT have a diagnosis of CHF.        Passed - Medication is active on med list        Passed - Patient is not pregnant        Passed - Patient has not had a positive pregnancy test within the past 12 mos.         Passed - Recent (6 mo) or future (30 days) visit within the authorizing provider's specialty     Patient had office visit in the last 6 months or has a visit in the next 30 days with authorizing provider or within the authorizing provider's specialty.  See \"Patient Info\" tab in inbasket, or \"Choose Columns\" in Meds & Orders section of the refill encounter.               "

## 2019-11-04 DIAGNOSIS — F41.9 ANXIETY: ICD-10-CM

## 2019-11-04 NOTE — TELEPHONE ENCOUNTER
"Requested Prescriptions   Pending Prescriptions Disp Refills     busPIRone (BUSPAR) 10 MG tablet [Pharmacy Med Name: BUSPIRONE HCL 10 MG TABLET] 180 tablet 1     Sig: TAKE 1 TABLET (10 MG) BY MOUTH 2 TIMES DAILY  Last Written Prescription Date:  5/7/19  Last Fill Quantity: 180 tab,  # refills: 1   Last office visit: 5/24/2019 with prescribing provider:  Jasmin   Future Office Visit:   Next 5 appointments (look out 90 days)    Nov 22, 2019  8:00 AM CST  Return Visit with  ONCOLOGY NURSE  [facility] ([facility]) [facility]  [address]  [facility] [address]  [phone]   Nov 26, 2019  7:30 AM CST  Office Visit with Irene Castellanos MD  [facility] ([address] [address] [phone]   Nov 29, 2019 10:15 AM CST  Return Visit with Jorge A Delgado MD  [facility] ([facility]) [facility]  [address]  [facility] [address]  [phone]             Atypical Antidepressants Protocol Passed - 11/4/2019  2:07 AM        Passed - Recent (12 mo) or future (30 days) visit within the authorizing provider's specialty     Patient has had an office visit with the authorizing provider or a provider within the authorizing providers department within the previous 12 mos or has a future within next 30 days. See \"Patient Info\" tab in inbasket, or \"Choose Columns\" in Meds & Orders section of the refill encounter.              Passed - Medication active on med list        Passed - Patient is age 18 or older        Passed - No active pregnancy on record        Passed - No positive pregnancy test in past 12 mos           "

## 2019-11-05 RX ORDER — METFORMIN HCL 500 MG
2000 TABLET, EXTENDED RELEASE 24 HR ORAL
Qty: 120 TABLET | Refills: 0 | Status: SHIPPED | OUTPATIENT
Start: 2019-11-05 | End: 2019-11-14

## 2019-11-05 RX ORDER — BUSPIRONE HYDROCHLORIDE 10 MG/1
10 TABLET ORAL 2 TIMES DAILY
Qty: 180 TABLET | Refills: 1 | Status: SHIPPED | OUTPATIENT
Start: 2019-11-05 | End: 2020-04-22

## 2019-11-05 NOTE — TELEPHONE ENCOUNTER
Prescription approved per [facility] Refill Protocol.    Selina Garcia RN -- [facility] [employer]

## 2019-11-05 NOTE — TELEPHONE ENCOUNTER
Has DM check up in November.     Will issue short term refill.     Selina Garcia RN -- [facility]

## 2019-11-14 ENCOUNTER — MYC REFILL (OUTPATIENT)
Dept: FAMILY MEDICINE | Facility: CLINIC | Age: 49
End: 2019-11-14

## 2019-11-14 DIAGNOSIS — E11.9 TYPE 2 DIABETES MELLITUS WITHOUT COMPLICATION, WITHOUT LONG-TERM CURRENT USE OF INSULIN (H): ICD-10-CM

## 2019-11-15 RX ORDER — METFORMIN HCL 500 MG
2000 TABLET, EXTENDED RELEASE 24 HR ORAL
Qty: 120 TABLET | Refills: 0 | Status: SHIPPED | OUTPATIENT
Start: 2019-11-15 | End: 2019-11-26

## 2019-11-15 NOTE — TELEPHONE ENCOUNTER
Medication is being filled for 1 time refill only due to:  Patient needs to be seen because due for DM appointment.  has future appointment scheduled..   Next 5 appointments (look out 90 days)    Nov 22, 2019  8:00 AM CST  Return Visit with  ONCOLOGY NURSE  [facility] ([facility]) [facility]  [address]  [place] [address]  [phone]   Nov 26, 2019  7:30 AM CST  Office Visit with Irene Castellanos MD  [facility] [address] [place] [address] [phone]   Nov 29, 2019 10:15 AM CST  Return Visit with Jorge A Delgado MD  [facility] ([facility]) [facility]  [address]  [facility] [address]  [phone]        Rhonda WESTON - Registered Nurse  [facility]  Acute and Diagnostic Services              Requested Prescriptions   Pending Prescriptions Disp Refills     metFORMIN (GLUCOPHAGE-XR) 500 MG 24 hr tablet 120 tablet 0     Sig: Take 4 tablets (2,000 mg) by mouth daily (with breakfast)       Biguanide Agents Failed - 11/14/2019 10:49 PM        Failed - Patient has documented A1c within the specified period of time.     If HgbA1C is 8 or greater, it needs to be on file within the past 3 months.  If less than 8, must be on file within the past 6 months.     Recent Labs   Lab Test 04/05/19  1016   A1C 7.2*             Passed - Blood pressure less than 140/90 in past 6 months     BP Readings from Last 3 Encounters:   08/30/19 109/73   08/08/19 118/78   06/17/19 124/70                 Passed - Patient has documented LDL within the past 12 mos.     Recent Labs   Lab Test 04/05/19  1016   LDL 56             Passed - Patient has had a Microalbumin in the past 15 mos.     Recent Labs   Lab Test 10/11/18  1309   MICROL <5   UMALCR Unable to calculate due to low value             Passed - Patient is age 10 or older        Passed  "- Patient's CR is NOT>1.4 OR Patient's EGFR is NOT<45 within past 12 mos.     Recent Labs   Lab Test 09/04/19  0918   GFRESTIMATED 67   GFRESTBLACK 81       Recent Labs   Lab Test 05/24/19  1137   CR 0.78             Passed - Patient does NOT have a diagnosis of CHF.        Passed - Medication is active on med list        Passed - Patient is not pregnant        Passed - Patient has not had a positive pregnancy test within the past 12 mos.         Passed - Recent (6 mo) or future (30 days) visit within the authorizing provider's specialty     Patient had office visit in the last 6 months or has a visit in the next 30 days with authorizing provider or within the authorizing provider's specialty.  See \"Patient Info\" tab in inbasket, or \"Choose Columns\" in Meds & Orders section of the refill encounter.              "

## 2019-11-22 ENCOUNTER — HOSPITAL ENCOUNTER (OUTPATIENT)
Facility: CLINIC | Age: 49
Setting detail: SPECIMEN
Discharge: HOME | End: 2019-11-22
Attending: INTERNAL MEDICINE | Admitting: INTERNAL MEDICINE
Payer: COMMERCIAL

## 2019-11-22 ENCOUNTER — ONCOLOGY VISIT (OUTPATIENT)
Dept: ONCOLOGY | Facility: CLINIC | Age: 49
End: 2019-11-22
Attending: INTERNAL MEDICINE
Payer: COMMERCIAL

## 2019-11-22 DIAGNOSIS — D50.0 IRON DEFICIENCY ANEMIA DUE TO CHRONIC BLOOD LOSS: ICD-10-CM

## 2019-11-22 DIAGNOSIS — D75.839 THROMBOCYTOSIS: ICD-10-CM

## 2019-11-22 LAB
BASOPHILS # BLD AUTO: 0.1 10E9/L (ref 0–0.2)
BASOPHILS NFR BLD AUTO: 0.8 %
DIFFERENTIAL METHOD BLD: NORMAL
EOSINOPHIL # BLD AUTO: 0.2 10E9/L (ref 0–0.7)
EOSINOPHIL NFR BLD AUTO: 2.2 %
ERYTHROCYTE [DISTWIDTH] IN BLOOD BY AUTOMATED COUNT: 13 % (ref 10–15)
FERRITIN SERPL-MCNC: 15 NG/ML (ref 8–252)
HCT VFR BLD AUTO: 39.1 % (ref 35–47)
HGB BLD-MCNC: 12.3 G/DL (ref 11.7–15.7)
IMM GRANULOCYTES # BLD: 0 10E9/L (ref 0–0.4)
IMM GRANULOCYTES NFR BLD: 0.3 %
IRON SATN MFR SERPL: 13 % (ref 15–46)
IRON SERPL-MCNC: 51 UG/DL (ref 35–180)
LYMPHOCYTES # BLD AUTO: 1.9 10E9/L (ref 0.8–5.3)
LYMPHOCYTES NFR BLD AUTO: 25.7 %
MCH RBC QN AUTO: 29.6 PG (ref 26.5–33)
MCHC RBC AUTO-ENTMCNC: 31.5 G/DL (ref 31.5–36.5)
MCV RBC AUTO: 94 FL (ref 78–100)
MONOCYTES # BLD AUTO: 0.4 10E9/L (ref 0–1.3)
MONOCYTES NFR BLD AUTO: 5.9 %
NEUTROPHILS # BLD AUTO: 4.8 10E9/L (ref 1.6–8.3)
NEUTROPHILS NFR BLD AUTO: 65.1 %
NRBC # BLD AUTO: 0 10*3/UL
NRBC BLD AUTO-RTO: 0 /100
PLATELET # BLD AUTO: 418 10E9/L (ref 150–450)
RBC # BLD AUTO: 4.15 10E12/L (ref 3.8–5.2)
TIBC SERPL-MCNC: 406 UG/DL (ref 240–430)
WBC # BLD AUTO: 7.4 10E9/L (ref 4–11)

## 2019-11-22 PROCEDURE — 82728 ASSAY OF FERRITIN: CPT | Performed by: INTERNAL MEDICINE

## 2019-11-22 PROCEDURE — 36415 COLL VENOUS BLD VENIPUNCTURE: CPT

## 2019-11-22 PROCEDURE — 83550 IRON BINDING TEST: CPT | Performed by: INTERNAL MEDICINE

## 2019-11-22 PROCEDURE — 85025 COMPLETE CBC W/AUTO DIFF WBC: CPT | Performed by: INTERNAL MEDICINE

## 2019-11-22 PROCEDURE — 83540 ASSAY OF IRON: CPT | Performed by: INTERNAL MEDICINE

## 2019-11-22 PROCEDURE — 84238 ASSAY NONENDOCRINE RECEPTOR: CPT | Performed by: INTERNAL MEDICINE

## 2019-11-22 NOTE — PROGRESS NOTES
Medical Assistant Note:  Kaylee Wolfman presents today for lab draw.    Patient seen by provider today: No.   present during visit today: Not Applicable.    Concerns: No Concerns.    Procedure:  Labs drawn: .    Post Assessment:  Labs drawn without difficulty: Yes.    Discharge Plan:  Departure Mode: Ambulatory.    Face to Face Time: 10.    Sherlyn Souza, [facility]

## 2019-11-22 NOTE — LETTER
11/22/2019         RE: Kaylee Lopez  [address]  [place] [address]        Dear Colleague,    Thank you for referring your patient, Kaylee Lopez, to the [facility]. Please see a copy of my visit note below.    Medical Assistant Note:  Kaylee Lopez presents today for lab draw.    Patient seen by provider today: No.   present during visit today: Not Applicable.    Concerns: No Concerns.    Procedure:  Labs drawn: .    Post Assessment:  Labs drawn without difficulty: Yes.    Discharge Plan:  Departure Mode: Ambulatory.    Face to Face Time: 10.    Sherlyn Souza CMA              Again, thank you for allowing me to participate in the care of your patient.        Sincerely,        [facility] Oncology Nurse

## 2019-11-23 LAB — STFR SERPL-SCNC: 2.5 MG/L (ref 1.9–4.4)

## 2019-11-26 ENCOUNTER — OFFICE VISIT (OUTPATIENT)
Dept: FAMILY MEDICINE | Facility: CLINIC | Age: 49
End: 2019-11-26
Payer: COMMERCIAL

## 2019-11-26 VITALS
SYSTOLIC BLOOD PRESSURE: 118 MMHG | RESPIRATION RATE: 16 BRPM | TEMPERATURE: 98.4 F | OXYGEN SATURATION: 98 % | DIASTOLIC BLOOD PRESSURE: 70 MMHG | WEIGHT: 238.1 LBS | HEART RATE: 92 BPM | HEIGHT: 65 IN | BODY MASS INDEX: 39.67 KG/M2

## 2019-11-26 DIAGNOSIS — M79.7 FIBROMYALGIA: ICD-10-CM

## 2019-11-26 DIAGNOSIS — D75.839 THROMBOCYTOSIS: ICD-10-CM

## 2019-11-26 DIAGNOSIS — E11.9 TYPE 2 DIABETES MELLITUS WITHOUT COMPLICATION, WITHOUT LONG-TERM CURRENT USE OF INSULIN (H): Primary | ICD-10-CM

## 2019-11-26 DIAGNOSIS — F32.5 MAJOR DEPRESSION IN COMPLETE REMISSION (H): ICD-10-CM

## 2019-11-26 DIAGNOSIS — E78.5 HYPERLIPIDEMIA LDL GOAL <100: ICD-10-CM

## 2019-11-26 DIAGNOSIS — I27.20 PULMONARY HYPERTENSION (H): ICD-10-CM

## 2019-11-26 LAB
CREAT UR-MCNC: 159 MG/DL
HBA1C MFR BLD: 6.4 % (ref 0–5.6)
MICROALBUMIN UR-MCNC: 8 MG/L
MICROALBUMIN/CREAT UR: 4.96 MG/G CR (ref 0–25)
TSH SERPL DL<=0.005 MIU/L-ACNC: 2.92 MU/L (ref 0.4–4)

## 2019-11-26 PROCEDURE — 82043 UR ALBUMIN QUANTITATIVE: CPT | Performed by: FAMILY MEDICINE

## 2019-11-26 PROCEDURE — 99214 OFFICE O/P EST MOD 30 MIN: CPT | Performed by: FAMILY MEDICINE

## 2019-11-26 PROCEDURE — 36415 COLL VENOUS BLD VENIPUNCTURE: CPT | Performed by: FAMILY MEDICINE

## 2019-11-26 PROCEDURE — 96127 BRIEF EMOTIONAL/BEHAV ASSMT: CPT | Performed by: FAMILY MEDICINE

## 2019-11-26 PROCEDURE — 83036 HEMOGLOBIN GLYCOSYLATED A1C: CPT | Performed by: FAMILY MEDICINE

## 2019-11-26 PROCEDURE — 84443 ASSAY THYROID STIM HORMONE: CPT | Performed by: FAMILY MEDICINE

## 2019-11-26 PROCEDURE — 99207 C FOOT EXAM  NO CHARGE: CPT | Performed by: FAMILY MEDICINE

## 2019-11-26 RX ORDER — LIRAGLUTIDE 6 MG/ML
1.8 INJECTION SUBCUTANEOUS DAILY
Qty: 18 ML | Refills: 4 | Status: SHIPPED | OUTPATIENT
Start: 2019-11-26 | End: 2020-12-28

## 2019-11-26 RX ORDER — CITALOPRAM HYDROBROMIDE 40 MG/1
40 TABLET ORAL DAILY
Qty: 90 TABLET | Refills: 1 | Status: SHIPPED | OUTPATIENT
Start: 2019-11-26 | End: 2020-05-14

## 2019-11-26 RX ORDER — TRAMADOL HYDROCHLORIDE 50 MG/1
TABLET ORAL
Qty: 60 TABLET | Refills: 0 | Status: SHIPPED | OUTPATIENT
Start: 2019-11-26 | End: 2020-02-10

## 2019-11-26 RX ORDER — METFORMIN HCL 500 MG
2000 TABLET, EXTENDED RELEASE 24 HR ORAL
Qty: 360 TABLET | Refills: 1 | Status: SHIPPED | OUTPATIENT
Start: 2019-11-26 | End: 2020-06-01

## 2019-11-26 ASSESSMENT — ANXIETY QUESTIONNAIRES
6. BECOMING EASILY ANNOYED OR IRRITABLE: NOT AT ALL
7. FEELING AFRAID AS IF SOMETHING AWFUL MIGHT HAPPEN: NOT AT ALL
3. WORRYING TOO MUCH ABOUT DIFFERENT THINGS: NOT AT ALL
1. FEELING NERVOUS, ANXIOUS, OR ON EDGE: NOT AT ALL
2. NOT BEING ABLE TO STOP OR CONTROL WORRYING: NOT AT ALL
IF YOU CHECKED OFF ANY PROBLEMS ON THIS QUESTIONNAIRE, HOW DIFFICULT HAVE THESE PROBLEMS MADE IT FOR YOU TO DO YOUR WORK, TAKE CARE OF THINGS AT HOME, OR GET ALONG WITH OTHER PEOPLE: NOT DIFFICULT AT ALL
5. BEING SO RESTLESS THAT IT IS HARD TO SIT STILL: NOT AT ALL
GAD7 TOTAL SCORE: 0

## 2019-11-26 ASSESSMENT — PATIENT HEALTH QUESTIONNAIRE - PHQ9
5. POOR APPETITE OR OVEREATING: NOT AT ALL
SUM OF ALL RESPONSES TO PHQ QUESTIONS 1-9: 0

## 2019-11-26 ASSESSMENT — MIFFLIN-ST. JEOR: SCORE: 1707.95

## 2019-11-26 NOTE — PROGRESS NOTES
Subjective     Kaylee Lopez is a 49 year old female who presents to clinic today for the following health issues:    HPI   Diabetes Follow-up      How often are you checking your blood sugar? no    What concerns do you have today about your diabetes? None     Do you have any of these symptoms? (Select all that apply)  No numbness or tingling in feet.  No redness, sores or blisters on feet.  No complaints of excessive thirst.  No reports of blurry vision.  No significant changes to weight.     Have you had a diabetic eye exam in the last 12 months? Yes- Date of last eye exam: 08/15/2019    Diabetes Management Resources    Hyperlipidemia Follow-Up      Are you regularly taking any medication or supplement to lower your cholesterol?   Yes- atorvastatin    Are you having muscle aches or other side effects that you think could be caused by your cholesterol lowering medication?  No    Hypertension Follow-up      Do you check your blood pressure regularly outside of the clinic? No     Are you following a low salt diet? Yes    Are your blood pressures ever more than 140 on the top number (systolic) OR more   than 90 on the bottom number (diastolic), for example 140/90? No    BP Readings from Last 2 Encounters:   08/30/19 109/73   08/08/19 118/78     Hemoglobin A1C (%)   Date Value   04/05/2019 7.2 (H)   10/11/2018 6.4 (H)     LDL Cholesterol Calculated (mg/dL)   Date Value   04/05/2019 56   03/23/2018 46         How many servings of fruits and vegetables do you eat daily?  2-3    On average, how many sweetened beverages do you drink each day (Examples: soda, juice, sweet tea, etc.  Do NOT count diet or artificially sweetened     How many days per week do you miss taking your medication? 0        Patient Active Problem List   Diagnosis     Fibromyalgia     Non-rheumatic tricuspid valve insufficiency     Papanicolaou smear of cervix with atypical squamous cells cannot exclude high grade squamous intraepithelial lesion  (ASC-H)     Major depression in complete remission (H)     Narcolepsy     Hyperlipidemia LDL goal <100     Health Care Home     Reflex sympathetic dystrophy     Contraception     Elevated BMI     Chronic pain-Fibromyalgia     Type 2 diabetes mellitus without complication, without long-term current use of insulin (H)     Migraine with aura and without status migrainosus, not intractable     Elevated blood pressure reading without diagnosis of hypertension     Pulmonary hypertension (H)     Unexplained endometrial cells on cervical Pap smear     Thrombocytosis (H)     Anxiety     Post-op pain     Chronic, continuous use of opioids       Past Medical History:   Diagnosis Date     Abnormal Papanicolaou smear of cervix and cervical HPV 2003    late 2003, early 2004; treated with TCA     Depression      Diabetes mellitus      Fibromyalgia      Heart murmur     tricuspid valve disorder     Inflammatory arthritis     ?; has been discharged from Rheumatology     Narcolepsy 11/1/2010    doing well without medication.     Non-rheumatic tricuspid valve insufficiency     antibiotic prophylax Problem list name updated by automated process. Provider to review      Obesity      Other chronic pain      Pulmonary hypertension (H) 5/2/2010    on ECHO, but normal right heart cath 2011     Reflex sympathetic dystrophy     right foot ; related to stress fracture     TRICUSPID VALVE DISEASE (regurg)     sees Cardiology        Past Surgical History:   Procedure Laterality Date     Angiogram  3/2011    No pulmonary hypertension     COLONOSCOPY  1/2008    normal     CYSTOSCOPY N/A 6/5/2019    Procedure: CYSTOSCOPY;  Surgeon: Georgiana Pizarro DO;  Location: [facility] OR     DAVINCI HYSTERECTOMY TOTAL, BILATERAL SALPINGO-OOPHORECTOMY, COMBINED Bilateral 6/5/2019    Procedure: robotic assisted total laparoscopic hysterectomy bilateral salpingectomy and cystoscopy;  Surgeon: Georgiana Pizarro DO;  Location: [facility] OR     DILATION AND CURETTAGE,  OPERATIVE HYSTEROSCOPY WITH MORCELLATOR, COMBINED N/A 2018    Procedure: COMBINED DILATION AND CURETTAGE, OPERATIVE HYSTEROSCOPY WITH MORCELLATOR;  Hysteroscopy, polypectomy  with myosure, dilation and curettage, endometrial biopsy ;  Surgeon: Georgiana Pizarro DO;  Location: [facility] OR     ENT SURGERY      wisdon teeth removal     LAPAROSCOPIC CHOLECYSTECTOMY  2000     SURGICAL HISTORY OF -       essure procedure for contraception     TCA for abnormal pap         OB History    Para Term  AB Living   3 2 2 0 1 2   SAB TAB Ectopic Multiple Live Births   0 1 0 0 2      # Outcome Date GA Lbr Keaton/2nd Weight Sex Delivery Anes PTL Lv   3 Term 99 39w0d  3.629 kg (8 lb) F    HETAL      Birth Comments:  labor      Name: Jeff   2 Term 96 39w6d  3.515 kg (7 lb 12 oz) M    HETAL      Birth Comments:  labor      Name: Michi Huang TAB                Current Outpatient Medications   Medication Sig Dispense Refill     albuterol (PROAIR HFA/PROVENTIL HFA/VENTOLIN HFA) 108 (90 Base) MCG/ACT inhaler Inhale 2 puffs into the lungs every 6 hours as needed for shortness of breath / dyspnea or wheezing 1 Inhaler 0     atorvastatin (LIPITOR) 10 MG tablet Take 1 tablet (10 mg) by mouth daily 90 tablet 3     busPIRone (BUSPAR) 10 MG tablet TAKE 1 TABLET (10 MG) BY MOUTH 2 TIMES DAILY 180 tablet 1     cetirizine (ZYRTEC) 10 MG tablet Take 10 mg by mouth daily as needed for allergies       citalopram (CELEXA) 40 MG tablet TAKE 1 TABLET BY MOUTH EVERY DAY 90 tablet 1     cyanocobalamin (VITAMIN B-12) 1000 MCG tablet TAKE 1 TABLET BY MOUTH DAILY 100 tablet 3     cyclobenzaprine (FLEXERIL) 10 MG tablet TAKE 1 TABLET BY MOUTH THREE TIMES A DAY AS NEEDED FOR MUSCLE SPASMS 90 tablet 1     ibuprofen (ADVIL/MOTRIN) 800 MG tablet Take 1 tablet (800 mg) by mouth every 8 hours as needed for moderate pain 90 tablet 1     liraglutide (VICTOZA PEN) 18 MG/3ML solution Inject 1.8 mg Subcutaneous daily 18 mL 4      lisinopril (PRINIVIL/ZESTRIL) 5 MG tablet Take 1 tablet (5 mg) by mouth daily 90 tablet 3     metFORMIN (GLUCOPHAGE-XR) 500 MG 24 hr tablet Take 4 tablets (2,000 mg) by mouth daily (with breakfast) 120 tablet 0     traMADol (ULTRAM) 50 MG tablet TAKE 1-2 TBS BY MOUTH EVERY 8 HRS AS NEEDED FOR MODERATE PAIN MAX 8 TABS/24 HOURS, MAX 60 TBS/MONTH 60 tablet 0     traZODone (DESYREL) 50 MG tablet TAKE 1 AND 1/2 TABLET BY MOUTH EVERY NIGHT AT BEDTIME AS NEEDED FOR SLEEP. 45 tablet 1       Family History   Problem Relation Age of Onset     Respiratory Father         asthma     Arthritis Father         hips     Depression Father      Alcohol/Drug Father      Diabetes Paternal Grandfather      Cerebrovascular Disease Paternal Grandfather      Hypertension Paternal Grandfather      Cancer - colorectal Paternal Grandmother      Arthritis Paternal Grandmother         hips     Colon Cancer Paternal Grandmother      Prostate Cancer Maternal Grandfather      Hypertension Maternal Grandfather      Cancer Maternal Grandmother         pancreatic     Arthritis Maternal Grandmother         hips     Depression Maternal Grandmother      Hypertension Maternal Grandmother      Other Cancer Maternal Grandmother      Gallbladder Disease Maternal Grandmother      Depression Mother      Rashes/Skin Problems Mother         Rosacea     Arthritis Mother         osteoarthritis in hands and knees     Hypertension Mother      Depression Maternal Uncle         bipolar     Respiratory Brother         sleep apnea     Mental Illness Brother      Cancer - colorectal Maternal Uncle      Cerebrovascular Disease Maternal Uncle          of massive stroke--no heart problems     Hypertension Son        Social History     Tobacco Use     Smoking status: Never Smoker     Smokeless tobacco: Never Used   Substance Use Topics     Alcohol use: Yes     Alcohol/week: 0.0 standard drinks     Comment: rare       Immunization History   Administered Date(s)  "Administered     Influenza (H1N1) 10/20/2012     Influenza (IIV3) PF 12/09/2005, 11/15/2006, 10/23/2008, 10/08/2010, 11/08/2011, 10/08/2013, 10/30/2015     Influenza Vaccine IM > 6 months Valent IIV4 11/04/2014, 02/16/2017, 09/29/2017, 09/24/2018, 08/11/2019     Pneumococcal 23 valent 12/09/2011     TD (ADULT, 7+) 01/01/1997     TDAP Vaccine (Adacel) 01/09/2008, 04/23/2018           Reviewed and updated as needed this visit by Provider         Review of Systems   ROS COMP: CONSTITUTIONAL:NEGATIVE for fever, chills, change in weight  RESP:NEGATIVE for significant cough or SOB  CV: NEGATIVE for chest pain, palpitations or peripheral edema  MS: few fibro flares but nothing real bad.  PSYCHIATRIC: NEGATIVE for changes in mood or affect    Cannot find her monitor; needs a new one.    Eye doctor 8/15;  no diabetic changes.      Objective    /70 (BP Location: Right arm, Cuff Size: Adult Large)   Pulse 92   Temp 98.4  F (36.9  C) (Oral)   Resp 16   Ht 1.638 m (5' 4.5\")   Wt 108 kg (238 lb 1.6 oz)   SpO2 98%   BMI 40.24 kg/m    Body mass index is 40.24 kg/m .  Physical Exam   GENERAL APPEARANCE: alert and no distress  RESP: lungs clear to auscultation - no rales, rhonchi or wheezes  CV: regular rates and rhythm  MS: no edema.   PSYCH: mentation appears normal and affect normal/bright    DP pulse present bilaterally.  No sores or ulcerations.  Little callous.  Monofilament exam normal.      PHQ-9 SCORE 4/5/2019 5/7/2019 11/26/2019   PHQ-9 Total Score - - -   PHQ-9 Total Score MyChart - - -   PHQ-9 Total Score 15 1 0       MICHAEL-7 SCORE 4/5/2019 5/7/2019 11/26/2019   Total Score - - -   Total Score - - -   Total Score 18 4 0           Lab Results   Component Value Date    A1C 6.4 11/26/2019    A1C 7.2 04/05/2019    A1C 6.4 10/11/2018    A1C 5.8 04/23/2018    A1C 5.6 09/18/2017     '  Recent Labs   Lab Test 04/05/19  1016 03/23/18  0831  04/28/15  0758 04/28/14  0813   CHOL 148 127   < > 166 151   HDL 57 61   < > 56 " 42*   LDL 56 46   < > 82 81   TRIG 174* 100   < > 142 139   CHOLHDLRATIO  --   --   --  3.0 3.6    < > = values in this interval not displayed.                 Assessment & Plan     1. Type 2 diabetes mellitus without complication, without long-term current use of insulin (H)  Satisfactory control. No change in medications.  Sending for a new meter.   - Hemoglobin A1c  - Albumin Random Urine Quantitative with Creat Ratio  - TSH with free T4 reflex  - FOOT EXAM  - liraglutide (VICTOZA PEN) 18 MG/3ML solution; Inject 1.8 mg Subcutaneous daily  Dispense: 18 mL; Refill: 4  - metFORMIN (GLUCOPHAGE-XR) 500 MG 24 hr tablet; Take 4 tablets (2,000 mg) by mouth daily (with breakfast)  Dispense: 360 tablet; Refill: 1  - Hemoglobin A1c; Future  - Lipid panel reflex to direct LDL Fasting; Future  - Comprehensive metabolic panel; Future    2. Major depression in complete remission (H)  Doing well. Would like to continue current medications.  Does note that work has been stressful.  - citalopram (CELEXA) 40 MG tablet; Take 1 tablet (40 mg) by mouth daily  Dispense: 90 tablet; Refill: 1    3. Pulmonary hypertension (H)  She has seen Cardiology recently; currently sees annually.     4. Thrombocytosis (H)  Does see Hematology.     5. Fibromyalgia  She requests a refill of tramadol. She still has some left, but will be out soon.   We did discuss this as a controlled substance.   - traMADol (ULTRAM) 50 MG tablet; TAKE 1-2 TBS BY MOUTH EVERY 8 HRS AS NEEDED FOR MODERATE PAIN MAX 8 TABS/24 HOURS, MAX 60 TBS/MONTH  Dispense: 60 tablet; Refill: 0    6. Hyperlipidemia LDL goal <100  On statin.   - Lipid panel reflex to direct LDL Fasting; Future  - Comprehensive metabolic panel; Future     Return in about 6 months (around 5/26/2020) for fasting , Diabetes Recheck.    Irene Castellanos MD, MD  [facility]

## 2019-11-27 ASSESSMENT — ANXIETY QUESTIONNAIRES: GAD7 TOTAL SCORE: 0

## 2019-12-05 DIAGNOSIS — M79.7 FIBROMYALGIA: ICD-10-CM

## 2019-12-05 RX ORDER — TRAZODONE HYDROCHLORIDE 50 MG/1
TABLET, FILM COATED ORAL
Qty: 45 TABLET | Refills: 3 | Status: SHIPPED | OUTPATIENT
Start: 2019-12-05 | End: 2020-03-17

## 2019-12-05 RX ORDER — CYCLOBENZAPRINE HCL 10 MG
TABLET ORAL
Qty: 90 TABLET | Refills: 3 | Status: SHIPPED | OUTPATIENT
Start: 2019-12-05 | End: 2021-02-03

## 2019-12-05 NOTE — TELEPHONE ENCOUNTER
"Requested Prescriptions   Pending Prescriptions Disp Refills     cyclobenzaprine (FLEXERIL) 10 MG tablet [Pharmacy Med Name: CYCLOBENZAPRINE 10 MG TABLET] 90 tablet 1     Sig: TAKE 1 TABLET BY MOUTH THREE TIMES A DAY AS NEEDED FOR MUSCLE SPASMS   Last Written Prescription Date:  9/9/19  Last Fill Quantity: 90,  # refills: 1   Last office visit: 11/26/2019 with prescribing provider:  Irene Castellanos MD   Future Office Visit:   Next 5 appointments (look out 90 days)    Dec 23, 2019  8:30 AM CST  Return Visit with Jorge A Delgado MD  [facility] ([facility]) [address]  [place] [address]  [phone]             There is no refill protocol information for this order        traZODone (DESYREL) 50 MG tablet [Pharmacy Med Name: TRAZODONE 50 MG TABLET] 45 tablet 1     Sig: TAKE 1 AND 1/2 TABLET BY MOUTH EVERY NIGHT AT BEDTIME AS NEEDED FOR SLEEP.   Last Written Prescription Date:  10/1/19  Last Fill Quantity: 45,  # refills: 1   Last office visit: 11/26/2019 with prescribing provider:  Irene Castellanos MD   Future Office Visit:   Next 5 appointments (look out 90 days)    Dec 23, 2019  8:30 AM CST  Return Visit with Jorge A Delgado MD  [facility] ([facility]) [facility]  [id number] [place] [address] [address]  [place] [address]  [phone]             Serotonin Modulators Passed - 12/5/2019  2:59 AM        Passed - Recent (12 mo) or future (30 days) visit within the authorizing provider's specialty     Patient has had an office visit with the authorizing provider or a provider within the authorizing providers department within the previous 12 mos or has a future within next 30 days. See \"Patient Info\" tab in inbasket, or \"Choose Columns\" in Meds & Orders section of the refill encounter.              Passed - Medication is active on med list        Passed - Patient is age 18 or older        Passed - No " active pregnancy on record        Passed - No positive pregnancy test in past 12 months

## 2019-12-26 ENCOUNTER — E-VISIT (OUTPATIENT)
Dept: FAMILY MEDICINE | Facility: CLINIC | Age: 49
End: 2019-12-26
Payer: COMMERCIAL

## 2019-12-26 DIAGNOSIS — R05.9 COUGH: Primary | ICD-10-CM

## 2019-12-26 DIAGNOSIS — J20.9 ACUTE BRONCHITIS WITH SYMPTOMS > 10 DAYS: ICD-10-CM

## 2019-12-26 DIAGNOSIS — R06.2 WHEEZE: ICD-10-CM

## 2019-12-26 PROCEDURE — 99444 ZZC PHYSICIAN ONLINE EVALUATION & MANAGEMENT SERVICE: CPT | Performed by: FAMILY MEDICINE

## 2019-12-26 RX ORDER — CODEINE PHOSPHATE AND GUAIFENESIN 10; 100 MG/5ML; MG/5ML
1-2 SOLUTION ORAL EVERY 4 HOURS PRN
Qty: 180 ML | Refills: 0 | Status: SHIPPED | OUTPATIENT
Start: 2019-12-26 | End: 2020-06-09

## 2019-12-26 RX ORDER — DOXYCYCLINE HYCLATE 100 MG
100 TABLET ORAL 2 TIMES DAILY
Qty: 20 TABLET | Refills: 0 | Status: SHIPPED | OUTPATIENT
Start: 2019-12-26 | End: 2020-06-09

## 2019-12-26 RX ORDER — ALBUTEROL SULFATE 90 UG/1
2 AEROSOL, METERED RESPIRATORY (INHALATION) EVERY 6 HOURS
Qty: 1 INHALER | Refills: 0 | Status: SHIPPED | OUTPATIENT
Start: 2019-12-26 | End: 2020-06-09

## 2020-01-24 ENCOUNTER — ONCOLOGY VISIT (OUTPATIENT)
Dept: ONCOLOGY | Facility: CLINIC | Age: 50
End: 2020-01-24
Attending: INTERNAL MEDICINE
Payer: COMMERCIAL

## 2020-01-24 ENCOUNTER — HOSPITAL ENCOUNTER (OUTPATIENT)
Facility: CLINIC | Age: 50
Setting detail: SPECIMEN
Discharge: HOME | End: 2020-01-24
Attending: INTERNAL MEDICINE | Admitting: INTERNAL MEDICINE
Payer: COMMERCIAL

## 2020-01-24 ENCOUNTER — HOSPITAL ENCOUNTER (OUTPATIENT)
Facility: CLINIC | Age: 50
Setting detail: SPECIMEN
End: 2020-01-24
Attending: INTERNAL MEDICINE
Payer: COMMERCIAL

## 2020-01-24 VITALS
SYSTOLIC BLOOD PRESSURE: 140 MMHG | RESPIRATION RATE: 16 BRPM | DIASTOLIC BLOOD PRESSURE: 89 MMHG | WEIGHT: 237.2 LBS | TEMPERATURE: 97.7 F | OXYGEN SATURATION: 96 % | BODY MASS INDEX: 40.09 KG/M2 | HEART RATE: 99 BPM

## 2020-01-24 DIAGNOSIS — D50.0 IRON DEFICIENCY ANEMIA DUE TO CHRONIC BLOOD LOSS: ICD-10-CM

## 2020-01-24 DIAGNOSIS — D75.839 THROMBOCYTOSIS: Primary | ICD-10-CM

## 2020-01-24 LAB
BASOPHILS # BLD AUTO: 0.1 10E9/L (ref 0–0.2)
BASOPHILS NFR BLD AUTO: 0.9 %
DIFFERENTIAL METHOD BLD: ABNORMAL
EOSINOPHIL # BLD AUTO: 0.1 10E9/L (ref 0–0.7)
EOSINOPHIL NFR BLD AUTO: 1.4 %
ERYTHROCYTE [DISTWIDTH] IN BLOOD BY AUTOMATED COUNT: 13 % (ref 10–15)
FERRITIN SERPL-MCNC: 25 NG/ML (ref 8–252)
HCT VFR BLD AUTO: 40.1 % (ref 35–47)
HGB BLD-MCNC: 12.9 G/DL (ref 11.7–15.7)
IMM GRANULOCYTES # BLD: 0 10E9/L (ref 0–0.4)
IMM GRANULOCYTES NFR BLD: 0.3 %
IRON SATN MFR SERPL: 17 % (ref 15–46)
IRON SERPL-MCNC: 75 UG/DL (ref 35–180)
LYMPHOCYTES # BLD AUTO: 2.1 10E9/L (ref 0.8–5.3)
LYMPHOCYTES NFR BLD AUTO: 30 %
MCH RBC QN AUTO: 30.6 PG (ref 26.5–33)
MCHC RBC AUTO-ENTMCNC: 32.2 G/DL (ref 31.5–36.5)
MCV RBC AUTO: 95 FL (ref 78–100)
MONOCYTES # BLD AUTO: 0.5 10E9/L (ref 0–1.3)
MONOCYTES NFR BLD AUTO: 6.5 %
NEUTROPHILS # BLD AUTO: 4.2 10E9/L (ref 1.6–8.3)
NEUTROPHILS NFR BLD AUTO: 60.9 %
NRBC # BLD AUTO: 0 10*3/UL
NRBC BLD AUTO-RTO: 0 /100
PLATELET # BLD AUTO: 452 10E9/L (ref 150–450)
RBC # BLD AUTO: 4.22 10E12/L (ref 3.8–5.2)
TIBC SERPL-MCNC: 450 UG/DL (ref 240–430)
WBC # BLD AUTO: 6.9 10E9/L (ref 4–11)

## 2020-01-24 PROCEDURE — 36415 COLL VENOUS BLD VENIPUNCTURE: CPT

## 2020-01-24 PROCEDURE — 83550 IRON BINDING TEST: CPT | Performed by: INTERNAL MEDICINE

## 2020-01-24 PROCEDURE — 85025 COMPLETE CBC W/AUTO DIFF WBC: CPT | Performed by: INTERNAL MEDICINE

## 2020-01-24 PROCEDURE — G0463 HOSPITAL OUTPT CLINIC VISIT: HCPCS

## 2020-01-24 PROCEDURE — 99213 OFFICE O/P EST LOW 20 MIN: CPT | Performed by: INTERNAL MEDICINE

## 2020-01-24 PROCEDURE — 84238 ASSAY NONENDOCRINE RECEPTOR: CPT | Performed by: INTERNAL MEDICINE

## 2020-01-24 PROCEDURE — 82728 ASSAY OF FERRITIN: CPT | Performed by: INTERNAL MEDICINE

## 2020-01-24 PROCEDURE — 83540 ASSAY OF IRON: CPT | Performed by: INTERNAL MEDICINE

## 2020-01-24 ASSESSMENT — PAIN SCALES - GENERAL: PAINLEVEL: MILD PAIN (3)

## 2020-01-24 NOTE — PROGRESS NOTES
[facility]  HEMATOLOGY AND ONCOLOGY    FOLLOW-UP VISIT NOTE    PATIENT NAME: Kaylee Lopez MRN # [id number]  DATE OF VISIT: Jan 24, 2020 YOB: 1970        SUBJECTIVE   Kaylee Lopez is being followed for thrombocytosis and iron deficiency anemia    Kaylee is being seen with labs to review results for her work up of thrombocytosis.      PAST MEDICAL HISTORY     Past Medical History:   Diagnosis Date     Abnormal Papanicolaou smear of cervix and cervical HPV 2003    late 2003, early 2004; treated with TCA     Depression      Diabetes mellitus      Fibromyalgia      Heart murmur     tricuspid valve disorder     Inflammatory arthritis     ?; has been discharged from Rheumatology     Narcolepsy 11/1/2010    doing well without medication.     Non-rheumatic tricuspid valve insufficiency     antibiotic prophylax Problem list name updated by automated process. Provider to review      Obesity      Other chronic pain      Pulmonary hypertension (H) 5/2/2010    on ECHO, but normal right heart cath 2011     Reflex sympathetic dystrophy     right foot ; related to stress fracture     TRICUSPID VALVE DISEASE (regurg)     sees Cardiology          CURRENT OUTPATIENT MEDICATIONS     Current Outpatient Medications   Medication Sig     albuterol (PROAIR HFA/PROVENTIL HFA/VENTOLIN HFA) 108 (90 Base) MCG/ACT inhaler Inhale 2 puffs into the lungs every 6 hours     albuterol (PROAIR HFA/PROVENTIL HFA/VENTOLIN HFA) 108 (90 Base) MCG/ACT inhaler Inhale 2 puffs into the lungs every 6 hours as needed for shortness of breath / dyspnea or wheezing     atorvastatin (LIPITOR) 10 MG tablet Take 1 tablet (10 mg) by mouth daily     blood glucose monitoring (NO BRAND SPECIFIED) meter device kit Use to test blood sugar 1 times daily or as directed. Please give her all needed supplies with prn refills.     busPIRone (BUSPAR) 10 MG tablet TAKE 1 TABLET (10 MG) BY MOUTH 2 TIMES DAILY     cetirizine (ZYRTEC) 10 MG  tablet Take 10 mg by mouth daily as needed for allergies     citalopram (CELEXA) 40 MG tablet Take 1 tablet (40 mg) by mouth daily     cyanocobalamin (VITAMIN B-12) 1000 MCG tablet TAKE 1 TABLET BY MOUTH DAILY     cyclobenzaprine (FLEXERIL) 10 MG tablet TAKE 1 TABLET BY MOUTH THREE TIMES A DAY AS NEEDED FOR MUSCLE SPASMS     doxycycline hyclate (VIBRA-TABS) 100 MG tablet Take 1 tablet (100 mg) by mouth 2 times daily     guaiFENesin-codeine (ROBITUSSIN AC) 100-10 MG/5ML solution Take 5-10 mLs by mouth every 4 hours as needed for cough     ibuprofen (ADVIL/MOTRIN) 800 MG tablet Take 1 tablet (800 mg) by mouth every 8 hours as needed for moderate pain     liraglutide (VICTOZA PEN) 18 MG/3ML solution Inject 1.8 mg Subcutaneous daily     lisinopril (PRINIVIL/ZESTRIL) 5 MG tablet Take 1 tablet (5 mg) by mouth daily     metFORMIN (GLUCOPHAGE-XR) 500 MG 24 hr tablet Take 4 tablets (2,000 mg) by mouth daily (with breakfast)     traMADol (ULTRAM) 50 MG tablet TAKE 1-2 TBS BY MOUTH EVERY 8 HRS AS NEEDED FOR MODERATE PAIN MAX 8 TABS/24 HOURS, MAX 60 TBS/MONTH     traZODone (DESYREL) 50 MG tablet TAKE 1 AND 1/2 TABLET BY MOUTH EVERY NIGHT AT BEDTIME AS NEEDED FOR SLEEP.     No current facility-administered medications for this visit.         ALLERGIES      Allergies   Allergen Reactions     Codeine Nausea     Nausea (with tylenol/codeine)        REVIEW OF SYSTEMS   As above in the HPI, o/w complete 12-point ROS was negative.     PHYSICAL EXAM   BP (!) 140/89   Pulse 99   Temp 97.7  F (36.5  C) (Tympanic)   Resp 16   Wt 107.6 kg (237 lb 3.2 oz)   SpO2 96%   BMI 40.09 kg/m    GEN: NAD  HEENT: PERRL, EOMI, no icterus, injection or pallor. Oropharynx is clear.  LYMPHATICs: no cervical or supraclavicular lymphadenopathy; no other abn lymphadenopathy  PULMONARY: clear with good air entry bilaterally  CARDIOVASCULAR: regular, no murmurs, rubs, or gallops  GASTROINTESTINAL: soft, non-tender, non-distended, normal bowel sounds, no  hepatosplenomegaly by percussion or palpation  MUSCULOSKELTAL: warm, well perfused, no edema  NEURO: awake, alert and oriented to time place and person, cranial nerves intact - II - XII, no focal neurologic deficits  SKIN: no rashes     LABORATORY AND IMAGING STUDIES     Recent Labs   Lab Test 05/24/19  1137 05/07/19  1545 04/05/19  1016 10/11/18  1309 07/02/18  0910    137 139 136 136   POTASSIUM 4.1 4.4 3.9 4.1 4.4   CHLORIDE 103 106 105 100 102   CO2 28 23 26 27 29   ANIONGAP 5 8 8 9 5   BUN 13 15 10 10 13   CR 0.78 0.83 0.80 0.81 0.81   * 177* 153* 114* 129*   KIMBERLEE 9.2 9.1 9.0 8.7 8.4*     Recent Labs   Lab Test 10/11/18  1309 10/30/15  1318   MAG 1.8 2.0   PHOS 3.5  --      Recent Labs   Lab Test 01/24/20  0911 11/22/19  0820 06/17/19  1015 06/05/19  0615 05/24/19  1137 04/17/19  0823  04/20/12  0830   WBC 6.9 7.4 7.4  --  7.4 5.6   < > 7.3   HGB 12.9 12.3 12.2 12.5 11.8 12.5   < > 13.2   * 418 457*  --  457* 475*   < > 357   MCV 95 94 90  --  92 91   < > 90   NEUTROPHIL 60.9 65.1  --   --   --  62.0  --  75.1*    < > = values in this interval not displayed.     Recent Labs   Lab Test 04/05/19  1016 10/11/18  1309 03/23/18  0831 09/18/17  0754 08/22/16  0745   BILITOTAL 0.3  --   --  0.6 0.3   ALKPHOS 114  --   --  138 143   ALT 15  --  20 23 21   AST 10  --   --  15 9   ALBUMIN 3.3* 3.2*  --  3.7 3.8     TSH   Date Value Ref Range Status   11/26/2019 2.92 0.40 - 4.00 mU/L Final   04/23/2018 2.44 0.40 - 4.00 mU/L Final   02/27/2017 2.66 0.40 - 4.00 mU/L Final     No results for input(s): CEA in the last 99590 hours.  Results for orders placed or performed during the hospital encounter of 09/04/19   CT Chest Pulmonary Embolism w Contrast    Narrative    CT CHEST PULMONARY EMBOLISM WITH CONTRAST  9/4/2019 1:21 PM    HISTORY:  49-year-old woman with moderate pulmonary hypertension.  Request made for exclusion of pulmonary embolism.    COMPARISON: None.    TECHNIQUE: Axial and coronal CT images  obtained from the lung apices  through the lung bases after the uneventful administration of Isovue  370 intravenous contrast given for a total of 100 mL. Radiation dose  for this scan was reduced using automated exposure control, adjustment  of the mA and/or kV according to patient size, or iterative  reconstruction technique.    FINDINGS: The thyroid gland is unremarkable. No abnormally enlarged  mediastinal lymph nodes. Heart is normal in size. No pericardial or  pleural effusion. The visible solid organs in the upper abdomen are  unremarkable. No acute osseous abnormality. No pleural effusion,  pneumothorax, or abnormal area of consolidation. No pulmonary masses.  Cholecystectomy clips.    The thoracic aorta is normal in size and position. No dissection.  Origins of the great arteries are widely patent. Main pulmonary artery  is normal in size, measuring 2.2 cm. No filling defects to suggest  pulmonary emboli.      Impression    IMPRESSION:  1. No pulmonary emboli identified. Main pulmonary artery is normal in  size.  2. Thoracic aorta is normal in size and appearance.  3. Exam is otherwise unremarkable.    CARMINE MCDERMOTT MD     Recent Labs   Lab Test 11/22/19  0820 05/14/19  1410   LEONIE 15 12   IRON 51 91    584*   IRONSAT 13* 16      ASSESSMENT AND PLAN   1. Thrombocytosis  2. HTN, DM TII, Obesity, menorrhagia with planned hysterectomy, fibromyalgia  3. ECOG PS 1    Kaylee is alone at this visit. I explained her that she continues to have thrombocytosis which is only marginal elevation in her PLT counts (PLT counts 452K with normal level up to 450k). Her work up for myeloproliferative disorder was negative. She does not have any of the mutation involving JAK2, CALR or MPL genes.     She continues to have iron deficiency which does lead to thrombocytosis. Her ferritin levels are low at 25 ng/ml, her TIBC is markedly elevated at 450 very consistent with iron deficiency. Her free iron is relatively normal  which reflects good iron intake and absorption. Despite this robust iron level, her percent saturation at lower limits of normal at 17%.     I have suggested that she try oral iron supplementation to replenish her stores. I suggested that she take her iron with vitamin C to aid absorption. Iron tends to cause constipation.     She can actually follow with her PCP and return to see me if there are any concerns.

## 2020-01-24 NOTE — NURSING NOTE
"Oncology Rooming Note    January 24, 2020 9:03 AM   Kaylee Lopez is a 49 year old female who presents for:    Chief Complaint   Patient presents with     Oncology Clinic Visit     Thrombocytosis     Initial Vitals: BP (!) 140/89   Pulse 99   Temp 97.7  F (36.5  C) (Tympanic)   Resp 16   Wt 107.6 kg (237 lb 3.2 oz)   SpO2 96%   BMI 40.09 kg/m   Estimated body mass index is 40.09 kg/m  as calculated from the following:    Height as of 11/26/19: 1.638 m (5' 4.5\").    Weight as of this encounter: 107.6 kg (237 lb 3.2 oz). Body surface area is 2.21 meters squared.  Mild Pain (3) Comment: Data Unavailable   No LMP recorded.  Allergies reviewed: Yes  Medications reviewed: Yes    Medications: Medication refills not needed today.  Pharmacy name entered into [employer]: [id number] [place] [facility] [id number] [place] [address]    Clinical concerns: Follow Up      Mabel Latham CMA              "

## 2020-01-24 NOTE — LETTER
1/24/2020         RE: Kaylee Lopez  [address] [address] [facility] [address]        Dear Colleague,    Thank you for referring your patient, Kaylee Lopez, to the [facility]. Please see a copy of my visit note below.    [facility]  HEMATOLOGY AND ONCOLOGY    FOLLOW-UP VISIT NOTE    PATIENT NAME: Kaylee Lopez MRN # [id number]  DATE OF VISIT: Jan 24, 2020 YOB: 1970        SUBJECTIVE   Kaylee Lopez is being followed for thrombocytosis and iron deficiency anemia    Kaylee is being seen with labs to review results for her work up of thrombocytosis.      PAST MEDICAL HISTORY     Past Medical History:   Diagnosis Date     Abnormal Papanicolaou smear of cervix and cervical HPV 2003    late 2003, early 2004; treated with TCA     Depression      Diabetes mellitus      Fibromyalgia      Heart murmur     tricuspid valve disorder     Inflammatory arthritis     ?; has been discharged from Rheumatology     Narcolepsy 11/1/2010    doing well without medication.     Non-rheumatic tricuspid valve insufficiency     antibiotic prophylax Problem list name updated by automated process. Provider to review      Obesity      Other chronic pain      Pulmonary hypertension (H) 5/2/2010    on ECHO, but normal right heart cath 2011     Reflex sympathetic dystrophy     right foot ; related to stress fracture     TRICUSPID VALVE DISEASE (regurg)     sees Cardiology          CURRENT OUTPATIENT MEDICATIONS     Current Outpatient Medications   Medication Sig     albuterol (PROAIR HFA/PROVENTIL HFA/VENTOLIN HFA) 108 (90 Base) MCG/ACT inhaler Inhale 2 puffs into the lungs every 6 hours     albuterol (PROAIR HFA/PROVENTIL HFA/VENTOLIN HFA) 108 (90 Base) MCG/ACT inhaler Inhale 2 puffs into the lungs every 6 hours as needed for shortness of breath / dyspnea or wheezing     atorvastatin (LIPITOR) 10 MG tablet Take 1 tablet (10 mg) by mouth daily     blood glucose monitoring (NO BRAND  SPECIFIED) meter device kit Use to test blood sugar 1 times daily or as directed. Please give her all needed supplies with prn refills.     busPIRone (BUSPAR) 10 MG tablet TAKE 1 TABLET (10 MG) BY MOUTH 2 TIMES DAILY     cetirizine (ZYRTEC) 10 MG tablet Take 10 mg by mouth daily as needed for allergies     citalopram (CELEXA) 40 MG tablet Take 1 tablet (40 mg) by mouth daily     cyanocobalamin (VITAMIN B-12) 1000 MCG tablet TAKE 1 TABLET BY MOUTH DAILY     cyclobenzaprine (FLEXERIL) 10 MG tablet TAKE 1 TABLET BY MOUTH THREE TIMES A DAY AS NEEDED FOR MUSCLE SPASMS     doxycycline hyclate (VIBRA-TABS) 100 MG tablet Take 1 tablet (100 mg) by mouth 2 times daily     guaiFENesin-codeine (ROBITUSSIN AC) 100-10 MG/5ML solution Take 5-10 mLs by mouth every 4 hours as needed for cough     ibuprofen (ADVIL/MOTRIN) 800 MG tablet Take 1 tablet (800 mg) by mouth every 8 hours as needed for moderate pain     liraglutide (VICTOZA PEN) 18 MG/3ML solution Inject 1.8 mg Subcutaneous daily     lisinopril (PRINIVIL/ZESTRIL) 5 MG tablet Take 1 tablet (5 mg) by mouth daily     metFORMIN (GLUCOPHAGE-XR) 500 MG 24 hr tablet Take 4 tablets (2,000 mg) by mouth daily (with breakfast)     traMADol (ULTRAM) 50 MG tablet TAKE 1-2 TBS BY MOUTH EVERY 8 HRS AS NEEDED FOR MODERATE PAIN MAX 8 TABS/24 HOURS, MAX 60 TBS/MONTH     traZODone (DESYREL) 50 MG tablet TAKE 1 AND 1/2 TABLET BY MOUTH EVERY NIGHT AT BEDTIME AS NEEDED FOR SLEEP.     No current facility-administered medications for this visit.         ALLERGIES      Allergies   Allergen Reactions     Codeine Nausea     Nausea (with tylenol/codeine)        REVIEW OF SYSTEMS   As above in the HPI, o/w complete 12-point ROS was negative.     PHYSICAL EXAM   BP (!) 140/89   Pulse 99   Temp 97.7  F (36.5  C) (Tympanic)   Resp 16   Wt 107.6 kg (237 lb 3.2 oz)   SpO2 96%   BMI 40.09 kg/m     GEN: NAD  HEENT: PERRL, EOMI, no icterus, injection or pallor. Oropharynx is clear.  LYMPHATICs: no  cervical or supraclavicular lymphadenopathy; no other abn lymphadenopathy  PULMONARY: clear with good air entry bilaterally  CARDIOVASCULAR: regular, no murmurs, rubs, or gallops  GASTROINTESTINAL: soft, non-tender, non-distended, normal bowel sounds, no hepatosplenomegaly by percussion or palpation  MUSCULOSKELTAL: warm, well perfused, no edema  NEURO: awake, alert and oriented to time place and person, cranial nerves intact - II - XII, no focal neurologic deficits  SKIN: no rashes     LABORATORY AND IMAGING STUDIES     Recent Labs   Lab Test 05/24/19  1137 05/07/19  1545 04/05/19  1016 10/11/18  1309 07/02/18  0910    137 139 136 136   POTASSIUM 4.1 4.4 3.9 4.1 4.4   CHLORIDE 103 106 105 100 102   CO2 28 23 26 27 29   ANIONGAP 5 8 8 9 5   BUN 13 15 10 10 13   CR 0.78 0.83 0.80 0.81 0.81   * 177* 153* 114* 129*   KIMBERLEE 9.2 9.1 9.0 8.7 8.4*     Recent Labs   Lab Test 10/11/18  1309 10/30/15  1318   MAG 1.8 2.0   PHOS 3.5  --      Recent Labs   Lab Test 01/24/20  0911 11/22/19  0820 06/17/19  1015 06/05/19  0615 05/24/19  1137 04/17/19  0823  04/20/12  0830   WBC 6.9 7.4 7.4  --  7.4 5.6   < > 7.3   HGB 12.9 12.3 12.2 12.5 11.8 12.5   < > 13.2   * 418 457*  --  457* 475*   < > 357   MCV 95 94 90  --  92 91   < > 90   NEUTROPHIL 60.9 65.1  --   --   --  62.0  --  75.1*    < > = values in this interval not displayed.     Recent Labs   Lab Test 04/05/19  1016 10/11/18  1309 03/23/18  0831 09/18/17  0754 08/22/16  0745   BILITOTAL 0.3  --   --  0.6 0.3   ALKPHOS 114  --   --  138 143   ALT 15  --  20 23 21   AST 10  --   --  15 9   ALBUMIN 3.3* 3.2*  --  3.7 3.8     TSH   Date Value Ref Range Status   11/26/2019 2.92 0.40 - 4.00 mU/L Final   04/23/2018 2.44 0.40 - 4.00 mU/L Final   02/27/2017 2.66 0.40 - 4.00 mU/L Final     No results for input(s): CEA in the last 19164 hours.  Results for orders placed or performed during the hospital encounter of 09/04/19   CT Chest Pulmonary Embolism w Contrast     Narrative    CT CHEST PULMONARY EMBOLISM WITH CONTRAST  9/4/2019 1:21 PM    HISTORY:  49-year-old woman with moderate pulmonary hypertension.  Request made for exclusion of pulmonary embolism.    COMPARISON: None.    TECHNIQUE: Axial and coronal CT images obtained from the lung apices  through the lung bases after the uneventful administration of Isovue  370 intravenous contrast given for a total of 100 mL. Radiation dose  for this scan was reduced using automated exposure control, adjustment  of the mA and/or kV according to patient size, or iterative  reconstruction technique.    FINDINGS: The thyroid gland is unremarkable. No abnormally enlarged  mediastinal lymph nodes. Heart is normal in size. No pericardial or  pleural effusion. The visible solid organs in the upper abdomen are  unremarkable. No acute osseous abnormality. No pleural effusion,  pneumothorax, or abnormal area of consolidation. No pulmonary masses.  Cholecystectomy clips.    The thoracic aorta is normal in size and position. No dissection.  Origins of the great arteries are widely patent. Main pulmonary artery  is normal in size, measuring 2.2 cm. No filling defects to suggest  pulmonary emboli.      Impression    IMPRESSION:  1. No pulmonary emboli identified. Main pulmonary artery is normal in  size.  2. Thoracic aorta is normal in size and appearance.  3. Exam is otherwise unremarkable.    CARMINE MCDERMOTT MD     Recent Labs   Lab Test 11/22/19  0820 05/14/19  1410   LEONIE 15 12   IRON 51 91    584*   IRONSAT 13* 16      ASSESSMENT AND PLAN   1. Thrombocytosis  2. HTN, DM TII, Obesity, menorrhagia with planned hysterectomy, fibromyalgia  3. ECOG PS 1    Kaylee is alone at this visit. I explained her that she continues to have thrombocytosis which is only marginal elevation in her PLT counts (PLT counts 452K with normal level up to 450k). Her work up for myeloproliferative disorder was negative. She does not have any of the mutation  involving JAK2, CALR or MPL genes.     She continues to have iron deficiency which does lead to thrombocytosis. Her ferritin levels are low at 25 ng/ml, her TIBC is markedly elevated at 450 very consistent with iron deficiency. Her free iron is relatively normal which reflects good iron intake and absorption. Despite this robust iron level, her percent saturation at lower limits of normal at 17%.     I have suggested that she try oral iron supplementation to replenish her stores. I suggested that she take her iron with vitamin C to aid absorption. Iron tends to cause constipation.     She can actually follow with her PCP and return to see me if there are any concerns.     Again, thank you for allowing me to participate in the care of your patient.        Sincerely,        Jorge A Delgado MD

## 2020-01-25 LAB — STFR SERPL-SCNC: 2.4 MG/L (ref 1.9–4.4)

## 2020-02-01 ENCOUNTER — MYC MEDICAL ADVICE (OUTPATIENT)
Dept: FAMILY MEDICINE | Facility: CLINIC | Age: 50
End: 2020-02-01

## 2020-02-01 DIAGNOSIS — E11.9 TYPE 2 DIABETES MELLITUS WITHOUT COMPLICATION, WITHOUT LONG-TERM CURRENT USE OF INSULIN (H): Primary | ICD-10-CM

## 2020-02-07 DIAGNOSIS — F11.90 CHRONIC, CONTINUOUS USE OF OPIOIDS: ICD-10-CM

## 2020-02-07 DIAGNOSIS — M79.7 FIBROMYALGIA: ICD-10-CM

## 2020-02-07 NOTE — TELEPHONE ENCOUNTER
Requested Prescriptions   Pending Prescriptions Disp Refills     traMADol (ULTRAM) 50 MG tablet [Pharmacy Med Name: TRAMADOL HCL 50 MG TABLET] 60 tablet 0     Sig: TAKE 1-2 TABS EVERY 8HRS AS NEEDED FOR MODERATE PAIN MAX 8 TABS/24 HOURS, MAX 60 TBS/MONTH   Last Written Prescription Date:  11/26/19  Last Fill Quantity: 60,  # refills: 0   Last office visit: 11/26/2019 with prescribing provider:  Irene Castellanos MD   Future Office Visit:        There is no refill protocol information for this order

## 2020-02-10 RX ORDER — TRAMADOL HYDROCHLORIDE 50 MG/1
TABLET ORAL
Qty: 60 TABLET | Refills: 0 | Status: SHIPPED | OUTPATIENT
Start: 2020-02-10 | End: 2020-04-22

## 2020-02-10 NOTE — TELEPHONE ENCOUNTER
RX monitoring program ([place]) reviewed:     Last fill dates:  11/26/19, 9/17/19, 8/10/19    [place]  [email or web address]      Routing refill request to provider for review/approval because:  Drug not on the [facility] refill protocol

## 2020-02-11 ENCOUNTER — MYC MEDICAL ADVICE (OUTPATIENT)
Dept: FAMILY MEDICINE | Facility: CLINIC | Age: 50
End: 2020-02-11

## 2020-02-11 DIAGNOSIS — E11.9 TYPE 2 DIABETES MELLITUS WITHOUT COMPLICATION, WITH LONG-TERM CURRENT USE OF INSULIN (H): Primary | ICD-10-CM

## 2020-02-11 DIAGNOSIS — Z79.4 TYPE 2 DIABETES MELLITUS WITHOUT COMPLICATION, WITH LONG-TERM CURRENT USE OF INSULIN (H): Primary | ICD-10-CM

## 2020-02-12 NOTE — TELEPHONE ENCOUNTER
"    Wrong pens ordered. Ok to fill?     Mabel SILVA RN     Requested Prescriptions   Pending Prescriptions Disp Refills     insulin pen needle (31G X 5 MM) 31G X 5 MM miscellaneous 100 each 3     Sig: Use 1 pen needles daily or as directed. Please use brand per insurance.       Diabetic Supplies Protocol Passed - 2/12/2020  9:02 AM        Passed - Medication is active on med list        Passed - Patient is 18 years of age or older        Passed - Recent (6 mo) or future (30 days) visit within the authorizing provider's specialty     Patient had office visit in the last 6 months or has a visit in the next 30 days with authorizing provider.  See \"Patient Info\" tab in inbasket, or \"Choose Columns\" in Meds & Orders section of the refill encounter.              "

## 2020-02-12 NOTE — TELEPHONE ENCOUNTER
Please fill what she needs; correct pens.  I am ok with refills for a year on these as well.  Thanks!!!

## 2020-02-24 DIAGNOSIS — E11.9 TYPE 2 DIABETES MELLITUS WITHOUT COMPLICATION, WITHOUT LONG-TERM CURRENT USE OF INSULIN (H): ICD-10-CM

## 2020-02-24 NOTE — TELEPHONE ENCOUNTER
One touch Ultra Blue Test Strips   Last Written Prescription Date:  na  Last Fill Quantity: na,   # refills: na  Last Office Visit: 11/26/2019 Irene Castellanos MD     Future Office visit:       Routing refill request to provider for review/approval because:  Drug not active on patient's medication list

## 2020-02-25 NOTE — TELEPHONE ENCOUNTER
Please make sure she gets what she needs for strips.  This is t'd up as generic, which I am ok with... some of the insurances want a brand listed.   Looks like she is testing once daily.

## 2020-02-26 NOTE — TELEPHONE ENCOUNTER
Call to pt- she is unsure of strip name. Adv we put in generic if something different is needed let us know.     Mabel SILVA RN

## 2020-03-15 ENCOUNTER — HEALTH MAINTENANCE LETTER (OUTPATIENT)
Age: 50
End: 2020-03-15

## 2020-03-17 ENCOUNTER — MYC MEDICAL ADVICE (OUTPATIENT)
Dept: FAMILY MEDICINE | Facility: CLINIC | Age: 50
End: 2020-03-17

## 2020-03-17 ENCOUNTER — E-VISIT (OUTPATIENT)
Dept: FAMILY MEDICINE | Facility: CLINIC | Age: 50
End: 2020-03-17
Payer: COMMERCIAL

## 2020-03-17 DIAGNOSIS — F41.9 ANXIETY: ICD-10-CM

## 2020-03-17 DIAGNOSIS — G47.00 INSOMNIA, UNSPECIFIED TYPE: ICD-10-CM

## 2020-03-17 DIAGNOSIS — F33.1 MODERATE EPISODE OF RECURRENT MAJOR DEPRESSIVE DISORDER (H): ICD-10-CM

## 2020-03-17 DIAGNOSIS — M79.7 FIBROMYALGIA: Primary | ICD-10-CM

## 2020-03-17 PROCEDURE — 99423 OL DIG E/M SVC 21+ MIN: CPT | Performed by: FAMILY MEDICINE

## 2020-03-17 RX ORDER — TRAZODONE HYDROCHLORIDE 50 MG/1
100 TABLET, FILM COATED ORAL
Qty: 180 TABLET | Refills: 0 | Status: SHIPPED | OUTPATIENT
Start: 2020-03-17 | End: 2020-06-24

## 2020-03-17 RX ORDER — BUSPIRONE HYDROCHLORIDE 15 MG/1
15 TABLET ORAL 2 TIMES DAILY
Qty: 180 TABLET | Refills: 0 | Status: SHIPPED | OUTPATIENT
Start: 2020-03-17 | End: 2020-06-09

## 2020-03-17 ASSESSMENT — ANXIETY QUESTIONNAIRES
6. BECOMING EASILY ANNOYED OR IRRITABLE: MORE THAN HALF THE DAYS
7. FEELING AFRAID AS IF SOMETHING AWFUL MIGHT HAPPEN: NEARLY EVERY DAY
2. NOT BEING ABLE TO STOP OR CONTROL WORRYING: MORE THAN HALF THE DAYS
7. FEELING AFRAID AS IF SOMETHING AWFUL MIGHT HAPPEN: NEARLY EVERY DAY
4. TROUBLE RELAXING: MORE THAN HALF THE DAYS
5. BEING SO RESTLESS THAT IT IS HARD TO SIT STILL: NOT AT ALL
GAD7 TOTAL SCORE: 14
3. WORRYING TOO MUCH ABOUT DIFFERENT THINGS: MORE THAN HALF THE DAYS
1. FEELING NERVOUS, ANXIOUS, OR ON EDGE: NEARLY EVERY DAY

## 2020-03-17 ASSESSMENT — PATIENT HEALTH QUESTIONNAIRE - PHQ9
SUM OF ALL RESPONSES TO PHQ QUESTIONS 1-9: 10
SUM OF ALL RESPONSES TO PHQ QUESTIONS 1-9: 10
10. IF YOU CHECKED OFF ANY PROBLEMS, HOW DIFFICULT HAVE THESE PROBLEMS MADE IT FOR YOU TO DO YOUR WORK, TAKE CARE OF THINGS AT HOME, OR GET ALONG WITH OTHER PEOPLE: VERY DIFFICULT

## 2020-03-17 NOTE — TELEPHONE ENCOUNTER
Provider E-Visit time total (minutes): 22          PHQ-9 SCORE 5/7/2019 11/26/2019 3/17/2020   PHQ-9 Total Score - - -   PHQ-9 Total Score MyChart - - 10 (Moderate depression)   PHQ-9 Total Score 1 0 10       MICHAEL-7 SCORE 5/7/2019 11/26/2019 3/17/2020   Total Score - - -   Total Score - - 14 (moderate anxiety)   Total Score 4 0 14     From a MyChart encounter:     Thank you. I just complete the e busy but forgot to state in there about my request to increase the 2 meds. Can you make sure Dr Castellanos sees this message along with the whist stuff?     Current Outpatient Medications   Medication Sig Dispense Refill     albuterol (PROAIR HFA/PROVENTIL HFA/VENTOLIN HFA) 108 (90 Base) MCG/ACT inhaler Inhale 2 puffs into the lungs every 6 hours 1 Inhaler 0     albuterol (PROAIR HFA/PROVENTIL HFA/VENTOLIN HFA) 108 (90 Base) MCG/ACT inhaler Inhale 2 puffs into the lungs every 6 hours as needed for shortness of breath / dyspnea or wheezing 1 Inhaler 0     atorvastatin (LIPITOR) 10 MG tablet Take 1 tablet (10 mg) by mouth daily 90 tablet 3     blood glucose (NO BRAND SPECIFIED) test strip Use to test blood sugar 1 times daily or as directed. 100 strip 4     blood glucose monitoring (NO BRAND SPECIFIED) meter device kit Use to test blood sugar 1 times daily or as directed. Please give her all needed supplies with prn refills. 1 kit 0     busPIRone (BUSPAR) 10 MG tablet TAKE 1 TABLET (10 MG) BY MOUTH 2 TIMES DAILY 180 tablet 1     cetirizine (ZYRTEC) 10 MG tablet Take 10 mg by mouth daily as needed for allergies       citalopram (CELEXA) 40 MG tablet Take 1 tablet (40 mg) by mouth daily 90 tablet 1     cyanocobalamin (VITAMIN B-12) 1000 MCG tablet TAKE 1 TABLET BY MOUTH DAILY 100 tablet 3     cyclobenzaprine (FLEXERIL) 10 MG tablet TAKE 1 TABLET BY MOUTH THREE TIMES A DAY AS NEEDED FOR MUSCLE SPASMS 90 tablet 3     doxycycline hyclate (VIBRA-TABS) 100 MG tablet Take 1 tablet (100 mg) by mouth 2 times daily 20 tablet 0      guaiFENesin-codeine (ROBITUSSIN AC) 100-10 MG/5ML solution Take 5-10 mLs by mouth every 4 hours as needed for cough 180 mL 0     ibuprofen (ADVIL/MOTRIN) 800 MG tablet Take 1 tablet (800 mg) by mouth every 8 hours as needed for moderate pain 90 tablet 1     insulin pen needle (29G X 12.7MM) 29G X 12.7MM miscellaneous Use 1 pen needles daily or as directed./ Brand per insurance 100 each 3     insulin pen needle (31G X 5 MM) 31G X 5 MM miscellaneous Use 1 pen needles daily or as directed. Please use brand per insurance. 100 each 3     liraglutide (VICTOZA PEN) 18 MG/3ML solution Inject 1.8 mg Subcutaneous daily 18 mL 4     lisinopril (PRINIVIL/ZESTRIL) 5 MG tablet Take 1 tablet (5 mg) by mouth daily 90 tablet 3     metFORMIN (GLUCOPHAGE-XR) 500 MG 24 hr tablet Take 4 tablets (2,000 mg) by mouth daily (with breakfast) 360 tablet 1     traMADol (ULTRAM) 50 MG tablet TAKE 1-2 TABS EVERY 8HRS AS NEEDED FOR MODERATE PAIN MAX 8 TABS/24 HOURS, MAX 60 TBS/MONTH 60 tablet 0     traZODone (DESYREL) 50 MG tablet TAKE 1 AND 1/2 TABLET BY MOUTH EVERY NIGHT AT BEDTIME AS NEEDED FOR SLEEP. 45 tablet 3

## 2020-03-18 ASSESSMENT — ANXIETY QUESTIONNAIRES: GAD7 TOTAL SCORE: 14

## 2020-03-18 ASSESSMENT — PATIENT HEALTH QUESTIONNAIRE - PHQ9: SUM OF ALL RESPONSES TO PHQ QUESTIONS 1-9: 10

## 2020-04-09 DIAGNOSIS — G47.00 INSOMNIA, UNSPECIFIED TYPE: ICD-10-CM

## 2020-04-09 DIAGNOSIS — M79.7 FIBROMYALGIA: ICD-10-CM

## 2020-04-09 NOTE — TELEPHONE ENCOUNTER
It actually looks like she should have enough. The instructions here are different than the most recent ones in the chart.   (we had increased dose in March)  I am thinking the pharmacy called this in from an older prescription?  Please clarify with them.

## 2020-04-14 RX ORDER — TRAZODONE HYDROCHLORIDE 50 MG/1
TABLET, FILM COATED ORAL
Qty: 45 TABLET | Refills: 3 | OUTPATIENT
Start: 2020-04-14

## 2020-04-21 DIAGNOSIS — F11.90 CHRONIC, CONTINUOUS USE OF OPIOIDS: ICD-10-CM

## 2020-04-21 DIAGNOSIS — F41.9 ANXIETY: ICD-10-CM

## 2020-04-21 DIAGNOSIS — M79.7 FIBROMYALGIA: ICD-10-CM

## 2020-04-22 RX ORDER — TRAMADOL HYDROCHLORIDE 50 MG/1
TABLET ORAL
Qty: 60 TABLET | Refills: 0 | Status: SHIPPED | OUTPATIENT
Start: 2020-04-22 | End: 2020-06-25

## 2020-04-22 RX ORDER — BUSPIRONE HYDROCHLORIDE 10 MG/1
10 TABLET ORAL 2 TIMES DAILY
Qty: 180 TABLET | Refills: 1 | Status: SHIPPED | OUTPATIENT
Start: 2020-04-22 | End: 2020-06-09 | Stop reason: DRUGHIGH

## 2020-04-22 NOTE — TELEPHONE ENCOUNTER
Prescription approved per [facility] Refill Protocol.  Routing refill request to provider for review/approval because:  Drug not on the [facility] refill protocol     Jose Maria Bourne RN   [facility]

## 2020-05-14 DIAGNOSIS — E53.8 VITAMIN B12 DEFICIENCY (NON ANEMIC): ICD-10-CM

## 2020-05-14 DIAGNOSIS — F32.5 MAJOR DEPRESSION IN COMPLETE REMISSION (H): ICD-10-CM

## 2020-05-14 RX ORDER — CITALOPRAM HYDROBROMIDE 40 MG/1
TABLET ORAL
Qty: 90 TABLET | Refills: 0 | Status: SHIPPED | OUTPATIENT
Start: 2020-05-14 | End: 2020-06-09

## 2020-05-14 NOTE — TELEPHONE ENCOUNTER
I did do a refill.  She is due for diabetic visit this month; see if you can help her schedule; can be virtual...        PHQ-9 SCORE 5/7/2019 11/26/2019 3/17/2020   PHQ-9 Total Score - - -   PHQ-9 Total Score MyChart - - 10 (Moderate depression)   PHQ-9 Total Score 1 0 10       MICHAEL-7 SCORE 5/7/2019 11/26/2019 3/17/2020   Total Score - - -   Total Score - - 14 (moderate anxiety)   Total Score 4 0 14

## 2020-05-14 NOTE — TELEPHONE ENCOUNTER
Virtual visit 3/17/2020.    Routing refill request to provider for review/approval because:  PHQ9 score 10.    Belkys Calvin RN

## 2020-05-29 DIAGNOSIS — E11.9 TYPE 2 DIABETES MELLITUS WITHOUT COMPLICATION, WITHOUT LONG-TERM CURRENT USE OF INSULIN (H): ICD-10-CM

## 2020-06-01 RX ORDER — METFORMIN HCL 500 MG
TABLET, EXTENDED RELEASE 24 HR ORAL
Qty: 360 TABLET | Refills: 0 | Status: SHIPPED | OUTPATIENT
Start: 2020-06-01 | End: 2020-08-26

## 2020-06-01 NOTE — TELEPHONE ENCOUNTER
Routing refill request to provider for review/approval because:  Labs out of range:  Due for A1C and creatinine - has appt scheduled for 6/9/2020

## 2020-06-09 ENCOUNTER — VIRTUAL VISIT (OUTPATIENT)
Dept: FAMILY MEDICINE | Facility: CLINIC | Age: 50
End: 2020-06-09
Payer: COMMERCIAL

## 2020-06-09 DIAGNOSIS — F41.9 ANXIETY: ICD-10-CM

## 2020-06-09 DIAGNOSIS — E66.01 MORBID OBESITY (H): ICD-10-CM

## 2020-06-09 DIAGNOSIS — M79.7 FIBROMYALGIA: ICD-10-CM

## 2020-06-09 DIAGNOSIS — E78.5 HYPERLIPIDEMIA LDL GOAL <100: ICD-10-CM

## 2020-06-09 DIAGNOSIS — F32.5 MAJOR DEPRESSION IN COMPLETE REMISSION (H): ICD-10-CM

## 2020-06-09 DIAGNOSIS — E11.9 TYPE 2 DIABETES MELLITUS WITHOUT COMPLICATION, WITHOUT LONG-TERM CURRENT USE OF INSULIN (H): Primary | ICD-10-CM

## 2020-06-09 PROCEDURE — 99214 OFFICE O/P EST MOD 30 MIN: CPT | Mod: 95 | Performed by: FAMILY MEDICINE

## 2020-06-09 PROCEDURE — 96127 BRIEF EMOTIONAL/BEHAV ASSMT: CPT | Performed by: FAMILY MEDICINE

## 2020-06-09 RX ORDER — CITALOPRAM HYDROBROMIDE 40 MG/1
40 TABLET ORAL DAILY
Qty: 90 TABLET | Refills: 1 | Status: SHIPPED | OUTPATIENT
Start: 2020-06-09 | End: 2020-12-29

## 2020-06-09 RX ORDER — BUSPIRONE HYDROCHLORIDE 15 MG/1
15 TABLET ORAL 2 TIMES DAILY
Qty: 180 TABLET | Refills: 1 | Status: SHIPPED | OUTPATIENT
Start: 2020-06-09 | End: 2020-12-21

## 2020-06-09 ASSESSMENT — ANXIETY QUESTIONNAIRES
2. NOT BEING ABLE TO STOP OR CONTROL WORRYING: NOT AT ALL
3. WORRYING TOO MUCH ABOUT DIFFERENT THINGS: NOT AT ALL
6. BECOMING EASILY ANNOYED OR IRRITABLE: NOT AT ALL
1. FEELING NERVOUS, ANXIOUS, OR ON EDGE: NOT AT ALL
5. BEING SO RESTLESS THAT IT IS HARD TO SIT STILL: NOT AT ALL
GAD7 TOTAL SCORE: 0
7. FEELING AFRAID AS IF SOMETHING AWFUL MIGHT HAPPEN: NOT AT ALL

## 2020-06-09 ASSESSMENT — PATIENT HEALTH QUESTIONNAIRE - PHQ9
5. POOR APPETITE OR OVEREATING: NOT AT ALL
SUM OF ALL RESPONSES TO PHQ QUESTIONS 1-9: 0

## 2020-06-09 NOTE — PROGRESS NOTES
"Kaylee Lopez is a 50 year old female who is being evaluated via a billable video visit.      [phone]    The patient has been notified of following:     \"This video visit will be conducted via a call between you and your physician/provider. We have found that certain health care needs can be provided without the need for an in-person physical exam.  This service lets us provide the care you need with a video conversation.  If a prescription is necessary we can send it directly to your pharmacy.  If lab work is needed we can place an order for that and you can then stop by our lab to have the test done at a later time.    Video visits are billed at different rates depending on your insurance coverage.  Please reach out to your insurance provider with any questions.    If during the course of the call the physician/provider feels a video visit is not appropriate, you will not be charged for this service.\"    Patient has given verbal consent for Video visit? Yes    How would you like to obtain your AVS? [facility]    Patient would like the video invitation sent by: Text to cell phone: [phone]    Will anyone else be joining your video visit? No    Subjective     Kaylee Lopez is a 50 year old female who presents today via video visit for the following health issues:    HPI  Diabetes Follow-up      How often are you checking your blood sugar? Not at all- months     What concerns do you have today about your diabetes? None     Do you have any of these symptoms? (Select all that apply)  No numbness or tingling in feet.  No redness, sores or blisters on feet.  No complaints of excessive thirst.  No reports of blurry vision.  No significant changes to weight.        Hyperlipidemia Follow-Up      Are you regularly taking any medication or supplement to lower your cholesterol?   Yes- atorvastatin     Are you having muscle aches or other side effects that you think could be caused by your cholesterol " lowering medication?  No    Hypertension Follow-up      Do you check your blood pressure regularly outside of the clinic? No     Are you following a low salt diet? Yes    Are your blood pressures ever more than 140 on the top number (systolic) OR more   than 90 on the bottom number (diastolic), for example 140/90? Not taking blood pressures    BP Readings from Last 2 Encounters:   01/24/20 (!) 140/89   11/26/19 118/70     Hemoglobin A1C (%)   Date Value   11/26/2019 6.4 (H)   04/05/2019 7.2 (H)     LDL Cholesterol Calculated (mg/dL)   Date Value   04/05/2019 56   03/23/2018 46         How many servings of fruits and vegetables do you eat daily?  2-3    On average, how many sweetened beverages do you drink each day (Examples: soda, juice, sweet tea, etc.  Do NOT count diet or artificially sweetened beverages)?   2    How many days per week do you exercise enough to make your heart beat faster? 0    How many minutes a day do you exercise enough to make your heart beat faster? none    How many days per week do you miss taking your medication? 0    See under ROS     Patient Active Problem List   Diagnosis     Fibromyalgia     Non-rheumatic tricuspid valve insufficiency     Papanicolaou smear of cervix with atypical squamous cells cannot exclude high grade squamous intraepithelial lesion (ASC-H)     Major depression in complete remission (H)     Narcolepsy     Hyperlipidemia LDL goal <100     Health Care Home     Reflex sympathetic dystrophy     Contraception     Elevated BMI     Chronic pain-Fibromyalgia     Type 2 diabetes mellitus without complication, without long-term current use of insulin (H)     Migraine with aura and without status migrainosus, not intractable     Elevated blood pressure reading without diagnosis of hypertension     Pulmonary hypertension (H)     Unexplained endometrial cells on cervical Pap smear     Thrombocytosis (H)     Anxiety     Post-op pain     Chronic, continuous use of opioids        Current Outpatient Medications   Medication Sig Dispense Refill     atorvastatin (LIPITOR) 10 MG tablet Take 1 tablet (10 mg) by mouth daily 90 tablet 3     blood glucose (NO BRAND SPECIFIED) test strip Use to test blood sugar 1 times daily or as directed. 100 strip 4     blood glucose monitoring (NO BRAND SPECIFIED) meter device kit Use to test blood sugar 1 times daily or as directed. Please give her all needed supplies with prn refills. 1 kit 0     busPIRone (BUSPAR) 15 MG tablet Take 1 tablet (15 mg) by mouth 2 times daily 180 tablet 0     cetirizine (ZYRTEC) 10 MG tablet Take 10 mg by mouth daily as needed for allergies       citalopram (CELEXA) 40 MG tablet TAKE 1 TABLET BY MOUTH EVERY DAY 90 tablet 0     cyclobenzaprine (FLEXERIL) 10 MG tablet TAKE 1 TABLET BY MOUTH THREE TIMES A DAY AS NEEDED FOR MUSCLE SPASMS 90 tablet 3     ibuprofen (ADVIL/MOTRIN) 800 MG tablet Take 1 tablet (800 mg) by mouth every 8 hours as needed for moderate pain 90 tablet 1     insulin pen needle (31G X 5 MM) 31G X 5 MM miscellaneous Use 1 pen needles daily or as directed. Please use brand per insurance. 100 each 3     liraglutide (VICTOZA PEN) 18 MG/3ML solution Inject 1.8 mg Subcutaneous daily 18 mL 4     lisinopril (PRINIVIL/ZESTRIL) 5 MG tablet Take 1 tablet (5 mg) by mouth daily 90 tablet 3     metFORMIN (GLUCOPHAGE-XR) 500 MG 24 hr tablet TAKE 4 TABLETS BY MOUTH DAILY (WITH BREAKFAST) 360 tablet 0     traMADol (ULTRAM) 50 MG tablet TAKE 1-2 TABS BY MOUTH EVERY 8HRS AS NEEDED FOR MODERATE PAIN MAX 8 TABS/24 HOURS, MAX 60 TBS/MONTH 60 tablet 0     traZODone (DESYREL) 50 MG tablet Take 2 tablets (100 mg) by mouth nightly as needed for sleep (insomnia) 180 tablet 0     vitamin B-12 (CYANOCOBALAMIN) 1000 MCG tablet TAKE 1 TABLET BY MOUTH DAILY 90 tablet 3     albuterol (PROAIR HFA/PROVENTIL HFA/VENTOLIN HFA) 108 (90 Base) MCG/ACT inhaler Inhale 2 puffs into the lungs every 6 hours as needed for shortness of breath / dyspnea or  "wheezing 1 Inhaler 0          Video Start Time: 4:41 PM         Reviewed and updated as needed this visit by Provider         Review of Systems   CONSTITUTIONAL:NEGATIVE for fever, chills, change in weight x some gain. Less active.   Is going to work on walking.   RESP:NEGATIVE for significant cough or SOB  CV: NEGATIVE for chest pain, palpitations or peripheral edema  PSYCHIATRIC: NEGATIVE for changes in mood or affect  Using head space meditation lizandro; this has helped her stress. buspar has been helpful. Has been able to get self out of panic; back to normal.  Working from home time since March.     Has not been checking sugars.   Notes the accucheck pen is harder to use with her arthritis.   Not feeling like things are high or low. No excessive thirst; no dizziness.       Objective    There were no vitals taken for this visit.  Estimated body mass index is 40.09 kg/m  as calculated from the following:    Height as of 11/26/19: 1.638 m (5' 4.5\").    Weight as of 1/24/20: 107.6 kg (237 lb 3.2 oz).     BP WT  CHOL Latest Ref Rng & Units 11/26/2019 1/24/2020   BP  118/70 140/89     Physical Exam       GENERAL: alert and no distress  EYES: Eyes grossly normal to inspection.  No discharge or erythema, or obvious scleral/conjunctival abnormalities.  RESP: No audible wheeze, cough, or visible cyanosis.  No visible retractions or increased work of breathing.    SKIN: Visible skin clear. No significant rash, abnormal pigmentation or lesions.  NEURO: Cranial nerves grossly intact.  Mentation and speech appropriate for age.  PSYCH: Mentation appears normal, affect normal/bright, judgement and insight intact, normal speech and appearance well-groomed.    PHQ-9 SCORE 11/26/2019 3/17/2020 6/9/2020   PHQ-9 Total Score - - -   PHQ-9 Total Score MyChart - 10 (Moderate depression) -   PHQ-9 Total Score 0 10 0       MICHAEL-7 SCORE 11/26/2019 3/17/2020 6/9/2020   Total Score - - -   Total Score - 14 (moderate anxiety) -   Total Score 0 " 14 0           Lab Results   Component Value Date    A1C 6.4 11/26/2019    A1C 7.2 04/05/2019    A1C 6.4 10/11/2018    A1C 5.8 04/23/2018    A1C 5.6 09/18/2017       Recent Labs   Lab Test 04/05/19  1016 03/23/18  0831  04/28/15  0758 04/28/14  0813   CHOL 148 127   < > 166 151   HDL 57 61   < > 56 42*   LDL 56 46   < > 82 81   TRIG 174* 100   < > 142 139   CHOLHDLRATIO  --   --   --  3.0 3.6    < > = values in this interval not displayed.     GFR Estimate   Date Value Ref Range Status   09/04/2019 67 >60 mL/min/[1.73_m2] Final   05/24/2019 90 >60 mL/min/[1.73_m2] Final     Comment:     Non  GFR Calc  Starting 12/18/2018, serum creatinine based estimated GFR (eGFR) will be   calculated using the Chronic Kidney Disease Epidemiology Collaboration   (CKD-EPI) equation.     05/07/2019 84 >60 mL/min/[1.73_m2] Final     Comment:     Non  GFR Calc  Starting 12/18/2018, serum creatinine based estimated GFR (eGFR) will be   calculated using the Chronic Kidney Disease Epidemiology Collaboration   (CKD-EPI) equation.                     Assessment & Plan     1. Type 2 diabetes mellitus without complication, without long-term current use of insulin (H)  Has been under control in the past, but not checked recently. She is going to ask insurance about coverage related to self monitoring supplies.  Orders are there for HgbA1C and other labs; she will come in soon for this.    2. Morbid obesity (H)  Encourage getting some exercise. She notes she has saved time related to commute; encourage her to use some of this.     3. Fibromyalgia  She notes this has been more difficult to control than her arthritis. Will use some tramadol when things really flare, but overall using tylenol.     4. Hyperlipidemia LDL goal <100  She will come fasting for lab.    5. Major depression in complete remission (H)  Reviewed her scores; they are much improved from early March. She feels this too. No change in medication.  "    6. Anxiety  As above.        BMI:   Estimated body mass index is 40.09 kg/m  as calculated from the following:    Height as of 11/26/19: 1.638 m (5' 4.5\").    Weight as of 1/24/20: 107.6 kg (237 lb 3.2 oz).   Weight management plan: Discussed healthy diet and exercise guidelines            Return in about 1 week (around 6/16/2020) for Lab Work - fasting and nurse only bp check.    Irene Castellanos MD, MD  [facility] [employer]      Video-Visit Details    Type of service:  Video Visit    Video End Time:4:53 PM    Originating Location (pt. Location): Home    Distant Location (provider location):  [facility] [employer]     Platform used for Video Visit: Phong    No follow-ups on file.   Follow up again in 6 months if lab and bp OK; otherwise earlier depending on results.    Irene Castellanos MD, MD      "

## 2020-06-10 ASSESSMENT — ANXIETY QUESTIONNAIRES: GAD7 TOTAL SCORE: 0

## 2020-06-24 DIAGNOSIS — M79.7 FIBROMYALGIA: ICD-10-CM

## 2020-06-24 DIAGNOSIS — F11.90 CHRONIC, CONTINUOUS USE OF OPIOIDS: ICD-10-CM

## 2020-06-24 DIAGNOSIS — G47.00 INSOMNIA, UNSPECIFIED TYPE: ICD-10-CM

## 2020-06-24 RX ORDER — TRAZODONE HYDROCHLORIDE 50 MG/1
100 TABLET, FILM COATED ORAL
Qty: 180 TABLET | Refills: 1 | Status: SHIPPED | OUTPATIENT
Start: 2020-06-24 | End: 2020-12-21

## 2020-06-24 NOTE — TELEPHONE ENCOUNTER
Routing refill request to provider for review/approval because:  Drug interaction warning w/   Citalopram/trazodone  Tramadol/trazodone    Mabel SILVA RN

## 2020-06-25 RX ORDER — TRAMADOL HYDROCHLORIDE 50 MG/1
TABLET ORAL
Qty: 60 TABLET | Refills: 0 | Status: SHIPPED | OUTPATIENT
Start: 2020-06-25 | End: 2020-11-21

## 2020-06-25 NOTE — TELEPHONE ENCOUNTER
Tramadol 50 mg    Last Written Prescription Date:  4/22/2020  Last Fill Quantity: 60,  # refills: 0   Last office visit: 11/26/2019 with prescribing provider:      MAHAMED visit 6/9/2020    Future Office Visit:      SHERRELL SKINNER No concerns.    Belkys Calvin RN

## 2020-08-26 DIAGNOSIS — E11.9 TYPE 2 DIABETES MELLITUS WITHOUT COMPLICATION, WITHOUT LONG-TERM CURRENT USE OF INSULIN (H): ICD-10-CM

## 2020-08-26 RX ORDER — METFORMIN HCL 500 MG
TABLET, EXTENDED RELEASE 24 HR ORAL
Qty: 120 TABLET | Refills: 0 | Status: SHIPPED | OUTPATIENT
Start: 2020-08-26 | End: 2020-09-23

## 2020-08-26 NOTE — TELEPHONE ENCOUNTER
Called and scheduled pt for labs.     9/9 RM lab     Medication is being filled for 1 time refill only due to:  Patient needs labs scheduled.     Filled Metformin    Mabel SILVA RN

## 2020-09-04 ENCOUNTER — MYC REFILL (OUTPATIENT)
Dept: FAMILY MEDICINE | Facility: CLINIC | Age: 50
End: 2020-09-04

## 2020-09-04 DIAGNOSIS — F32.5 MAJOR DEPRESSION IN COMPLETE REMISSION (H): ICD-10-CM

## 2020-09-04 RX ORDER — CITALOPRAM HYDROBROMIDE 40 MG/1
40 TABLET ORAL DAILY
Qty: 90 TABLET | Refills: 1 | OUTPATIENT
Start: 2020-09-04

## 2020-09-09 DIAGNOSIS — E11.9 TYPE 2 DIABETES MELLITUS WITHOUT COMPLICATION, WITHOUT LONG-TERM CURRENT USE OF INSULIN (H): ICD-10-CM

## 2020-09-09 DIAGNOSIS — E78.5 HYPERLIPIDEMIA LDL GOAL <100: ICD-10-CM

## 2020-09-09 LAB
ALBUMIN SERPL-MCNC: 3.5 G/DL (ref 3.4–5)
ALP SERPL-CCNC: 146 U/L (ref 40–150)
ALT SERPL W P-5'-P-CCNC: 20 U/L (ref 0–50)
ANION GAP SERPL CALCULATED.3IONS-SCNC: 8 MMOL/L (ref 3–14)
AST SERPL W P-5'-P-CCNC: 12 U/L (ref 0–45)
BILIRUB SERPL-MCNC: 0.5 MG/DL (ref 0.2–1.3)
BUN SERPL-MCNC: 11 MG/DL (ref 7–30)
CALCIUM SERPL-MCNC: 9 MG/DL (ref 8.5–10.1)
CHLORIDE SERPL-SCNC: 102 MMOL/L (ref 94–109)
CHOLEST SERPL-MCNC: 123 MG/DL
CO2 SERPL-SCNC: 26 MMOL/L (ref 20–32)
CREAT SERPL-MCNC: 0.95 MG/DL (ref 0.52–1.04)
GFR SERPL CREATININE-BSD FRML MDRD: 71 ML/MIN/{1.73_M2}
GLUCOSE SERPL-MCNC: 169 MG/DL (ref 70–99)
HBA1C MFR BLD: 6.7 % (ref 0–5.6)
HDLC SERPL-MCNC: 50 MG/DL
LDLC SERPL CALC-MCNC: 48 MG/DL
NONHDLC SERPL-MCNC: 73 MG/DL
POTASSIUM SERPL-SCNC: 4 MMOL/L (ref 3.4–5.3)
PROT SERPL-MCNC: 7.2 G/DL (ref 6.8–8.8)
SODIUM SERPL-SCNC: 136 MMOL/L (ref 133–144)
TRIGL SERPL-MCNC: 126 MG/DL

## 2020-09-09 PROCEDURE — 80053 COMPREHEN METABOLIC PANEL: CPT | Performed by: FAMILY MEDICINE

## 2020-09-09 PROCEDURE — 36415 COLL VENOUS BLD VENIPUNCTURE: CPT | Performed by: FAMILY MEDICINE

## 2020-09-09 PROCEDURE — 80061 LIPID PANEL: CPT | Performed by: FAMILY MEDICINE

## 2020-09-09 PROCEDURE — 83036 HEMOGLOBIN GLYCOSYLATED A1C: CPT | Performed by: FAMILY MEDICINE

## 2020-09-22 DIAGNOSIS — E11.9 TYPE 2 DIABETES MELLITUS WITHOUT COMPLICATION, WITHOUT LONG-TERM CURRENT USE OF INSULIN (H): ICD-10-CM

## 2020-09-22 NOTE — TELEPHONE ENCOUNTER
Information relayed to Pt,  she will schedule online or later.     Moriah Godfrey on 9/22/2020 at 5:26 PM

## 2020-09-22 NOTE — TELEPHONE ENCOUNTER
LMTCB- Pt due for appointment. Message sent to pt on 9/11/2020 that Dr. Castellanos would like to see her.     Called to help schedule.     Mabel SILVA RN

## 2020-09-23 RX ORDER — METFORMIN HCL 500 MG
TABLET, EXTENDED RELEASE 24 HR ORAL
Qty: 120 TABLET | Refills: 0 | Status: SHIPPED | OUTPATIENT
Start: 2020-09-23 | End: 2020-10-20

## 2020-11-12 ENCOUNTER — HOSPITAL ENCOUNTER (OUTPATIENT)
Dept: MAMMOGRAPHY | Facility: CLINIC | Age: 50
Discharge: HOME | End: 2020-11-12
Attending: FAMILY MEDICINE | Admitting: FAMILY MEDICINE
Payer: COMMERCIAL

## 2020-11-12 DIAGNOSIS — Z12.31 VISIT FOR SCREENING MAMMOGRAM: ICD-10-CM

## 2020-11-12 PROCEDURE — 77063 BREAST TOMOSYNTHESIS BI: CPT

## 2020-11-21 ENCOUNTER — MYC REFILL (OUTPATIENT)
Dept: CARDIOLOGY | Facility: CLINIC | Age: 50
End: 2020-11-21

## 2020-11-21 DIAGNOSIS — I10 HYPERTENSION GOAL BP (BLOOD PRESSURE) < 130/80: ICD-10-CM

## 2020-11-21 DIAGNOSIS — F11.90 CHRONIC, CONTINUOUS USE OF OPIOIDS: ICD-10-CM

## 2020-11-21 DIAGNOSIS — M79.7 FIBROMYALGIA: ICD-10-CM

## 2020-11-23 RX ORDER — LISINOPRIL 5 MG/1
5 TABLET ORAL DAILY
Qty: 90 TABLET | Refills: 0 | Status: SHIPPED | OUTPATIENT
Start: 2020-11-23 | End: 2020-12-29

## 2020-11-23 RX ORDER — TRAMADOL HYDROCHLORIDE 50 MG/1
TABLET ORAL
Qty: 60 TABLET | Refills: 0 | Status: SHIPPED | OUTPATIENT
Start: 2020-11-23 | End: 2021-01-31

## 2020-11-23 NOTE — TELEPHONE ENCOUNTER
Ultram  LRF 6/25/2020, disp 60   Virtual visit 6/9/2020    RX [place]:     Last fill dates:    6/25/2020, 4/22/2020 - both disp 60    [place]  [email or web address]    Routing refill request to provider for review/approval because:  Failed protocol for lisinopril - bp above parameters - was to come in for bp this summer, did not, now has appt scheduled 12/29/2020 and ultram is not on the protocol

## 2020-12-17 DIAGNOSIS — E11.9 TYPE 2 DIABETES MELLITUS WITHOUT COMPLICATION, WITHOUT LONG-TERM CURRENT USE OF INSULIN (H): ICD-10-CM

## 2020-12-20 DIAGNOSIS — F41.9 ANXIETY: ICD-10-CM

## 2020-12-20 DIAGNOSIS — G47.00 INSOMNIA, UNSPECIFIED TYPE: ICD-10-CM

## 2020-12-20 DIAGNOSIS — M79.7 FIBROMYALGIA: ICD-10-CM

## 2020-12-21 RX ORDER — METFORMIN HCL 500 MG
TABLET, EXTENDED RELEASE 24 HR ORAL
Qty: 120 TABLET | Refills: 0 | Status: SHIPPED | OUTPATIENT
Start: 2020-12-21 | End: 2020-12-29

## 2020-12-21 RX ORDER — TRAZODONE HYDROCHLORIDE 50 MG/1
100 TABLET, FILM COATED ORAL
Qty: 180 TABLET | Refills: 0 | Status: SHIPPED | OUTPATIENT
Start: 2020-12-21 | End: 2021-03-15

## 2020-12-21 RX ORDER — BUSPIRONE HYDROCHLORIDE 15 MG/1
TABLET ORAL
Qty: 180 TABLET | Refills: 0 | Status: SHIPPED | OUTPATIENT
Start: 2020-12-21 | End: 2020-12-29

## 2020-12-21 NOTE — TELEPHONE ENCOUNTER
Routing refill request to provider for review/approval because:  Drug interaction warning    Patient has appointment next week.     Jaja Mcgraw RN Flex

## 2020-12-21 NOTE — TELEPHONE ENCOUNTER
Prescription approved per [facility] Refill Protocol.  Erica Callahan RN

## 2020-12-27 DIAGNOSIS — E11.9 TYPE 2 DIABETES MELLITUS WITHOUT COMPLICATION, WITHOUT LONG-TERM CURRENT USE OF INSULIN (H): ICD-10-CM

## 2020-12-28 RX ORDER — LIRAGLUTIDE 6 MG/ML
INJECTION SUBCUTANEOUS
Qty: 18 ML | Refills: 0 | Status: SHIPPED | OUTPATIENT
Start: 2020-12-28 | End: 2020-12-29

## 2020-12-28 NOTE — TELEPHONE ENCOUNTER
Prescription approved per [facility] Refill Protocol.  Erica Callahan RN

## 2020-12-28 NOTE — PROGRESS NOTES
Subjective     Kaylee Shonna Lopez is a 50 year old female who presents to clinic today for the following health issues:    HPI         Diabetes Follow-up    How often are you checking your blood sugar? A few times a month  What time of day are you checking your blood sugars (select all that apply)?  Before and after meals  Have you had any blood sugars above 200?  Yes 201-203  Have you had any blood sugars below 70?  No    What symptoms do you notice when your blood sugar is low?  None    What concerns do you have today about your diabetes? None     Do you have any of these symptoms? (Select all that apply)  No numbness or tingling in feet.  No redness, sores or blisters on feet.  No complaints of excessive thirst.  No reports of blurry vision.  No significant changes to weight.    Have you had a diabetic eye exam in the last 12 months? No          Hyperlipidemia Follow-Up      Are you regularly taking any medication or supplement to lower your cholesterol?   Yes- atorvastatin     Are you having muscle aches or other side effects that you think could be caused by your cholesterol lowering medication?  No    Hypertension Follow-up      Do you check your blood pressure regularly outside of the clinic? No     Are you following a low salt diet? Yes    Are your blood pressures ever more than 140 on the top number (systolic) OR more   than 90 on the bottom number (diastolic), for example 140/90? Not taking     BP Readings from Last 2 Encounters:   01/24/20 (!) 140/89   11/26/19 118/70     Hemoglobin A1C (%)   Date Value   09/09/2020 6.7 (H)   11/26/2019 6.4 (H)     LDL Cholesterol Calculated (mg/dL)   Date Value   09/09/2020 48   04/05/2019 56         How many servings of fruits and vegetables do you eat daily?  2-3    On average, how many sweetened beverages do you drink each day (Examples: soda, juice, sweet tea, etc.  Do NOT count diet or artificially sweetened beverages)?   1    How many days per week do you  exercise enough to make your heart beat faster? 3 or less    How many minutes a day do you exercise enough to make your heart beat faster? 10 - 19    How many days per week do you miss taking your medication? 0    See under ROS     Patient Active Problem List   Diagnosis     Fibromyalgia     Non-rheumatic tricuspid valve insufficiency     Papanicolaou smear of cervix with atypical squamous cells cannot exclude high grade squamous intraepithelial lesion (ASC-H)     Major depression in complete remission (H)     Narcolepsy     Hyperlipidemia LDL goal <100     Health Care Home     Reflex sympathetic dystrophy     Contraception     Elevated BMI     Chronic pain-Fibromyalgia     Type 2 diabetes mellitus without complication, without long-term current use of insulin (H)     Migraine with aura and without status migrainosus, not intractable     Elevated blood pressure reading without diagnosis of hypertension     Pulmonary hypertension (H)     Unexplained endometrial cells on cervical Pap smear     Thrombocytosis (H)     Anxiety     Post-op pain     Chronic, continuous use of opioids       Current Outpatient Medications   Medication Sig Dispense Refill     albuterol (PROAIR HFA/PROVENTIL HFA/VENTOLIN HFA) 108 (90 Base) MCG/ACT inhaler Inhale 2 puffs into the lungs every 6 hours as needed for shortness of breath / dyspnea or wheezing 1 Inhaler 0     atorvastatin (LIPITOR) 10 MG tablet Take 1 tablet (10 mg) by mouth daily 90 tablet 3     blood glucose (NO BRAND SPECIFIED) test strip Use to test blood sugar 1 times daily or as directed. 100 strip 4     blood glucose monitoring (NO BRAND SPECIFIED) meter device kit Use to test blood sugar 1 times daily or as directed. Please give her all needed supplies with prn refills. 1 kit 0     busPIRone (BUSPAR) 15 MG tablet TAKE 1 TABLET BY MOUTH TWICE A  tablet 0     cetirizine (ZYRTEC) 10 MG tablet Take 10 mg by mouth daily as needed for allergies       citalopram (CELEXA) 40  "MG tablet Take 1 tablet (40 mg) by mouth daily 90 tablet 1     cyclobenzaprine (FLEXERIL) 10 MG tablet TAKE 1 TABLET BY MOUTH THREE TIMES A DAY AS NEEDED FOR MUSCLE SPASMS 90 tablet 3     ibuprofen (ADVIL/MOTRIN) 800 MG tablet Take 1 tablet (800 mg) by mouth every 8 hours as needed for moderate pain 90 tablet 1     insulin pen needle (31G X 5 MM) 31G X 5 MM miscellaneous Use 1 pen needles daily or as directed. Please use brand per insurance. 100 each 3     lisinopril (ZESTRIL) 5 MG tablet Take 1 tablet (5 mg) by mouth daily 90 tablet 0     metFORMIN (GLUCOPHAGE-XR) 500 MG 24 hr tablet TAKE 4 TABLETS BY MOUTH DAILY (WITH BREAKFAST) 120 tablet 0     traMADol (ULTRAM) 50 MG tablet TAKE 1-2 TABS BY MOUTH EVERY 8HRS AS NEEDED FOR MODERATE PAIN MAX 8 TABS/24 HOURS, MAX 60 TBS/MONTH 60 tablet 0     traZODone (DESYREL) 50 MG tablet TAKE 2 TABLETS (100 MG) BY MOUTH NIGHTLY AS NEEDED FOR SLEEP (INSOMNIA) 180 tablet 0     VICTOZA PEN 18 MG/3ML soln INJECT 1.8 MG UNDER THE SKIN ONCE DAILY 18 mL 0     vitamin B-12 (CYANOCOBALAMIN) 1000 MCG tablet TAKE 1 TABLET BY MOUTH DAILY 90 tablet 3         Review of Systems   CONSTITUTIONAL:NEGATIVE for fever, chills, change in weight  RESP:NEGATIVE for significant cough or SOB  CV: NEGATIVE for chest pain, palpitations or peripheral edema  MUSCULOSKELETAL: ongoing pain with her fibromyalgia.  PSYCHIATRIC: NEGATIVE for changes in mood or affect    Joined [employer] and lost weight. Has gained some back; not tracking over the holidays.  Will get back on track.     Pain with fibro limits exercise.    Glucoses will vary.   Currently a little higher, into the low 200's.       Objective    /80 (BP Location: Right arm, Cuff Size: Adult Large)   Pulse 90   Temp 98.3  F (36.8  C) (Oral)   Resp 16   Ht 1.638 m (5' 4.5\")   Wt 112.5 kg (248 lb)   SpO2 98%   BMI 41.91 kg/m    Body mass index is 41.91 kg/m .  Physical Exam   GENERAL APPEARANCE: healthy, alert and no distress  RESP: lungs clear to " auscultation - no rales, rhonchi or wheezes  CV: regular rates and rhythm  MS: no edema.   PSYCH: mentation appears normal and affect normal/bright    DP pulse present bilaterally.  No sores or ulcerations.  Little callous.  Monofilament exam normal.    PHQ-9 SCORE 3/17/2020 6/9/2020 12/29/2020   PHQ-9 Total Score - - -   PHQ-9 Total Score MyChart 10 (Moderate depression) - -   PHQ-9 Total Score 10 0 0       MICHAEL-7 SCORE 3/17/2020 6/9/2020 12/29/2020   Total Score - - -   Total Score 14 (moderate anxiety) - -   Total Score 14 0 3           Lab Results   Component Value Date    A1C 6.6 12/29/2020    A1C 6.7 09/09/2020    A1C 6.4 11/26/2019    A1C 7.2 04/05/2019    A1C 6.4 10/11/2018     Recent Labs   Lab Test 09/09/20  0815 04/05/19  1016 04/28/15  0758 04/28/15  0758 04/28/14  0813   CHOL 123 148   < > 166 151   HDL 50 57   < > 56 42*   LDL 48 56   < > 82 81   TRIG 126 174*   < > 142 139   CHOLHDLRATIO  --   --   --  3.0 3.6    < > = values in this interval not displayed.       GFR Estimate   Date Value Ref Range Status   09/09/2020 71 >60 mL/min/[1.73_m2] Final     Comment:     Non  GFR Calc  Starting 12/18/2018, serum creatinine based estimated GFR (eGFR) will be   calculated using the Chronic Kidney Disease Epidemiology Collaboration   (CKD-EPI) equation.     09/04/2019 67 >60 mL/min/[1.73_m2] Final   05/24/2019 90 >60 mL/min/[1.73_m2] Final     Comment:     Non  GFR Calc  Starting 12/18/2018, serum creatinine based estimated GFR (eGFR) will be   calculated using the Chronic Kidney Disease Epidemiology Collaboration   (CKD-EPI) equation.                 Assessment & Plan     Type 2 diabetes mellitus without complication, without long-term current use of insulin (H)  Satisfactory control. Continue current treatment. Encourage lifestyle.   - Albumin Random Urine Quantitative with Creat Ratio  - Hemoglobin A1c  - FOOT EXAM  - metFORMIN (GLUCOPHAGE-XR) 500 MG 24 hr tablet; TAKE 4  "TABLETS BY MOUTH DAILY (WITH BREAKFAST)  - liraglutide (VICTOZA PEN) 18 MG/3ML solution; INJECT 1.8 MG UNDER THE SKIN ONCE DAILY    Elevated BMI  She has lost some weight successfully with WW. She is planning to continue.     Major depression in complete remission (H)  Doing well. Reviewed scores which reflect this as well. No change in treatment.   - citalopram (CELEXA) 40 MG tablet; Take 1 tablet (40 mg) by mouth daily    Anxiety  As above.   - busPIRone (BUSPAR) 15 MG tablet; TAKE 1 TABLET BY MOUTH TWICE A DAY    Hypertension goal BP (blood pressure) < 130/80  Controlled.  - lisinopril (ZESTRIL) 5 MG tablet; Take 1 tablet (5 mg) by mouth daily    Hyperlipidemia LDL goal <100  Has been stable; continue current medication.   - atorvastatin (LIPITOR) 10 MG tablet; Take 1 tablet (10 mg) by mouth daily    Encounter for hepatitis C screening test for low risk patient    - Hepatitis C Screen Reflex to HCV RNA Quant and Genotype     BMI:   Estimated body mass index is 41.91 kg/m  as calculated from the following:    Height as of this encounter: 1.638 m (5' 4.5\").    Weight as of this encounter: 112.5 kg (248 lb).   Weight management plan: Specific weight management program called WW discussed           Return in about 6 months (around 6/29/2021) for Diabetes Recheck.    Irene Castellanos MD, MD  [facility]      "

## 2020-12-29 ENCOUNTER — OFFICE VISIT (OUTPATIENT)
Dept: FAMILY MEDICINE | Facility: CLINIC | Age: 50
End: 2020-12-29
Payer: COMMERCIAL

## 2020-12-29 VITALS
SYSTOLIC BLOOD PRESSURE: 128 MMHG | TEMPERATURE: 98.3 F | BODY MASS INDEX: 41.32 KG/M2 | OXYGEN SATURATION: 98 % | HEART RATE: 90 BPM | WEIGHT: 248 LBS | RESPIRATION RATE: 16 BRPM | DIASTOLIC BLOOD PRESSURE: 80 MMHG | HEIGHT: 65 IN

## 2020-12-29 DIAGNOSIS — F32.5 MAJOR DEPRESSION IN COMPLETE REMISSION (H): ICD-10-CM

## 2020-12-29 DIAGNOSIS — I10 HYPERTENSION GOAL BP (BLOOD PRESSURE) < 130/80: ICD-10-CM

## 2020-12-29 DIAGNOSIS — E11.9 TYPE 2 DIABETES MELLITUS WITHOUT COMPLICATION, WITHOUT LONG-TERM CURRENT USE OF INSULIN (H): Primary | ICD-10-CM

## 2020-12-29 DIAGNOSIS — F41.9 ANXIETY: ICD-10-CM

## 2020-12-29 DIAGNOSIS — E78.5 HYPERLIPIDEMIA LDL GOAL <100: ICD-10-CM

## 2020-12-29 DIAGNOSIS — Z11.59 ENCOUNTER FOR HEPATITIS C SCREENING TEST FOR LOW RISK PATIENT: ICD-10-CM

## 2020-12-29 DIAGNOSIS — E66.01 MORBID OBESITY (H): ICD-10-CM

## 2020-12-29 LAB
CREAT UR-MCNC: 157 MG/DL
HBA1C MFR BLD: 6.6 % (ref 0–5.6)
MICROALBUMIN UR-MCNC: 6 MG/L
MICROALBUMIN/CREAT UR: 4.04 MG/G CR (ref 0–25)

## 2020-12-29 PROCEDURE — 96127 BRIEF EMOTIONAL/BEHAV ASSMT: CPT | Performed by: FAMILY MEDICINE

## 2020-12-29 PROCEDURE — 83036 HEMOGLOBIN GLYCOSYLATED A1C: CPT | Performed by: FAMILY MEDICINE

## 2020-12-29 PROCEDURE — 99214 OFFICE O/P EST MOD 30 MIN: CPT | Performed by: FAMILY MEDICINE

## 2020-12-29 PROCEDURE — 36415 COLL VENOUS BLD VENIPUNCTURE: CPT | Performed by: FAMILY MEDICINE

## 2020-12-29 PROCEDURE — 82043 UR ALBUMIN QUANTITATIVE: CPT | Performed by: FAMILY MEDICINE

## 2020-12-29 PROCEDURE — 99207 PR FOOT EXAM NO CHARGE: CPT | Mod: 25 | Performed by: FAMILY MEDICINE

## 2020-12-29 PROCEDURE — 86803 HEPATITIS C AB TEST: CPT | Performed by: FAMILY MEDICINE

## 2020-12-29 RX ORDER — CITALOPRAM HYDROBROMIDE 40 MG/1
40 TABLET ORAL DAILY
Qty: 90 TABLET | Refills: 1 | Status: SHIPPED | OUTPATIENT
Start: 2020-12-29 | End: 2021-06-18

## 2020-12-29 RX ORDER — BUSPIRONE HYDROCHLORIDE 15 MG/1
TABLET ORAL
Qty: 180 TABLET | Refills: 1 | Status: SHIPPED | OUTPATIENT
Start: 2020-12-29 | End: 2021-06-18

## 2020-12-29 RX ORDER — LISINOPRIL 5 MG/1
5 TABLET ORAL DAILY
Qty: 90 TABLET | Refills: 0 | Status: SHIPPED | OUTPATIENT
Start: 2020-12-29 | End: 2021-04-26

## 2020-12-29 RX ORDER — METFORMIN HCL 500 MG
TABLET, EXTENDED RELEASE 24 HR ORAL
Qty: 120 TABLET | Refills: 1 | Status: SHIPPED | OUTPATIENT
Start: 2020-12-29 | End: 2021-01-20

## 2020-12-29 RX ORDER — ATORVASTATIN CALCIUM 10 MG/1
10 TABLET, FILM COATED ORAL DAILY
Qty: 90 TABLET | Refills: 2 | Status: SHIPPED | OUTPATIENT
Start: 2020-12-29 | End: 2021-09-22

## 2020-12-29 RX ORDER — LIRAGLUTIDE 6 MG/ML
INJECTION SUBCUTANEOUS
Qty: 18 ML | Refills: 0 | Status: SHIPPED | OUTPATIENT
Start: 2020-12-29 | End: 2021-03-31

## 2020-12-29 ASSESSMENT — PATIENT HEALTH QUESTIONNAIRE - PHQ9
SUM OF ALL RESPONSES TO PHQ QUESTIONS 1-9: 0
5. POOR APPETITE OR OVEREATING: NOT AT ALL

## 2020-12-29 ASSESSMENT — ANXIETY QUESTIONNAIRES
GAD7 TOTAL SCORE: 3
1. FEELING NERVOUS, ANXIOUS, OR ON EDGE: SEVERAL DAYS
IF YOU CHECKED OFF ANY PROBLEMS ON THIS QUESTIONNAIRE, HOW DIFFICULT HAVE THESE PROBLEMS MADE IT FOR YOU TO DO YOUR WORK, TAKE CARE OF THINGS AT HOME, OR GET ALONG WITH OTHER PEOPLE: SOMEWHAT DIFFICULT
6. BECOMING EASILY ANNOYED OR IRRITABLE: SEVERAL DAYS
5. BEING SO RESTLESS THAT IT IS HARD TO SIT STILL: NOT AT ALL
7. FEELING AFRAID AS IF SOMETHING AWFUL MIGHT HAPPEN: SEVERAL DAYS
2. NOT BEING ABLE TO STOP OR CONTROL WORRYING: NOT AT ALL
3. WORRYING TOO MUCH ABOUT DIFFERENT THINGS: NOT AT ALL

## 2020-12-29 ASSESSMENT — MIFFLIN-ST. JEOR: SCORE: 1747.86

## 2020-12-30 LAB — HCV AB SERPL QL IA: NONREACTIVE

## 2020-12-30 ASSESSMENT — ANXIETY QUESTIONNAIRES: GAD7 TOTAL SCORE: 3

## 2021-01-15 ENCOUNTER — HEALTH MAINTENANCE LETTER (OUTPATIENT)
Age: 51
End: 2021-01-15

## 2021-01-19 DIAGNOSIS — E11.9 TYPE 2 DIABETES MELLITUS WITHOUT COMPLICATION, WITHOUT LONG-TERM CURRENT USE OF INSULIN (H): ICD-10-CM

## 2021-01-20 RX ORDER — METFORMIN HCL 500 MG
TABLET, EXTENDED RELEASE 24 HR ORAL
Qty: 120 TABLET | Refills: 3 | Status: SHIPPED | OUTPATIENT
Start: 2021-01-20 | End: 2021-05-03

## 2021-01-30 DIAGNOSIS — M79.7 FIBROMYALGIA: ICD-10-CM

## 2021-01-30 DIAGNOSIS — F11.90 CHRONIC, CONTINUOUS USE OF OPIOIDS: ICD-10-CM

## 2021-01-31 ENCOUNTER — MYC REFILL (OUTPATIENT)
Dept: CARDIOLOGY | Facility: CLINIC | Age: 51
End: 2021-01-31

## 2021-01-31 DIAGNOSIS — M79.7 FIBROMYALGIA: ICD-10-CM

## 2021-01-31 DIAGNOSIS — F11.90 CHRONIC, CONTINUOUS USE OF OPIOIDS: ICD-10-CM

## 2021-02-01 RX ORDER — TRAMADOL HYDROCHLORIDE 50 MG/1
TABLET ORAL
Qty: 56 TABLET | Refills: 1 | OUTPATIENT
Start: 2021-02-01

## 2021-02-01 RX ORDER — TRAMADOL HYDROCHLORIDE 50 MG/1
TABLET ORAL
Qty: 60 TABLET | Refills: 0 | Status: SHIPPED | OUTPATIENT
Start: 2021-02-01 | End: 2021-03-31

## 2021-02-02 PROBLEM — F11.90 CHRONIC, CONTINUOUS USE OF OPIOIDS: Status: ACTIVE | Noted: 2019-09-17

## 2021-02-03 ENCOUNTER — MYC MEDICAL ADVICE (OUTPATIENT)
Dept: FAMILY MEDICINE | Facility: CLINIC | Age: 51
End: 2021-02-03

## 2021-02-03 DIAGNOSIS — M79.7 FIBROMYALGIA: ICD-10-CM

## 2021-02-03 RX ORDER — CYCLOBENZAPRINE HCL 10 MG
TABLET ORAL
Qty: 90 TABLET | Refills: 3 | Status: SHIPPED | OUTPATIENT
Start: 2021-02-03 | End: 2021-08-18

## 2021-02-03 NOTE — TELEPHONE ENCOUNTER
Cyclobenzaprine  LRF 12/5/19, disp 90 with 3 refills  LOV 12/29/2020    Routing refill request to provider for review/approval because:  Drug not on the [facility] refill protocol

## 2021-02-17 ENCOUNTER — MYC MEDICAL ADVICE (OUTPATIENT)
Dept: FAMILY MEDICINE | Facility: CLINIC | Age: 51
End: 2021-02-17

## 2021-02-17 NOTE — TELEPHONE ENCOUNTER
LOV 12/29/2020.    Last [place] paper work done 4/17/2019. Did you want telephone visit for this?    Belkys Calvin RN

## 2021-02-19 NOTE — TELEPHONE ENCOUNTER
Form faxed to [phone]. Patient advised via MyChart. Original placed in abstraction, copy placed in outgoing faxes.  -Kaitlyn Greco

## 2021-03-13 DIAGNOSIS — G47.00 INSOMNIA, UNSPECIFIED TYPE: ICD-10-CM

## 2021-03-13 DIAGNOSIS — M79.7 FIBROMYALGIA: ICD-10-CM

## 2021-03-15 RX ORDER — TRAZODONE HYDROCHLORIDE 50 MG/1
100 TABLET, FILM COATED ORAL
Qty: 180 TABLET | Refills: 1 | Status: SHIPPED | OUTPATIENT
Start: 2021-03-15 | End: 2021-06-18

## 2021-03-15 NOTE — TELEPHONE ENCOUNTER
Routing refill request to provider for review/approval because:  Failed protocol for trazodone due to warnings

## 2021-03-18 ENCOUNTER — IMMUNIZATION (OUTPATIENT)
Dept: NURSING | Facility: CLINIC | Age: 51
End: 2021-03-18
Payer: COMMERCIAL

## 2021-03-18 PROCEDURE — 91301 PR COVID VAC MODERNA 100 MCG/0.5 ML IM: CPT

## 2021-03-18 PROCEDURE — 0011A PR COVID VAC MODERNA 100 MCG/0.5 ML IM: CPT

## 2021-03-30 DIAGNOSIS — E11.9 TYPE 2 DIABETES MELLITUS WITHOUT COMPLICATION, WITHOUT LONG-TERM CURRENT USE OF INSULIN (H): ICD-10-CM

## 2021-03-30 DIAGNOSIS — Z79.4 TYPE 2 DIABETES MELLITUS WITHOUT COMPLICATION, WITH LONG-TERM CURRENT USE OF INSULIN (H): ICD-10-CM

## 2021-03-30 DIAGNOSIS — E11.9 TYPE 2 DIABETES MELLITUS WITHOUT COMPLICATION, WITH LONG-TERM CURRENT USE OF INSULIN (H): ICD-10-CM

## 2021-03-31 ENCOUNTER — MYC REFILL (OUTPATIENT)
Dept: CARDIOLOGY | Facility: CLINIC | Age: 51
End: 2021-03-31

## 2021-03-31 DIAGNOSIS — F11.90 CHRONIC, CONTINUOUS USE OF OPIOIDS: ICD-10-CM

## 2021-03-31 DIAGNOSIS — M79.7 FIBROMYALGIA: ICD-10-CM

## 2021-03-31 RX ORDER — FLURBIPROFEN SODIUM 0.3 MG/ML
SOLUTION/ DROPS OPHTHALMIC
Qty: 100 EACH | Refills: 1 | Status: SHIPPED | OUTPATIENT
Start: 2021-03-31 | End: 2021-09-16

## 2021-03-31 RX ORDER — LIRAGLUTIDE 6 MG/ML
INJECTION SUBCUTANEOUS
Qty: 9 ML | Refills: 1 | Status: SHIPPED | OUTPATIENT
Start: 2021-03-31 | End: 2021-05-24

## 2021-03-31 NOTE — TELEPHONE ENCOUNTER
Prescription approved per [facility] Refill Protocol.  Insulin pen needles and victoza pen    Mabel SILVA RN

## 2021-04-01 RX ORDER — TRAMADOL HYDROCHLORIDE 50 MG/1
TABLET ORAL
Qty: 60 TABLET | Refills: 0 | Status: SHIPPED | OUTPATIENT
Start: 2021-04-01 | End: 2021-05-24

## 2021-04-01 NOTE — TELEPHONE ENCOUNTER
Reviewed .  Last visit 12/29.  Did refill, but she will need an appointment prior to additional fills. Can be virtual.

## 2021-04-15 ENCOUNTER — IMMUNIZATION (OUTPATIENT)
Dept: NURSING | Facility: CLINIC | Age: 51
End: 2021-04-15
Attending: INTERNAL MEDICINE
Payer: COMMERCIAL

## 2021-04-15 PROCEDURE — 0012A PR COVID VAC MODERNA 100 MCG/0.5 ML IM: CPT

## 2021-04-15 PROCEDURE — 91301 PR COVID VAC MODERNA 100 MCG/0.5 ML IM: CPT

## 2021-04-24 DIAGNOSIS — I10 HYPERTENSION GOAL BP (BLOOD PRESSURE) < 130/80: ICD-10-CM

## 2021-04-26 RX ORDER — LISINOPRIL 5 MG/1
TABLET ORAL
Qty: 90 TABLET | Refills: 1 | Status: SHIPPED | OUTPATIENT
Start: 2021-04-26 | End: 2021-06-18

## 2021-04-27 ENCOUNTER — TRANSFERRED RECORDS (OUTPATIENT)
Dept: HEALTH INFORMATION MANAGEMENT | Facility: CLINIC | Age: 51
End: 2021-04-27

## 2021-04-27 LAB — RETINOPATHY: NEGATIVE

## 2021-05-22 DIAGNOSIS — E11.9 TYPE 2 DIABETES MELLITUS WITHOUT COMPLICATION, WITHOUT LONG-TERM CURRENT USE OF INSULIN (H): ICD-10-CM

## 2021-05-23 DIAGNOSIS — F11.90 CHRONIC, CONTINUOUS USE OF OPIOIDS: ICD-10-CM

## 2021-05-23 DIAGNOSIS — M79.7 FIBROMYALGIA: ICD-10-CM

## 2021-05-24 RX ORDER — LIRAGLUTIDE 6 MG/ML
INJECTION SUBCUTANEOUS
Qty: 9 ML | Refills: 0 | Status: SHIPPED | OUTPATIENT
Start: 2021-05-24 | End: 2021-06-18

## 2021-05-24 RX ORDER — TRAMADOL HYDROCHLORIDE 50 MG/1
TABLET ORAL
Qty: 60 TABLET | Refills: 0 | Status: SHIPPED | OUTPATIENT
Start: 2021-05-24 | End: 2021-07-06

## 2021-05-24 NOTE — TELEPHONE ENCOUNTER
Patient has an upcoming appointment.  Will give refill to get her through.    Prescription approved per [facility] Refill Protocol.  Erica Callahan RN

## 2021-05-24 NOTE — TELEPHONE ENCOUNTER
traMADol (ULTRAM) 50 MG tablet      Last Written Prescription Date:  4/1/2021  Last Fill Quantity: 60,   # refills: 0  Last Office Visit: 12/29/2020  Future Office visit:    Next 5 appointments (look out 90 days)    Jun 18, 2021  9:20 AM  Office Visit with Irene Castellanos MD  [facility] ([facility] ) [address] [place] [id number] [phone]           Routing refill request to provider for review/approval because:  Drug not on the [facility], [facility] or [facility] refill protocol or controlled substance.    Erica Callahan RN

## 2021-06-10 DIAGNOSIS — E53.8 VITAMIN B12 DEFICIENCY (NON ANEMIC): ICD-10-CM

## 2021-06-10 RX ORDER — LANOLIN ALCOHOL/MO/W.PET/CERES
CREAM (GRAM) TOPICAL
Qty: 90 TABLET | Refills: 0 | Status: SHIPPED | OUTPATIENT
Start: 2021-06-10 | End: 2021-09-03

## 2021-06-17 DIAGNOSIS — E11.9 TYPE 2 DIABETES MELLITUS WITHOUT COMPLICATION, WITHOUT LONG-TERM CURRENT USE OF INSULIN (H): ICD-10-CM

## 2021-06-17 DIAGNOSIS — D75.839 THROMBOCYTOSIS: ICD-10-CM

## 2021-06-17 DIAGNOSIS — E78.5 HYPERLIPIDEMIA LDL GOAL <100: Primary | ICD-10-CM

## 2021-06-18 ENCOUNTER — OFFICE VISIT (OUTPATIENT)
Dept: FAMILY MEDICINE | Facility: CLINIC | Age: 51
End: 2021-06-18
Payer: COMMERCIAL

## 2021-06-18 VITALS
HEART RATE: 110 BPM | WEIGHT: 249 LBS | RESPIRATION RATE: 16 BRPM | OXYGEN SATURATION: 97 % | TEMPERATURE: 99 F | DIASTOLIC BLOOD PRESSURE: 80 MMHG | HEIGHT: 65 IN | SYSTOLIC BLOOD PRESSURE: 120 MMHG | BODY MASS INDEX: 41.48 KG/M2

## 2021-06-18 DIAGNOSIS — D75.839 THROMBOCYTOSIS: ICD-10-CM

## 2021-06-18 DIAGNOSIS — I10 HYPERTENSION GOAL BP (BLOOD PRESSURE) < 130/80: ICD-10-CM

## 2021-06-18 DIAGNOSIS — G47.00 INSOMNIA, UNSPECIFIED TYPE: ICD-10-CM

## 2021-06-18 DIAGNOSIS — M79.7 FIBROMYALGIA: ICD-10-CM

## 2021-06-18 DIAGNOSIS — E66.01 MORBID OBESITY (H): ICD-10-CM

## 2021-06-18 DIAGNOSIS — R74.8 ELEVATED ALKALINE PHOSPHATASE LEVEL: ICD-10-CM

## 2021-06-18 DIAGNOSIS — R05.9 COUGH: ICD-10-CM

## 2021-06-18 DIAGNOSIS — E78.5 HYPERLIPIDEMIA LDL GOAL <100: ICD-10-CM

## 2021-06-18 DIAGNOSIS — L30.9 ECZEMA, UNSPECIFIED TYPE: ICD-10-CM

## 2021-06-18 DIAGNOSIS — R00.0 TACHYCARDIA: ICD-10-CM

## 2021-06-18 DIAGNOSIS — F32.5 MAJOR DEPRESSION IN COMPLETE REMISSION (H): ICD-10-CM

## 2021-06-18 DIAGNOSIS — E11.9 TYPE 2 DIABETES MELLITUS WITHOUT COMPLICATION, WITHOUT LONG-TERM CURRENT USE OF INSULIN (H): Primary | ICD-10-CM

## 2021-06-18 DIAGNOSIS — F41.9 ANXIETY: ICD-10-CM

## 2021-06-18 LAB
ALBUMIN SERPL-MCNC: 3.5 G/DL (ref 3.4–5)
ALP SERPL-CCNC: 161 U/L (ref 40–150)
ALT SERPL W P-5'-P-CCNC: 25 U/L (ref 0–50)
ANION GAP SERPL CALCULATED.3IONS-SCNC: 5 MMOL/L (ref 3–14)
AST SERPL W P-5'-P-CCNC: 10 U/L (ref 0–45)
BASOPHILS # BLD AUTO: 0 10E9/L (ref 0–0.2)
BASOPHILS NFR BLD AUTO: 0.6 %
BILIRUB SERPL-MCNC: 0.3 MG/DL (ref 0.2–1.3)
BUN SERPL-MCNC: 7 MG/DL (ref 7–30)
CALCIUM SERPL-MCNC: 8.6 MG/DL (ref 8.5–10.1)
CHLORIDE SERPL-SCNC: 104 MMOL/L (ref 94–109)
CO2 SERPL-SCNC: 29 MMOL/L (ref 20–32)
CREAT SERPL-MCNC: 0.83 MG/DL (ref 0.52–1.04)
DIFFERENTIAL METHOD BLD: NORMAL
EOSINOPHIL # BLD AUTO: 0.1 10E9/L (ref 0–0.7)
EOSINOPHIL NFR BLD AUTO: 1.1 %
ERYTHROCYTE [DISTWIDTH] IN BLOOD BY AUTOMATED COUNT: 12.6 % (ref 10–15)
GFR SERPL CREATININE-BSD FRML MDRD: 82 ML/MIN/{1.73_M2}
GLUCOSE SERPL-MCNC: 281 MG/DL (ref 70–99)
HBA1C MFR BLD: 7.4 % (ref 0–5.6)
HCT VFR BLD AUTO: 39.4 % (ref 35–47)
HGB BLD-MCNC: 12.6 G/DL (ref 11.7–15.7)
LYMPHOCYTES # BLD AUTO: 1.9 10E9/L (ref 0.8–5.3)
LYMPHOCYTES NFR BLD AUTO: 27.5 %
MCH RBC QN AUTO: 30.1 PG (ref 26.5–33)
MCHC RBC AUTO-ENTMCNC: 32 G/DL (ref 31.5–36.5)
MCV RBC AUTO: 94 FL (ref 78–100)
MONOCYTES # BLD AUTO: 0.4 10E9/L (ref 0–1.3)
MONOCYTES NFR BLD AUTO: 5.1 %
NEUTROPHILS # BLD AUTO: 4.6 10E9/L (ref 1.6–8.3)
NEUTROPHILS NFR BLD AUTO: 65.7 %
PLATELET # BLD AUTO: 425 10E9/L (ref 150–450)
POTASSIUM SERPL-SCNC: 4 MMOL/L (ref 3.4–5.3)
PROT SERPL-MCNC: 7.2 G/DL (ref 6.8–8.8)
RBC # BLD AUTO: 4.19 10E12/L (ref 3.8–5.2)
SODIUM SERPL-SCNC: 138 MMOL/L (ref 133–144)
WBC # BLD AUTO: 7.1 10E9/L (ref 4–11)

## 2021-06-18 PROCEDURE — 36415 COLL VENOUS BLD VENIPUNCTURE: CPT | Performed by: FAMILY MEDICINE

## 2021-06-18 PROCEDURE — 99214 OFFICE O/P EST MOD 30 MIN: CPT | Performed by: FAMILY MEDICINE

## 2021-06-18 PROCEDURE — 96127 BRIEF EMOTIONAL/BEHAV ASSMT: CPT | Performed by: FAMILY MEDICINE

## 2021-06-18 PROCEDURE — 85025 COMPLETE CBC W/AUTO DIFF WBC: CPT | Performed by: FAMILY MEDICINE

## 2021-06-18 PROCEDURE — 83036 HEMOGLOBIN GLYCOSYLATED A1C: CPT | Performed by: FAMILY MEDICINE

## 2021-06-18 PROCEDURE — 80053 COMPREHEN METABOLIC PANEL: CPT | Performed by: FAMILY MEDICINE

## 2021-06-18 RX ORDER — ALBUTEROL SULFATE 90 UG/1
2 AEROSOL, METERED RESPIRATORY (INHALATION) EVERY 6 HOURS PRN
Qty: 18 G | Refills: 0 | Status: SHIPPED | OUTPATIENT
Start: 2021-06-18 | End: 2021-07-09

## 2021-06-18 RX ORDER — LISINOPRIL 5 MG/1
5 TABLET ORAL DAILY
Qty: 90 TABLET | Refills: 1 | Status: SHIPPED | OUTPATIENT
Start: 2021-06-18 | End: 2021-11-02

## 2021-06-18 RX ORDER — TRIAMCINOLONE ACETONIDE 1 MG/G
CREAM TOPICAL 2 TIMES DAILY
Qty: 15 G | Refills: 0 | Status: SHIPPED | OUTPATIENT
Start: 2021-06-18 | End: 2023-08-21

## 2021-06-18 RX ORDER — LIRAGLUTIDE 6 MG/ML
INJECTION SUBCUTANEOUS
Qty: 9 ML | Refills: 0 | Status: SHIPPED | OUTPATIENT
Start: 2021-06-18 | End: 2021-07-21

## 2021-06-18 RX ORDER — TRAZODONE HYDROCHLORIDE 50 MG/1
100 TABLET, FILM COATED ORAL
Qty: 180 TABLET | Refills: 1 | Status: SHIPPED | OUTPATIENT
Start: 2021-06-18 | End: 2021-11-02

## 2021-06-18 RX ORDER — CITALOPRAM HYDROBROMIDE 40 MG/1
40 TABLET ORAL DAILY
Qty: 90 TABLET | Refills: 1 | Status: SHIPPED | OUTPATIENT
Start: 2021-06-18 | End: 2021-11-02

## 2021-06-18 RX ORDER — METFORMIN HCL 500 MG
TABLET, EXTENDED RELEASE 24 HR ORAL
Qty: 360 TABLET | Refills: 0 | Status: SHIPPED | OUTPATIENT
Start: 2021-06-18 | End: 2021-09-22

## 2021-06-18 RX ORDER — BUSPIRONE HYDROCHLORIDE 15 MG/1
TABLET ORAL
Qty: 180 TABLET | Refills: 1 | Status: SHIPPED | OUTPATIENT
Start: 2021-06-18 | End: 2021-11-02

## 2021-06-18 ASSESSMENT — PATIENT HEALTH QUESTIONNAIRE - PHQ9
5. POOR APPETITE OR OVEREATING: NOT AT ALL
SUM OF ALL RESPONSES TO PHQ QUESTIONS 1-9: 1

## 2021-06-18 ASSESSMENT — ANXIETY QUESTIONNAIRES
1. FEELING NERVOUS, ANXIOUS, OR ON EDGE: NOT AT ALL
2. NOT BEING ABLE TO STOP OR CONTROL WORRYING: NOT AT ALL
GAD7 TOTAL SCORE: 0
5. BEING SO RESTLESS THAT IT IS HARD TO SIT STILL: NOT AT ALL
6. BECOMING EASILY ANNOYED OR IRRITABLE: NOT AT ALL
3. WORRYING TOO MUCH ABOUT DIFFERENT THINGS: NOT AT ALL
7. FEELING AFRAID AS IF SOMETHING AWFUL MIGHT HAPPEN: NOT AT ALL

## 2021-06-18 ASSESSMENT — MIFFLIN-ST. JEOR: SCORE: 1747.4

## 2021-06-18 ASSESSMENT — PAIN SCALES - GENERAL: PAINLEVEL: MODERATE PAIN (4)

## 2021-06-18 NOTE — PROGRESS NOTES
Assessment & Plan     Type 2 diabetes mellitus without complication, without long-term current use of insulin (H)  Not at optimal control. At this time, she would like to continue to work on lifestyle and recheck in about 3 months. She has recently stopped her pop intake which should be helpful.   - Comprehensive metabolic panel  - Hemoglobin A1c  - metFORMIN (GLUCOPHAGE-XR) 500 MG 24 hr tablet; TAKE 4 TABLETS BY MOUTH DAILY (WITH BREAKFAST)  - liraglutide (VICTOZA PEN) 18 MG/3ML solution; INJECT 1.8 MG UNDER THE SKIN ONCE DAILY  - blood glucose (NO BRAND SPECIFIED) test strip; Use to test blood sugar 1 times daily or as directed.  - Hemoglobin A1c; Future    Hypertension goal BP (blood pressure) < 130/80  Meeting goal; continue medication.  - lisinopril (ZESTRIL) 5 MG tablet; Take 1 tablet (5 mg) by mouth daily  - Comprehensive metabolic panel; Future    Major depression in complete remission (H)  Reviewed scores. Doing well overall with mood. No change in medication.  - citalopram (CELEXA) 40 MG tablet; Take 1 tablet (40 mg) by mouth daily    Anxiety  Reviewed scores. Doing well overall with mood. No change in medication.  - busPIRone (BUSPAR) 15 MG tablet; TAKE 1 TABLET BY MOUTH TWICE A DAY    Tachycardia  Mild; suspect sinus. Did go down with her own observation. She did have some anxiety coming in.  I am not concerned about her current temperature either as she is feeling well. Continue to follow.     Obesity:  She has successfully lost some weight on WW. Has gained some back. She is starting to utilize some of the principles again despite not ready to engage in tracking currently. Continue to encourage healthy practices.     Thrombocytosis (H)  Will recheck. Not new.   - CBC with platelets and differential    Hyperlipidemia LDL goal <100  Will come fasting next visit.   - Comprehensive metabolic panel  - Comprehensive metabolic panel; Future  - Lipid panel reflex to direct LDL Fasting;  "Future    Fibromyalgia  Refilling.   She does have FMLA for this when acting up. We discussed this; it has been working for her. I do not feel she is overusing her FMLA.   - traZODone (DESYREL) 50 MG tablet; Take 2 tablets (100 mg) by mouth nightly as needed for sleep (insomnia)    Insomnia, unspecified type  Refilling.   - traZODone (DESYREL) 50 MG tablet; Take 2 tablets (100 mg) by mouth nightly as needed for sleep (insomnia)    Cough  Discussed possible asthma.   She notes she was evaluated with PFTs and seeing Pulmonary in the past and was not diagnosed with this.   I did find PFTs, but don't see if a challenge was done. Could not find Pulmonary notes; will see if we can get this. Refilling inhaler at this time.   - albuterol (PROAIR HFA/PROVENTIL HFA/VENTOLIN HFA) 108 (90 Base) MCG/ACT inhaler; Inhale 2 puffs into the lungs every 6 hours as needed for shortness of breath / dyspnea or wheezing    Eczema, unspecified type  Reviewed chart; had given the following in the past. Will try now. Discussed it is a more potent steroid and caution about overuse.   - triamcinolone (KENALOG) 0.1 % external cream; Apply topically 2 times daily             BMI:   Estimated body mass index is 42.08 kg/m  as calculated from the following:    Height as of this encounter: 1.638 m (5' 4.5\").    Weight as of this encounter: 112.9 kg (249 lb).   Weight management plan: Discussed healthy diet and exercise guidelines    Return in about 3 months (around 9/18/2021) for Diabetes Recheck.    Irene Castellanos MD, MD  [facility] is a 51 year old who presents for the following health issues     HPI     Medication Followup of Trazodone    Taking Medication as prescribed: yes    Side Effects:  None    Medication Helping Symptoms:  yes     See under ROS     Patient Active Problem List   Diagnosis     Fibromyalgia     Non-rheumatic tricuspid valve insufficiency     Papanicolaou smear of cervix with " atypical squamous cells cannot exclude high grade squamous intraepithelial lesion (ASC-H)     Major depression in complete remission (H)     Narcolepsy     Hyperlipidemia LDL goal <100     Health Care Home     Reflex sympathetic dystrophy     Contraception     Elevated BMI     Chronic pain-Fibromyalgia     Type 2 diabetes mellitus without complication, without long-term current use of insulin (H)     Migraine with aura and without status migrainosus, not intractable     Elevated blood pressure reading without diagnosis of hypertension     Pulmonary hypertension (H)     Unexplained endometrial cells on cervical Pap smear     Thrombocytosis (H)     Anxiety     Post-op pain     Chronic, continuous use of opioids       Current Outpatient Medications   Medication Sig Dispense Refill     albuterol (PROAIR HFA/PROVENTIL HFA/VENTOLIN HFA) 108 (90 Base) MCG/ACT inhaler Inhale 2 puffs into the lungs every 6 hours as needed for shortness of breath / dyspnea or wheezing 1 Inhaler 0     atorvastatin (LIPITOR) 10 MG tablet Take 1 tablet (10 mg) by mouth daily 90 tablet 2     blood glucose (NO BRAND SPECIFIED) test strip Use to test blood sugar 1 times daily or as directed. 100 strip 4     blood glucose monitoring (NO BRAND SPECIFIED) meter device kit Use to test blood sugar 1 times daily or as directed. Please give her all needed supplies with prn refills. 1 kit 0     busPIRone (BUSPAR) 15 MG tablet TAKE 1 TABLET BY MOUTH TWICE A  tablet 1     cetirizine (ZYRTEC) 10 MG tablet Take 10 mg by mouth daily as needed for allergies       citalopram (CELEXA) 40 MG tablet Take 1 tablet (40 mg) by mouth daily 90 tablet 1     cyanocobalamin (VITAMIN B-12) 1000 MCG tablet TAKE 1 TABLET BY MOUTH DAILY 90 tablet 0     cyclobenzaprine (FLEXERIL) 10 MG tablet TAKE 1 TABLET BY MOUTH THREE TIMES A DAY AS NEEDED FOR MUSCLE SPASMS 90 tablet 3     ibuprofen (ADVIL/MOTRIN) 800 MG tablet Take 1 tablet (800 mg) by mouth every 8 hours as needed  for moderate pain 90 tablet 1     insulin pen needle (B-D U/F) 31G X 5 MM miscellaneous USE 1 PEN NEEDLES DAILY OR AS DIRECTED 100 each 1     lisinopril (ZESTRIL) 5 MG tablet TAKE 1 TABLET BY MOUTH EVERY DAY 90 tablet 1     metFORMIN (GLUCOPHAGE-XR) 500 MG 24 hr tablet TAKE 4 TABLETS BY MOUTH DAILY (WITH BREAKFAST) 360 tablet 0     traZODone (DESYREL) 50 MG tablet TAKE 2 TABLETS (100 MG) BY MOUTH NIGHTLY AS NEEDED FOR SLEEP (INSOMNIA) 180 tablet 1     VICTOZA PEN 18 MG/3ML soln INJECT 1.8 MG UNDER THE SKIN ONCE DAILY 9 mL 0     traMADol (ULTRAM) 50 MG tablet TAKE 1-2 TABS BY MOUTH EVERY 8 HRS AS NEEDED FOR MODERATE PAIN MAX 8 TABS/24 HOURS, MAX 60 TBS/MONTH 60 tablet 0         Review of Systems   CONSTITUTIONAL:NEGATIVE for fever, chills, change in weight  HEENT: mild congestion related to allergies.   RESP:NEGATIVE for significant cough or SOB  CV: NEGATIVE for chest pain, palpitations or peripheral edema  GI: no N/V, change in bowel movement.   : no urinary symptoms.   PSYCHIATRIC: NEGATIVE for changes in mood or affect      Notes that she is surprised with fever noted today. Usually runs low.  Fast HR.  Did go to 103 after some breathing exercises (on her watch).   Not feeling ill.   She notes she may be a little anxious noting she will need to switch to a new provider.    She notes she has a rash along hairline; she has had this previously; better recently.   Has used some hydrocortisone that has not cleared up.  Itching. Wonders about getting a cream she has used in the past.     She notes she has not done well regarding checking sugars recently. Does have new monitor.  No symptoms of either high or low sugar.    Had done WW in the past successfully; has gained some of the weight back.   She did get burned out on tracking, but is still looking at the support things and educational things on [facility].  She had increased her pop intake for awhile, but now she is drinking more Hint water.         Objective    BP  "120/80 (BP Location: Right arm, Patient Position: Sitting, Cuff Size: Adult Large)   Pulse 110   Temp 99  F (37.2  C) (Oral)   Resp 16   Ht 1.638 m (5' 4.5\")   Wt 112.9 kg (249 lb)   SpO2 97%   BMI 42.08 kg/m    Body mass index is 42.08 kg/m .  Physical Exam   GENERAL APPEARANCE: healthy, alert and no distress  RESP: lungs clear to auscultation - no rales, rhonchi or wheezes  CV: regular rates and rhythm  MS: no edema.   Skin: there were a few erythematous papules on the back of her neck just below the hairline.   PSYCH: mentation appears normal and affect normal/bright    PHQ-9 SCORE 6/9/2020 12/29/2020 6/18/2021   PHQ-9 Total Score - - -   PHQ-9 Total Score MyChart - - -   PHQ-9 Total Score 0 0 1       MICHAEL-7 SCORE 6/9/2020 12/29/2020 6/18/2021   Total Score - - -   Total Score - - -   Total Score 0 3 0         Lab Results   Component Value Date    A1C 7.4 06/18/2021    A1C 6.6 12/29/2020    A1C 6.7 09/09/2020    A1C 6.4 11/26/2019    A1C 7.2 04/05/2019     Recent Labs   Lab Test 09/09/20  0815 04/05/19  1016 04/28/15  0758 04/28/15  0758 04/28/14  0813   CHOL 123 148   < > 166 151   HDL 50 57   < > 56 42*   LDL 48 56   < > 82 81   TRIG 126 174*   < > 142 139   CHOLHDLRATIO  --   --   --  3.0 3.6    < > = values in this interval not displayed.     Reviewed spirometry.                  "

## 2021-06-19 ASSESSMENT — ANXIETY QUESTIONNAIRES: GAD7 TOTAL SCORE: 0

## 2021-06-21 ENCOUNTER — TELEPHONE (OUTPATIENT)
Dept: FAMILY MEDICINE | Facility: CLINIC | Age: 51
End: 2021-06-21

## 2021-06-21 NOTE — TELEPHONE ENCOUNTER
She noted that she saw Pulmonary and had PFTs done in the past.    I see the PFTs done in 2019.  See if you can get any notes from [facility] from then.     Thanks!

## 2021-06-24 NOTE — TELEPHONE ENCOUNTER
OK....    It was more to clarify diagnosis.  I think she could possibly have a mild asthma.     No need to do anything at this time.

## 2021-06-24 NOTE — TELEPHONE ENCOUNTER
Patient reports that she was only seen in the hospital by pulmonology, patient has not had any F/U with pulmonology since 9/2019.  -Kaitlyn Greco

## 2021-07-05 ENCOUNTER — MYC REFILL (OUTPATIENT)
Dept: CARDIOLOGY | Facility: CLINIC | Age: 51
End: 2021-07-05

## 2021-07-05 DIAGNOSIS — M79.7 FIBROMYALGIA: ICD-10-CM

## 2021-07-05 DIAGNOSIS — F11.90 CHRONIC, CONTINUOUS USE OF OPIOIDS: ICD-10-CM

## 2021-07-06 RX ORDER — TRAMADOL HYDROCHLORIDE 50 MG/1
TABLET ORAL
Qty: 60 TABLET | Refills: 0 | Status: SHIPPED | OUTPATIENT
Start: 2021-07-06 | End: 2021-08-18

## 2021-07-06 RX ORDER — TRAMADOL HYDROCHLORIDE 50 MG/1
TABLET ORAL
Qty: 60 TABLET | Refills: 0 | OUTPATIENT
Start: 2021-07-06

## 2021-07-21 DIAGNOSIS — E11.9 TYPE 2 DIABETES MELLITUS WITHOUT COMPLICATION, WITHOUT LONG-TERM CURRENT USE OF INSULIN (H): ICD-10-CM

## 2021-07-21 RX ORDER — LIRAGLUTIDE 6 MG/ML
INJECTION SUBCUTANEOUS
Qty: 9 ML | Refills: 3 | Status: SHIPPED | OUTPATIENT
Start: 2021-07-21 | End: 2021-09-22

## 2021-08-16 DIAGNOSIS — M79.7 FIBROMYALGIA: ICD-10-CM

## 2021-08-16 DIAGNOSIS — F11.90 CHRONIC, CONTINUOUS USE OF OPIOIDS: ICD-10-CM

## 2021-08-18 RX ORDER — TRAMADOL HYDROCHLORIDE 50 MG/1
TABLET ORAL
Qty: 60 TABLET | Refills: 0 | Status: SHIPPED | OUTPATIENT
Start: 2021-08-18 | End: 2021-10-10

## 2021-08-18 RX ORDER — CYCLOBENZAPRINE HCL 10 MG
TABLET ORAL
Qty: 90 TABLET | Refills: 3 | Status: SHIPPED | OUTPATIENT
Start: 2021-08-18 | End: 2021-12-17

## 2021-08-18 NOTE — TELEPHONE ENCOUNTER
1.  Cyclobenzaprine 10mg      Last Written Prescription Date:  2/3/2021  Last Fill Quantity: 90,   # refills: 3    2.  Tramadol 50mg      Last Written Prescription Date:  7/6/2021  Last Fill Quantity: 60,   # refills: 0    Last Office Visit: 6/18/2021  Future Office visit:    Next 5 appointments (look out 90 days)    Sep 22, 2021  4:00 PM  Office Visit with Irene Castellanos MD  [facility] ([facility] [place] ) [address] [place] [id number] [phone]         Routing refill request to provider for review/approval because:  Drug not on the [facility], [facility] or [facility] refill protocol or controlled substance    Erica Callahan RN

## 2021-09-02 DIAGNOSIS — E53.8 VITAMIN B12 DEFICIENCY (NON ANEMIC): ICD-10-CM

## 2021-09-03 RX ORDER — LANOLIN ALCOHOL/MO/W.PET/CERES
CREAM (GRAM) TOPICAL
Qty: 90 TABLET | Refills: 0 | Status: SHIPPED | OUTPATIENT
Start: 2021-09-03 | End: 2021-12-01

## 2021-09-03 NOTE — TELEPHONE ENCOUNTER
Prescription approved per [facility] Refill Protocol.  Erica Callahan RN

## 2021-09-15 DIAGNOSIS — Z79.4 TYPE 2 DIABETES MELLITUS WITHOUT COMPLICATION, WITH LONG-TERM CURRENT USE OF INSULIN (H): ICD-10-CM

## 2021-09-15 DIAGNOSIS — E11.9 TYPE 2 DIABETES MELLITUS WITHOUT COMPLICATION, WITH LONG-TERM CURRENT USE OF INSULIN (H): ICD-10-CM

## 2021-09-16 RX ORDER — FLURBIPROFEN SODIUM 0.3 MG/ML
SOLUTION/ DROPS OPHTHALMIC
Qty: 100 EACH | Refills: 1 | Status: SHIPPED | OUTPATIENT
Start: 2021-09-16 | End: 2022-01-04

## 2021-09-20 ENCOUNTER — LAB (OUTPATIENT)
Dept: LAB | Facility: CLINIC | Age: 51
End: 2021-09-20
Payer: COMMERCIAL

## 2021-09-20 DIAGNOSIS — E11.9 TYPE 2 DIABETES MELLITUS WITHOUT COMPLICATION, WITHOUT LONG-TERM CURRENT USE OF INSULIN (H): ICD-10-CM

## 2021-09-20 DIAGNOSIS — I10 HYPERTENSION GOAL BP (BLOOD PRESSURE) < 130/80: ICD-10-CM

## 2021-09-20 DIAGNOSIS — L65.9 HAIR LOSS: ICD-10-CM

## 2021-09-20 DIAGNOSIS — R74.8 ELEVATED ALKALINE PHOSPHATASE LEVEL: ICD-10-CM

## 2021-09-20 DIAGNOSIS — E78.5 HYPERLIPIDEMIA LDL GOAL <100: ICD-10-CM

## 2021-09-20 LAB
ALBUMIN SERPL-MCNC: 3.4 G/DL (ref 3.4–5)
ALP SERPL-CCNC: 142 U/L (ref 40–150)
ALT SERPL W P-5'-P-CCNC: 22 U/L (ref 0–50)
ANION GAP SERPL CALCULATED.3IONS-SCNC: 4 MMOL/L (ref 3–14)
AST SERPL W P-5'-P-CCNC: 10 U/L (ref 0–45)
BILIRUB SERPL-MCNC: 0.3 MG/DL (ref 0.2–1.3)
BUN SERPL-MCNC: 8 MG/DL (ref 7–30)
CALCIUM SERPL-MCNC: 8.5 MG/DL (ref 8.5–10.1)
CHLORIDE BLD-SCNC: 104 MMOL/L (ref 94–109)
CHOLEST SERPL-MCNC: 151 MG/DL
CO2 SERPL-SCNC: 28 MMOL/L (ref 20–32)
CREAT SERPL-MCNC: 0.8 MG/DL (ref 0.52–1.04)
FASTING STATUS PATIENT QL REPORTED: YES
GFR SERPL CREATININE-BSD FRML MDRD: 87 ML/MIN/1.73M2
GLUCOSE BLD-MCNC: 189 MG/DL (ref 70–99)
HBA1C MFR BLD: 7.1 % (ref 0–5.6)
HDLC SERPL-MCNC: 58 MG/DL
LDLC SERPL CALC-MCNC: 60 MG/DL
NONHDLC SERPL-MCNC: 93 MG/DL
POTASSIUM BLD-SCNC: 4.1 MMOL/L (ref 3.4–5.3)
PROT SERPL-MCNC: 7.3 G/DL (ref 6.8–8.8)
SODIUM SERPL-SCNC: 136 MMOL/L (ref 133–144)
TRIGL SERPL-MCNC: 166 MG/DL

## 2021-09-20 PROCEDURE — 36415 COLL VENOUS BLD VENIPUNCTURE: CPT

## 2021-09-20 PROCEDURE — 80053 COMPREHEN METABOLIC PANEL: CPT

## 2021-09-20 PROCEDURE — 82728 ASSAY OF FERRITIN: CPT

## 2021-09-20 PROCEDURE — 80061 LIPID PANEL: CPT

## 2021-09-20 PROCEDURE — 83036 HEMOGLOBIN GLYCOSYLATED A1C: CPT

## 2021-09-20 PROCEDURE — 84443 ASSAY THYROID STIM HORMONE: CPT

## 2021-09-20 PROCEDURE — 83550 IRON BINDING TEST: CPT

## 2021-09-22 ENCOUNTER — OFFICE VISIT (OUTPATIENT)
Dept: FAMILY MEDICINE | Facility: CLINIC | Age: 51
End: 2021-09-22
Payer: COMMERCIAL

## 2021-09-22 VITALS
SYSTOLIC BLOOD PRESSURE: 122 MMHG | TEMPERATURE: 98.6 F | DIASTOLIC BLOOD PRESSURE: 80 MMHG | RESPIRATION RATE: 16 BRPM | WEIGHT: 252.1 LBS | HEART RATE: 77 BPM | OXYGEN SATURATION: 98 % | BODY MASS INDEX: 42.6 KG/M2

## 2021-09-22 DIAGNOSIS — E11.9 TYPE 2 DIABETES MELLITUS WITHOUT COMPLICATION, WITHOUT LONG-TERM CURRENT USE OF INSULIN (H): Primary | ICD-10-CM

## 2021-09-22 DIAGNOSIS — E78.5 HYPERLIPIDEMIA LDL GOAL <100: ICD-10-CM

## 2021-09-22 DIAGNOSIS — L65.9 HAIR LOSS: ICD-10-CM

## 2021-09-22 DIAGNOSIS — R23.2 HOT FLASHES: ICD-10-CM

## 2021-09-22 PROCEDURE — 90471 IMMUNIZATION ADMIN: CPT | Performed by: FAMILY MEDICINE

## 2021-09-22 PROCEDURE — 90686 IIV4 VACC NO PRSV 0.5 ML IM: CPT | Performed by: FAMILY MEDICINE

## 2021-09-22 PROCEDURE — 99214 OFFICE O/P EST MOD 30 MIN: CPT | Mod: 25 | Performed by: FAMILY MEDICINE

## 2021-09-22 RX ORDER — ATORVASTATIN CALCIUM 10 MG/1
10 TABLET, FILM COATED ORAL DAILY
Qty: 90 TABLET | Refills: 3 | Status: SHIPPED | OUTPATIENT
Start: 2021-09-22 | End: 2022-09-22

## 2021-09-22 RX ORDER — METFORMIN HCL 500 MG
TABLET, EXTENDED RELEASE 24 HR ORAL
Qty: 360 TABLET | Refills: 0 | Status: SHIPPED | OUTPATIENT
Start: 2021-09-22 | End: 2021-11-02

## 2021-09-22 RX ORDER — LIRAGLUTIDE 6 MG/ML
INJECTION SUBCUTANEOUS
Qty: 9 ML | Refills: 3 | Status: SHIPPED | OUTPATIENT
Start: 2021-09-22 | End: 2022-01-04

## 2021-09-22 RX ORDER — LIDOCAINE 4 G/G
1 PATCH TOPICAL EVERY 24 HOURS
COMMUNITY
End: 2022-01-04

## 2021-09-22 ASSESSMENT — PAIN SCALES - GENERAL: PAINLEVEL: MODERATE PAIN (4)

## 2021-09-22 NOTE — PROGRESS NOTES
Assessment & Plan     Type 2 diabetes mellitus without complication, without long-term current use of insulin (H)  Borderline control; better than last time. At this time, did not change medication. Encourage healthy lifestyle. Did discuss that she is still getting the metformin despite looking like it is in her stool.   - metFORMIN (GLUCOPHAGE-XR) 500 MG 24 hr tablet; TAKE 4 TABLETS BY MOUTH DAILY (WITH BREAKFAST)  - liraglutide (VICTOZA PEN) 18 MG/3ML solution; INJECT 1.8 MG UNDER THE SKIN ONCE DAILY    Hyperlipidemia LDL goal <100  Refilling.   - atorvastatin (LIPITOR) 10 MG tablet; Take 1 tablet (10 mg) by mouth daily    Hot flashes  She could be perimenopausal. Discussed there is a blood test which can be helpful postmenopausal; can fluctuate earlier. We don't frequently do this. Discussed symptomatic treatment briefly. Discussed ERT could be considered if real uncomfortable.     Hair loss  Will check the following.   - TSH with free T4 reflex; Future  - Iron and iron binding capacity; Future  - Ferritin; Future                 Return in about 3 months (around 12/22/2021) for Diabetes Recheck.    Irene Castellanos MD, MD  [facility] is a 51 year old who presents for the following health issues     History of Present Illness       Diabetes:   She presents for follow up of diabetes.  She is checking home blood glucose a few times a month. She checks blood glucose before and after meals.  Blood glucose is never over 200 and never under 70. When her blood glucose is low, the patient is asymptomatic for confusion, blurred vision, lethargy and reports not feeling dizzy, shaky, or weak.  She is concerned about other. She is not experiencing numbness or burning in feet, excessive thirst, blurry vision, weight changes or redness, sores or blisters on feet.         She eats 2-3 servings of fruits and vegetables daily.She consumes 1 sweetened beverage(s) daily.She exercises  with enough effort to increase her heart rate 9 or less minutes per day.  She exercises with enough effort to increase her heart rate 3 or less days per week.   She is taking medications regularly.       Diabetes Follow-up    How often are you checking your blood sugar? A few times a week  What time of day are you checking your blood sugars (select all that apply)?  Varies  Have you had any blood sugars above 200?  No  Have you had any blood sugars below 70?  No    What symptoms do you notice when your blood sugar is low?  None    What concerns do you have today about your diabetes? None     Do you have any of these symptoms? (Select all that apply)  No numbness or tingling in feet.  No redness, sores or blisters on feet.  No complaints of excessive thirst.  No reports of blurry vision.  No significant changes to weight.              Hyperlipidemia Follow-Up      Are you regularly taking any medication or supplement to lower your cholesterol?   Yes- Atorvistatin     Are you having muscle aches or other side effects that you think could be caused by your cholesterol lowering medication?  No    Hypertension Follow-up      Do you check your blood pressure regularly outside of the clinic? No     Are you following a low salt diet? Yes    Are your blood pressures ever more than 140 on the top number (systolic) OR more   than 90 on the bottom number (diastolic), for example 140/90? No    BP Readings from Last 2 Encounters:   06/18/21 120/80   12/29/20 128/80     Hemoglobin A1C (%)   Date Value   09/20/2021 7.1 (H)   06/18/2021 7.4 (H)   12/29/2020 6.6 (H)     LDL Cholesterol Calculated (mg/dL)   Date Value   09/20/2021 60   09/09/2020 48   04/05/2019 56         How many servings of fruits and vegetables do you eat daily?  2-3    On average, how many sweetened beverages do you drink each day (Examples: soda, juice, sweet tea, etc.  Do NOT count diet or artificially sweetened beverages)?   1    How many days per week do you  exercise enough to make your heart beat faster? 3 or less    How many minutes a day do you exercise enough to make your heart beat faster? 9 or less    How many days per week do you miss taking your medication? 0    See under ROS     Patient Active Problem List   Diagnosis     Fibromyalgia     Non-rheumatic tricuspid valve insufficiency     Papanicolaou smear of cervix with atypical squamous cells cannot exclude high grade squamous intraepithelial lesion (ASC-H)     Major depression in complete remission (H)     Narcolepsy     Hyperlipidemia LDL goal <100     Health Care Home     Reflex sympathetic dystrophy     Contraception     Elevated BMI     Chronic pain-Fibromyalgia     Type 2 diabetes mellitus without complication, without long-term current use of insulin (H)     Migraine with aura and without status migrainosus, not intractable     Elevated blood pressure reading without diagnosis of hypertension     Pulmonary hypertension (H)     Unexplained endometrial cells on cervical Pap smear     Thrombocytosis (H)     Anxiety     Post-op pain     Chronic, continuous use of opioids       Current Outpatient Medications   Medication Sig Dispense Refill     atorvastatin (LIPITOR) 10 MG tablet Take 1 tablet (10 mg) by mouth daily 90 tablet 2     B-D U/F insulin pen needle USE 1 PEN NEEDLES DAILY OR AS DIRECTED 100 each 1     blood glucose (NO BRAND SPECIFIED) test strip Use to test blood sugar 1 times daily or as directed. 100 strip 4     blood glucose monitoring (NO BRAND SPECIFIED) meter device kit Use to test blood sugar 1 times daily or as directed. Please give her all needed supplies with prn refills. 1 kit 0     busPIRone (BUSPAR) 15 MG tablet TAKE 1 TABLET BY MOUTH TWICE A  tablet 1     cetirizine (ZYRTEC) 10 MG tablet Take 10 mg by mouth daily as needed for allergies       citalopram (CELEXA) 40 MG tablet Take 1 tablet (40 mg) by mouth daily 90 tablet 1     cyanocobalamin (VITAMIN B-12) 1000 MCG tablet TAKE  1 TABLET BY MOUTH DAILY 90 tablet 0     cyclobenzaprine (FLEXERIL) 10 MG tablet TAKE 1 TABLET BY MOUTH THREE TIMES A DAY AS NEEDED FOR MUSCLE SPASMS 90 tablet 3     ibuprofen (ADVIL/MOTRIN) 800 MG tablet Take 1 tablet (800 mg) by mouth every 8 hours as needed for moderate pain 90 tablet 1     Lidocaine (LIDOCARE) 4 % Patch Place 1 patch onto the skin every 24 hours To prevent lidocaine toxicity, patient should be patch free for 12 hrs daily.       lisinopril (ZESTRIL) 5 MG tablet Take 1 tablet (5 mg) by mouth daily 90 tablet 1     metFORMIN (GLUCOPHAGE-XR) 500 MG 24 hr tablet TAKE 4 TABLETS BY MOUTH DAILY (WITH BREAKFAST) 360 tablet 0     traMADol (ULTRAM) 50 MG tablet TAKE 1-2 TABS BY MOUTH EVERY 8 HRS AS NEEDED FOR MODERATE PAIN MAX 8 TABS/24 HOURS, MAX 60 TBS/MONTH 60 tablet 0     traZODone (DESYREL) 50 MG tablet Take 2 tablets (100 mg) by mouth nightly as needed for sleep (insomnia) 180 tablet 1     triamcinolone (KENALOG) 0.1 % external cream Apply topically 2 times daily 15 g 0     VENTOLIN  (90 Base) MCG/ACT inhaler INHALE 2 PUFFS BY MOUTH EVERY 6 HOURS AS NEEDED FOR WHEEZE OR FOR SHORTNESS OF BREATH 18 g 1     VICTOZA PEN 18 MG/3ML soln INJECT 1.8 MG UNDER THE SKIN ONCE DAILY 9 mL 3         Review of Systems   CONSTITUTIONAL:NEGATIVE for fever, chills, change in weight x some gain.  ENT/MOUTH: NEGATIVE for ear, mouth and throat problems  RESP:NEGATIVE for significant cough or SOB  CV: NEGATIVE for chest pain, palpitations or peripheral edema  PSYCHIATRIC: NEGATIVE for changes in mood or affect    Notes sugars could be better; but thinks she knows why.  Diarrhea worse with fibro and she can see the pill in her poop.    Cut down on sugar pop and notes she is taking in less fruit.    Hot flashes have increased in the last 3 years; does get these with fibro. Wonders about menopause.  More hair loss.      Objective    /80 (BP Location: Right arm, Patient Position: Sitting, Cuff Size: Adult Large)    Pulse 77   Temp 98.6  F (37  C) (Oral)   Resp 16   Wt 114.4 kg (252 lb 1.6 oz)   LMP 05/14/2019   SpO2 98%   BMI 42.60 kg/m    Body mass index is 42.6 kg/m .  Physical Exam   GENERAL APPEARANCE: alert and no distress  RESP: lungs clear to auscultation - no rales, rhonchi or wheezes  CV: regular rates and rhythm  MS: no edema.   PSYCH: mentation appears normal and affect normal/bright    Lab Results   Component Value Date    A1C 7.1 09/20/2021    A1C 7.4 06/18/2021    A1C 6.6 12/29/2020    A1C 6.7 09/09/2020    A1C 6.4 11/26/2019    A1C 7.2 04/05/2019     Recent Labs   Lab Test 09/20/21  0819 09/09/20  0815 04/29/16  0827 04/28/15  0758 04/28/14  0813 04/28/14  0813   CHOL 151 123   < > 166   < > 151   HDL 58 50   < > 56   < > 42*   LDL 60 48   < > 82   < > 81   TRIG 166* 126   < > 142   < > 139   CHOLHDLRATIO  --   --   --  3.0  --  3.6    < > = values in this interval not displayed.     TSH   Date Value Ref Range Status   11/26/2019 2.92 0.40 - 4.00 mU/L Final

## 2021-09-23 DIAGNOSIS — E78.5 HYPERLIPIDEMIA LDL GOAL <100: ICD-10-CM

## 2021-09-23 LAB
FERRITIN SERPL-MCNC: 24 NG/ML (ref 8–252)
IRON SATN MFR SERPL: 12 % (ref 15–46)
IRON SERPL-MCNC: 48 UG/DL (ref 35–180)
TIBC SERPL-MCNC: 395 UG/DL (ref 240–430)
TSH SERPL DL<=0.005 MIU/L-ACNC: 2.31 MU/L (ref 0.4–4)

## 2021-10-07 ENCOUNTER — LAB (OUTPATIENT)
Dept: LAB | Facility: CLINIC | Age: 51
End: 2021-10-07
Payer: COMMERCIAL

## 2021-10-07 DIAGNOSIS — E61.1 LOW IRON: ICD-10-CM

## 2021-10-07 LAB
ERYTHROCYTE [DISTWIDTH] IN BLOOD BY AUTOMATED COUNT: 12.7 % (ref 10–15)
HCT VFR BLD AUTO: 37.8 % (ref 35–47)
HGB BLD-MCNC: 12.5 G/DL (ref 11.7–15.7)
MCH RBC QN AUTO: 30.5 PG (ref 26.5–33)
MCHC RBC AUTO-ENTMCNC: 33.1 G/DL (ref 31.5–36.5)
MCV RBC AUTO: 92 FL (ref 78–100)
PLATELET # BLD AUTO: 446 10E3/UL (ref 150–450)
RBC # BLD AUTO: 4.1 10E6/UL (ref 3.8–5.2)
WBC # BLD AUTO: 7 10E3/UL (ref 4–11)

## 2021-10-07 PROCEDURE — 85027 COMPLETE CBC AUTOMATED: CPT

## 2021-10-07 PROCEDURE — 36415 COLL VENOUS BLD VENIPUNCTURE: CPT

## 2021-10-10 ENCOUNTER — MYC REFILL (OUTPATIENT)
Dept: FAMILY MEDICINE | Facility: CLINIC | Age: 51
End: 2021-10-10

## 2021-10-10 DIAGNOSIS — M79.7 FIBROMYALGIA: ICD-10-CM

## 2021-10-10 DIAGNOSIS — F11.90 CHRONIC, CONTINUOUS USE OF OPIOIDS: ICD-10-CM

## 2021-10-11 RX ORDER — TRAMADOL HYDROCHLORIDE 50 MG/1
TABLET ORAL
Qty: 60 TABLET | Refills: 0 | Status: SHIPPED | OUTPATIENT
Start: 2021-10-11 | End: 2021-11-19

## 2021-10-11 NOTE — TELEPHONE ENCOUNTER
Tramadol 50 mg       Last Written Prescription Date:  8/18/2021  Last Fill Quantity: 60,   # refills: 0  Last Office Visit: 9/22/2021  Future Office visit:       Routing refill request to provider for review/approval because:  Drug not on the [facility], [facility] or [facility] refill protocol or controlled substance    Mabel SILVA RN

## 2021-11-02 ENCOUNTER — OFFICE VISIT (OUTPATIENT)
Dept: FAMILY MEDICINE | Facility: CLINIC | Age: 51
End: 2021-11-02
Payer: COMMERCIAL

## 2021-11-02 VITALS
DIASTOLIC BLOOD PRESSURE: 68 MMHG | HEART RATE: 72 BPM | RESPIRATION RATE: 16 BRPM | HEIGHT: 65 IN | TEMPERATURE: 98.9 F | BODY MASS INDEX: 42.07 KG/M2 | SYSTOLIC BLOOD PRESSURE: 114 MMHG | WEIGHT: 252.5 LBS

## 2021-11-02 DIAGNOSIS — E66.01 MORBID OBESITY (H): ICD-10-CM

## 2021-11-02 DIAGNOSIS — G47.00 INSOMNIA, UNSPECIFIED TYPE: ICD-10-CM

## 2021-11-02 DIAGNOSIS — F11.90 CHRONIC, CONTINUOUS USE OF OPIOIDS: ICD-10-CM

## 2021-11-02 DIAGNOSIS — F41.9 ANXIETY: ICD-10-CM

## 2021-11-02 DIAGNOSIS — M76.31 ILIOTIBIAL BAND SYNDROME, RIGHT: ICD-10-CM

## 2021-11-02 DIAGNOSIS — F32.5 MAJOR DEPRESSION IN COMPLETE REMISSION (H): ICD-10-CM

## 2021-11-02 DIAGNOSIS — L65.9 HAIR LOSS: ICD-10-CM

## 2021-11-02 DIAGNOSIS — E11.9 TYPE 2 DIABETES MELLITUS WITHOUT COMPLICATION, WITHOUT LONG-TERM CURRENT USE OF INSULIN (H): Primary | ICD-10-CM

## 2021-11-02 DIAGNOSIS — Z12.31 ENCOUNTER FOR SCREENING MAMMOGRAM FOR BREAST CANCER: ICD-10-CM

## 2021-11-02 DIAGNOSIS — I10 HYPERTENSION GOAL BP (BLOOD PRESSURE) < 130/80: ICD-10-CM

## 2021-11-02 DIAGNOSIS — M79.7 FIBROMYALGIA: ICD-10-CM

## 2021-11-02 PROBLEM — I27.20 PULMONARY HYPERTENSION (H): Status: RESOLVED | Noted: 2017-02-27 | Resolved: 2021-11-02

## 2021-11-02 PROBLEM — R87.618 UNEXPLAINED ENDOMETRIAL CELLS ON CERVICAL PAP SMEAR: Status: RESOLVED | Noted: 2018-04-23 | Resolved: 2021-11-02

## 2021-11-02 PROBLEM — G89.18 POST-OP PAIN: Status: RESOLVED | Noted: 2019-06-05 | Resolved: 2021-11-02

## 2021-11-02 PROCEDURE — 83001 ASSAY OF GONADOTROPIN (FSH): CPT | Performed by: FAMILY MEDICINE

## 2021-11-02 PROCEDURE — 36415 COLL VENOUS BLD VENIPUNCTURE: CPT | Performed by: FAMILY MEDICINE

## 2021-11-02 PROCEDURE — 84443 ASSAY THYROID STIM HORMONE: CPT | Performed by: FAMILY MEDICINE

## 2021-11-02 PROCEDURE — 99214 OFFICE O/P EST MOD 30 MIN: CPT | Performed by: FAMILY MEDICINE

## 2021-11-02 PROCEDURE — 84439 ASSAY OF FREE THYROXINE: CPT | Performed by: FAMILY MEDICINE

## 2021-11-02 PROCEDURE — 82306 VITAMIN D 25 HYDROXY: CPT | Performed by: FAMILY MEDICINE

## 2021-11-02 RX ORDER — TRAZODONE HYDROCHLORIDE 50 MG/1
100 TABLET, FILM COATED ORAL
Qty: 180 TABLET | Refills: 1 | Status: SHIPPED | OUTPATIENT
Start: 2021-11-02 | End: 2022-06-27

## 2021-11-02 RX ORDER — LISINOPRIL 5 MG/1
5 TABLET ORAL DAILY
Qty: 90 TABLET | Refills: 1 | Status: SHIPPED | OUTPATIENT
Start: 2021-11-02 | End: 2022-04-12

## 2021-11-02 RX ORDER — CITALOPRAM HYDROBROMIDE 40 MG/1
40 TABLET ORAL DAILY
Qty: 90 TABLET | Refills: 1 | Status: SHIPPED | OUTPATIENT
Start: 2021-11-02 | End: 2022-06-28

## 2021-11-02 RX ORDER — METFORMIN HCL 500 MG
TABLET, EXTENDED RELEASE 24 HR ORAL
Qty: 360 TABLET | Refills: 0 | Status: SHIPPED | OUTPATIENT
Start: 2021-11-02 | End: 2022-01-04

## 2021-11-02 RX ORDER — BUSPIRONE HYDROCHLORIDE 15 MG/1
TABLET ORAL
Qty: 180 TABLET | Refills: 1 | Status: SHIPPED | OUTPATIENT
Start: 2021-11-02 | End: 2022-05-16

## 2021-11-02 ASSESSMENT — MIFFLIN-ST. JEOR: SCORE: 1763.27

## 2021-11-02 NOTE — PROGRESS NOTES
Assessment & Plan     Type 2 diabetes mellitus without complication, without long-term current use of insulin (H) - suggested she check BS more regularly with freestyle leonardo  - Continuous Blood Gluc  (FREESTYLE LEONARDO 2 READER) CHUCKIE; 1 each once for 1 dose  - Continuous Blood Gluc Sensor (FREESTYLE LEONARDO 2 SENSOR) MISC; 1 each every 14 days 1 each every 14 days. Change every 14 days.  - metFORMIN (GLUCOPHAGE-XR) 500 MG 24 hr tablet; TAKE 4 TABLETS BY MOUTH DAILY (WITH BREAKFAST)    Morbid obesity (H) - will continue to monitor weight, likely a contributory factor to fibro pain and right hip pain.     Hair loss - labs as below, suggested daily iron use, follow up in 3 months for surveillance labs  - Vitamin D Deficiency; Future  - Follicle stimulating hormone; Future  - TSH; Future  - T4, free; Future  - Vitamin D Deficiency  - Follicle stimulating hormone  - TSH  - T4, free    Anxiety - stable, refills  - busPIRone (BUSPAR) 15 MG tablet; TAKE 1 TABLET BY MOUTH TWICE A DAY    Major depression in complete remission (H) - stable, refills  - citalopram (CELEXA) 40 MG tablet; Take 1 tablet (40 mg) by mouth daily    Hypertension goal BP (blood pressure) < 130/80 - on ACEI for renal protection  - lisinopril (ZESTRIL) 5 MG tablet; Take 1 tablet (5 mg) by mouth daily    Fibromyalgia - stable  - traZODone (DESYREL) 50 MG tablet; Take 2 tablets (100 mg) by mouth nightly as needed for sleep (insomnia)    Insomnia, unspecified type - stable, refills  - traZODone (DESYREL) 50 MG tablet; Take 2 tablets (100 mg) by mouth nightly as needed for sleep (insomnia)    Iliotibial band syndrome, right - discussed possible diagnosis, although doesn't fit typical scenario. Will have PT evaluate further and suggest treatment course.   - [facility] Referral; Future    Encounter for screening mammogram for breast cancer   - MA Screen Bilateral w/Alex; Future    Chronic, continuous use of opioids - doesn't need tramadol fills at  "this point, CSA signed today    Return in about 3 months (around 2/2/2022) for Diabetes Visit.    Moriah Hernandez MD  [facility]      Lisandro Page is a 51 year old who presents for the following health issues     History of Present Illness       She eats 2-3 servings of fruits and vegetables daily.She consumes 1 sweetened beverage(s) daily.She exercises with enough effort to increase her heart rate 9 or less minutes per day.  She exercises with enough effort to increase her heart rate 3 or less days per week.   She is taking medications regularly.       Right Hip Pain for several months, has gotten worse In the last month. Lateral hip, pain with all motions. Radiates to the front of the thigh, along the inguinal ligament. No n/t of the hip or thigh.     Has tried hot packing, lidocaine patches OTC.     Has fibromyalgia at baseline. Can manage reasonably well. Using tramadol 2-3 times per week to help with pain. Reports using more frequently when she is walking more.     Recent increase in walking on [place] vacation.     Notes history of sciatica. Symptoms are not similar.       Concerns with hair loss. Notes ongoing for several months. Was discovered she had low ferritin and iron levels. Was instructed to supplement but forgot dosing. S/p hysterectomy, unsure about perimenopause.         Review of Systems   Constitutional, HEENT, cardiovascular, pulmonary, gi and gu systems are negative, except as otherwise noted.      Objective    /68 (BP Location: Right arm, Patient Position: Sitting, Cuff Size: Adult Large)   Pulse 72   Temp 98.9  F (37.2  C) (Oral)   Resp 16   Ht 1.638 m (5' 4.5\")   Wt 114.5 kg (252 lb 8 oz)   LMP 05/14/2019   Breastfeeding No   BMI 42.67 kg/m    Body mass index is 42.67 kg/m .  Physical Exam   GENERAL: healthy, alert and no distress  MS: RLE exam shows normal strength and muscle mass, no deformities and ROM of all joints is normal, positive " Volodymyr test, negative RONNELL, tender to touch along iliac crest. ttp over the right lateral femoral epicondyle

## 2021-11-02 NOTE — LETTER
Opioid / Opioid Plus Controlled Substance Agreement    This is an agreement between you and your provider about the safe and appropriate use of controlled substance/opioids prescribed by your care team. Controlled substances are medicines that can cause physical and mental dependence (abuse).    There are strict laws about having and using these medicines. We here at [facility] are committing to working with you in your efforts to get better. To support you in this work, we ll help you schedule regular office appointments for medicine refills. If we must cancel or change your appointment for any reason, we ll make sure you have enough medicine to last until your next appointment.     As a Provider, I will:    Listen carefully to your concerns and treat you with respect.     Recommend a treatment plan that I believe is in your best interest. This plan may involve therapies other than opioid pain medication.     Talk with you often about the possible benefits, and the risk of harm of any medicine that we prescribe for you.     Provide a plan on how to taper (discontinue or go off) using this medicine if the decision is made to stop its use.    As a Patient, I understand that opioid(s):     Are a controlled substance prescribed by my care team to help me function or work and manage my condition(s).     Are strong medicines and can cause serious side effects such as:    Drowsiness, which can seriously affect my driving ability    A lower breathing rate, enough to cause death    Harm to my thinking ability     Depression     Abuse of and addiction to this medicine    Need to be taken exactly as prescribed. Combining opioids with certain medicines or chemicals (such as illegal drugs, sedatives, sleeping pills, and benzodiazepines) can be dangerous or even fatal. If I stop opioids suddenly, I may have severe withdrawal symptoms.    Do not work for all types of pain nor for all patients. If they re not helpful, I may  be asked to stop them.      The risks, benefits and side effects of these medicine(s) were explained to me. I agree that:  1. I will take part in other treatments as advised by my care team. This may be psychiatry or counseling, physical therapy, behavioral therapy, group treatment or a referral to a specialist.     2. I will keep all my appointments. I understand that this is part of the monitoring of opioids. My care team may require an office visit for EVERY opioid/controlled substance   refill. If I miss appointments or don t follow instructions, my care team may stop my medicine.    3. I will take my medicines as prescribed. I will not change the dose or schedule unless my care team tells me to. There will be no refills if I run out early.     4. I may be asked to come to the clinic and complete a urine drug test or complete a pill count at any time. If I don t give a urine sample or participate in a pill count, the care team may stop my medicine.    5. I will only receive prescriptions from this clinic for chronic pain. If I am treated by another provider for acute pain issues, I will tell them that I am taking opioid pain medication for chronic pain and that I have a treatment agreement with this provider. I will inform my [facility] care team within one business day if I am given a prescription for any pain medication by another healthcare provider. My [facility] care team can contact other providers and pharmacists about my use of any medicines.    6. It is up to me to make sure that I don t run out of my medicines on weekends or holidays. If my care team is willing to refill my opioid prescription without a visit, I must request refills only during office hours. Refills may take up to 3 business days to process. I will use one pharmacy to fill all my opioid and other controlled substance prescriptions. I will notify the clinic about any changes to my insurance or medication  availability.    7. I am responsible for my prescriptions. If the medicine/prescription is lost, stolen or destroyed, it will not be replaced. I also agree not to share controlled substance medicines with anyone.    8. I am aware I should not use any illegal or recreational drugs. I agree not to drink alcohol unless my care team says I can.       9. If I enroll in the [place] [facility] program, I will tell my care team prior to my next refill.     10. I will tell my care team right away if I become pregnant, have a new medical problem treated outside of my regular clinic, or have a change in my medications.    11. I understand that this medicine can affect my thinking, judgment and reaction time. Alcohol and drugs affect the brain and body, which can affect the safety of my driving. Being under the influence of alcohol or drugs can affect my decision-making, behaviors, personal safety, and the safety of others. Driving while impaired (DWI) can occur if a person is driving, operating, or in physical control of a car, motorcycle, boat, snowmobile, ATV, motorbike, off-road vehicle, or any other motor vehicle ([place] Statute 169A.20). I understand the risk if I choose to drive or operate any vehicle or machinery.    I understand that if I do not follow any of the conditions above, my prescriptions or treatment may be stopped or changed.          Opioids  What You Need to Know    What are opioids?   Opioids are pain medicines that must be prescribed by a doctor. They are also known as narcotics.     Examples are:   1. morphine (MS Contin, Page)  2. oxycodone (Oxycontin)  3. oxycodone and acetaminophen (Percocet)  4. hydrocodone and acetaminophen (Vicodin, Norco)   5. fentanyl patch (Duragesic)   6. hydromorphone (Dilaudid)   7. methadone  8. codeine (Tylenol #3)     What do opioids do well?   Opioids are best for severe short-term pain such as after a surgery or injury. They may work well for cancer pain. They may  help some people with long-lasting (chronic) pain.     What do opioids NOT do well?   Opioids never get rid of pain entirely, and they don t work well for most patients with chronic pain. Opioids don t reduce swelling, one of the causes of pain.                                    Other ways to manage chronic pain and improve function include:       Treat the health problem that may be causing pain    Anti-inflammation medicines, which reduce swelling and tenderness, such as ibuprofen (Advil, Motrin) or naproxen (Aleve)    Acetaminophen (Tylenol)    Antidepressants and anti-seizure medicines, especially for nerve pain    Topical treatments such as patches or creams    Injections or nerve blocks    Chiropractic or osteopathic treatment    Acupuncture, massage, deep breathing, meditation, visual imagery, aromatherapy    Use heat or ice at the pain site    Physical therapy     Exercise    Stop smoking    Take part in therapy       Risks and side effects     Talk to your doctor before you start or decide to keep taking opioids. Possible side effects include:      Lowering your breathing rate enough to cause death    Overdose, including death, especially if taking higher than prescribed doses    Worse depression symptoms; less pleasure in things you usually enjoy    Feeling tired or sluggish    Slower thoughts or cloudy thinking    Being more sensitive to pain over time; pain is harder to control    Trouble sleeping or restless sleep    Changes in hormone levels (for example, less testosterone)    Changes in sex drive or ability to have sex    Constipation    Unsafe driving    Itching and sweating    Dizziness    Nausea, throwing up and dry mouth    What else should I know about opioids?    Opioids may lead to dependence, tolerance, or addiction.      Dependence means that if you stop or reduce the medicine too quickly, you will have withdrawal symptoms. These include loose poop (diarrhea), jitters, flu-like symptoms,  nervousness and tremors. Dependence is not the same as addiction.                       Tolerance means needing higher doses over time to get the same effect. This may increase the chance of serious side effects.      Addiction is when people improperly use a substance that harms their body, their mind or their relations with others. Use of opiates can cause a relapse of addiction if you have a history of drug or alcohol abuse.      People who have used opioids for a long time may have a lower quality of life, worse depression, higher levels of pain and more visits to doctors.    You can overdose on opioids. Take these steps to lower your risk of overdose:    1. Recognize the signs:  Signs of overdose include decrease or loss of consciousness (blackout), slowed breathing, trouble waking up and blue lips. If someone is worried about overdose, they should call 911.    2. Talk to your doctor about Narcan (naloxone).   If you are at risk for overdose, you may be given a prescription for Narcan. This medicine very quickly reverses the effects of opioids.   If you overdose, a friend or family member can give you Narcan while waiting for the ambulance. They need to know the signs of overdose and how to give Narcan.     3. Don't use alcohol or street drugs.   Taking them with opioids can cause death.    4. Do not take any of these medicines unless your doctor says it s OK. Taking these with opioids can cause death:    Benzodiazepines, such as lorazepam (Ativan), alprazolam (Xanax) or diazepam (Valium)    Muscle relaxers, such as cyclobenzaprine (Flexeril)    Sleeping pills like zolpidem (Ambien)     Other opioids      How to keep you and other people safe while taking opioids:    1. Never share your opioids with others.  Opioid medicines are regulated by the Drug Enforcement Agency (ADRIAN). Selling or sharing medications is a criminal act.    2. Be sure to store opioids in a secure place, locked up if possible. Young children  can easily swallow them and overdose.    3. When you are traveling with your medicines, keep them in the original bottles. If you use a pill box, be sure you also carry a copy of your medicine list from your clinic or pharmacy.    4. Safe disposal of opioids    Most pharmacies have places to get rid of medicine, called disposal kiosks. Medicine disposal options are also available in every [facility]. Search your county and  medication disposal  to find more options. You can find more details at:  [email or web address]     I agree that my provider, clinic care team, and pharmacy may work with any city, state or federal law enforcement agency that investigates the misuse, sale, or other diversion of my controlled medicine. I will allow my provider to discuss my care with, or share a copy of, this agreement with any other treating provider, pharmacy or emergency room where I receive care.    I have read this agreement and have asked questions about anything I did not understand.    _______________________________________________________  Patient Signature - Kaylee Lopez _____________________                   Date     _______________________________________________________  Provider Signature - Moriah Hernandez MD   _____________________                   Date     _______________________________________________________  Witness Signature (required if provider not present while patient signing)   _____________________                   Date

## 2021-11-02 NOTE — PATIENT INSTRUCTIONS
Try ferrous gluconate 324 mg twice daily for the next 3 months.     Vitamin D - try [id number] international unit(s) daily in the colder months, 2000 international unit(s) daily during warmer months  Patient Education     Treatment for Iliotibial Band Syndrome  Iliotibial band syndrome, or IT band syndrome, is a condition that causes pain on the outside of the knee. It most often occurs in athletes, especially long-distance runners. It can happen if you cycle, ski, row, or play soccer. It can also occur in people who are starting to exercise.  Types of treatment  Treatment may include:    Avoiding any activity that makes your knee pain worse for a while (like running), and returning to this activity slowly over time    Icing the outside of your knee when it causes you pain    Taking over-the-counter pain medicines    Having corticosteroid injections, to reduce inflammation    Making changes to your activity, like lowering your bicycle seat for cycling or improving your running form    Practicing special exercises to stretch and strengthen the muscles around your hip and your knee  You may find it helpful to work with a physical therapist.  These treatments help most people with IT band syndrome. Your doctor may advise surgery if you still have severe symptoms after 6 months or more of other treatment. Your doctor will talk with you about the types of surgery.  Preventing IT band syndrome  You may be able to prevent IT band syndrome if you:    Run on even surfaces    Replace your running shoes often    Ease up on your training    On a track, make sure you run in both directions    Have an expert check your stance for running and other sporting activities    Stretch your outer thigh and hamstrings often  If you are new to exercise, start slowly. Increase your activity over time.  Talk with your doctor or  for more advice.  When to call the healthcare provider  Call your healthcare provider right away if you have  any of these:    Symptoms that get worse, or don t get better with treatment    New symptoms  Ann Marie last reviewed this educational content on 5/1/2018 2000-2021 [employer]. All rights reserved. This information is not intended as a substitute for professional medical care. Always follow your healthcare professional's instructions.           Patient Education     Understanding Iliotibial Band Syndrome  Iliotibial band syndrome, or IT band syndrome, is a condition that causes pain on the outside of the knee. It most often occurs in athletes, especially long-distance runners. It can also happen if you cycle, ski, row, or play soccer. It can also occur in people who are just starting to exercise.   What is the IT band?  The iliotibial (IT) band is a strong, thick band of tissue that runs down the outside of your thigh. It goes all the way from your hipbones to the top of your shinbone (tibia), just below your knee joint. The bones of your knee joint include your tibia, your thighbone (femur), and your kneecap (patella).   When you bend and straighten your leg, the IT band moves over the outer lower edge of your femur. Over time, bending and straightening your leg can cause the IT band to irritate nearby tissues and cause pain.   What causes IT band syndrome?  Researchers are still learning the exact cause of IT band syndrome. The pain may be caused by the IT band rubbing over the lower outer edge of the femur. This may cause inflammation in the bone, tendons, and small fluid-filled sacs (bursa) in the area. The IT band may also squeeze (compress) the tissue under it and cause pain.   If you are a runner, you might be more likely to develop IT band syndrome if:    You run on uneven ground or down hills    You run on worn-out shoes    You run many miles per day    Your legs bend in a little from your knee to your ankle (bowlegs)  What are the symptoms of IT band syndrome?  IT band syndrome causes pain  on the outside of the knee or side of the thigh. It might affect one or both of your knees. The pain is an aching, burning feeling that can spread up the thigh to the hip. You may feel this pain only when you exercise, such as while running. The pain may be worst right after you step on your foot. It may only happen near the end of your exercise. As the condition gets worse, pain may start earlier and continue after you have stopped exercising. Going up and down the stairs may make the pain worse.   How is IT band syndrome diagnosed?  Your healthcare provider will ask about your health history and your symptoms. He or she will give you a physical exam. This will include an exam of your knee. The range of motion and strength of your knee will be tested. The provider will look for areas of pain around your knee, thigh, and hip. The symptoms of IT band syndrome can be like those of osteoarthritis or a meniscal tear. Your provider will need to make sure IT band syndrome is the cause of your symptoms. If the diagnosis is not clear, you may need imaging tests. These can include an X-ray or MRI scan.   Ann Marie last reviewed this educational content on 4/1/2020 2000-2021 [employer]. All rights reserved. This information is not intended as a substitute for professional medical care. Always follow your healthcare professional's instructions.

## 2021-11-03 ENCOUNTER — TELEPHONE (OUTPATIENT)
Dept: FAMILY MEDICINE | Facility: CLINIC | Age: 51
End: 2021-11-03
Payer: COMMERCIAL

## 2021-11-03 DIAGNOSIS — I10 HYPERTENSION GOAL BP (BLOOD PRESSURE) < 130/80: Primary | ICD-10-CM

## 2021-11-03 LAB
DEPRECATED CALCIDIOL+CALCIFEROL SERPL-MC: 23 UG/L (ref 20–75)
FSH SERPL-ACNC: 43.9 IU/L
T4 FREE SERPL-MCNC: 0.85 NG/DL (ref 0.76–1.46)
TSH SERPL DL<=0.005 MIU/L-ACNC: 2.24 MU/L (ref 0.4–4)

## 2021-11-03 NOTE — TELEPHONE ENCOUNTER
Please see message below regarding lack of coverage of continuous glucose monitoring.    Suman COLLINS RN

## 2021-11-03 NOTE — TELEPHONE ENCOUNTER
Hello,     Patient has [employer] [place] as their primary coverage and does not meet guidelines for coverage of CGM. Patient must be on insulin at least 3 times daily and testing blood sugar at least 4 times daily.    We reached out to patient and let them know they do not meet their coverage policy.    Thank you,  [facility] Team  [phone]

## 2021-11-04 ENCOUNTER — THERAPY VISIT (OUTPATIENT)
Dept: PHYSICAL THERAPY | Facility: CLINIC | Age: 51
End: 2021-11-04
Attending: FAMILY MEDICINE
Payer: COMMERCIAL

## 2021-11-04 DIAGNOSIS — M76.31 ILIOTIBIAL BAND SYNDROME, RIGHT: ICD-10-CM

## 2021-11-04 PROCEDURE — 97161 PT EVAL LOW COMPLEX 20 MIN: CPT | Mod: GP | Performed by: PHYSICAL THERAPIST

## 2021-11-04 PROCEDURE — 97112 NEUROMUSCULAR REEDUCATION: CPT | Mod: GP | Performed by: PHYSICAL THERAPIST

## 2021-11-04 PROCEDURE — 97110 THERAPEUTIC EXERCISES: CPT | Mod: GP | Performed by: PHYSICAL THERAPIST

## 2021-11-04 ASSESSMENT — ACTIVITIES OF DAILY LIVING (ADL)
HOS_ADL_ITEM_SCORE_TOTAL: 37
TWISTING/PIVOTING_ON_INVOLVED_LEG: SLIGHT DIFFICULTY
RECREATIONAL_ACTIVITIES: MODERATE DIFFICULTY
SITTING_FOR_15_MINUTES: SLIGHT DIFFICULTY
WALKING_UP_STEEP_HILLS: MODERATE DIFFICULTY
WALKING_APPROXIMATELY_10_MINUTES: SLIGHT DIFFICULTY
HOS_ADL_COUNT: 17
WALKING_DOWN_STEEP_HILLS: MODERATE DIFFICULTY
GETTING_INTO_AND_OUT_OF_A_BATHTUB: MODERATE DIFFICULTY
GOING_DOWN_1_FLIGHT_OF_STAIRS: MODERATE DIFFICULTY
ROLLING_OVER_IN_BED: SLIGHT DIFFICULTY
STANDING_FOR_15_MINUTES: MODERATE DIFFICULTY
PUTTING_ON_SOCKS_AND_SHOES: SLIGHT DIFFICULTY
HEAVY_WORK: MODERATE DIFFICULTY
STEPPING_UP_AND_DOWN_CURBS: MODERATE DIFFICULTY
HOW_WOULD_YOU_RATE_YOUR_CURRENT_LEVEL_OF_FUNCTION_DURING_YOUR_USUAL_ACTIVITIES_OF_DAILY_LIVING_FROM_0_TO_100_WITH_100_BEING_YOUR_LEVEL_OF_FUNCTION_PRIOR_TO_YOUR_HIP_PROBLEM_AND_0_BEING_THE_INABILITY_TO_PERFORM_ANY_OF_YOUR_USUAL_DAILY_ACTIVITIES?: 80
HOS_ADL_HIGHEST_POTENTIAL_SCORE: 68
LIGHT_TO_MODERATE_WORK: MODERATE DIFFICULTY
WALKING_15_MINUTES_OR_GREATER: MODERATE DIFFICULTY
HOS_ADL_SCORE(%): 54.41
DEEP_SQUATTING: EXTREME DIFFICULTY
WALKING_INITIALLY: SLIGHT DIFFICULTY
GOING_UP_1_FLIGHT_OF_STAIRS: MODERATE DIFFICULTY
GETTING_INTO_AND_OUT_OF_AN_AVERAGE_CAR: MODERATE DIFFICULTY

## 2021-11-04 NOTE — PROGRESS NOTES
Physical Therapy Initial Evaluation  Subjective:  The history is provided by the patient. No  was used.   Patient Health History  Kaylee Lopez being seen for IT band syndrome.     Problem began: 8/1/2021.   Problem occurred: Not sure   Pain is reported as 4/10 on pain scale.  General health as reported by patient is good.  Pertinent medical history includes: anemia, depression, diabetes, fibromyalgia, heart problems, high blood pressure, menopausal, mental illness, migraines/headaches, numbness/tingling, overweight, pain at night/rest and weakness.     Medical allergies: other. Other medical allergies details: Tylenol with codine.   Surgeries include:  Other. Other surgery history details: Gallblader removal, mass removal from uterus, hyserectomy.    Current medications:  Anti-depressants, anti-inflammatory, high blood pressure medication, muscle relaxants, pain medication, sleep medication and other. Other medications details: Cholesteral med, dibetes meds.    Current occupation is [employer]  for [employer].   Primary job tasks include:  Computer work.                  Therapist Generated HPI Evaluation  Problem details: Pt c/o R hip pain since beginning 8/2021.  Denies injury.  MD order date 11/2/2021.  States fibro pain can mask or flare hip pain.  Also history of LBP that she feels may also aggravate hip pain..         Type of problem:  Right hip.    This is a new condition.  Condition occurred with:  Insidious onset.  Where condition occurred: for unknown reasons.  Patient reports pain:  IT band and lateral.  Pain is described as aching   Pain is the same all the time.  Since onset symptoms are unchanged.  Associated symptoms:  Loss of motion/stiffness (fibro tingling in R ant thigh for years). Symptoms are exacerbated by bending/squatting, ascending stairs, kneeling, standing, sitting, walking, descending stairs and transfers  and relieved by rest and  heat.      Restrictions due to condition include:  Working in normal job without restrictions.  Barriers include:  None as reported by patient.                        Objective:    Gait:    Gait Type:  Antalgic   Assistive Devices:  None      Flexibility/Screens:   Positive screens:  Hip and Lumbar                   Lumbar/SI Evaluation  ROM:  Arom wnl lumbar: reporducable R hip pain with Lx testing.  AROM Lumbar:   Flexion:          Min/mod loss +/-  Ext:                    Min loss +   Side Bend:        Left:  Min loss +    Right:  ERT/P  Rotation:           Left:     Right:   Side Glide:        Left:     Right:         Strength: poor core stab recruitment                                                      Hip Evaluation  Hip PROM:    Flexion: Left:  Right: 95 +    Abduction: Left:   Right: 40+    Internal Rotation: Left:   Right: 30+  External Rotation: Left:   Right: 35+              Hip Strength:    Flexion:   Right: 4+ and 4/5   +  Pain:                    Extension:  Right: 4-/5    +  Pain:    Abduction:  Right: 4-/5   +   Pain:  Adduction:  Right: 4+/5   +  Pain:  Internal Rotation:  Right: 4/5   +  Pain:  External Rotation:  Right: 4/5   +  Pain:            Hip Special Testing:       Right hip positive for the following special tests:  Luma and Fadir/Labrum    Hip Palpation:      Right hip tenderness present at:  IT Band; Iliac Crest and Gluteus Medius  Functional Testing:          Quad:    Single leg squat:   Left:   Unable   Right:  Unable     Bilateral leg squat: Shallow height +      Proprioception:    Stork balance test:   Left:    2-3 sec  Right:  ++ limited  % of Uninvolved:                General     ROS    Assessment/Plan:    Patient is a 51 year old female with right side hip complaints.    Patient has the following significant findings with corresponding treatment plan.                Diagnosis 1:  R hip pain   Pain -  hot/cold therapy, US, manual therapy, directional preference exercise and  home program  Decreased ROM/flexibility - manual therapy and therapeutic exercise  Decreased strength - therapeutic exercise and therapeutic activities  Decreased proprioception - neuro re-education and therapeutic activities  Impaired gait - gait training  Impaired muscle performance - neuro re-education  Decreased function - therapeutic activities  Impaired posture - neuro re-education    Therapy Evaluation Codes:   Cumulative Therapy Evaluation is: Low complexity.    Previous and current functional limitations:  (See Goal Flow Sheet for this information)    Short term and Long term goals: (See Goal Flow Sheet for this information)     Communication ability:  Patient appears to be able to clearly communicate and understand verbal and written communication and follow directions correctly.  Treatment Explanation - The following has been discussed with the patient:   RX ordered/plan of care  Anticipated outcomes  Possible risks and side effects  This patient would benefit from PT intervention to resume normal activities.   Rehab potential is good.    Frequency:  1 X week, once daily  Duration:  for 8 weeks  Discharge Plan:  Achieve all LTG.  Independent in home treatment program.  Reach maximal therapeutic benefit.    Please refer to the daily flowsheet for treatment today, total treatment time and time spent performing 1:1 timed codes.

## 2021-11-08 NOTE — TELEPHONE ENCOUNTER
Called and spoke with patient. Per notes below, patient was contacted by pharmacy regarding insurance denial for coverage of CGM. Patient is not currently on insulin and hopes to avoid being placed on insulin. Patient will continue with DM medications (metformin and victoza) as well as work on diet and exercise and will follow up as scheduled regarding continued A1c monitoring. No further action needed at this time.     Kalia GORMAN RN

## 2021-11-11 ENCOUNTER — THERAPY VISIT (OUTPATIENT)
Dept: PHYSICAL THERAPY | Facility: CLINIC | Age: 51
End: 2021-11-11
Payer: COMMERCIAL

## 2021-11-11 DIAGNOSIS — M76.31 ILIOTIBIAL BAND SYNDROME, RIGHT: ICD-10-CM

## 2021-11-11 PROCEDURE — 97035 APP MDLTY 1+ULTRASOUND EA 15: CPT | Mod: GP | Performed by: PHYSICAL THERAPIST

## 2021-11-11 PROCEDURE — 97110 THERAPEUTIC EXERCISES: CPT | Mod: GP | Performed by: PHYSICAL THERAPIST

## 2021-11-11 PROCEDURE — 97112 NEUROMUSCULAR REEDUCATION: CPT | Mod: GP | Performed by: PHYSICAL THERAPIST

## 2021-11-15 ENCOUNTER — IMMUNIZATION (OUTPATIENT)
Dept: NURSING | Facility: CLINIC | Age: 51
End: 2021-11-15
Payer: COMMERCIAL

## 2021-11-15 PROCEDURE — 91306 COVID-19,PF,MODERNA (18+ YRS BOOSTER .25ML): CPT

## 2021-11-15 PROCEDURE — 0064A COVID-19,PF,MODERNA (18+ YRS BOOSTER .25ML): CPT

## 2021-11-19 ENCOUNTER — MYC REFILL (OUTPATIENT)
Dept: FAMILY MEDICINE | Facility: CLINIC | Age: 51
End: 2021-11-19
Payer: COMMERCIAL

## 2021-11-19 DIAGNOSIS — M79.7 FIBROMYALGIA: ICD-10-CM

## 2021-11-19 DIAGNOSIS — F11.90 CHRONIC, CONTINUOUS USE OF OPIOIDS: ICD-10-CM

## 2021-11-19 RX ORDER — TRAMADOL HYDROCHLORIDE 50 MG/1
50 TABLET ORAL 2 TIMES DAILY PRN
Qty: 60 TABLET | Refills: 0 | Status: SHIPPED | OUTPATIENT
Start: 2021-11-19 | End: 2022-01-04

## 2021-11-19 NOTE — TELEPHONE ENCOUNTER
traMADol (ULTRAM) 50 MG tablet         Last Written Prescription Date:  10/11/2021  Last Fill Quantity: 60,   # refills: 0  Last Office Visit: 11/2/2021  Future Office visit:    Next 5 appointments (look out 90 days)    Dec 20, 2021  8:15 AM  Return Visit with Frederic Ag MD  [facility] ([facility] ) [address] [place] [id number]  [phone]           Routing refill request to provider for review/approval because:  Drug not on the [facility], [facility] or [facility] refill protocol or controlled substance.    Erica Callahan RN

## 2021-11-30 DIAGNOSIS — E53.8 VITAMIN B12 DEFICIENCY (NON ANEMIC): ICD-10-CM

## 2021-12-01 RX ORDER — LANOLIN ALCOHOL/MO/W.PET/CERES
CREAM (GRAM) TOPICAL
Qty: 90 TABLET | Refills: 0 | Status: SHIPPED | OUTPATIENT
Start: 2021-12-01

## 2021-12-01 NOTE — TELEPHONE ENCOUNTER
Prescription approved per [facility] [place].  Erica Callahan RN

## 2021-12-02 ENCOUNTER — OFFICE VISIT (OUTPATIENT)
Dept: URGENT CARE | Facility: URGENT CARE | Age: 51
End: 2021-12-02
Payer: COMMERCIAL

## 2021-12-02 VITALS
BODY MASS INDEX: 41.99 KG/M2 | HEART RATE: 107 BPM | WEIGHT: 252 LBS | DIASTOLIC BLOOD PRESSURE: 84 MMHG | SYSTOLIC BLOOD PRESSURE: 130 MMHG | HEIGHT: 65 IN | TEMPERATURE: 98.8 F | OXYGEN SATURATION: 98 %

## 2021-12-02 DIAGNOSIS — H60.92 OTITIS EXTERNA OF LEFT EAR, UNSPECIFIED CHRONICITY, UNSPECIFIED TYPE: Primary | ICD-10-CM

## 2021-12-02 PROCEDURE — 99213 OFFICE O/P EST LOW 20 MIN: CPT | Performed by: FAMILY MEDICINE

## 2021-12-02 RX ORDER — OFLOXACIN 3 MG/ML
10 SOLUTION AURICULAR (OTIC) DAILY
Qty: 4 ML | Refills: 0 | Status: SHIPPED | OUTPATIENT
Start: 2021-12-02 | End: 2021-12-09

## 2021-12-02 ASSESSMENT — MIFFLIN-ST. JEOR: SCORE: 1761

## 2021-12-02 NOTE — PROGRESS NOTES
Assessment & Plan     Kaylee was seen today for ear problem.    Diagnoses and all orders for this visit:    Otitis externa of left ear, unspecified chronicity, unspecified type  -     ofloxacin (FLOXIN) 0.3 % otic solution; Place 10 drops Into the left ear daily for 7 days    Differential was considered.  Doubt otitis media, based on exam.  Doubt temporal arteritis, based on history and the patient's age.    Discussed risks and benefits of treatment strategies.    Patient Instructions     Use over-the-counter medications, only as discussed.    Floxin eardrops, as prescribed.    Avoid using Q-tips.    Recheck in 48 hours if your symptoms have not significantly improved.    Follow up in the ER immediately if:  you develop a fever, your pain/symptoms worsen, or you have other emergent symptoms.       Return for Follow up, as noted in Patient Instructions.    Rhonda Puente MD  [facility]    Lisandro Page is a 51 year old female who presents to clinic today for the following health issues:  Chief Complaint   Patient presents with     Ear Problem     left ear pain,  swelling, also drainage from her left ear x last night-- pain radiates into her left jaw     HPI - Left Ear Pain    The patient presents with left ear pain, starting last evening.  Pain is moderate to severe.  Patient has not taken OTC medications.  No trauma/injury.  Left ear feels a bit swollen, with a small amount of clear drainage noted on a piece of tissue paper this morning.  Hearing seems a bit muffled on the left.  Patient denies taking antibiotics recently.  No fever, chills, nausea, vomiting, significant nasal congestion, dizziness, chest pain, shortness of breath, vision concerns, or acute neurologic deficits.    -Patient presents to rule out an ear infection.  -She has a known history of type 2 diabetes (on Metformin), but she is not checking her blood sugars.  -Patient reports a history of  "TMJ.    Review of Systems      Objective    /84 (BP Location: Right arm, Patient Position: Chair, Cuff Size: Adult Regular)   Pulse 107   Temp 98.8  F (37.1  C) (Oral)   Ht 1.638 m (5' 4.5\")   Wt 114.3 kg (252 lb)   LMP 05/14/2019   SpO2 98%   Breastfeeding No   BMI 42.59 kg/m    Physical Exam   GENERAL APPEARANCE:  Awake, alert, and in no acute distress.  PSYCHIATRIC:  Pleasant, smiling affect.  HEENT:  Wears glasses.  Sclera anicteric.  No conjunctivitis.  PERRLA.  Extraocular movements are intact.  Mild edema and erythema are noted involving the left outer ear canal, with tenderness to palpation noted over the left tragus and with retraction of the left pinna.  Left pinna and tragus are within normal limits.  Bilateral TM's are within normal limits.  Right ear canal is within normal limits.  No obvious nasal congestion.  No erythema, edema, or exudates of the oral mucosa or posterior pharynx.  Mucous membranes moist.  NECK:  Spontaneous full range of motion.  No thyromegaly or mass.  No lymphadenopathy.  HEART:  Normal S1, S2.  Regular rate and rhythm.  No murmurs, rubs, or gallops.  LUNGS:  No respiratory distress.  No wheezes, rales, or rhonchi.  EXTREMITIES:  Moves 4 extremities.     NEUROLOGIC:  Gait within normal limits.  No facial droop or acute neurologic deficits.  SKIN:  No rash.          "

## 2021-12-02 NOTE — PATIENT INSTRUCTIONS
Use over-the-counter medications, only as discussed.    Floxin eardrops, as prescribed.    Avoid using Q-tips.    Recheck in 48 hours if your symptoms have not significantly improved.    Follow up in the ER immediately if:  you develop a fever, your pain/symptoms worsen, or you have other emergent symptoms.

## 2021-12-09 ENCOUNTER — THERAPY VISIT (OUTPATIENT)
Dept: PHYSICAL THERAPY | Facility: CLINIC | Age: 51
End: 2021-12-09
Payer: COMMERCIAL

## 2021-12-09 DIAGNOSIS — M76.31 ILIOTIBIAL BAND SYNDROME, RIGHT: ICD-10-CM

## 2021-12-09 PROCEDURE — 97112 NEUROMUSCULAR REEDUCATION: CPT | Mod: GP | Performed by: PHYSICAL THERAPIST

## 2021-12-09 PROCEDURE — 97035 APP MDLTY 1+ULTRASOUND EA 15: CPT | Mod: GP | Performed by: PHYSICAL THERAPIST

## 2021-12-09 PROCEDURE — 97110 THERAPEUTIC EXERCISES: CPT | Mod: GP | Performed by: PHYSICAL THERAPIST

## 2021-12-17 DIAGNOSIS — M79.7 FIBROMYALGIA: ICD-10-CM

## 2021-12-17 RX ORDER — CYCLOBENZAPRINE HCL 10 MG
TABLET ORAL
Qty: 90 TABLET | Refills: 3 | Status: SHIPPED | OUTPATIENT
Start: 2021-12-17 | End: 2022-04-22

## 2021-12-23 ENCOUNTER — HOSPITAL ENCOUNTER (OUTPATIENT)
Dept: CARDIOLOGY | Facility: CLINIC | Age: 51
Discharge: HOME | End: 2021-12-23
Attending: INTERNAL MEDICINE | Admitting: INTERNAL MEDICINE
Payer: COMMERCIAL

## 2021-12-23 DIAGNOSIS — I10 HYPERTENSION GOAL BP (BLOOD PRESSURE) < 130/80: ICD-10-CM

## 2021-12-23 LAB — LVEF ECHO: NORMAL

## 2021-12-23 PROCEDURE — 93306 TTE W/DOPPLER COMPLETE: CPT | Mod: 26 | Performed by: INTERNAL MEDICINE

## 2021-12-23 PROCEDURE — 255N000002 HC RX 255 OP 636: Performed by: INTERNAL MEDICINE

## 2021-12-23 PROCEDURE — 999N000208 ECHOCARDIOGRAM COMPLETE

## 2021-12-23 RX ADMIN — HUMAN ALBUMIN MICROSPHERES AND PERFLUTREN 3 ML: 10; .22 INJECTION, SOLUTION INTRAVENOUS at 09:38

## 2022-01-01 ASSESSMENT — PATIENT HEALTH QUESTIONNAIRE - PHQ9
SUM OF ALL RESPONSES TO PHQ QUESTIONS 1-9: 1
SUM OF ALL RESPONSES TO PHQ QUESTIONS 1-9: 1
10. IF YOU CHECKED OFF ANY PROBLEMS, HOW DIFFICULT HAVE THESE PROBLEMS MADE IT FOR YOU TO DO YOUR WORK, TAKE CARE OF THINGS AT HOME, OR GET ALONG WITH OTHER PEOPLE: NOT DIFFICULT AT ALL

## 2022-01-02 ASSESSMENT — PATIENT HEALTH QUESTIONNAIRE - PHQ9: SUM OF ALL RESPONSES TO PHQ QUESTIONS 1-9: 1

## 2022-01-04 ENCOUNTER — OFFICE VISIT (OUTPATIENT)
Dept: FAMILY MEDICINE | Facility: CLINIC | Age: 52
End: 2022-01-04
Payer: COMMERCIAL

## 2022-01-04 VITALS
SYSTOLIC BLOOD PRESSURE: 124 MMHG | HEIGHT: 64 IN | TEMPERATURE: 98.2 F | OXYGEN SATURATION: 98 % | HEART RATE: 104 BPM | WEIGHT: 251.2 LBS | DIASTOLIC BLOOD PRESSURE: 76 MMHG | RESPIRATION RATE: 20 BRPM | BODY MASS INDEX: 42.88 KG/M2

## 2022-01-04 DIAGNOSIS — E78.5 HYPERLIPIDEMIA LDL GOAL <100: ICD-10-CM

## 2022-01-04 DIAGNOSIS — F32.0 CURRENT MILD EPISODE OF MAJOR DEPRESSIVE DISORDER WITHOUT PRIOR EPISODE (H): ICD-10-CM

## 2022-01-04 DIAGNOSIS — M79.7 FIBROMYALGIA: ICD-10-CM

## 2022-01-04 DIAGNOSIS — E66.01 MORBID OBESITY (H): ICD-10-CM

## 2022-01-04 DIAGNOSIS — F11.90 CHRONIC, CONTINUOUS USE OF OPIOIDS: ICD-10-CM

## 2022-01-04 DIAGNOSIS — E11.9 TYPE 2 DIABETES MELLITUS WITHOUT COMPLICATION, WITHOUT LONG-TERM CURRENT USE OF INSULIN (H): Primary | ICD-10-CM

## 2022-01-04 DIAGNOSIS — E55.9 VITAMIN D DEFICIENCY: ICD-10-CM

## 2022-01-04 DIAGNOSIS — E61.1 IRON DEFICIENCY: ICD-10-CM

## 2022-01-04 PROBLEM — F32.5 MAJOR DEPRESSION IN COMPLETE REMISSION (H): Status: ACTIVE | Noted: 2022-01-04

## 2022-01-04 PROBLEM — M76.31 ILIOTIBIAL BAND SYNDROME, RIGHT: Status: RESOLVED | Noted: 2021-11-04 | Resolved: 2022-01-04

## 2022-01-04 PROBLEM — F32.5 MAJOR DEPRESSION IN COMPLETE REMISSION (H): Status: RESOLVED | Noted: 2022-01-04 | Resolved: 2022-01-04

## 2022-01-04 PROBLEM — D75.839 THROMBOCYTOSIS: Status: RESOLVED | Noted: 2018-10-15 | Resolved: 2022-01-04

## 2022-01-04 LAB
HBA1C MFR BLD: 7.6 % (ref 0–5.6)
HOLD SPECIMEN: NORMAL
HOLD SPECIMEN: NORMAL
IRON SATN MFR SERPL: 28 % (ref 15–46)
IRON SERPL-MCNC: 110 UG/DL (ref 35–180)
TIBC SERPL-MCNC: 395 UG/DL (ref 240–430)

## 2022-01-04 PROCEDURE — 83550 IRON BINDING TEST: CPT | Performed by: FAMILY MEDICINE

## 2022-01-04 PROCEDURE — 99214 OFFICE O/P EST MOD 30 MIN: CPT | Performed by: FAMILY MEDICINE

## 2022-01-04 PROCEDURE — 96127 BRIEF EMOTIONAL/BEHAV ASSMT: CPT | Performed by: FAMILY MEDICINE

## 2022-01-04 PROCEDURE — 83036 HEMOGLOBIN GLYCOSYLATED A1C: CPT | Performed by: FAMILY MEDICINE

## 2022-01-04 PROCEDURE — 82043 UR ALBUMIN QUANTITATIVE: CPT | Performed by: FAMILY MEDICINE

## 2022-01-04 PROCEDURE — 99207 PR FOOT EXAM NO CHARGE: CPT | Mod: 25 | Performed by: FAMILY MEDICINE

## 2022-01-04 PROCEDURE — 36415 COLL VENOUS BLD VENIPUNCTURE: CPT | Performed by: FAMILY MEDICINE

## 2022-01-04 PROCEDURE — 82306 VITAMIN D 25 HYDROXY: CPT | Performed by: FAMILY MEDICINE

## 2022-01-04 RX ORDER — TRAMADOL HYDROCHLORIDE 50 MG/1
50 TABLET ORAL 2 TIMES DAILY PRN
Qty: 60 TABLET | Refills: 0 | Status: SHIPPED | OUTPATIENT
Start: 2022-01-04 | End: 2022-03-23

## 2022-01-04 RX ORDER — METFORMIN HCL 500 MG
TABLET, EXTENDED RELEASE 24 HR ORAL
Qty: 360 TABLET | Refills: 0 | Status: SHIPPED | OUTPATIENT
Start: 2022-01-04 | End: 2022-04-12

## 2022-01-04 RX ORDER — FLURBIPROFEN SODIUM 0.3 MG/ML
SOLUTION/ DROPS OPHTHALMIC
Qty: 12 EACH | Refills: 0 | Status: SHIPPED | OUTPATIENT
Start: 2022-01-04 | End: 2022-05-17

## 2022-01-04 ASSESSMENT — MIFFLIN-ST. JEOR: SCORE: 1749.44

## 2022-01-04 NOTE — PROGRESS NOTES
Assessment & Plan     Type 2 diabetes mellitus without complication, without long-term current use of insulin (H) - recently worsened, following the holidays. Will change from victoza to ozempic for option of weight loss. She will reach out in a couple weeks with BS readings, can make adjustments based on this.   - Hemoglobin A1c; Future  - Albumin Random Urine Quantitative with Creat Ratio; Future  - Hemoglobin A1c  - Albumin Random Urine Quantitative with Creat Ratio  - insulin pen needle (B-D U/F) 31G X 5 MM miscellaneous; Use 1 pen needles weekly or as directed.  - Semaglutide, 1 MG/DOSE, 4 MG/3ML SOPN; Inject 1 mg Subcutaneous once a week  - metFORMIN (GLUCOPHAGE-XR) 500 MG 24 hr tablet; TAKE 4 TABLETS BY MOUTH DAILY (WITH BREAKFAST)  - FOOT EXAM    Morbid obesity (H) - ozempic for weight loss as well as diabetes control.     Hyperlipidemia LDL goal <100 - on statin, continue    Fibromyalgia - stable, refills  - traMADol (ULTRAM) 50 MG tablet; Take 1 tablet (50 mg) by mouth 2 times daily as needed for severe pain MAX 60 TBS/MONTH, can fill 11/19/21    Chronic, continuous use of opioids  - traMADol (ULTRAM) 50 MG tablet; Take 1 tablet (50 mg) by mouth 2 times daily as needed for severe pain MAX 60 TBS/MONTH, can fill 11/19/21    Iron deficiency - surveillance labs, taking her supplements regularly  - Iron and iron binding capacity; Future  - Iron and iron binding capacity    Vitamin D deficiency - surveillance labs, taking supplements regularly.   - Vitamin D Deficiency; Future  - Vitamin D Deficiency    Current mild episode of major depressive disorder without prior episode (H) - stable, continue current    Return in about 3 months (around 4/4/2022) for Diabetes Visit.    Moriah Hernandez MD  [facility]   Kaylee is a 51 year old who presents for the following health issues     HPI     Diabetes Follow-up    How often are you checking your blood sugar? A few times a  "month  What time of day are you checking your blood sugars (select all that apply)?  After meals  Have you had any blood sugars above 200?  Yes a few.  Have you had any blood sugars below 70?  No    What symptoms do you notice when your blood sugar is low?  None    What concerns do you have today about your diabetes? None     Do you have any of these symptoms? (Select all that apply)  No numbness or tingling in feet.  No redness, sores or blisters on feet.  No complaints of excessive thirst.  No reports of blurry vision.  No significant changes to weight.      BP Readings from Last 2 Encounters:   01/04/22 124/76   12/02/21 130/84     Hemoglobin A1C POCT (%)   Date Value   06/18/2021 7.4 (H)   12/29/2020 6.6 (H)     Hemoglobin A1C (%)   Date Value   01/04/2022 7.6 (H)   09/20/2021 7.1 (H)     LDL Cholesterol Calculated (mg/dL)   Date Value   09/20/2021 60   09/09/2020 48   04/05/2019 56         How many servings of fruits and vegetables do you eat daily?  2-3    On average, how many sweetened beverages do you drink each day (Examples: soda, juice, sweet tea, etc.  Do NOT count diet or artificially sweetened beverages)?   0    How many days per week do you exercise enough to make your heart beat faster? 3 or less    How many minutes a day do you exercise enough to make your heart beat faster? 9 or less    How many days per week do you miss taking your medication? 0      On victoza and metformin daily.     On ACEI and statin.       Review of Systems   Constitutional, HEENT, cardiovascular, pulmonary, gi and gu systems are negative, except as otherwise noted.      Objective    /76   Pulse 104   Temp 98.2  F (36.8  C) (Oral)   Resp 20   Ht 1.626 m (5' 4\")   Wt 113.9 kg (251 lb 3.2 oz)   LMP 05/14/2019   SpO2 98%   BMI 43.12 kg/m    Body mass index is 43.12 kg/m .  Physical Exam   GENERAL: healthy, alert and no distress  RESP: lungs clear to auscultation - no rales, rhonchi or wheezes  CV: regular rate and " rhythm, normal S1 S2, no S3 or S4, no murmur, click or rub, no peripheral edema and peripheral pulses strong  PSYCH: mentation appears normal, affect normal/bright  Diabetic foot exam: normal DP and PT pulses, no trophic changes or ulcerative lesions and normal sensory exam    Lab Results   Component Value Date    A1C 7.6 01/04/2022    A1C 7.1 09/20/2021    A1C 7.4 06/18/2021    A1C 6.6 12/29/2020    A1C 6.7 09/09/2020    A1C 6.4 11/26/2019    A1C 7.2 04/05/2019           Answers for HPI/ROS submitted by the patient on 1/1/2022  If you checked off any problems, how difficult have these problems made it for you to do your work, take care of things at home, or get along with other people?: Not difficult at all  PHQ9 TOTAL SCORE: 1       tea/coffee/pop/soda

## 2022-01-05 LAB — DEPRECATED CALCIDIOL+CALCIFEROL SERPL-MC: 42 UG/L (ref 20–75)

## 2022-01-11 LAB
CREAT UR-MCNC: 328 MG/DL
MICROALBUMIN UR-MCNC: 21 MG/L
MICROALBUMIN/CREAT UR: 6.4 MG/G CR (ref 0–25)

## 2022-01-18 NOTE — PROGRESS NOTES
Discharge Note    Progress reporting period is from initial eval to Dec 9, 2021.     Kaylee failed to return for next follow up visit and current status is unknown.  Please see information below for last relevant information on current status.  Patient seen for Rxs Used: 3 visits.  SUBJECTIVE  Subjective changes noted by patient:  Subjective: Break in PT due to holidays and fibro flare up.  Returned to HEP few days ago starting with little to  resistance.  US last visit very helpful  .  Current pain level is Current Pain level: 3/10.     Previous pain level was  Initial Pain level: 4/10.   Changes in function:  Yes (See Goal flowsheet attached for changes in current functional level)  Adverse reaction to treatment or activity: None    OBJECTIVE  Changes noted in objective findings: Objective: Cont TTP lat hip (bursa)     ASSESSMENT/PLAN  Diagnosis: R hip pain   DIAGP:  The encounter diagnosis was Iliotibial band syndrome, right.  Updated problem list and treatment plan:   Pain - HEP  Decreased ROM/flexibility - HEP  Decreased function - HEP  Decreased strength - HEP  Impaired muscle performance - HEP  Decreased proprioception - HEP  Impaired posture - HEP  STG/LTGs have been met or progress has been made towards goals:  Yes, please see goal flowsheet for most current information  Assessment of Progress: current status is unknown.  Last current status: Assessment of progress: Pt is progressing slower than anticipated,Pt has experienced exacerbation of symptoms   Self Management Plans:  HEP  I have re-evaluated this patient and find that the nature, scope, duration and intensity of the therapy is appropriate for the medical condition of the patient.  Kaylee continues to require the following intervention to meet STG and LTG's:  HEP.    Recommendations:  Discharge with current home program.  Patient to follow up with MD as needed.    Please refer to the daily flowsheet for treatment today, total treatment time and time  spent performing 1:1 timed codes.

## 2022-01-27 ENCOUNTER — E-VISIT (OUTPATIENT)
Dept: URGENT CARE | Facility: CLINIC | Age: 52
End: 2022-01-27
Payer: COMMERCIAL

## 2022-01-27 DIAGNOSIS — J40 BRONCHITIS: Primary | ICD-10-CM

## 2022-01-27 PROCEDURE — 99421 OL DIG E/M SVC 5-10 MIN: CPT | Performed by: PHYSICIAN ASSISTANT

## 2022-01-27 RX ORDER — DEXTROMETHORPHAN POLISTIREX 30 MG/5ML
60 SUSPENSION ORAL 2 TIMES DAILY
Qty: 148 ML | Refills: 0 | Status: SHIPPED | OUTPATIENT
Start: 2022-01-27 | End: 2022-04-12

## 2022-01-27 RX ORDER — BENZONATATE 200 MG/1
200 CAPSULE ORAL 3 TIMES DAILY PRN
Qty: 21 CAPSULE | Refills: 0 | Status: SHIPPED | OUTPATIENT
Start: 2022-01-27 | End: 2022-04-12

## 2022-01-27 RX ORDER — DOXYCYCLINE HYCLATE 100 MG
100 TABLET ORAL 2 TIMES DAILY
Qty: 20 TABLET | Refills: 0 | Status: SHIPPED | OUTPATIENT
Start: 2022-01-27 | End: 2022-04-12

## 2022-01-27 NOTE — PATIENT INSTRUCTIONS
"    Dear Kaylee Lopez    After reviewing your responses, I've been able to diagnose you with \"Bronchitis\" which is a common infection of your lungs. While this is most commonly caused by a virus, the symptoms you have given suggest you should be treated with antibiotics.     I have sent medications to your pharmacy to treat this infection.     It is important that you take all of your prescribed medication even if your symptoms are improving after a few doses. Taking all of your medicine helps prevent the symptoms from returning.     If your symptoms worsen, you develop chest pain or shortness of breath, fevers over 101, or are not improving in 5 days, please contact your primary care provider for an appointment or visit any of our convenient Walk-in Care or Urgent Care Centers to be seen which can be found on our website here.    Thanks again for choosing us as your health care partner,    Brian Negron PA-C    "

## 2022-01-28 ENCOUNTER — E-VISIT (OUTPATIENT)
Dept: FAMILY MEDICINE | Facility: CLINIC | Age: 52
End: 2022-01-28
Payer: COMMERCIAL

## 2022-01-28 DIAGNOSIS — R05.9 COUGH: Primary | ICD-10-CM

## 2022-01-28 PROCEDURE — 99421 OL DIG E/M SVC 5-10 MIN: CPT | Performed by: FAMILY MEDICINE

## 2022-02-01 RX ORDER — CODEINE PHOSPHATE AND GUAIFENESIN 10; 100 MG/5ML; MG/5ML
1-2 SOLUTION ORAL EVERY 4 HOURS PRN
Qty: 236 ML | Refills: 0 | Status: SHIPPED | OUTPATIENT
Start: 2022-02-01 | End: 2022-04-12

## 2022-02-13 ENCOUNTER — HEALTH MAINTENANCE LETTER (OUTPATIENT)
Age: 52
End: 2022-02-13

## 2022-02-22 ENCOUNTER — MYC MEDICAL ADVICE (OUTPATIENT)
Dept: FAMILY MEDICINE | Facility: CLINIC | Age: 52
End: 2022-02-22
Payer: COMMERCIAL

## 2022-02-22 NOTE — TELEPHONE ENCOUNTER
Please advise if patient needs to schedule appointment with you to complete these, or can just do from previous    Melissa Ortega/

## 2022-03-03 ENCOUNTER — TELEPHONE (OUTPATIENT)
Dept: FAMILY MEDICINE | Facility: CLINIC | Age: 52
End: 2022-03-03
Payer: COMMERCIAL

## 2022-03-03 NOTE — TELEPHONE ENCOUNTER
Reason for Call:  Form, our goal is to have forms completed with 72 hours, however, some forms may require a visit or additional information.    Type of letter, form or note:  FMLA    Who is the form from?: Insurance comp    Where did the form come from: form was faxed in    What clinic location was the form placed at?: [facility]     Where the form was placed: Dr. Hernandez Box/Folder    What number is listed as a contact on the form?:        Additional comments: Please fill out/sign/date. Fax back to [phone]    Call taken on 3/3/2022 at 3:19 PM by Niyah Fragoso MA

## 2022-03-08 NOTE — TELEPHONE ENCOUNTER
Dr. Hernandez did you want a virtual visit with this patient? Looks like it was a different provider who filled this out last time. Forms in your folder.  Niyah Fragoso,

## 2022-03-23 DIAGNOSIS — M79.7 FIBROMYALGIA: ICD-10-CM

## 2022-03-23 DIAGNOSIS — F11.90 CHRONIC, CONTINUOUS USE OF OPIOIDS: ICD-10-CM

## 2022-03-23 RX ORDER — TRAMADOL HYDROCHLORIDE 50 MG/1
50 TABLET ORAL 2 TIMES DAILY PRN
Qty: 60 TABLET | Refills: 0 | Status: SHIPPED | OUTPATIENT
Start: 2022-03-23 | End: 2022-04-12

## 2022-03-23 NOTE — TELEPHONE ENCOUNTER
Routing refill request to provider for review/approval because:  Drug not on the [facility] refill protocol   Nader Guerra RN, BSN

## 2022-03-24 ENCOUNTER — HOSPITAL ENCOUNTER (OUTPATIENT)
Dept: MAMMOGRAPHY | Facility: CLINIC | Age: 52
Discharge: HOME | End: 2022-03-24
Attending: FAMILY MEDICINE | Admitting: FAMILY MEDICINE
Payer: COMMERCIAL

## 2022-03-24 DIAGNOSIS — Z12.31 OTHER SCREENING MAMMOGRAM: ICD-10-CM

## 2022-03-24 PROCEDURE — 77067 SCR MAMMO BI INCL CAD: CPT

## 2022-04-12 ENCOUNTER — OFFICE VISIT (OUTPATIENT)
Dept: FAMILY MEDICINE | Facility: CLINIC | Age: 52
End: 2022-04-12
Payer: COMMERCIAL

## 2022-04-12 VITALS
RESPIRATION RATE: 18 BRPM | OXYGEN SATURATION: 97 % | SYSTOLIC BLOOD PRESSURE: 126 MMHG | BODY MASS INDEX: 41.16 KG/M2 | TEMPERATURE: 98.4 F | WEIGHT: 241.1 LBS | DIASTOLIC BLOOD PRESSURE: 74 MMHG | HEART RATE: 119 BPM | HEIGHT: 64 IN

## 2022-04-12 DIAGNOSIS — E11.9 TYPE 2 DIABETES MELLITUS WITHOUT COMPLICATION, WITHOUT LONG-TERM CURRENT USE OF INSULIN (H): Primary | ICD-10-CM

## 2022-04-12 DIAGNOSIS — Z12.11 SCREEN FOR COLON CANCER: ICD-10-CM

## 2022-04-12 DIAGNOSIS — F11.90 CHRONIC, CONTINUOUS USE OF OPIOIDS: ICD-10-CM

## 2022-04-12 DIAGNOSIS — I10 HYPERTENSION GOAL BP (BLOOD PRESSURE) < 130/80: ICD-10-CM

## 2022-04-12 DIAGNOSIS — M79.7 FIBROMYALGIA: ICD-10-CM

## 2022-04-12 DIAGNOSIS — Z79.899 ENCOUNTER FOR LONG-TERM (CURRENT) USE OF MEDICATIONS: ICD-10-CM

## 2022-04-12 LAB — HBA1C MFR BLD: 6.3 % (ref 0–5.6)

## 2022-04-12 PROCEDURE — 36415 COLL VENOUS BLD VENIPUNCTURE: CPT | Performed by: FAMILY MEDICINE

## 2022-04-12 PROCEDURE — 0064A COVID-19,PF,MODERNA (18+ YRS BOOSTER .25ML): CPT | Performed by: FAMILY MEDICINE

## 2022-04-12 PROCEDURE — 99214 OFFICE O/P EST MOD 30 MIN: CPT | Performed by: FAMILY MEDICINE

## 2022-04-12 PROCEDURE — 91306 COVID-19,PF,MODERNA (18+ YRS BOOSTER .25ML): CPT | Performed by: FAMILY MEDICINE

## 2022-04-12 PROCEDURE — 80307 DRUG TEST PRSMV CHEM ANLYZR: CPT | Performed by: FAMILY MEDICINE

## 2022-04-12 PROCEDURE — 83036 HEMOGLOBIN GLYCOSYLATED A1C: CPT | Performed by: FAMILY MEDICINE

## 2022-04-12 RX ORDER — TRAMADOL HYDROCHLORIDE 50 MG/1
50 TABLET ORAL 2 TIMES DAILY PRN
Qty: 60 TABLET | Refills: 0 | Status: SHIPPED | OUTPATIENT
Start: 2022-04-12 | End: 2022-06-24

## 2022-04-12 RX ORDER — LISINOPRIL 5 MG/1
5 TABLET ORAL DAILY
Qty: 90 TABLET | Refills: 3 | Status: SHIPPED | OUTPATIENT
Start: 2022-04-12 | End: 2023-04-27

## 2022-04-12 RX ORDER — METFORMIN HCL 500 MG
TABLET, EXTENDED RELEASE 24 HR ORAL
Qty: 360 TABLET | Refills: 0 | Status: SHIPPED | OUTPATIENT
Start: 2022-04-12 | End: 2022-07-25

## 2022-04-12 NOTE — PROGRESS NOTES
Assessment & Plan     Type 2 diabetes mellitus without complication, without long-term current use of insulin (H) - improved control. Encouraged her efforts, continue current.   - Hemoglobin A1c; Future  - Hemoglobin A1c  - metFORMIN (GLUCOPHAGE-XR) 500 MG 24 hr tablet; TAKE 4 TABLETS BY MOUTH DAILY (WITH BREAKFAST)  - Semaglutide, 1 MG/DOSE, 4 MG/3ML SOPN; Inject 1 mg Subcutaneous once a week    Encounter for long-term (current) use of medications  - [id number] - Urine Drug Confirmation Panel (Comprehensive); Future  - [id number] - Urine Drug Confirmation Panel (Comprehensive)    Screen for colon cancer  - Adult Gastro Ref - Procedure Only; Future    Hypertension goal BP (blood pressure) < 130/80 - controlled, continue current.   - lisinopril (ZESTRIL) 5 MG tablet; Take 1 tablet (5 mg) by mouth daily    Fibromyalgia - stable, refills, using opioid occasionally  - traMADol (ULTRAM) 50 MG tablet; Take 1 tablet (50 mg) by mouth 2 times daily as needed for severe pain MAX 60 TBS/MONTH, can fill 3/23/2022    Chronic, continuous use of opioids - as above  - traMADol (ULTRAM) 50 MG tablet; Take 1 tablet (50 mg) by mouth 2 times daily as needed for severe pain MAX 60 TBS/MONTH, can fill 3/23/2022    Return in about 3 months (around 7/12/2022) for Diabetes Visit.    Moriah Hernandez MD  [facility]      Lisandro Page is a 52 year old who presents for the following health issues     HPI   Question 4/12/2022  2:14 PM CDT - Filed by Patient   I understand that completing this form is intended to provide my doctor and/or care team with helpful information for my upcoming clinic visit. It is not to notify my doctor and/or care team of medical matters requiring urgent attention. If I have an urgent medical matter, I should call 911 or my doctor's office. Acknowledge   For what are you coming to see the doctor today? Diabetes   How many servings of fruits and vegetables do you eat daily? 2-3   On  average, how many sweetened beverages do you drink each day (Examples: soda, juice, sweet tea, etc.  Do NOT count diet or artificially sweetened beverages)? 2   How many minutes a day do you exercise enough to make your heart beat faster? 9 or less   How many days a week do you exercise enough to make your heart beat faster? 3 or less   How many days per week do you miss taking your medication? 0   Over the past 2 weeks, how often have you been bothered by any of the following problems?    Q1: Little interest or pleasure in doing things Not at all   Q2: Feeling down, depressed or hopeless Not at all   PHQ-2 Score (range: 0 - 6) 0       Fv Hpi Diabetes    Question 4/12/2022  2:14 PM CDT - Filed by Patient   Please respond to the following questions regarding your diabetes. These will be reviewed by your provider at the time of your appointment and will become a permanent part of your medical record.     How often are you checking your blood sugars? A few times a month   What time of day are you checking your blood sugars  Before meals   Have you had any blood sugars above 200? Yes   Have you had any blood sugars below 70? No   Which symptoms do you notice when your blood sugar is low? None   What concerns do you have today about your diabetes? None   Do you have any of these symptoms? None of these symptoms   Have you had a diabetic eye exam within the last year? Yes   Date of last eye exam: June or july of 2021   Where was this eye exam done? [facility]       Question 4/12/2022  2:14 PM CDT - Filed by Patient   I understand that completing this form is intended to provide my doctor and/or care team with helpful information for my upcoming clinic visit. It is not to notify my doctor and/or care team of medical matters requiring urgent attention. If I have an urgent medical matter, I should call 911 or my doctor's office. Acknowledge   For what are you coming to see the doctor today? Diabetes   How many  "servings of fruits and vegetables do you eat daily? 2-3   On average, how many sweetened beverages do you drink each day (Examples: soda, juice, sweet tea, etc.  Do NOT count diet or artificially sweetened beverages)? 2   How many minutes a day do you exercise enough to make your heart beat faster? 9 or less   How many days a week do you exercise enough to make your heart beat faster? 3 or less   How many days per week do you miss taking your medication? 0   Over the past 2 weeks, how often have you been bothered by any of the following problems?    Q1: Little interest or pleasure in doing things Not at all   Q2: Feeling down, depressed or hopeless Not at all   PHQ-2 Score (range: 0 - 6) 0       Fv Hpi Diabetes    Question 4/12/2022  2:14 PM CDT - Filed by Patient   Please respond to the following questions regarding your diabetes. These will be reviewed by your provider at the time of your appointment and will become a permanent part of your medical record.     How often are you checking your blood sugars? A few times a month   What time of day are you checking your blood sugars  Before meals   Have you had any blood sugars above 200? Yes   Have you had any blood sugars below 70? No   Which symptoms do you notice when your blood sugar is low? None   What concerns do you have today about your diabetes? None   Do you have any of these symptoms? None of these symptoms   Have you had a diabetic eye exam within the last year? Yes   Date of last eye exam: June or july of 2021   Where was this eye exam done? [facility]           Review of Systems   Constitutional, HEENT, cardiovascular, pulmonary, gi and gu systems are negative, except as otherwise noted.      Objective    /74   Pulse 119   Temp 98.4  F (36.9  C) (Oral)   Resp 18   Ht 1.613 m (5' 3.5\")   Wt 109.4 kg (241 lb 1.6 oz)   LMP 05/14/2019   SpO2 97%   BMI 42.04 kg/m    Body mass index is 42.04 kg/m .  Physical Exam   GENERAL: healthy, alert " and no distress  PSYCH: mentation appears normal, affect normal/bright    Lab Results   Component Value Date    A1C 6.3 04/12/2022    A1C 7.6 01/04/2022    A1C 7.1 09/20/2021    A1C 7.4 06/18/2021    A1C 6.6 12/29/2020    A1C 6.7 09/09/2020    A1C 6.4 11/26/2019    A1C 7.2 04/05/2019

## 2022-04-12 NOTE — PATIENT INSTRUCTIONS
Lab Results   Component Value Date    A1C 6.3 04/12/2022    A1C 7.6 01/04/2022    A1C 7.1 09/20/2021    A1C 7.4 06/18/2021    A1C 6.6 12/29/2020    A1C 6.7 09/09/2020    A1C 6.4 11/26/2019    A1C 7.2 04/05/2019

## 2022-04-13 LAB — CREAT UR-MCNC: 123 MG/DL

## 2022-04-15 LAB
N-NORTRAMADOL/CREAT UR CFM: 4130 NG/MG {CREAT}
O-NORTRAMADOL UR CFM-MCNC: 5080 NG/ML
TRAMADOL CTO UR CFM-MCNC: 4400 NG/ML
TRAMADOL/CREAT UR: 3577 NG/MG {CREAT}

## 2022-04-22 DIAGNOSIS — M79.7 FIBROMYALGIA: ICD-10-CM

## 2022-04-22 RX ORDER — CYCLOBENZAPRINE HCL 10 MG
10 TABLET ORAL 3 TIMES DAILY PRN
Qty: 270 TABLET | Refills: 3 | Status: SHIPPED | OUTPATIENT
Start: 2022-04-22 | End: 2023-06-22

## 2022-04-28 ENCOUNTER — MYC MEDICAL ADVICE (OUTPATIENT)
Dept: FAMILY MEDICINE | Facility: CLINIC | Age: 52
End: 2022-04-28
Payer: COMMERCIAL

## 2022-04-29 ENCOUNTER — OFFICE VISIT (OUTPATIENT)
Dept: URGENT CARE | Facility: URGENT CARE | Age: 52
End: 2022-04-29
Payer: COMMERCIAL

## 2022-04-29 VITALS
RESPIRATION RATE: 12 BRPM | SYSTOLIC BLOOD PRESSURE: 127 MMHG | HEART RATE: 95 BPM | OXYGEN SATURATION: 98 % | TEMPERATURE: 99.3 F | DIASTOLIC BLOOD PRESSURE: 78 MMHG

## 2022-04-29 DIAGNOSIS — K11.20 PAROTIDITIS: Primary | ICD-10-CM

## 2022-04-29 PROCEDURE — 99213 OFFICE O/P EST LOW 20 MIN: CPT | Performed by: FAMILY MEDICINE

## 2022-04-29 RX ORDER — HYDROCORTISONE 1 %
OINTMENT (GRAM) TOPICAL
COMMUNITY
Start: 2021-11-01

## 2022-04-29 RX ORDER — MULTIVIT WITH MINERALS/LUTEIN
TABLET ORAL
COMMUNITY
Start: 2021-11-01

## 2022-04-29 RX ORDER — FERROUS GLUCONATE 324(38)MG
TABLET ORAL
COMMUNITY
Start: 2021-11-01 | End: 2024-08-20

## 2022-04-29 ASSESSMENT — ENCOUNTER SYMPTOMS
TROUBLE SWALLOWING: 0
COLOR CHANGE: 0
SINUS PAIN: 0
SORE THROAT: 0
SHORTNESS OF BREATH: 0
RHINORRHEA: 0
FEVER: 0

## 2022-04-29 NOTE — TELEPHONE ENCOUNTER
Summary:    Patient is due/failing the following:   COLONOSCOPY    Reviewed:  [] CARE EVERYWHERE  [] LAST OV NOTE INCLUDING ENDO  [] FYI TAB  [] MYCHART ACTIVE?  [] LAST PANEL ENCOUNTER  [] FUTURE APPTS  [] IMMUNIZATIONS          Action needed:   Per patient has colonoscopy scheduled    Type of outreach:    Sent [employer] message.                                                                               Giuliana Pickens/WILLA  [place]---[facility]

## 2022-04-29 NOTE — PROGRESS NOTES
Assessment & Plan     Parotiditis  Symptoms suggestive of a imminent acute right-sided parotid gland infection.  Exam findings not suggestive of periodontic involvement, no evidence of mastoiditis, otitis media or externa.  At this time, I think it is early enough or we can start treatment with Augmentin.  I did inform patient that if symptoms get worse, she may need IV antibiotic therapy and to go to the ER at that time if there is worsening redness, swelling over that region.  May continue her tramadol which is part of her pain plan from her primary care doctor for management of fibromyalgia.  - amoxicillin-clavulanate (AUGMENTIN) 875-125 MG tablet  Dispense: 14 tablet; Refill: 0      Return in about 5 days (around 5/4/2022) for Urgent Care followup.    Miguel Rockwell MD  [facility] is a 52 year old who presents for the following health issues     HPI     4 days of ongoing and progressive pain and swelling over her right jaw, history of TMJ and fibromyalgia however has not incurred this type of symptom.  No teeth or dental pain, no earache. No fever or difficulty swallowing.      Review of Systems   Constitutional: Negative for fever.   HENT: Negative for ear discharge, rhinorrhea, sinus pain, sore throat and trouble swallowing.    Respiratory: Negative for shortness of breath.    Skin: Negative for color change and rash.            Objective    /78   Pulse 95   Temp 99.3  F (37.4  C) (Oral)   Resp 12   LMP 05/14/2019   SpO2 98%   Breastfeeding No   There is no height or weight on file to calculate BMI.  Physical Exam  HENT:      Head:        Comments: Non-erythematous soft tissue swelling over the right jaw, tender submandibular right lymphadenopathy without overlying erythema.     Right Ear: Tympanic membrane and external ear normal.      Left Ear: Tympanic membrane and external ear normal.      Mouth/Throat:      Mouth: Mucous membranes are moist.       Pharynx: Oropharynx is clear.      Comments: No tenderness with palpation of the posterior right upper and lower molars and surrounding gum tissue no palpable parotid stone

## 2022-05-17 ENCOUNTER — VIRTUAL VISIT (OUTPATIENT)
Dept: CARDIOLOGY | Facility: CLINIC | Age: 52
End: 2022-05-17
Payer: COMMERCIAL

## 2022-05-17 DIAGNOSIS — I10 HYPERTENSION GOAL BP (BLOOD PRESSURE) < 130/80: Primary | ICD-10-CM

## 2022-05-17 PROCEDURE — 99213 OFFICE O/P EST LOW 20 MIN: CPT | Mod: 95 | Performed by: INTERNAL MEDICINE

## 2022-05-17 NOTE — LETTER
"5/17/2022    Moriah Hernandez MD  [id number]  [facility] [address]    RE: Kaylee Lopez       Dear Colleague,     I had the pleasure of seeing Kaylee Lopez in the [facility].  Kaylee is a 52 year old who is being evaluated via a billable video visit.      How would you like to obtain your AVS? MyChart  If the video visit is dropped, the invitation should be resent by: Text to cell phone: [phone]  Will anyone else be joining your video visit? No       Vitals - Patient Reported  Weight (Patient Reported): 111.1 kg (245 lb)  Height (Patient Reported): 162.6 cm (5' 4\")  BMI (Based on Pt Reported Ht/Wt): 42.05    Review Of Systems  Skin: NEGATIVE  Eyes:Ears/Nose/Throat: NEGATIVE  Respiratory: NEGATIVE  Cardiovascular: palpitations rare no change  Gastrointestinal: heart burn occ.  Genitourinary:NEGATIVE  Musculoskeletal: fibromyalgia   Neurologic: headache- past 5 days   Psychiatric: NEGATIVE  Hematologic/Lymphatic/Immunologic: NEGATIVE  Endocrine:  NEGATIVE    Lolly Roberts LPN      Video Start Time: 8:20  Video-Visit Details  General:  no apparent distress,Obese, sitting upright.  ENT/Mouth:  membranes moist, no nasal discharge.  Normal head shape, no apparent injury or laceration.  Eyes:  no scleral icterus, normal conjunctivae.  No observed jaundice.  Neck:  no apparent neck swelling.   Chest/Lungs:  No breathing difficulty while speaking.  No audible wheezing.  No cough during conversation.  Cardiovascular:  No obviously elevated jugular venous pressure.  No apparent edema bilaterally in LE.   Abdomen:  no obvious abdominal distention.   Extremities:  no apparent cyanosis.  Skin:  no xanthelasma.  No facial lacerations.  Neurologic:  Normal arm motion bilateral, no tremors.    Psychiatric:  Alert and oriented x3, calm demeanor    The rest of the comprehensive physical examination is deferred due to public health emergency video visit restrictions.    Type of service:  Video " Visit    Video End Time:8:45    Originating Location (pt. Location): Home    Distant Location (provider location):  [facility]     Platform used for Video Visit: Yakov    Thank you for allowing me to participate in the care of your patient.      Sincerely,     Frederic Ag MD, MD     [facility]  cc:   Referred Self

## 2022-05-17 NOTE — PROGRESS NOTES
"Kaylee is a 52 year old who is being evaluated via a billable video visit.      How would you like to obtain your AVS? MyChart  If the video visit is dropped, the invitation should be resent by: Text to cell phone: [phone]  Will anyone else be joining your video visit? No       Vitals - Patient Reported  Weight (Patient Reported): 111.1 kg (245 lb)  Height (Patient Reported): 162.6 cm (5' 4\")  BMI (Based on Pt Reported Ht/Wt): 42.05    Review Of Systems  Skin: NEGATIVE  Eyes:Ears/Nose/Throat: NEGATIVE  Respiratory: NEGATIVE  Cardiovascular: palpitations rare no change  Gastrointestinal: heart burn occ.  Genitourinary:NEGATIVE  Musculoskeletal: fibromyalgia   Neurologic: headache- past 5 days   Psychiatric: NEGATIVE  Hematologic/Lymphatic/Immunologic: NEGATIVE  Endocrine:  NEGATIVE    Lolly Roberts LPN      Video Start Time: 8:20  Video-Visit Details  General:  no apparent distress,Obese, sitting upright.  ENT/Mouth:  membranes moist, no nasal discharge.  Normal head shape, no apparent injury or laceration.  Eyes:  no scleral icterus, normal conjunctivae.  No observed jaundice.  Neck:  no apparent neck swelling.   Chest/Lungs:  No breathing difficulty while speaking.  No audible wheezing.  No cough during conversation.  Cardiovascular:  No obviously elevated jugular venous pressure.  No apparent edema bilaterally in LE.   Abdomen:  no obvious abdominal distention.   Extremities:  no apparent cyanosis.  Skin:  no xanthelasma.  No facial lacerations.  Neurologic:  Normal arm motion bilateral, no tremors.    Psychiatric:  Alert and oriented x3, calm demeanor    The rest of the comprehensive physical examination is deferred due to public health emergency video visit restrictions.    Type of service:  Video Visit    Video End Time:8:45    Originating Location (pt. Location): Home    Distant Location (provider location):  [facility]     Platform used for Video Visit: Yakov"

## 2022-05-17 NOTE — PROGRESS NOTES
Service Date: 05/17/2022    VIDEO VISIT    This was a video visit using the AwesomePiece platform.  Due to the fact that the patient has 2 family members who are positive for COVID-19, she felt uncomfortable coming into the clinic with the possibility that she might have occult COVID-19 infection herself.      HISTORY OF PRESENT ILLNESS:  It was my pleasure to see your patient on video visit using the AwesomePiece video platform due to the fact that the patient has 2 family members with COVID-19 and the patient felt uneasy coming into the hospital given the positive family members in close proximity to her.  If you remember, this is a patient with a past history of moderate tricuspid regurgitation and who also has a history of moderate pulmonary hypertension in the past.  She also has a history of mixed hyperlipidemia.  She has type 2 diabetes mellitus and morbid obesity with a BMI of 42.04 when last measured in April last month.    With that background in mind, the patient is feeling well.  She has lost about 10 pounds in weight over the last 4 months.  She thinks that this might be due to the fact that she has started Ozempic as her new oral hypoglycemic agent.  The patient did have an echocardiogram performed to follow her pulmonary pressures.  This was performed in 12/2021, so 5 months ago, and this showed a significant drop in pulmonary pressures.  The pulmonary pressures had dropped to 32.8 mmHg plus right atrial pressure and the right atrial pressure was felt to be 3 mmHg so that is 35.8 mmHg PA systolic pressure, which is very much in the mild region.  It is near normal with 30 mmHg being the upper limit of normal.  Her degree of tricuspid regurgitation is trivial to mild, which represents a significant improvement from the past when it was as bad as moderate tricuspid regurgitation.  This patient also has had a CT scan in the past, which showed no evidence of pulmonary embolism.  I suspect that this degree of obesity  could be contributing to her raised pulmonary pressures and obstructive sleep apnea as always needs to be borne in mind as a cause of pulmonary hypertension.  However, I do think that the weight loss has made a significant difference.    Her blood pressure was well controlled as recently as 2 weeks ago at 127/78.  Her last lipid profile was drawn at the end last year, so that is approximately 8 months ago and that was reasonably good.  The LDL was 60.  The HDL was 58.  The triglycerides were borderline raised at 166 and her total cholesterol was 151 and I suspect that her triglycerides would have improved also because of the weight reduction.  I do note that her hemoglobin A1c has dropped from 7.6 down to 6.3, which is excellent.    IMPRESSION:    1.  Pulmonary hypertension.  The patient has significantly lower pulmonary pressures and has mild pulmonary hypertension as described above.  2.  Tricuspid regurgitation.  This has dropped from moderate in the past to trivial to mild on the most recent echocardiogram in December.  3.  Essential hypertension.  Her blood pressure was well controlled approximately 2 weeks ago.  4.  Morbid obesity with a 10-pound weight loss since January.  5.  Mixed hyperlipidemia with an excellent lipid profile with exception of borderline hypertriglyceridemia.    PLAN:  I encouraged the patient to continue to exercise and to continue to lose weight, which will have beneficial effects on her systemic blood pressure, her pulmonary pressures and also her lipids as well as diabetes.  We will have the patient return to see us in 1 year's time and we will repeat her lipids and her echocardiogram at that stage to follow her pulmonary pressures and tricuspid regurgitation.    It has been my pleasure to be involved in the care of this very nice patient, and as always, she has been told to contact us if she has any questions or any concerns.    In total, this visit was approximately 20 minutes,  including reviewing the patient's laboratory investigations and previous laboratory investigations from past visits and echocardiogram and radiologic investigations with comparison to previous echocardiograms also.    Frederic Ag MD    cc:  Moriah Hernandez MD  [facility]  [address] [place]  [address]    Frederic Hills MD, [facility]        D: 2022   T: 2022   MT: tk    Name:     VANCE CONNERDeysi  MRN:      [id number]        Account:      [id number]   :      1970           Service Date: 2022       Document: [id number]

## 2022-06-08 ENCOUNTER — TELEPHONE (OUTPATIENT)
Dept: NURSING | Facility: CLINIC | Age: 52
End: 2022-06-08
Payer: COMMERCIAL

## 2022-06-08 NOTE — TELEPHONE ENCOUNTER
Outreach attempt made to review COVID testing options for upcoming procedure. Patient asked to call back [phone] to discuss further.     Eve Zendejas LPN

## 2022-06-14 ENCOUNTER — HOSPITAL ENCOUNTER (OUTPATIENT)
Facility: CLINIC | Age: 52
Discharge: HOME | End: 2022-06-14
Attending: INTERNAL MEDICINE | Admitting: INTERNAL MEDICINE
Payer: COMMERCIAL

## 2022-06-14 VITALS
WEIGHT: 245 LBS | BODY MASS INDEX: 41.83 KG/M2 | DIASTOLIC BLOOD PRESSURE: 68 MMHG | HEART RATE: 103 BPM | OXYGEN SATURATION: 92 % | SYSTOLIC BLOOD PRESSURE: 126 MMHG | HEIGHT: 64 IN | TEMPERATURE: 98 F | RESPIRATION RATE: 16 BRPM

## 2022-06-14 LAB
COLONOSCOPY: NORMAL
GLUCOSE BLDC GLUCOMTR-MCNC: 157 MG/DL (ref 70–99)

## 2022-06-14 PROCEDURE — G0500 MOD SEDAT ENDO SERVICE >5YRS: HCPCS | Performed by: INTERNAL MEDICINE

## 2022-06-14 PROCEDURE — 45378 DIAGNOSTIC COLONOSCOPY: CPT | Performed by: INTERNAL MEDICINE

## 2022-06-14 PROCEDURE — G0121 COLON CA SCRN NOT HI RSK IND: HCPCS | Performed by: INTERNAL MEDICINE

## 2022-06-14 PROCEDURE — 250N000011 HC RX IP 250 OP 636: Performed by: INTERNAL MEDICINE

## 2022-06-14 PROCEDURE — 82962 GLUCOSE BLOOD TEST: CPT

## 2022-06-14 RX ORDER — FLUMAZENIL 0.1 MG/ML
0.2 INJECTION, SOLUTION INTRAVENOUS
Status: DISCONTINUED | OUTPATIENT
Start: 2022-06-14 | End: 2022-06-14 | Stop reason: HOSPADM

## 2022-06-14 RX ORDER — PROCHLORPERAZINE MALEATE 10 MG
10 TABLET ORAL EVERY 6 HOURS PRN
Status: DISCONTINUED | OUTPATIENT
Start: 2022-06-14 | End: 2022-06-14 | Stop reason: HOSPADM

## 2022-06-14 RX ORDER — EPINEPHRINE 1 MG/ML
0.1 INJECTION, SOLUTION INTRAMUSCULAR; SUBCUTANEOUS
Status: DISCONTINUED | OUTPATIENT
Start: 2022-06-14 | End: 2022-06-14 | Stop reason: HOSPADM

## 2022-06-14 RX ORDER — SIMETHICONE 40MG/0.6ML
133 SUSPENSION, DROPS(FINAL DOSAGE FORM)(ML) ORAL
Status: DISCONTINUED | OUTPATIENT
Start: 2022-06-14 | End: 2022-06-14 | Stop reason: HOSPADM

## 2022-06-14 RX ORDER — FENTANYL CITRATE 0.05 MG/ML
50-100 INJECTION, SOLUTION INTRAMUSCULAR; INTRAVENOUS EVERY 5 MIN PRN
Status: DISCONTINUED | OUTPATIENT
Start: 2022-06-14 | End: 2022-06-14 | Stop reason: HOSPADM

## 2022-06-14 RX ORDER — DIPHENHYDRAMINE HYDROCHLORIDE 50 MG/ML
25-50 INJECTION INTRAMUSCULAR; INTRAVENOUS
Status: DISCONTINUED | OUTPATIENT
Start: 2022-06-14 | End: 2022-06-14 | Stop reason: HOSPADM

## 2022-06-14 RX ORDER — LIDOCAINE 40 MG/G
CREAM TOPICAL
Status: DISCONTINUED | OUTPATIENT
Start: 2022-06-14 | End: 2022-06-14 | Stop reason: HOSPADM

## 2022-06-14 RX ORDER — NALOXONE HYDROCHLORIDE 0.4 MG/ML
0.4 INJECTION, SOLUTION INTRAMUSCULAR; INTRAVENOUS; SUBCUTANEOUS
Status: DISCONTINUED | OUTPATIENT
Start: 2022-06-14 | End: 2022-06-14 | Stop reason: HOSPADM

## 2022-06-14 RX ORDER — ONDANSETRON 2 MG/ML
4 INJECTION INTRAMUSCULAR; INTRAVENOUS
Status: DISCONTINUED | OUTPATIENT
Start: 2022-06-14 | End: 2022-06-14 | Stop reason: HOSPADM

## 2022-06-14 RX ORDER — NALOXONE HYDROCHLORIDE 0.4 MG/ML
0.2 INJECTION, SOLUTION INTRAMUSCULAR; INTRAVENOUS; SUBCUTANEOUS
Status: DISCONTINUED | OUTPATIENT
Start: 2022-06-14 | End: 2022-06-14 | Stop reason: HOSPADM

## 2022-06-14 RX ORDER — ATROPINE SULFATE 0.1 MG/ML
1 INJECTION INTRAVENOUS
Status: DISCONTINUED | OUTPATIENT
Start: 2022-06-14 | End: 2022-06-14 | Stop reason: HOSPADM

## 2022-06-14 RX ORDER — ONDANSETRON 4 MG/1
4 TABLET, ORALLY DISINTEGRATING ORAL EVERY 6 HOURS PRN
Status: DISCONTINUED | OUTPATIENT
Start: 2022-06-14 | End: 2022-06-14 | Stop reason: HOSPADM

## 2022-06-14 RX ORDER — ONDANSETRON 2 MG/ML
4 INJECTION INTRAMUSCULAR; INTRAVENOUS EVERY 6 HOURS PRN
Status: DISCONTINUED | OUTPATIENT
Start: 2022-06-14 | End: 2022-06-14 | Stop reason: HOSPADM

## 2022-06-14 RX ADMIN — FENTANYL CITRATE 50 MCG: 50 INJECTION INTRAMUSCULAR; INTRAVENOUS at 09:26

## 2022-06-14 RX ADMIN — FENTANYL CITRATE 50 MCG: 50 INJECTION INTRAMUSCULAR; INTRAVENOUS at 09:23

## 2022-06-14 RX ADMIN — MIDAZOLAM HYDROCHLORIDE 2 MG: 1 INJECTION, SOLUTION INTRAMUSCULAR; INTRAVENOUS at 09:17

## 2022-06-14 RX ADMIN — FENTANYL CITRATE 100 MCG: 50 INJECTION INTRAMUSCULAR; INTRAVENOUS at 09:17

## 2022-06-14 RX ADMIN — MIDAZOLAM HYDROCHLORIDE 2 MG: 1 INJECTION, SOLUTION INTRAMUSCULAR; INTRAVENOUS at 09:23

## 2022-06-14 NOTE — LETTER
May 19, 2022      Kaylee Lopez  [address] [address]        Dear Kaylee,     Please be aware that coverage of these services is subject to the terms and limitations of your health insurance plan.  Call member services at your health plan with any benefit or coverage questions.    Thank you for choosing [facility]. You are scheduled for the following service(s):    Date:  6/14/2022             Procedure:  COLONOSCOPY  Doctor:        Dr. Warren   Arrival Time:  8:15 am Enter and check in at the Main Hospital Entrance  Procedure Time:  9:00 am       Location:   [facility]        Endoscopy Department, First Floor         [address] [place], [place] [address]      [phone] or [phone] ([facility]) to reschedule        MIRALAX -GATORADE  PREP  Colonoscopy is the most accurate test to detect colon polyps and colon cancer; and the only test where polyps can be removed. During this procedure, a doctor examines the lining of your large intestine and rectum through a flexible tube.   Transportation  You must arrange for a ride for the day of your procedure with a responsible adult. A taxi , Uber, etc, is not an option unless you are accompanied by a responsible adult. If you fail to arrange transportation with a responsible adult, your procedure will be cancelled and rescheduled.    Purchase the  following supplies at your local pharmacy:  - 2 (two) bisacodyl tablets: each tablet contains 5 mg.  (Dulcolax  laxative NOT Dulcolax  stool softener)   - 1 (one) 8.3 oz bottle of Polyethylene Glycol (PEG) 3350 Powder   (MiraLAX , Smooth LAX , ClearLAX  or equivalent)  - 64 oz Gatorade    Regular Gatorade, Gatorade G2 , Powerade , Powerade Zero  or Pedialyte  is acceptable. Red colored flavors are not allowed; all other colors (yellow, green, orange, purple and blue) are okay. It is also okay to buy two 2.12 oz packets of powdered Gatorade that  can be mixed with water to a total volume of 64 oz of liquid.  - 1 (one) 10 oz bottle of Magnesium Citrate (Red colored flavors are not allowed)  It is also okay for you to use a 0.5 oz package of powdered magnesium citrate (17 g) mixed with 10 oz of water.    PREPARATION FOR COLONOSCOPY  7 days before:    Discontinue fiber supplements and medications containing iron. This includes Metamucil  and Fibercon ; and multivitamins with iron.    3 days before:    Begin a low-fiber diet. A low-fiber diet helps making the cleanout more effective.     Examples of a low-fiber diet include (but are not limited to): white bread, white rice, pasta, crackers, fish, chicken, eggs, ground beef, creamy peanut butter, cooked/steamed/boiled vegetables, canned fruit, bananas, melons, milk, plain yogurt cheese, salad dressing and other condiments.     The following are not allowed on a low-fiber diet: seeds, nuts, popcorn, bran, whole wheat, corn, quinoa, raw fruits and vegetables, berries and dried fruit, beans and lentils.    For additional details on low-fiber diet, please refer to the table on the last page.    2 days before:    Continue the low-fiber diet.     Drink at least 8 glasses of water throughout the day.     Stop eating solid foods at 11:45 pm.    1 day before:    In the morning: begin a clear liquid diet (liquids you can see through).     Examples of a clear liquid diet include: water, clear broth or bouillon, Gatorade, Pedialyte or Powerade, carbonated and non-carbonated soft drinks (Sprite , 7-Up , ginger ale), strained fruit juices without pulp (apple, white grape, white cranberry), Jell-O  and popsicles.     The following are not allowed on a clear liquid diet: red liquids, alcoholic beverages, dairy products (milk, creamer, and yogurt), protein shakes, creamy broths, juice with pulp and chewing tobacco.    At noon: take 2 (two) bisacodyl tablets     At 4 (and no later than 6pm): start drinking the Miralax-Gatorade  preparation (8.3 oz of Miralax mixed with 64 oz of Gatorade in a large pitcher). Drink 1(one) 8 oz glass every 15 minutes thereafter, until the mixture is gone.    COLON CLEANSING TIPS: drink adequate amounts of fluids before and after your colon cleansing to prevent dehydration. Stay near a toilet because you will have diarrhea. Even if you are sitting on the toilet, continue to drink the cleansing solution every 15 minutes. If you feel nauseous or vomit, rinse your mouth with water, take a 15 to 30-minute-break and then continue drinking the solution. You will be uncomfortable until the stool has flushed from your colon (in about 2 to 4 hours). You may feel chilled.    Day of your procedure  You may take your morning medications including blood pressure medications, methadone, anti-seizure medications with sips of water 3 hours prior to your procedure or earlier. Do not take insulin, blood thinners (unless specifically told by your primary care provider) or vitamins prior to your procedure. Continue the clear liquid diet.       4 hours prior: drink 10 oz of magnesium citrate. It may be easier to drink it with a straw.    STOP consuming all liquids after that.     Do not take anything by mouth during this time.     Allow extra time to travel to your procedure as you may need to stop and use a restroom along the way.    You are ready for the procedure, if you followed all instructions and your stool is no longer formed, but clear or yellow liquid. If you are unsure whether your colon is clean, please call our office at [phone] before you leave for your appointment.    Bring the following to your procedure:  - Insurance Card/Photo ID.   - List of current medications including over-the-counter medications and supplements.   - Your rescue inhaler if you currently use one to control asthma.    Canceling or rescheduling your appointment:   If you must cancel or reschedule your appointment, please call [phone]  as soon as possible.    COLONOSCOPY PRE-PROCEDURE CHECKLIST    If you have diabetes, ask your regular doctor for diet and medication restrictions.  If you take an anticoagulant or anti-platelet medication (such as Coumadin , Lovenox , Pradaxa , Xarelto , Eliquis , etc.), please call your primary doctor for advice on holding this medication.  If you take aspirin you may continue to do so.  If you are or may be pregnant, please discuss the risks and benefits of this procedure with your doctor.        What happens during a colonoscopy?    Plan to spend up to two hours, starting at registration time, at the endoscopy center the day of your procedure. The colonoscopy takes an average of 15 to 30 minutes. Recovery time is about 30 minutes.      Before the exam:    You will change into a gown.    Your medical history and medication list will be reviewed with you, unless that has been done over the phone prior to the procedure.     A nurse will insert an intravenous (IV) line into your hand or arm.    The doctor will meet with you and will give you a consent form to sign.  During the exam:     Medicine will be given through the IV line to help you relax.     Your heart rate and oxygen levels will be monitored. If your blood pressure is low, you may be given fluids through the IV line.     The doctor will insert a flexible hollow tube, called a colonoscope, into your rectum. The scope will be advanced slowly through the large intestine (colon).    You may have a feeling of fullness or pressure.     If an abnormal tissue or a polyp is found, the doctor may remove it through the endoscope for closer examination, or biopsy. Tissue removal is painless    After the exam:           Any tissue samples removed during the exam will be sent to a lab for evaluation. It may take 5-7 working days for you to be notified of the results.     A nurse will provide you with complete discharge instructions before you leave the endoscopy center.  Be sure to ask the nurse for specific instructions if you take blood thinners such as Aspirin, Coumadin or Plavix.     The doctor will prepare a full report for you and for the physician who referred you for the procedure.     Your doctor will talk with you about the initial results of your exam.      Medication given during the exam will prohibit you from driving for the rest of the day.     Following the exam, you may resume your normal diet. Your first meal should be light, no greasy foods. Avoid alcohol until the next day.     You may resume your regular activities the day after the procedure.         LOW-FIBER DIET    Foods RECOMMENDED Foods to AVOID   Breads, Cereal, Rice and Pasta:   White bread, rolls, biscuits, croissant and viki toast.   Waffles, Frisian toast and pancakes.   White rice, noodles, pasta, macaroni and peeled cooked potatoes.   Plain crackers and saltines.   Cooked cereals: farina, cream of rice.   Cold cereals: Puffed Rice , Rice Krispies , Corn Flakes  and Special K    Breads, Cereal, Rice and Pasta:   Breads or rolls with nuts, seeds or fruit.   Whole wheat, pumpernickel, rye breads and cornbread.   Potatoes with skin, brown or wild rice, and kasha (buckwheat).     Vegetables:   Tender cooked and canned vegetables without seeds: carrots, asparagus tips, green or wax beans, pumpkin, spinach, lima beans. Vegetables:   Raw or steamed vegetables.   Vegetables with seeds.   Sauerkraut.   Winter squash, peas, broccoli, Brussel sprouts, cabbage, onions, cauliflower, baked beans, peas and corn.   Fruits:   Strained fruit juice.   Canned fruit, except pineapple.   Ripe bananas and melon. Fruits:   Prunes and prune juice.   Raw fruits.   Dried fruits: figs, dates and raisins.   Milk/Dairy:   Milk: plain or flavored.   Yogurt, custard and ice cream.   Cheese and cottage cheese Milk/Dairy:     Meat and other proteins:   ground, well-cooked tender beef, lamb, ham, veal, pork, fish, poultry and organ  meats.   Eggs.   Peanut butter without nuts. Meat and other proteins:   Tough, fibrous meats with gristle.   Dry beans, peas and lentils.   Peanut butter with nuts.   Tofu.   Fats, Snack, Sweets, Condiments and Beverages:   Margarine, butter, oils, mayonnaise, sour cream and salad dressing, plain gravy.   Sugar, hard candy, clear jelly, honey and syrup.   Spices, cooked herbs, bouillon, broth and soups made with allowed vegetable, ketchup and mustard.   Coffee, tea and carbonated drinks.   Plain cakes, cookies and pretzels.   Gelatin, plain puddings, custard, ice cream, sherbet and popsicles. Fats, Snack, Sweets, Condiments and Beverages:   Nuts, seeds and coconut.   Jam, marmalade and preserves.   Pickles, olives, relish and horseradish.   All desserts containing nuts, seeds, dried fruit and coconut; or made from whole grains or bran.   Candy made with nuts or seeds.   Popcorn.   DIRECTIONS TO THE ENDOSCOPY DEPARTMENT    From the north ([facility])  Take [address] South, exit on [address] [address]. Get into the left hand lalit, turn left (east), go one-half mile to [address] and turn left. Go north to the second stoplight, take a right on [address] and follow it to the Main Hospital entrance.    From the south ([facility])  Take 35N to the [address]E split and exit on [address] [address]. On [address] [address], turn left (west) to [address]. Turn right (north) on [address]. Go north to the second stoplight, take a right on [address] and follow it to the Main Hospital entrance.    From the east via 35E ([facility])  Take [address] south to [address] [address] exit. Turn right on [address] [address]. Go west to [address]. Turn right (north) on [address]. Go to the second stoplight, take a right on [address] to the Main Hospital entrance.    From the east via [address] ([facility])  Take [address] to [address]. Turn left (south) on [address] to [address], turn left (east) on [address] and follow it to the Main Hospital entrance.    From the west via [address] ([place], [place])  Take [address] east to [address]. Turn right (south) on [address] to [address], turn left (east) on [address] and follow it to the Main Hospital entrance.

## 2022-06-14 NOTE — H&P
Pre-Endoscopy History and Physical     Kaylee Lopez MRN# [id number]   YOB: 1970 Age: 52 year old     Date of Procedure: 6/14/2022  Primary care provider: Moriah Hernandez  Type of Endoscopy: Colonoscopy with possible biopsy, possible polypectomy  Reason for Procedure: screen  Type of Anesthesia Anticipated: Conscious Sedation    HPI:    Kaylee is a 52 year old female who will be undergoing the above procedure.      A history and physical has been performed. The patient's medications and allergies have been reviewed. The risks and benefits of the procedure and the sedation options and risks were discussed with the patient.  All questions were answered and informed consent was obtained.      She denies a personal or family history of anesthesia complications or bleeding disorders.     Patient Active Problem List   Diagnosis     Fibromyalgia     Non-rheumatic tricuspid valve insufficiency     Major depression in complete remission (H)     Narcolepsy     Hyperlipidemia LDL goal <100     Health Care Home     Reflex sympathetic dystrophy     Type 2 diabetes mellitus without complication, without long-term current use of insulin (H)     Migraine with aura and without status migrainosus, not intractable     Anxiety     Chronic, continuous use of opioids     Morbid obesity (H)        Past Medical History:   Diagnosis Date     Abnormal Papanicolaou smear of cervix and cervical HPV 2003    late 2003, early 2004; treated with TCA     Depression      Depressive disorder 15-20 years     Diabetes mellitus      Fibromyalgia      Heart murmur     tricuspid valve disorder     Inflammatory arthritis     ?; has been discharged from Rheumatology     Narcolepsy 11/01/2010    doing well without medication.     Non-rheumatic tricuspid valve insufficiency     antibiotic prophylax Problem list name updated by automated process. Provider to review      Obesity      Other chronic pain      Pulmonary hypertension (H)  2010    on ECHO, but normal right heart cath      Reflex sympathetic dystrophy     right foot ; related to stress fracture     TRICUSPID VALVE DISEASE (regurg)     sees Cardiology         Past Surgical History:   Procedure Laterality Date     Angiogram  3/2011    No pulmonary hypertension     COLONOSCOPY  2008    normal     CYSTOSCOPY N/A 2019    Procedure: CYSTOSCOPY;  Surgeon: Georgiana Pizarro DO;  Location: [facility]     DAVINCI HYSTERECTOMY TOTAL, BILATERAL SALPINGO-OOPHORECTOMY, COMBINED Bilateral 2019    Procedure: robotic assisted total laparoscopic hysterectomy bilateral salpingectomy and cystoscopy;  Surgeon: Georgiana Pizarro DO;  Location: [facility] OR     DILATION AND CURETTAGE, OPERATIVE HYSTEROSCOPY WITH MORCELLATOR, COMBINED N/A 2018    Procedure: COMBINED DILATION AND CURETTAGE, OPERATIVE HYSTEROSCOPY WITH MORCELLATOR;  Hysteroscopy, polypectomy  with myosure, dilation and curettage, endometrial biopsy ;  Surgeon: Georgiana Pizarro DO;  Location: [facility] OR     ENT SURGERY      wisdon teeth removal     LAPAROSCOPIC CHOLECYSTECTOMY  2000     SURGICAL HISTORY OF -       essure procedure for contraception     TCA for abnormal pap         Social History     Tobacco Use     Smoking status: Never Smoker     Smokeless tobacco: Never Used   Substance Use Topics     Alcohol use: Yes     Comment: Rarely       Family History   Problem Relation Age of Onset     Respiratory Father         asthma     Arthritis Father         hips     Depression Father          by suicide in      Alcohol/Drug Father      Substance Abuse Father      Asthma Father      Diabetes Paternal Grandfather      Cerebrovascular Disease Paternal Grandfather          at age 50/51     Hypertension Paternal Grandfather      Cancer - colorectal Paternal Grandmother      Arthritis Paternal Grandmother         hips     Colon Cancer Paternal Grandmother      Other Cancer Paternal Grandmother         Mouth/tongue  cancer     Asthma Paternal Grandmother      Prostate Cancer Maternal Grandfather      Hypertension Maternal Grandfather      Substance Abuse Maternal Grandfather      Cancer Maternal Grandmother         pancreatic     Arthritis Maternal Grandmother         hips     Depression Maternal Grandmother      Hypertension Maternal Grandmother      Other Cancer Maternal Grandmother      Gallbladder Disease Maternal Grandmother      Substance Abuse Maternal Grandmother      Depression Mother      Rashes/Skin Problems Mother         Rosacea     Arthritis Mother         osteoarthritis in hands and knees     Hypertension Mother      Substance Abuse Mother         Revovering     Osteoporosis Mother      Depression Maternal Uncle         bipolar     Respiratory Brother         sleep apnea     Mental Illness Brother      Cancer - colorectal Maternal Uncle      Cerebrovascular Disease Maternal Uncle          of massive stroke--no heart problems     Hypertension Son      Diabetes Cousin      Hypertension Son      Breast Cancer Cousin      Breast Cancer Other      Colon Cancer Other         Maternal uncle     Depression Brother          by Suicide 17     Substance Abuse Brother         Relasped 2017 &  by suicide 17     Substance Abuse Other      Substance Abuse Cousin      Substance Abuse Cousin        Prior to Admission medications    Medication Sig Start Date End Date Taking? Authorizing Provider   atorvastatin (LIPITOR) 10 MG tablet Take 1 tablet (10 mg) by mouth daily 21  Yes Irene Castellanos MD   blood glucose (NO BRAND SPECIFIED) test strip Use to test blood sugar 1 times daily or as directed. 21  Yes Irene Castellanos MD   blood glucose monitoring (NO BRAND SPECIFIED) meter device kit Use to test blood sugar 1 times daily or as directed. Please give her all needed supplies with prn refills. 19  Yes Irene Castellanos MD   busPIRone (BUSPAR) 15 MG tablet TAKE 1 TABLET BY MOUTH TWICE A DAY 22   "Yes Moriah Hernandez MD   cetirizine (ZYRTEC) 10 MG tablet Take 10 mg by mouth daily as needed for allergies   Yes Reported, Patient   Cholecalciferol ( VITAMIN D3) 100 MCG (4000 UT) CAPS  11/1/21  Yes Reported, Patient   citalopram (CELEXA) 40 MG tablet Take 1 tablet (40 mg) by mouth daily 11/2/21  Yes Moriah Hernandez MD   cyanocobalamin (VITAMIN B-12) 1000 MCG tablet TAKE 1 TABLET BY MOUTH DAILY 12/1/21  Yes Moriah Hernandez MD   cyclobenzaprine (FLEXERIL) 10 MG tablet Take 1 tablet (10 mg) by mouth 3 times daily as needed for muscle spasms 4/22/22  Yes Moriah Hernandez MD   ferrous gluconate (FERGON) 324 (38 Fe) MG tablet  11/1/21  Yes Reported, Patient   lisinopril (ZESTRIL) 5 MG tablet Take 1 tablet (5 mg) by mouth daily 4/12/22  Yes Moriah Hernandez MD   metFORMIN (GLUCOPHAGE-XR) 500 MG 24 hr tablet TAKE 4 TABLETS BY MOUTH DAILY (WITH BREAKFAST) 4/12/22  Yes Moriah Hernandez MD   Semaglutide, 1 MG/DOSE, 4 MG/3ML SOPN Inject 1 mg Subcutaneous once a week 4/12/22  Yes Moriah Hernandez MD   traMADol (ULTRAM) 50 MG tablet Take 1 tablet (50 mg) by mouth 2 times daily as needed for severe pain MAX 60 TBS/MONTH, can fill 3/23/2022 4/12/22  Yes Moriah Hernandez MD   traZODone (DESYREL) 50 MG tablet Take 2 tablets (100 mg) by mouth nightly as needed for sleep (insomnia) 11/2/21  Yes Moriah Hernandez MD   triamcinolone (KENALOG) 0.1 % external cream Apply topically 2 times daily 6/18/21  Yes Irene Castellanos MD   vitamin C (ASCORBIC ACID) 1000 MG TABS  11/1/21  Yes Reported, Patient       Allergies   Allergen Reactions     Codeine Nausea     Nausea (with tylenol/codeine)        REVIEW OF SYSTEMS:   5 point ROS negative except as noted above in HPI, including Gen., Resp., CV, GI &  system review.    PHYSICAL EXAM:   LMP 05/14/2019  Estimated body mass index is 42.04 kg/m  as calculated from the following:    Height as of 4/12/22: 1.613 m (5' 3.5\").    Weight as of 4/12/22: " 109.4 kg (241 lb 1.6 oz).   GENERAL APPEARANCE: alert, and oriented  MENTAL STATUS: alert  AIRWAY EXAM: Mallampatti Class I (visualization of the soft palate, fauces, uvula, anterior and posterior pillars)  RESP: lungs clear to auscultation - no rales, rhonchi or wheezes  CV: regular rates and rhythm  DIAGNOSTICS:    Not indicated    IMPRESSION   ASA Class 2 - Mild systemic disease    PLAN:   Plan for Colonoscopy with possible biopsy, possible polypectomy. We discussed the risks, benefits and alternatives and the patient wished to proceed.    The above has been forwarded to the consulting provider.      Signed Electronically by: Perico Warren MD  June 14, 2022

## 2022-06-22 DIAGNOSIS — E11.9 TYPE 2 DIABETES MELLITUS WITHOUT COMPLICATION, WITHOUT LONG-TERM CURRENT USE OF INSULIN (H): ICD-10-CM

## 2022-06-24 ENCOUNTER — MYC MEDICAL ADVICE (OUTPATIENT)
Dept: FAMILY MEDICINE | Facility: CLINIC | Age: 52
End: 2022-06-24

## 2022-06-24 DIAGNOSIS — G47.00 INSOMNIA, UNSPECIFIED TYPE: ICD-10-CM

## 2022-06-24 DIAGNOSIS — F11.90 CHRONIC, CONTINUOUS USE OF OPIOIDS: ICD-10-CM

## 2022-06-24 DIAGNOSIS — M79.7 FIBROMYALGIA: ICD-10-CM

## 2022-06-24 DIAGNOSIS — R05.9 COUGH: ICD-10-CM

## 2022-06-24 DIAGNOSIS — Z76.89 HEALTH CARE HOME: Primary | ICD-10-CM

## 2022-06-24 RX ORDER — TRAMADOL HYDROCHLORIDE 50 MG/1
50 TABLET ORAL 2 TIMES DAILY PRN
Qty: 60 TABLET | Refills: 0 | Status: SHIPPED | OUTPATIENT
Start: 2022-06-24 | End: 2022-10-13

## 2022-06-24 RX ORDER — BLOOD SUGAR DIAGNOSTIC
STRIP MISCELLANEOUS
Qty: 100 STRIP | Refills: 4 | Status: SHIPPED | OUTPATIENT
Start: 2022-06-24 | End: 2023-08-25

## 2022-06-24 RX ORDER — ALBUTEROL SULFATE 90 UG/1
2 AEROSOL, METERED RESPIRATORY (INHALATION) EVERY 4 HOURS PRN
Qty: 18 G | Refills: 4 | Status: SHIPPED | OUTPATIENT
Start: 2022-06-24 | End: 2023-08-18

## 2022-06-24 RX ORDER — ALBUTEROL SULFATE 90 UG/1
2 AEROSOL, METERED RESPIRATORY (INHALATION) EVERY 6 HOURS
Qty: 18 G | Refills: 1 | Status: CANCELLED | OUTPATIENT
Start: 2022-06-24

## 2022-06-24 NOTE — TELEPHONE ENCOUNTER
Routing refill request to provider for review/approval because:  Drug interaction warning    Roseanne De Dios, RN

## 2022-06-24 NOTE — TELEPHONE ENCOUNTER
Previous [facility] patient, new PCP is Moriah Hernandez. Will route to PCP and [facility] refill pool.    Bertha MARSH RN

## 2022-06-27 DIAGNOSIS — F32.5 MAJOR DEPRESSION IN COMPLETE REMISSION (H): ICD-10-CM

## 2022-06-27 RX ORDER — TRAZODONE HYDROCHLORIDE 50 MG/1
100 TABLET, FILM COATED ORAL
Qty: 180 TABLET | Refills: 1 | Status: SHIPPED | OUTPATIENT
Start: 2022-06-27 | End: 2022-12-15

## 2022-06-28 RX ORDER — CITALOPRAM HYDROBROMIDE 40 MG/1
40 TABLET ORAL DAILY
Qty: 90 TABLET | Refills: 1 | Status: SHIPPED | OUTPATIENT
Start: 2022-06-28 | End: 2022-11-11

## 2022-06-28 NOTE — TELEPHONE ENCOUNTER
Routing refill request to provider for review/approval because:  Drug interaction warning    Suman COLLINS RN

## 2022-07-24 DIAGNOSIS — E11.9 TYPE 2 DIABETES MELLITUS WITHOUT COMPLICATION, WITHOUT LONG-TERM CURRENT USE OF INSULIN (H): ICD-10-CM

## 2022-07-25 RX ORDER — METFORMIN HCL 500 MG
TABLET, EXTENDED RELEASE 24 HR ORAL
Qty: 360 TABLET | Refills: 0 | Status: SHIPPED | OUTPATIENT
Start: 2022-07-25 | End: 2022-10-25

## 2022-07-25 NOTE — TELEPHONE ENCOUNTER
Prescription approved per [facility] Refill Protocol.  Roseanne De Dios RN

## 2022-08-10 ENCOUNTER — TRANSFERRED RECORDS (OUTPATIENT)
Dept: CARDIOLOGY | Facility: CLINIC | Age: 52
End: 2022-08-10

## 2022-08-10 LAB — RETINOPATHY: NEGATIVE

## 2022-09-21 DIAGNOSIS — E78.5 HYPERLIPIDEMIA LDL GOAL <100: ICD-10-CM

## 2022-09-22 ENCOUNTER — IMMUNIZATION (OUTPATIENT)
Dept: FAMILY MEDICINE | Facility: CLINIC | Age: 52
End: 2022-09-22
Payer: COMMERCIAL

## 2022-09-22 DIAGNOSIS — Z23 NEED FOR VACCINATION: Primary | ICD-10-CM

## 2022-09-22 PROCEDURE — 90471 IMMUNIZATION ADMIN: CPT

## 2022-09-22 PROCEDURE — 90682 RIV4 VACC RECOMBINANT DNA IM: CPT

## 2022-09-22 PROCEDURE — 0134A COVID-19,PF,MODERNA BIVALENT: CPT

## 2022-09-22 PROCEDURE — 91313 COVID-19,PF,MODERNA BIVALENT: CPT

## 2022-09-22 PROCEDURE — 99207 PR NO CHARGE NURSE ONLY: CPT

## 2022-09-22 RX ORDER — ATORVASTATIN CALCIUM 10 MG/1
TABLET, FILM COATED ORAL
Qty: 90 TABLET | Refills: 0 | Status: SHIPPED | OUTPATIENT
Start: 2022-09-22 | End: 2022-11-11

## 2022-09-22 NOTE — TELEPHONE ENCOUNTER
Routing refill request to provider for review/approval because:  Labs not current: LDL     LDL Cholesterol Calculated   Date Value Ref Range Status   09/20/2021 60 <=100 mg/dL Final   09/09/2020 48 <100 mg/dL Final     Comment:     Desirable:       <100 mg/dl     Kelli LAYTON RN   Patient Advocate Liaison (PAL)  [facility]

## 2022-09-22 NOTE — ADDENDUM NOTE
Addended by: TALIA BARRIOS on: 9/22/2022 03:11 PM     Modules accepted: Orders, Level of Service

## 2022-10-13 ENCOUNTER — MYC REFILL (OUTPATIENT)
Dept: FAMILY MEDICINE | Facility: CLINIC | Age: 52
End: 2022-10-13

## 2022-10-13 DIAGNOSIS — F11.90 CHRONIC, CONTINUOUS USE OF OPIOIDS: ICD-10-CM

## 2022-10-13 DIAGNOSIS — M79.7 FIBROMYALGIA: ICD-10-CM

## 2022-10-14 RX ORDER — TRAMADOL HYDROCHLORIDE 50 MG/1
50 TABLET ORAL 2 TIMES DAILY PRN
Qty: 60 TABLET | Refills: 0 | Status: SHIPPED | OUTPATIENT
Start: 2022-10-14 | End: 2022-11-11

## 2022-10-14 NOTE — TELEPHONE ENCOUNTER
Routing refill request to provider for review/approval because:  Drug not on the [facility] refill protocol     Suman COLLINS RN

## 2022-10-15 ENCOUNTER — HEALTH MAINTENANCE LETTER (OUTPATIENT)
Age: 52
End: 2022-10-15

## 2022-10-25 DIAGNOSIS — E11.9 TYPE 2 DIABETES MELLITUS WITHOUT COMPLICATION, WITHOUT LONG-TERM CURRENT USE OF INSULIN (H): ICD-10-CM

## 2022-10-25 RX ORDER — METFORMIN HCL 500 MG
TABLET, EXTENDED RELEASE 24 HR ORAL
Qty: 360 TABLET | Refills: 0 | Status: SHIPPED | OUTPATIENT
Start: 2022-10-25 | End: 2022-11-11

## 2022-10-25 NOTE — TELEPHONE ENCOUNTER
Routing refill request to provider for review/approval because:  Labs not current:  A1C and creatinine  Patient needs to be seen because:  Failing visit    Tereza Kessler RN

## 2022-11-11 ENCOUNTER — OFFICE VISIT (OUTPATIENT)
Dept: FAMILY MEDICINE | Facility: CLINIC | Age: 52
End: 2022-11-11
Payer: COMMERCIAL

## 2022-11-11 VITALS
DIASTOLIC BLOOD PRESSURE: 84 MMHG | SYSTOLIC BLOOD PRESSURE: 134 MMHG | HEART RATE: 94 BPM | TEMPERATURE: 98.2 F | HEIGHT: 64 IN | RESPIRATION RATE: 14 BRPM | WEIGHT: 234 LBS | BODY MASS INDEX: 39.95 KG/M2

## 2022-11-11 DIAGNOSIS — E78.5 HYPERLIPIDEMIA LDL GOAL <100: ICD-10-CM

## 2022-11-11 DIAGNOSIS — E11.9 TYPE 2 DIABETES MELLITUS WITHOUT COMPLICATION, WITHOUT LONG-TERM CURRENT USE OF INSULIN (H): ICD-10-CM

## 2022-11-11 DIAGNOSIS — F11.90 CHRONIC, CONTINUOUS USE OF OPIOIDS: ICD-10-CM

## 2022-11-11 DIAGNOSIS — G90.50 REFLEX SYMPATHETIC DYSTROPHY: ICD-10-CM

## 2022-11-11 DIAGNOSIS — M79.7 FIBROMYALGIA: ICD-10-CM

## 2022-11-11 DIAGNOSIS — F33.42 RECURRENT MAJOR DEPRESSIVE DISORDER, IN FULL REMISSION (H): ICD-10-CM

## 2022-11-11 DIAGNOSIS — Z00.00 ROUTINE GENERAL MEDICAL EXAMINATION AT A HEALTH CARE FACILITY: Primary | ICD-10-CM

## 2022-11-11 DIAGNOSIS — E66.01 MORBID OBESITY (H): ICD-10-CM

## 2022-11-11 DIAGNOSIS — F41.9 ANXIETY: ICD-10-CM

## 2022-11-11 LAB
ALBUMIN SERPL-MCNC: 3.7 G/DL (ref 3.4–5)
ALP SERPL-CCNC: 168 U/L (ref 40–150)
ALT SERPL W P-5'-P-CCNC: 26 U/L (ref 0–50)
ANION GAP SERPL CALCULATED.3IONS-SCNC: 6 MMOL/L (ref 3–14)
AST SERPL W P-5'-P-CCNC: 17 U/L (ref 0–45)
BILIRUB SERPL-MCNC: 0.6 MG/DL (ref 0.2–1.3)
BUN SERPL-MCNC: 14 MG/DL (ref 7–30)
CALCIUM SERPL-MCNC: 9.5 MG/DL (ref 8.5–10.1)
CHLORIDE BLD-SCNC: 103 MMOL/L (ref 94–109)
CHOLEST SERPL-MCNC: 129 MG/DL
CO2 SERPL-SCNC: 29 MMOL/L (ref 20–32)
CREAT SERPL-MCNC: 0.82 MG/DL (ref 0.52–1.04)
FASTING STATUS PATIENT QL REPORTED: NORMAL
GFR SERPL CREATININE-BSD FRML MDRD: 87 ML/MIN/1.73M2
GLUCOSE BLD-MCNC: 147 MG/DL (ref 70–99)
HBA1C MFR BLD: 6.1 % (ref 0–5.6)
HDLC SERPL-MCNC: 59 MG/DL
LDLC SERPL CALC-MCNC: 50 MG/DL
NONHDLC SERPL-MCNC: 70 MG/DL
POTASSIUM BLD-SCNC: 4.6 MMOL/L (ref 3.4–5.3)
PROT SERPL-MCNC: 7.5 G/DL (ref 6.8–8.8)
SODIUM SERPL-SCNC: 138 MMOL/L (ref 133–144)
TRIGL SERPL-MCNC: 99 MG/DL

## 2022-11-11 PROCEDURE — 36415 COLL VENOUS BLD VENIPUNCTURE: CPT | Performed by: FAMILY MEDICINE

## 2022-11-11 PROCEDURE — 90471 IMMUNIZATION ADMIN: CPT | Performed by: FAMILY MEDICINE

## 2022-11-11 PROCEDURE — 99396 PREV VISIT EST AGE 40-64: CPT | Mod: 25 | Performed by: FAMILY MEDICINE

## 2022-11-11 PROCEDURE — 80061 LIPID PANEL: CPT | Performed by: FAMILY MEDICINE

## 2022-11-11 PROCEDURE — 99214 OFFICE O/P EST MOD 30 MIN: CPT | Mod: 25 | Performed by: FAMILY MEDICINE

## 2022-11-11 PROCEDURE — 80053 COMPREHEN METABOLIC PANEL: CPT | Performed by: FAMILY MEDICINE

## 2022-11-11 PROCEDURE — 83036 HEMOGLOBIN GLYCOSYLATED A1C: CPT | Performed by: FAMILY MEDICINE

## 2022-11-11 PROCEDURE — 90750 HZV VACC RECOMBINANT IM: CPT | Performed by: FAMILY MEDICINE

## 2022-11-11 RX ORDER — METFORMIN HCL 500 MG
2000 TABLET, EXTENDED RELEASE 24 HR ORAL
Qty: 360 TABLET | Refills: 1 | Status: SHIPPED | OUTPATIENT
Start: 2022-11-11 | End: 2023-04-27

## 2022-11-11 RX ORDER — CITALOPRAM HYDROBROMIDE 40 MG/1
40 TABLET ORAL DAILY
Qty: 90 TABLET | Refills: 1 | Status: SHIPPED | OUTPATIENT
Start: 2022-11-11 | End: 2023-04-27

## 2022-11-11 RX ORDER — TRAMADOL HYDROCHLORIDE 50 MG/1
50 TABLET ORAL 2 TIMES DAILY PRN
Qty: 60 TABLET | Refills: 0 | Status: SHIPPED | OUTPATIENT
Start: 2022-11-11 | End: 2023-02-02

## 2022-11-11 RX ORDER — ATORVASTATIN CALCIUM 10 MG/1
10 TABLET, FILM COATED ORAL DAILY
Qty: 90 TABLET | Refills: 3 | Status: SHIPPED | OUTPATIENT
Start: 2022-11-11 | End: 2023-07-25

## 2022-11-11 SDOH — ECONOMIC STABILITY: INCOME INSECURITY: HOW HARD IS IT FOR YOU TO PAY FOR THE VERY BASICS LIKE FOOD, HOUSING, MEDICAL CARE, AND HEATING?: NOT HARD AT ALL

## 2022-11-11 SDOH — ECONOMIC STABILITY: TRANSPORTATION INSECURITY
IN THE PAST 12 MONTHS, HAS THE LACK OF TRANSPORTATION KEPT YOU FROM MEDICAL APPOINTMENTS OR FROM GETTING MEDICATIONS?: NO

## 2022-11-11 SDOH — ECONOMIC STABILITY: TRANSPORTATION INSECURITY
IN THE PAST 12 MONTHS, HAS LACK OF TRANSPORTATION KEPT YOU FROM MEETINGS, WORK, OR FROM GETTING THINGS NEEDED FOR DAILY LIVING?: NO

## 2022-11-11 SDOH — HEALTH STABILITY: PHYSICAL HEALTH: ON AVERAGE, HOW MANY MINUTES DO YOU ENGAGE IN EXERCISE AT THIS LEVEL?: 0 MIN

## 2022-11-11 SDOH — HEALTH STABILITY: PHYSICAL HEALTH: ON AVERAGE, HOW MANY DAYS PER WEEK DO YOU ENGAGE IN MODERATE TO STRENUOUS EXERCISE (LIKE A BRISK WALK)?: 0 DAYS

## 2022-11-11 SDOH — ECONOMIC STABILITY: INCOME INSECURITY: IN THE LAST 12 MONTHS, WAS THERE A TIME WHEN YOU WERE NOT ABLE TO PAY THE MORTGAGE OR RENT ON TIME?: NO

## 2022-11-11 SDOH — ECONOMIC STABILITY: FOOD INSECURITY: WITHIN THE PAST 12 MONTHS, THE FOOD YOU BOUGHT JUST DIDN'T LAST AND YOU DIDN'T HAVE MONEY TO GET MORE.: NEVER TRUE

## 2022-11-11 SDOH — ECONOMIC STABILITY: FOOD INSECURITY: WITHIN THE PAST 12 MONTHS, YOU WORRIED THAT YOUR FOOD WOULD RUN OUT BEFORE YOU GOT MONEY TO BUY MORE.: NEVER TRUE

## 2022-11-11 ASSESSMENT — SOCIAL DETERMINANTS OF HEALTH (SDOH)
DO YOU BELONG TO ANY CLUBS OR ORGANIZATIONS SUCH AS CHURCH GROUPS UNIONS, FRATERNAL OR ATHLETIC GROUPS, OR SCHOOL GROUPS?: NO
HOW OFTEN DO YOU GET TOGETHER WITH FRIENDS OR RELATIVES?: ONCE A WEEK
IN A TYPICAL WEEK, HOW MANY TIMES DO YOU TALK ON THE PHONE WITH FAMILY, FRIENDS, OR NEIGHBORS?: TWICE A WEEK
HOW OFTEN DO YOU ATTEND CHURCH OR RELIGIOUS SERVICES?: NEVER

## 2022-11-11 ASSESSMENT — PATIENT HEALTH QUESTIONNAIRE - PHQ9
SUM OF ALL RESPONSES TO PHQ QUESTIONS 1-9: 9
10. IF YOU CHECKED OFF ANY PROBLEMS, HOW DIFFICULT HAVE THESE PROBLEMS MADE IT FOR YOU TO DO YOUR WORK, TAKE CARE OF THINGS AT HOME, OR GET ALONG WITH OTHER PEOPLE: SOMEWHAT DIFFICULT
SUM OF ALL RESPONSES TO PHQ QUESTIONS 1-9: 9

## 2022-11-11 ASSESSMENT — ENCOUNTER SYMPTOMS
HEMATURIA: 0
ABDOMINAL PAIN: 1
HEARTBURN: 1
ARTHRALGIAS: 1
SORE THROAT: 0
HEMATOCHEZIA: 0
BREAST MASS: 0
NAUSEA: 1
COUGH: 0
DYSURIA: 0
FEVER: 0
SHORTNESS OF BREATH: 0
HEADACHES: 1
PARESTHESIAS: 0
CHILLS: 0
PALPITATIONS: 0
EYE PAIN: 0
MYALGIAS: 1
CONSTIPATION: 1
JOINT SWELLING: 0
DIARRHEA: 1
FREQUENCY: 0
DIZZINESS: 0
WEAKNESS: 0
NERVOUS/ANXIOUS: 1

## 2022-11-11 ASSESSMENT — LIFESTYLE VARIABLES
HOW MANY STANDARD DRINKS CONTAINING ALCOHOL DO YOU HAVE ON A TYPICAL DAY: 1 OR 2
HOW OFTEN DO YOU HAVE SIX OR MORE DRINKS ON ONE OCCASION: NEVER
HOW OFTEN DO YOU HAVE A DRINK CONTAINING ALCOHOL: MONTHLY OR LESS
AUDIT-C TOTAL SCORE: 1
SKIP TO QUESTIONS 9-10: 1

## 2022-11-11 ASSESSMENT — ANXIETY QUESTIONNAIRES
GAD7 TOTAL SCORE: 8
8. IF YOU CHECKED OFF ANY PROBLEMS, HOW DIFFICULT HAVE THESE MADE IT FOR YOU TO DO YOUR WORK, TAKE CARE OF THINGS AT HOME, OR GET ALONG WITH OTHER PEOPLE?: SOMEWHAT DIFFICULT
GAD7 TOTAL SCORE: 8
3. WORRYING TOO MUCH ABOUT DIFFERENT THINGS: MORE THAN HALF THE DAYS
1. FEELING NERVOUS, ANXIOUS, OR ON EDGE: SEVERAL DAYS
4. TROUBLE RELAXING: SEVERAL DAYS
6. BECOMING EASILY ANNOYED OR IRRITABLE: SEVERAL DAYS
5. BEING SO RESTLESS THAT IT IS HARD TO SIT STILL: SEVERAL DAYS
7. FEELING AFRAID AS IF SOMETHING AWFUL MIGHT HAPPEN: SEVERAL DAYS
GAD7 TOTAL SCORE: 8
IF YOU CHECKED OFF ANY PROBLEMS ON THIS QUESTIONNAIRE, HOW DIFFICULT HAVE THESE PROBLEMS MADE IT FOR YOU TO DO YOUR WORK, TAKE CARE OF THINGS AT HOME, OR GET ALONG WITH OTHER PEOPLE: SOMEWHAT DIFFICULT
7. FEELING AFRAID AS IF SOMETHING AWFUL MIGHT HAPPEN: SEVERAL DAYS
2. NOT BEING ABLE TO STOP OR CONTROL WORRYING: SEVERAL DAYS

## 2022-11-11 NOTE — PROGRESS NOTES
SUBJECTIVE:   CC: Kaylee is an 52 year old who presents for preventive health visit.     Patient has been advised of split billing requirements and indicates understanding: Yes     Healthy Habits:     Getting at least 3 servings of Calcium per day:  NO    Bi-annual eye exam:  Yes    Dental care twice a year:  Yes    Sleep apnea or symptoms of sleep apnea:  None    Diet:  Diabetic    Frequency of exercise:  None    Taking medications regularly:  Yes    Medication side effects:  Other    PHQ-2 Total Score: 2    Additional concerns today:  No      Reports she hasn't been doing well with diabetes control over the past several months. Had a triggering event at work, MH took a turn. Has been following with therapy for EMDR. Trying to get back on track with eating. Taking her medications daily.       Today's PHQ-2 Score:   PHQ-2 ( 1999 [employer]) 11/11/2022   Q1: Little interest or pleasure in doing things 1   Q2: Feeling down, depressed or hopeless 1   PHQ-2 Score 2   PHQ-2 Total Score (12-17 Years)- Positive if 3 or more points; Administer PHQ-A if positive -   Q1: Little interest or pleasure in doing things Several days   Q2: Feeling down, depressed or hopeless Several days   PHQ-2 Score 2       Abuse: Current or Past (Physical, Sexual or Emotional) - No  Do you feel safe in your environment? Yes    Have you ever done Advance Care Planning? (For example, a Health Directive, POLST, or a discussion with a medical provider or your loved ones about your wishes): No, advance care planning information given to patient to review.  Patient plans to discuss their wishes with loved ones or provider.      Social History     Tobacco Use     Smoking status: Never     Smokeless tobacco: Never   Substance Use Topics     Alcohol use: Yes     Comment: Rarely         Alcohol Use 11/11/2022   Prescreen: >3 drinks/day or >7 drinks/week? Not Applicable   Prescreen: >3 drinks/day or >7 drinks/week? -       Reviewed orders with patient.   Reviewed health maintenance and updated orders accordingly - Yes  Patient Active Problem List   Diagnosis     Fibromyalgia     Non-rheumatic tricuspid valve insufficiency     Major depression in complete remission (H)     Narcolepsy     Hyperlipidemia LDL goal <100     Health Care Home     Reflex sympathetic dystrophy     Type 2 diabetes mellitus without complication, without long-term current use of insulin (H)     Migraine with aura and without status migrainosus, not intractable     Anxiety     Chronic, continuous use of opioids     Morbid obesity (H)     Past Surgical History:   Procedure Laterality Date     Angiogram  03/2011    No pulmonary hypertension     COLONOSCOPY  01/2008    normal     COLONOSCOPY N/A 06/14/2022    Procedure: COLONOSCOPY (fv);  Surgeon: Perico Warren MD;  Location: [facility]     CYSTOSCOPY N/A 06/05/2019    Procedure: CYSTOSCOPY;  Surgeon: Georgiana Pizarro DO;  Location: [facility] OR     DAVINCI HYSTERECTOMY TOTAL, BILATERAL SALPINGO-OOPHORECTOMY, COMBINED Bilateral 06/05/2019    Procedure: robotic assisted total laparoscopic hysterectomy bilateral salpingectomy and cystoscopy;  Surgeon: Georgiana Pizarro DO;  Location: [facility] OR     DILATION AND CURETTAGE, OPERATIVE HYSTEROSCOPY WITH MORCELLATOR, COMBINED N/A 07/09/2018    Procedure: COMBINED DILATION AND CURETTAGE, OPERATIVE HYSTEROSCOPY WITH MORCELLATOR;  Hysteroscopy, polypectomy  with myosure, dilation and curettage, endometrial biopsy ;  Surgeon: Georgiana Pizarro DO;  Location: [facility] OR     ENT SURGERY      wisdon teeth removal     HYSTERECTOMY, PAP NO LONGER INDICATED       LAPAROSCOPIC CHOLECYSTECTOMY  07/2000     SURGICAL HISTORY OF -       essure procedure for contraception     TCA for abnormal pap  2004       Social History     Tobacco Use     Smoking status: Never     Smokeless tobacco: Never   Substance Use Topics     Alcohol use: Yes     Comment: Rarely     Family History   Problem Relation Age of Onset     Respiratory  Father         asthma     Arthritis Father         hips     Depression Father          by suicide in      Alcohol/Drug Father      Substance Abuse Father      Asthma Father      Diabetes Paternal Grandfather      Cerebrovascular Disease Paternal Grandfather          at age 50/51     Hypertension Paternal Grandfather      Cancer - colorectal Paternal Grandmother      Arthritis Paternal Grandmother         hips     Colon Cancer Paternal Grandmother      Other Cancer Paternal Grandmother         Mouth/tongue cancer     Asthma Paternal Grandmother      Prostate Cancer Maternal Grandfather      Hypertension Maternal Grandfather      Substance Abuse Maternal Grandfather      Cancer Maternal Grandmother         pancreatic     Arthritis Maternal Grandmother         hips     Depression Maternal Grandmother      Hypertension Maternal Grandmother      Other Cancer Maternal Grandmother      Gallbladder Disease Maternal Grandmother      Substance Abuse Maternal Grandmother      Depression Mother      Rashes/Skin Problems Mother         Rosacea     Arthritis Mother         osteoarthritis in hands and knees     Hypertension Mother      Substance Abuse Mother         Revovering     Osteoporosis Mother      Depression Maternal Uncle         bipolar     Respiratory Brother         sleep apnea     Mental Illness Brother      Cancer - colorectal Maternal Uncle      Cerebrovascular Disease Maternal Uncle          of massive stroke--no heart problems     Hypertension Son      Diabetes Cousin      Hypertension Son      Breast Cancer Cousin      Breast Cancer Other      Colon Cancer Other         Maternal uncle     Depression Brother          by Suicide 17     Substance Abuse Brother         Relasped  &  by suicide 17     Substance Abuse Other      Substance Abuse Cousin      Substance Abuse Cousin            Breast Cancer Screening:    FHS-7:   Breast CA Risk Assessment (FHS-7) 3/24/2022 2022    Did any of your first-degree relatives have breast or ovarian cancer? No No   Did any of your relatives have bilateral breast cancer? No Unknown   Did any man in your family have breast cancer? No No   Did any woman in your family have breast and ovarian cancer? No Yes   Did any woman in your family have breast cancer before age 50 y? No No   Do you have 2 or more relatives with breast and/or ovarian cancer? No Yes   Do you have 2 or more relatives with breast and/or bowel cancer? Yes Yes       Mammogram Screening: Recommended annual mammography  Pertinent mammograms are reviewed under the imaging tab.    History of abnormal Pap smear: Status post benign hysterectomy. Health Maintenance and Surgical History updated.  PAP / HPV Latest Ref Rng & Units 4/23/2018 6/22/2016 5/5/2015   PAP (Historical) - OTHER-NIL, See Result NIL NIL   HPV16 NEG:Negative Negative Negative -   HPV18 NEG:Negative Negative Negative -   HRHPV NEG:Negative Negative Negative -     Reviewed and updated as needed this visit by clinical staff   Tobacco  Allergies  Meds   Med Hx  Surg Hx  Fam Hx  Soc Hx        Reviewed and updated as needed this visit by Provider     Meds               Review of Systems   Constitutional: Negative for chills and fever.   HENT: Negative for congestion, ear pain, hearing loss and sore throat.    Eyes: Negative for pain and visual disturbance.   Respiratory: Negative for cough and shortness of breath.    Cardiovascular: Negative for chest pain, palpitations and peripheral edema.   Gastrointestinal: Positive for abdominal pain, constipation, diarrhea, heartburn and nausea. Negative for hematochezia.   Breasts:  Negative for tenderness, breast mass and discharge.   Genitourinary: Negative for dysuria, frequency, genital sores, hematuria, pelvic pain, urgency, vaginal bleeding and vaginal discharge.   Musculoskeletal: Positive for arthralgias and myalgias. Negative for joint swelling.   Skin: Negative for rash.  "  Neurological: Positive for headaches. Negative for dizziness, weakness and paresthesias.   Psychiatric/Behavioral: Positive for mood changes. The patient is nervous/anxious.         OBJECTIVE:   /84 (BP Location: Right arm, Patient Position: Chair, Cuff Size: Adult Regular)   Pulse 94   Temp 98.2  F (36.8  C) (Oral)   Resp 14   Ht 1.619 m (5' 3.75\")   Wt 106.1 kg (234 lb)   LMP 05/14/2019   Breastfeeding No   BMI 40.48 kg/m    Physical Exam  GENERAL: healthy, alert and no distress  EYES: Eyes grossly normal to inspection, PERRL and conjunctivae and sclerae normal  HENT: ear canals and TM's normal, nose and mouth without ulcers or lesions  NECK: no adenopathy, no asymmetry, masses, or scars and thyroid normal to palpation  RESP: lungs clear to auscultation - no rales, rhonchi or wheezes  BREAST: normal without masses, tenderness or nipple discharge and no palpable axillary masses or adenopathy  CV: regular rate and rhythm, normal S1 S2, no S3 or S4, no murmur, click or rub, no peripheral edema and peripheral pulses strong  ABDOMEN: soft, nontender, no hepatosplenomegaly, no masses and bowel sounds normal  MS: no gross musculoskeletal defects noted, no edema  SKIN: no suspicious lesions or rashes  NEURO: Normal strength and tone, mentation intact and speech normal  PSYCH: mentation appears normal, affect normal/bright    Diagnostic Test Results:  Labs reviewed in Epic    ASSESSMENT/PLAN:     1. Routine general medical examination at a health care facility    2. Type 2 diabetes mellitus without complication, without long-term current use of insulin (H) - surveillance labs today, refills  - HEMOGLOBIN A1C; Future  - metFORMIN (GLUCOPHAGE XR) 500 MG 24 hr tablet; Take 4 tablets (2,000 mg) by mouth daily (with dinner)  Dispense: 360 tablet; Refill: 1  - Semaglutide, 1 MG/DOSE, 4 MG/3ML SOPN; Inject 1 mg Subcutaneous once a week  Dispense: 12 mL; Refill: 1  - Comprehensive metabolic panel (BMP + Alb, Alk " "Phos, ALT, AST, Total. Bili, TP); Future  - HEMOGLOBIN A1C  - Comprehensive metabolic panel (BMP + Alb, Alk Phos, ALT, AST, Total. Bili, TP)    3. Morbid obesity (H) - encouraged weight loss through diet and exercise    5. Anxiety - as below    6. Reflex sympathetic dystrophy - see below    7. Hyperlipidemia LDL goal <100 - on statin, continue  - Lipid panel reflex to direct LDL Non-fasting; Future  - atorvastatin (LIPITOR) 10 MG tablet; Take 1 tablet (10 mg) by mouth daily  Dispense: 90 tablet; Refill: 3  - Lipid panel reflex to direct LDL Non-fasting    8. Recurrent major depressive disorder, in full remission (H) - currently active - following with therapy, continue medications as prescribed.  - citalopram (CELEXA) 40 MG tablet; Take 1 tablet (40 mg) by mouth daily  Dispense: 90 tablet; Refill: 1    9. Fibromyalgia - stable, refills  - traMADol (ULTRAM) 50 MG tablet; Take 1 tablet (50 mg) by mouth 2 times daily as needed for severe pain MAX 60 TBS/MONTH, can fill 11/11/2022  Dispense: 60 tablet; Refill: 0    10. Chronic, continuous use of opioids - CSA signed today  - traMADol (ULTRAM) 50 MG tablet; Take 1 tablet (50 mg) by mouth 2 times daily as needed for severe pain MAX 60 TBS/MONTH, can fill 11/11/2022  Dispense: 60 tablet; Refill: 0      COUNSELING:  Reviewed preventive health counseling, as reflected in patient instructions    Estimated body mass index is 40.48 kg/m  as calculated from the following:    Height as of this encounter: 1.619 m (5' 3.75\").    Weight as of this encounter: 106.1 kg (234 lb).      She reports that she has never smoked. She has never used smokeless tobacco.    Moriah Hernandez MD  [facility]    Answers for HPI/ROS submitted by the patient on 11/11/2022  If you checked off any problems, how difficult have these problems made it for you to do your work, take care of things at home, or get along with other people?: Somewhat difficult  PHQ9 TOTAL SCORE: " 9  MICHAEL 7 TOTAL SCORE: 8

## 2022-11-11 NOTE — LETTER
Opioid / Opioid Plus Controlled Substance Agreement    This is an agreement between you and your provider about the safe and appropriate use of controlled substance/opioids prescribed by your care team. Controlled substances are medicines that can cause physical and mental dependence (abuse).    There are strict laws about having and using these medicines. We here at [facility] are committing to working with you in your efforts to get better. To support you in this work, we ll help you schedule regular office appointments for medicine refills. If we must cancel or change your appointment for any reason, we ll make sure you have enough medicine to last until your next appointment.     As a Provider, I will:    Listen carefully to your concerns and treat you with respect.     Recommend a treatment plan that I believe is in your best interest. This plan may involve therapies other than opioid pain medication.     Talk with you often about the possible benefits, and the risk of harm of any medicine that we prescribe for you.     Provide a plan on how to taper (discontinue or go off) using this medicine if the decision is made to stop its use.    As a Patient, I understand that opioid(s):     Are a controlled substance prescribed by my care team to help me function or work and manage my condition(s).     Are strong medicines and can cause serious side effects such as:    Drowsiness, which can seriously affect my driving ability    A lower breathing rate, enough to cause death    Harm to my thinking ability     Depression     Abuse of and addiction to this medicine    Need to be taken exactly as prescribed. Combining opioids with certain medicines or chemicals (such as illegal drugs, sedatives, sleeping pills, and benzodiazepines) can be dangerous or even fatal. If I stop opioids suddenly, I may have severe withdrawal symptoms.    Do not work for all types of pain nor for all patients. If they re not helpful, I may  be asked to stop them.        The risks, benefits and side effects of these medicine(s) were explained to me. I agree that:  1. I will take part in other treatments as advised by my care team. This may be psychiatry or counseling, physical therapy, behavioral therapy, group treatment or a referral to a specialist.     2. I will keep all my appointments. I understand that this is part of the monitoring of opioids. My care team may require an office visit for EVERY opioid/controlled substance refill. If I miss appointments or don t follow instructions, my care team may stop my medicine.    3. I will take my medicines as prescribed. I will not change the dose or schedule unless my care team tells me to. There will be no refills if I run out early.     4. I may be asked to come to the clinic and complete a urine drug test or complete a pill count at any time. If I don t give a urine sample or participate in a pill count, the care team may stop my medicine.    5. I will only receive prescriptions from this clinic for chronic pain. If I am treated by another provider for acute pain issues, I will tell them that I am taking opioid pain medication for chronic pain and that I have a treatment agreement with this provider. I will inform my [facility] care team within one business day if I am given a prescription for any pain medication by another healthcare provider. My [facility] care team can contact other providers and pharmacists about my use of any medicines.    6. It is up to me to make sure that I don t run out of my medicines on weekends or holidays. If my care team is willing to refill my opioid prescription without a visit, I must request refills only during office hours. Refills may take up to 3 business days to process. I will use one pharmacy to fill all my opioid and other controlled substance prescriptions. I will notify the clinic about any changes to my insurance or medication  availability.    7. I am responsible for my prescriptions. If the medicine/prescription is lost, stolen or destroyed, it will not be replaced. I also agree not to share controlled substance medicines with anyone.    8. I am aware I should not use any illegal or recreational drugs. I agree not to drink alcohol unless my care team says I can.       9. If I enroll in the [place] [facility] Cann[facility] program, I will tell my care team prior to my next refill.     10. I will tell my care team right away if I become pregnant, have a new medical problem treated outside of my regular clinic, or have a change in my medications.    11. I understand that this medicine can affect my thinking, judgment and reaction time. Alcohol and drugs affect the brain and body, which can affect the safety of my driving. Being under the influence of alcohol or drugs can affect my decision-making, behaviors, personal safety, and the safety of others. Driving while impaired (DWI) can occur if a person is driving, operating, or in physical control of a car, motorcycle, boat, snowmobile, ATV, motorbike, off-road vehicle, or any other motor vehicle ([place] Statute 169A.20). I understand the risk if I choose to drive or operate any vehicle or machinery.    I understand that if I do not follow any of the conditions above, my prescriptions or treatment may be stopped or changed.          Opioids  What You Need to Know    What are opioids?   Opioids are pain medicines that must be prescribed by a doctor. They are also known as narcotics.     Examples are:   1. morphine (MS Contin, Page)  2. oxycodone (Oxycontin)  3. oxycodone and acetaminophen (Percocet)  4. hydrocodone and acetaminophen (Vicodin, Norco)   5. fentanyl patch (Duragesic)   6. hydromorphone (Dilaudid)   7. methadone  8. codeine (Tylenol #3)     What do opioids do well?   Opioids are best for severe short-term pain such as after a surgery or injury. They may work well for cancer pain. They may  help some people with long-lasting (chronic) pain.     What do opioids NOT do well?   Opioids never get rid of pain entirely, and they don t work well for most patients with chronic pain. Opioids don t reduce swelling, one of the causes of pain.                                    Other ways to manage chronic pain and improve function include:       Treat the health problem that may be causing pain    Anti-inflammation medicines, which reduce swelling and tenderness, such as ibuprofen (Advil, Motrin) or naproxen (Aleve)    Acetaminophen (Tylenol)    Antidepressants and anti-seizure medicines, especially for nerve pain    Topical treatments such as patches or creams    Injections or nerve blocks    Chiropractic or osteopathic treatment    Acupuncture, massage, deep breathing, meditation, visual imagery, aromatherapy    Use heat or ice at the pain site    Physical therapy     Exercise    Stop smoking    Take part in therapy       Risks and side effects     Talk to your doctor before you start or decide to keep taking opioids. Possible side effects include:      Lowering your breathing rate enough to cause death    Overdose, including death, especially if taking higher than prescribed doses    Worse depression symptoms; less pleasure in things you usually enjoy    Feeling tired or sluggish    Slower thoughts or cloudy thinking    Being more sensitive to pain over time; pain is harder to control    Trouble sleeping or restless sleep    Changes in hormone levels (for example, less testosterone)    Changes in sex drive or ability to have sex    Constipation    Unsafe driving    Itching and sweating    Dizziness    Nausea, throwing up and dry mouth    What else should I know about opioids?    Opioids may lead to dependence, tolerance, or addiction.      Dependence means that if you stop or reduce the medicine too quickly, you will have withdrawal symptoms. These include loose poop (diarrhea), jitters, flu-like symptoms,  nervousness and tremors. Dependence is not the same as addiction.                       Tolerance means needing higher doses over time to get the same effect. This may increase the chance of serious side effects.      Addiction is when people improperly use a substance that harms their body, their mind or their relations with others. Use of opiates can cause a relapse of addiction if you have a history of drug or alcohol abuse.      People who have used opioids for a long time may have a lower quality of life, worse depression, higher levels of pain and more visits to doctors.    You can overdose on opioids. Take these steps to lower your risk of overdose:    1. Recognize the signs:  Signs of overdose include decrease or loss of consciousness (blackout), slowed breathing, trouble waking up and blue lips. If someone is worried about overdose, they should call 911.    2. Talk to your doctor about Narcan (naloxone).   If you are at risk for overdose, you may be given a prescription for Narcan. This medicine very quickly reverses the effects of opioids.   If you overdose, a friend or family member can give you Narcan while waiting for the ambulance. They need to know the signs of overdose and how to give Narcan.     3. Don't use alcohol or street drugs.   Taking them with opioids can cause death.    4. Do not take any of these medicines unless your doctor says it s OK. Taking these with opioids can cause death:    Benzodiazepines, such as lorazepam (Ativan), alprazolam (Xanax) or diazepam (Valium)    Muscle relaxers, such as cyclobenzaprine (Flexeril)    Sleeping pills like zolpidem (Ambien)     Other opioids      How to keep you and other people safe while taking opioids:    1. Never share your opioids with others.  Opioid medicines are regulated by the Drug Enforcement Agency ([employer]RIAN). Selling or sharing medications is a criminal act.    2. Be sure to store opioids in a secure place, locked up if possible. Young children  can easily swallow them and overdose.    3. When you are traveling with your medicines, keep them in the original bottles. If you use a pill box, be sure you also carry a copy of your medicine list from your clinic or pharmacy.    4. Safe disposal of opioids    Most pharmacies have places to get rid of medicine, called disposal kiosks. Medicine disposal options are also available in every [facility]. Search your county and  medication disposal  to find more options. You can find more details at:  [email or web address]     I agree that my provider, clinic care team, and pharmacy may work with any city, state or federal law enforcement agency that investigates the misuse, sale, or other diversion of my controlled medicine. I will allow my provider to discuss my care with, or share a copy of, this agreement with any other treating provider, pharmacy or emergency room where I receive care.    I have read this agreement and have asked questions about anything I did not understand.    _______________________________________________________  Patient Signature - Kaylee Lopez _____________________                   Date     _______________________________________________________  Provider Signature - Moriah Hernandez MD   _____________________                   Date     _______________________________________________________  Witness Signature (required if provider not present while patient signing)   _____________________                   Date

## 2022-12-15 DIAGNOSIS — G47.00 INSOMNIA, UNSPECIFIED TYPE: ICD-10-CM

## 2022-12-15 DIAGNOSIS — M79.7 FIBROMYALGIA: ICD-10-CM

## 2022-12-15 RX ORDER — TRAZODONE HYDROCHLORIDE 50 MG/1
100 TABLET, FILM COATED ORAL
Qty: 180 TABLET | Refills: 1 | Status: SHIPPED | OUTPATIENT
Start: 2022-12-15 | End: 2023-02-23

## 2022-12-15 NOTE — TELEPHONE ENCOUNTER
Routing refill request to provider for review/approval because:  Drug interaction warning  Nader ALBARRAN RN, BSN

## 2023-01-10 ENCOUNTER — OFFICE VISIT (OUTPATIENT)
Dept: URGENT CARE | Facility: URGENT CARE | Age: 53
End: 2023-01-10
Payer: COMMERCIAL

## 2023-01-10 VITALS
HEART RATE: 105 BPM | DIASTOLIC BLOOD PRESSURE: 84 MMHG | OXYGEN SATURATION: 97 % | TEMPERATURE: 97.9 F | RESPIRATION RATE: 16 BRPM | SYSTOLIC BLOOD PRESSURE: 136 MMHG

## 2023-01-10 DIAGNOSIS — J06.9 VIRAL URI: ICD-10-CM

## 2023-01-10 DIAGNOSIS — J03.90 TONSILLITIS: Primary | ICD-10-CM

## 2023-01-10 DIAGNOSIS — R07.0 THROAT PAIN: ICD-10-CM

## 2023-01-10 LAB — DEPRECATED S PYO AG THROAT QL EIA: NEGATIVE

## 2023-01-10 PROCEDURE — 99213 OFFICE O/P EST LOW 20 MIN: CPT | Performed by: PHYSICIAN ASSISTANT

## 2023-01-10 PROCEDURE — 87651 STREP A DNA AMP PROBE: CPT | Performed by: PHYSICIAN ASSISTANT

## 2023-01-10 RX ORDER — AMOXICILLIN 875 MG
875 TABLET ORAL 2 TIMES DAILY
Qty: 20 TABLET | Refills: 0 | Status: SHIPPED | OUTPATIENT
Start: 2023-01-10 | End: 2023-01-20

## 2023-01-10 NOTE — PROGRESS NOTES
Assessment & Plan     Tonsillitis  No concern for peritonsillar abscess based on exam findings.  Amoxicillin is prescribed.  Tylenol or Motrin as needed for pain.  Advised to keep monitoring symptoms.  Follow-up if any worsening symptoms.  Patient agrees.  - amoxicillin (AMOXIL) 875 MG tablet  Dispense: 20 tablet; Refill: 0    Viral URI  Lungs are clear on exam.  She is in no acute respiratory distress.  Supportive care measures advised.  Push fluids.  Rest.  Follow-up if any worsening symptoms. Patient understands and agrees with the plan.    Throat pain  RST is negative today.  Culture is pending.  Patient will be notified if any positive finding on the throat culture result.  Please see above treatment for tonsillitis.  She agrees.  - Streptococcus A Rapid Screen w/Reflex to PCR - Clinic Collect  - Group A Streptococcus PCR Throat Swab      Return in about 10 days (around 1/20/2023) for Symptoms failing to improve.    Marla Mart PA-C  [facility]    Lisandro Page is a 52 year old female who presents to clinic today for the following health issues:  Chief Complaint   Patient presents with     Pharyngitis     ST, hoarseness X 2 days. Pt reports white spots in throat, and redness. Possible exposure to strep through nephew.   Pt reports cold X 1 week.      HPI    Onset of symptoms was 1 week(s) ago. ST x 2 days, worsening.  Course of illness is worsening.    Severity moderate  Current and Associated symptoms:  Worsening sore throat, cough  Treatment measures tried include Tylenol/Ibuprofen.  Predisposing factors include exposure to strep.  No fever/chills      Review of Systems  Constitutional, HEENT, cardiovascular, pulmonary, GI, , musculoskeletal, neuro, skin, endocrine and psych systems are negative, except as otherwise noted.      Objective    /84 (BP Location: Right arm, Patient Position: Sitting, Cuff Size: Adult Large)   Pulse 105   Temp 97.9  F (36.6  C)  (Tympanic)   Resp 16   LMP 05/14/2019   SpO2 97%   Physical Exam   GENERAL: healthy, alert and no distress  HENT: ear canals and TM's normal, tonsils 2+ with exudates, uvula is midline  RESP: lungs clear to auscultation - no rales, rhonchi or wheezes  CV: regular rate and rhythm, normal S1 S2  MS: no gross musculoskeletal defects noted, no edema  SKIN: no suspicious lesions or rashes    Results for orders placed or performed in visit on 01/10/23 (from the past 24 hour(s))   Streptococcus A Rapid Screen w/Reflex to PCR - Clinic Collect    Specimen: Throat; Swab   Result Value Ref Range    Group A Strep antigen Negative Negative

## 2023-01-11 LAB — GROUP A STREP BY PCR: NOT DETECTED

## 2023-02-02 ENCOUNTER — MYC REFILL (OUTPATIENT)
Dept: FAMILY MEDICINE | Facility: CLINIC | Age: 53
End: 2023-02-02
Payer: COMMERCIAL

## 2023-02-02 DIAGNOSIS — F11.90 CHRONIC, CONTINUOUS USE OF OPIOIDS: ICD-10-CM

## 2023-02-02 DIAGNOSIS — M79.7 FIBROMYALGIA: ICD-10-CM

## 2023-02-03 RX ORDER — TRAMADOL HYDROCHLORIDE 50 MG/1
50 TABLET ORAL 2 TIMES DAILY PRN
Qty: 60 TABLET | Refills: 0 | Status: SHIPPED | OUTPATIENT
Start: 2023-02-03 | End: 2023-04-03

## 2023-02-16 DIAGNOSIS — E11.9 TYPE 2 DIABETES MELLITUS WITHOUT COMPLICATION, WITHOUT LONG-TERM CURRENT USE OF INSULIN (H): ICD-10-CM

## 2023-02-16 NOTE — TELEPHONE ENCOUNTER
Eilhart read by patient aware needs visit       Melissa Ortega/      
Medication is being filled for 1 time refill only due to:  Patient needs to be seen.    Routing to MA/TC pool. The Pt is due for a visit with PCP for DM    Nader ALBARRAN RN, BSN    
Radha message sent to patient to schedule    Melissa Ortega/      
normal...

## 2023-02-23 ENCOUNTER — E-VISIT (OUTPATIENT)
Dept: FAMILY MEDICINE | Facility: CLINIC | Age: 53
End: 2023-02-23
Payer: COMMERCIAL

## 2023-02-23 DIAGNOSIS — G47.429 NARCOLEPSY DUE TO UNDERLYING CONDITION WITHOUT CATAPLEXY: Primary | ICD-10-CM

## 2023-02-23 PROCEDURE — 99421 OL DIG E/M SVC 5-10 MIN: CPT | Performed by: FAMILY MEDICINE

## 2023-02-23 RX ORDER — TRAZODONE HYDROCHLORIDE 100 MG/1
150-200 TABLET ORAL AT BEDTIME
Qty: 180 TABLET | Refills: 3 | Status: SHIPPED | OUTPATIENT
Start: 2023-02-23 | End: 2023-12-28

## 2023-03-01 ENCOUNTER — ANCILLARY PROCEDURE (OUTPATIENT)
Dept: GENERAL RADIOLOGY | Facility: CLINIC | Age: 53
End: 2023-03-01
Attending: PHYSICIAN ASSISTANT
Payer: COMMERCIAL

## 2023-03-01 ENCOUNTER — OFFICE VISIT (OUTPATIENT)
Dept: URGENT CARE | Facility: URGENT CARE | Age: 53
End: 2023-03-01
Payer: COMMERCIAL

## 2023-03-01 VITALS
TEMPERATURE: 97.7 F | OXYGEN SATURATION: 99 % | SYSTOLIC BLOOD PRESSURE: 144 MMHG | RESPIRATION RATE: 12 BRPM | DIASTOLIC BLOOD PRESSURE: 84 MMHG | HEART RATE: 89 BPM

## 2023-03-01 DIAGNOSIS — M25.532 WRIST PAIN, ACUTE, LEFT: Primary | ICD-10-CM

## 2023-03-01 DIAGNOSIS — S69.92XA WRIST INJURY, LEFT, INITIAL ENCOUNTER: ICD-10-CM

## 2023-03-01 DIAGNOSIS — M77.8 TENDONITIS OF WRIST, LEFT: ICD-10-CM

## 2023-03-01 PROCEDURE — 99213 OFFICE O/P EST LOW 20 MIN: CPT | Performed by: PHYSICIAN ASSISTANT

## 2023-03-01 PROCEDURE — 73110 X-RAY EXAM OF WRIST: CPT | Mod: TC | Performed by: RADIOLOGY

## 2023-03-01 RX ORDER — DICLOFENAC SODIUM 75 MG/1
75 TABLET, DELAYED RELEASE ORAL 2 TIMES DAILY
Qty: 14 TABLET | Refills: 0 | Status: SHIPPED | OUTPATIENT
Start: 2023-03-01 | End: 2023-04-27

## 2023-03-02 NOTE — PROGRESS NOTES
Assessment & Plan     Wrist pain, acute, left  Fortunately today x-ray is negative for acute fracture/dislocation or abnormal bony lesions.  We discussed most likely tendinitis.  Patient is provided with a wrist brace.  Recommended resting. No heavy lifting.  Voltaren is prescribed for pain.  Advised to keep monitoring symptoms.  Will anticipate gradual improvement.  Follow-up if any worsening symptoms.  We discussed possibly getting an MRI if symptoms not improving as anticipated in the next 2-4 weeks, this can be coordinated via primary and/or orthopedic specialist.  Patient agrees with the plan.  - XR Wrist Left G/E 3 Views  - Wrist/Arm Supplies Order Wrist Brace; Left; non-thumb spica  - diclofenac (VOLTAREN) 75 MG EC tablet  Dispense: 14 tablet; Refill: 0    Tendonitis of wrist, left  Fortunately today x-ray is negative for acute fracture/dislocation or abnormal bony lesions.  We discussed most likely tendinitis.  Patient is provided with a wrist brace.  Recommended resting. No heavy lifting.  Voltaren is prescribed for pain.  Advised to keep monitoring symptoms.  Will anticipate gradual improvement.  Follow-up if any worsening symptoms.  We discussed possibly getting an MRI if symptoms not improving as anticipated in the next 2-4 weeks, this can be coordinated via primary and/or orthopedic specialist.  Patient agrees with the plan.  - diclofenac (VOLTAREN) 75 MG EC tablet  Dispense: 14 tablet; Refill: 0       Return in about 2 weeks (around 3/15/2023) for Symptoms failing to improve.    Marla Mart PA-C  [facility] CARE [facility]     Kaylee is a 53 year old female who presents to clinic today for the following health issues:  Chief Complaint   Patient presents with     Urgent Care     Musculoskeletal Problem     Left wrist tender and slightly swollen for 1 week-Wrist gave out about 1hr ago      HPI    Patient is presenting to urgent care today with complaint of left wrist pain.   Onset of symptoms approximately 1 week ago.  Does not recall any specific trauma or injury. Tonight she was carrying a plate in the left hand and while getting some potatoes in the plate her left wrist gave out and she dropped the plate.  Treatment tried: ibuprofen-- did not help much.  Denies any paresthesias in the left hand fingers.      Review of Systems  Constitutional, HEENT, cardiovascular, pulmonary, GI, , musculoskeletal, neuro, skin, endocrine and psych systems are negative, except as otherwise noted.      Objective    BP (!) 144/84   Pulse 89   Temp 97.7  F (36.5  C)   Resp 12   LMP 05/14/2019   SpO2 99%   Physical Exam    GENERAL: healthy, alert and no distress  MS: no gross deformities or ecchymosis noted, mild swelling noted ulnar aspect of the left wrist, TTP, ROM of the left hand fingers is normal, tenderness ulnar aspect of the wrist with flexion and extension. No erythema noted. No open wound or sore noted.    Xray - Reviewed and interpreted by me.  Negative for acute fracture or dislocation, or abnormal bony lesions, final radiologist report is pending.

## 2023-03-16 ENCOUNTER — OFFICE VISIT (OUTPATIENT)
Dept: ORTHOPEDICS | Facility: CLINIC | Age: 53
End: 2023-03-16
Payer: COMMERCIAL

## 2023-03-16 VITALS
SYSTOLIC BLOOD PRESSURE: 130 MMHG | DIASTOLIC BLOOD PRESSURE: 76 MMHG | WEIGHT: 234.4 LBS | BODY MASS INDEX: 40.02 KG/M2 | HEIGHT: 64 IN

## 2023-03-16 DIAGNOSIS — M77.8 EXTENSOR CARPI ULNARIS TENDINITIS: ICD-10-CM

## 2023-03-16 DIAGNOSIS — M77.8 FLEXOR CARPI ULNARIS TENDINITIS: ICD-10-CM

## 2023-03-16 DIAGNOSIS — M25.532 ACUTE PAIN OF LEFT WRIST: Primary | ICD-10-CM

## 2023-03-16 PROCEDURE — 99204 OFFICE O/P NEW MOD 45 MIN: CPT | Performed by: STUDENT IN AN ORGANIZED HEALTH CARE EDUCATION/TRAINING PROGRAM

## 2023-03-16 NOTE — LETTER
[facility] [place] [id number]  [address]  [place] [address]  Phone: [phone]  Fax: [phone]    March 16, 2023        Kaylee Lopez  [address] [address]          To Whom It May Concern,    Kaylee Lopez was seen and treated today at [facility] for a left wrist injury. She may return to work with the following restrictions:  - No typing, lifting or carrying with the left wrist or hand. Recommend using a voice-recognition software for computer work if possible.    Please contact me for questions or concerns.      Sincerely,        Mallorie Dove MD  [facility]

## 2023-03-16 NOTE — PROGRESS NOTES
ASSESSMENT & PLAN    1. Acute pain of left wrist    2. Extensor carpi ulnaris tendinitis    3. Flexor carpi ulnaris tendinitis      Kaylee Lopez is a 53 year old right-handed female presenting for evaluation of acute left ulnar-sided wrist pain x 2-3 weeks without injury. History, exam and X-ray imaging findings were reviewed today, most consistent with ECU and FCU tendinitis. Differential includes TFCC injury or subsheath injury although exam is reassuring today without evidence of ligament or tendon instability. We reviewed treatment options inclusive of activity modifications, bracing, NSAIDS, formal Hand Therapy, steroid injections or surgical referral.     At this time she chooses to proceed with a trial of the following:  - Activity as tolerated based on pain. Avid activities that provoke your symptoms.   - Topical diclofenac (Voltaren) gel may be applied to the painful area 3-4 times per day for up to 2 weeks, then reduce to as-needed. This is an over-the-counter topical non-steroidal anti-inflammatory (NSAID) medication. Do not use with other NSAIDS like ibuprofen or advil.  - Ice the wrist for 10-15 minutes 3-4 times per day as needed for pain or swelling.  - Referral placed for formal Hand Therapy. Please call [phone] to schedule. Exercises to include pain-free range of motion with progression to strengthening activities with use of modalities and durable medical equipment as needed with home exercise prescription.  - Work letter provided to be able to use a voice software instead of typing.     Please schedule a follow up appointment to see me in 4-6 weeks as needed for persistence or worsening of pain. You may call our direct clinic number ([phone]) at any time with questions or concerns.    Mallorie Dove MD, [facility]    -----    SUBJECTIVE  Kaylee Lopez is a/an 53 year old Right handed female who is seen as a self referral for  evaluation of left wrist pain. The patient is seen by themselves.    History of CRPS in past after right LE injury and of fibromyalgia.     Onset: 3-4 week(s) ago. Symptoms started without injury in late February then acutely worsened on 2/28/23 when dishing up food (holding a plate with her left hand when her wrist gave out). She noted swelling about the ulnar wrist prior to this incident. She was seen in urgent care on 3/1/23 where X-ray was negative and she was given a wrist brace.   Location of Pain: left wrist, ulnar sided   Rating of Pain at worst: 10/10  Rating of Pain Currently: 6/10  Worsened by: pronation  Better with: nothing  Treatments tried: ice, elevation, ibuprofen, bracing, Voltaren   Associated symptoms: swelling and pain; no bruising  Orthopedic history: NO  Relevant surgical history: NO  Social history: works as  at [employer]    Past Medical History:   Diagnosis Date     Abnormal Papanicolaou smear of cervix and cervical HPV 2003    late 2003, early 2004; treated with TCA     Depression      Depressive disorder 15-20 years     Diabetes mellitus      Fibromyalgia      Heart murmur     tricuspid valve disorder     Inflammatory arthritis     ?; has been discharged from Rheumatology     Narcolepsy 11/01/2010    doing well without medication.     Non-rheumatic tricuspid valve insufficiency     antibiotic prophylax Problem list name updated by automated process. Provider to review      Obesity      Other chronic pain      Pulmonary hypertension (H) 05/02/2010    on ECHO, but normal right heart cath 2011     Reflex sympathetic dystrophy     right foot ; related to stress fracture     TRICUSPID VALVE DISEASE (regurg)     sees Cardiology      Social History     Socioeconomic History     Marital status:      Number of children: 2     Years of education: 13+   Occupational History     Occupation: ; new job starting 5/11     Employer: NONE    Tobacco Use      Smoking status: Never     Smokeless tobacco: Never   Vaping Use     Vaping Use: Never used   Substance and Sexual Activity     Alcohol use: Yes     Comment: Rarely     Drug use: No     Sexual activity: Yes     Partners: Male     Birth control/protection: Female Surgical     Comment: essure procedure   Other Topics Concern     Caffeine Concern No     Comment: soda- 1 a day     Special Diet No     Comment: watches; fruits and veggies throughout the day.      Exercise No     Comment: working with fibromyalgia nurses; anticipating more.      Parent/sibling w/ CABG, MI or angioplasty before 65F 55M? No     Social Determinants of Health     Financial Resource Strain: Low Risk      Difficulty of Paying Living Expenses: Not hard at all   Food Insecurity: No Food Insecurity     Worried About Running Out of Food in the Last Year: Never true     Ran Out of Food in the Last Year: Never true   Transportation Needs: No Transportation Needs     Lack of Transportation (Medical): No     Lack of Transportation (Non-Medical): No   Physical Activity: Inactive     Days of Exercise per Week: 0 days     Minutes of Exercise per Session: 0 min   Stress: Stress Concern Present     Feeling of Stress : Rather much   Social Connections: Moderately Isolated     Frequency of Communication with Friends and Family: Twice a week     Frequency of Social Gatherings with Friends and Family: Once a week     Attends Orthodoxy Services: Never     Active Member of Clubs or Organizations: No     Marital Status:    Housing Stability: Low Risk      Unable to Pay for Housing in the Last Year: No     Number of Places Lived in the Last Year: 1     Unstable Housing in the Last Year: No     Patient's past medical, surgical, social, and family histories were reviewed today and no changes are noted.    REVIEW OF SYSTEMS:  10 point ROS is negative other than symptoms noted above in HPI, Past Medical History or as stated below  Constitutional: NEGATIVE for  "fever, chills, change in weight  Skin: NEGATIVE for worrisome rashes, moles or lesions  GI/: NEGATIVE for bowel or bladder changes  Neuro: NEGATIVE for weakness, dizziness or paresthesias    OBJECTIVE:  /76   Ht 1.626 m (5' 4\")   Wt 106.3 kg (234 lb 6.4 oz)   LMP 05/14/2019   BMI 40.23 kg/m     General: healthy, alert and in no distress  HEENT: no scleral icterus or conjunctival erythema  Skin: no suspicious lesions or rash. No jaundice.  CV: regular rhythm by palpation  Resp: normal respiratory effort without conversational dyspnea   Psych: normal mood and affect  Gait: normal steady gait with appropriate coordination and balance  Neuro: Normal sensory exam of bilateral hands. Normal 2 pt discrimination.   MSK:  LEFT HAND & WRIST  Inspection:    Mild swelling about the ulnar wrist. No bruising, discoloration, or obvious deformity or asymmetry.  Palpation:    Tender about the ECU tendon, FCU tendon and TFCC/fovea (mild). Remainder of bony and ligamentous line marks are nontender.    Crepitus is Absent    Metacarpals: normal    Thumb: normal    Fingers: normal  Range of Motion:    Slightly limited/apprehensive wrist range of motion due to pain.     Full active flexion and extension at MCP, PIP, and DIP joints; normal finger cascade without malrotation.  Strength:    Wrist flexion full, extension full, radial deviation full +painful stretch ulnarly, ulnar deviation full +pain, pronation and supination limited by pain without clicking or popping    Finger extension intact +pain at the ECU, flexion  intact +pain at the FCU, abduction full, adduction full, opposition full  Special Tests:    Positive: Mild fovea    Negative: TFCC compression, Ding's, Tinel's (carpal tunnel), Tinel's (cubital tunnel), CMC grind, extensor lag at DIP    Independent visualization of the below image:    Results for orders placed or performed in visit on 03/01/23   XR Wrist Left G/E 3 Views        EXAM: XR WRIST LEFT G/E 3 " VIEWS  LOCATION: [facility]  DATE/TIME: 3/1/2023 8:13 PM    INDICATION: Left ulnar wrist pain and tenderness.  COMPARISON: None.        IMPRESSION: Normal joint spaces and alignment. No fracture.       Mallorie Dove MD, CAM  [facility]

## 2023-03-16 NOTE — LETTER
3/16/2023         RE: Kaylee Lopez  [address] [address] [address]        Dear Colleague,    Thank you for referring your patient, Kaylee Lopez, to the [facility] [place]. Please see a copy of my visit note below.    ASSESSMENT & PLAN    1. Acute pain of left wrist    2. Extensor carpi ulnaris tendinitis    3. Flexor carpi ulnaris tendinitis      Kaylee Lopez is a 53 year old right-handed female presenting for evaluation of acute left ulnar-sided wrist pain x 2-3 weeks without injury. History, exam and X-ray imaging findings were reviewed today, most consistent with ECU and FCU tendinitis. Differential includes TFCC injury or subsheath injury although exam is reassuring today without evidence of ligament or tendon instability. We reviewed treatment options inclusive of activity modifications, bracing, NSAIDS, formal Hand Therapy, steroid injections or surgical referral.     At this time she chooses to proceed with a trial of the following:  - Activity as tolerated based on pain. Avid activities that provoke your symptoms.   - Topical diclofenac (Voltaren) gel may be applied to the painful area 3-4 times per day for up to 2 weeks, then reduce to as-needed. This is an over-the-counter topical non-steroidal anti-inflammatory (NSAID) medication. Do not use with other NSAIDS like ibuprofen or advil.  - Ice the wrist for 10-15 minutes 3-4 times per day as needed for pain or swelling.  - Referral placed for formal Hand Therapy. Please call [phone] to schedule. Exercises to include pain-free range of motion with progression to strengthening activities with use of modalities and durable medical equipment as needed with home exercise prescription.  - Work letter provided to be able to use a voice software instead of typing.     Please schedule a follow up appointment to see me in 4-6 weeks as needed for persistence or worsening of pain. You may call our direct  clinic number ([phone]) at any time with questions or concerns.    Mallorie Dove MD, [facility]    -----    SUBJECTIVE  Kaylee Lopez is a/an 53 year old Right handed female who is seen as a self referral for evaluation of left wrist pain. The patient is seen by themselves.    History of CRPS in past after right LE injury and of fibromyalgia.     Onset: 3-4 week(s) ago. Symptoms started without injury in late February then acutely worsened on 2/28/23 when dishing up food (holding a plate with her left hand when her wrist gave out). She noted swelling about the ulnar wrist prior to this incident. She was seen in urgent care on 3/1/23 where X-ray was negative and she was given a wrist brace.   Location of Pain: left wrist, ulnar sided   Rating of Pain at worst: 10/10  Rating of Pain Currently: 6/10  Worsened by: pronation  Better with: nothing  Treatments tried: ice, elevation, ibuprofen, bracing, Voltaren   Associated symptoms: swelling and pain; no bruising  Orthopedic history: NO  Relevant surgical history: NO  Social history: works as  at [employer]    Past Medical History:   Diagnosis Date     Abnormal Papanicolaou smear of cervix and cervical HPV 2003    late 2003, early 2004; treated with TCA     Depression      Depressive disorder 15-20 years     Diabetes mellitus      Fibromyalgia      Heart murmur     tricuspid valve disorder     Inflammatory arthritis     ?; has been discharged from Rheumatology     Narcolepsy 11/01/2010    doing well without medication.     Non-rheumatic tricuspid valve insufficiency     antibiotic prophylax Problem list name updated by automated process. Provider to review      Obesity      Other chronic pain      Pulmonary hypertension (H) 05/02/2010    on ECHO, but normal right heart cath 2011     Reflex sympathetic dystrophy     right foot ; related to stress fracture     TRICUSPID VALVE DISEASE (regurg)     sees  Cardiology      Social History     Socioeconomic History     Marital status:      Number of children: 2     Years of education: 13+   Occupational History     Occupation: ; new job starting 5/11     Employer: NONE    Tobacco Use     Smoking status: Never     Smokeless tobacco: Never   Vaping Use     Vaping Use: Never used   Substance and Sexual Activity     Alcohol use: Yes     Comment: Rarely     Drug use: No     Sexual activity: Yes     Partners: Male     Birth control/protection: Female Surgical     Comment: essure procedure   Other Topics Concern     Caffeine Concern No     Comment: soda- 1 a day     Special Diet No     Comment: watches; fruits and veggies throughout the day.      Exercise No     Comment: working with fibromyalgia nurses; anticipating more.      Parent/sibling w/ CABG, MI or angioplasty before 65F 55M? No     Social Determinants of Health     Financial Resource Strain: Low Risk      Difficulty of Paying Living Expenses: Not hard at all   Food Insecurity: No Food Insecurity     Worried About Running Out of Food in the Last Year: Never true     Ran Out of Food in the Last Year: Never true   Transportation Needs: No Transportation Needs     Lack of Transportation (Medical): No     Lack of Transportation (Non-Medical): No   Physical Activity: Inactive     Days of Exercise per Week: 0 days     Minutes of Exercise per Session: 0 min   Stress: Stress Concern Present     Feeling of Stress : Rather much   Social Connections: Moderately Isolated     Frequency of Communication with Friends and Family: Twice a week     Frequency of Social Gatherings with Friends and Family: Once a week     Attends Worship Services: Never     Active Member of Clubs or Organizations: No     Marital Status:    Housing Stability: Low Risk      Unable to Pay for Housing in the Last Year: No     Number of Places Lived in the Last Year: 1     Unstable Housing in the Last Year: No     Patient's  "past medical, surgical, social, and family histories were reviewed today and no changes are noted.    REVIEW OF SYSTEMS:  10 point ROS is negative other than symptoms noted above in HPI, Past Medical History or as stated below  Constitutional: NEGATIVE for fever, chills, change in weight  Skin: NEGATIVE for worrisome rashes, moles or lesions  GI/: NEGATIVE for bowel or bladder changes  Neuro: NEGATIVE for weakness, dizziness or paresthesias    OBJECTIVE:  /76   Ht 1.626 m (5' 4\")   Wt 106.3 kg (234 lb 6.4 oz)   LMP 05/14/2019   BMI 40.23 kg/m     General: healthy, alert and in no distress  HEENT: no scleral icterus or conjunctival erythema  Skin: no suspicious lesions or rash. No jaundice.  CV: regular rhythm by palpation  Resp: normal respiratory effort without conversational dyspnea   Psych: normal mood and affect  Gait: normal steady gait with appropriate coordination and balance  Neuro: Normal sensory exam of bilateral hands. Normal 2 pt discrimination.   MSK:  LEFT HAND & WRIST  Inspection:    Mild swelling about the ulnar wrist. No bruising, discoloration, or obvious deformity or asymmetry.  Palpation:    Tender about the ECU tendon, FCU tendon and TFCC/fovea (mild). Remainder of bony and ligamentous line marks are nontender.    Crepitus is Absent    Metacarpals: normal    Thumb: normal    Fingers: normal  Range of Motion:    Slightly limited/apprehensive wrist range of motion due to pain.     Full active flexion and extension at MCP, PIP, and DIP joints; normal finger cascade without malrotation.  Strength:    Wrist flexion full, extension full, radial deviation full +painful stretch ulnarly, ulnar deviation full +pain, pronation and supination limited by pain without clicking or popping    Finger extension intact +pain at the ECU, flexion  intact +pain at the FCU, abduction full, adduction full, opposition full  Special Tests:    Positive: Mild fovea    Negative: TFCC compression, Ding's, " Tinel's (carpal tunnel), Tinel's (cubital tunnel), CMC grind, extensor lag at DIP    Independent visualization of the below image:    Results for orders placed or performed in visit on 03/01/23   XR Wrist Left G/E 3 Views        EXAM: XR WRIST LEFT G/E 3 VIEWS  LOCATION: [facility]  DATE/TIME: 3/1/2023 8:13 PM    INDICATION: Left ulnar wrist pain and tenderness.  COMPARISON: None.        IMPRESSION: Normal joint spaces and alignment. No fracture.       Mallorie Dove MD, [facility]        Again, thank you for allowing me to participate in the care of your patient.        Sincerely,        Mallorie Dove MD

## 2023-03-16 NOTE — PATIENT INSTRUCTIONS
1. Acute pain of left wrist    2. Extensor carpi ulnaris tendinitis    3. Flexor carpi ulnaris tendinitis      Kaylee Lopez is a 53 year old right-handed female presenting for evaluation of acute left ulnar-sided wrist pain x 2-3 weeks without injury. History, exam and X-ray imaging findings were reviewed today, most consistent with ECU and FCU tendinitis. Differential includes TFCC injury or subsheath injury although exam is reassuring against these etiologies today. We reviewed treatment options inclusive of activity modifications, bracing, NSAIDS, formal Hand Therapy, steroid injections or surgical referral.     At this time she chooses to proceed with a trial of the following:  - Activity as tolerated based on pain. Avid activities that provoke your symptoms.   - Topical diclofenac (Voltaren) gel may be applied to the painful area 3-4 times per day for up to 2 weeks, then reduce to as-needed. This is an over-the-counter topical non-steroidal anti-inflammatory (NSAID) medication. Do not use with other NSAIDS like ibuprofen or advil.  - Ice the wrist for 10-15 minutes 3-4 times per day as needed for pain or swelling.  - Referral placed for formal Hand Therapy. Please call [phone] to schedule. Exercises to include pain-free range of motion with progression to strengthening activities with use of modalities and durable medical equipment as needed with home exercise prescription.  - Work letter provided to be able to use a voice software instead of typing.     Please schedule a follow up appointment to see me in 4-6 weeks as needed for persistence or worsening of pain. You may call our direct clinic number ([phone]) at any time with questions or concerns.    Mallorie Dove MD, [facility]

## 2023-03-18 ENCOUNTER — TELEPHONE (OUTPATIENT)
Dept: FAMILY MEDICINE | Facility: CLINIC | Age: 53
End: 2023-03-18
Payer: COMMERCIAL

## 2023-03-22 ENCOUNTER — MYC MEDICAL ADVICE (OUTPATIENT)
Dept: FAMILY MEDICINE | Facility: CLINIC | Age: 53
End: 2023-03-22
Payer: COMMERCIAL

## 2023-03-24 ENCOUNTER — THERAPY VISIT (OUTPATIENT)
Dept: OCCUPATIONAL THERAPY | Facility: CLINIC | Age: 53
End: 2023-03-24
Attending: STUDENT IN AN ORGANIZED HEALTH CARE EDUCATION/TRAINING PROGRAM
Payer: COMMERCIAL

## 2023-03-24 DIAGNOSIS — M25.532 ACUTE PAIN OF LEFT WRIST: ICD-10-CM

## 2023-03-24 DIAGNOSIS — M77.8 FLEXOR CARPI ULNARIS TENDINITIS: ICD-10-CM

## 2023-03-24 DIAGNOSIS — M77.8 EXTENSOR CARPI ULNARIS TENDINITIS: ICD-10-CM

## 2023-03-24 PROCEDURE — 97110 THERAPEUTIC EXERCISES: CPT | Mod: GO | Performed by: OCCUPATIONAL THERAPIST

## 2023-03-24 PROCEDURE — 97535 SELF CARE MNGMENT TRAINING: CPT | Mod: GO | Performed by: OCCUPATIONAL THERAPIST

## 2023-03-24 PROCEDURE — 97760 ORTHOTIC MGMT&TRAING 1ST ENC: CPT | Mod: GO | Performed by: OCCUPATIONAL THERAPIST

## 2023-03-24 PROCEDURE — 97165 OT EVAL LOW COMPLEX 30 MIN: CPT | Mod: GO | Performed by: OCCUPATIONAL THERAPIST

## 2023-03-24 NOTE — PROGRESS NOTES
[facility] Initial Evaluation     Current Date: 3/24/2023    Diagnosis: Left wrist pain  DOI: Inconclusive etiology, though noticed an acute exacerbation while scooping food at home  DOS: 2/20/2023, per patient    Subjective:  Patient Health History  Kaylee Lopez being seen for Left wrist & arm pain.     Problem began: 2/21/2023.   Problem occurred: Unknown, just started with swelling and pain   Pain is reported as 6/10 on pain scale.  General health as reported by patient is fair.  Pertinent medical history includes: depression, diabetes, fibromyalgia, history of fractures, heart problems, high blood pressure, menopausal, mental illness, migraines/headaches, numbness/tingling, overweight, pain at night/rest and weakness.     Medical allergies: other. Other medical allergies details: Tylonol with codine.   Surgeries include:  Other. Other surgery history details: [place] tooth extraction, gallbladder removal, uterine cyst removal, hysterectomy.    Current medications:  Anti-depressants, anti-inflammatory, high blood pressure medication, muscle relaxants, pain medication, sleep medication and other. Other medications details: Anti anxiety, type 2 diabetes meds, high cholesterol med, inhaler as needed.    Current occupation is [employer] .   Primary job tasks include:  Computer work.                  Occupational Profile Information:  Right hand dominant  Prior functional level:  Normally independent, requires assistance with donning bra/washing hair occasionally  Patient reports symptoms of pain, stiffness/loss of motion, weakness/loss of strength and edema  Special tests:  x-ray.    Previous treatment: Ice, rest, immobilization, heat  Barriers include:none  Mobility: No difficulty  Transportation: drives  Currently working in normal job without restrictions, works from home  Leisure activities/hobbies: Knitting, reading  Other: History of CRPS (R LE), currently keeping elbow  elevated on pillows while working. H/x fibromyalgia, nerve pain with therapy RUE.    Functional Outcome Measure:   Upper Extremity Functional Index Score:  SCORE:   Column Totals: /80: (P) 9   (A lower score indicates greater disability.)    Objective:  Pain Level (Scale 0-10)   3/24/2023   At Rest 2-3/10 (intermittent flare)   With Use 7/10      Pain Description  Date 3/24/2023   Location wrist and radiates proximally up the forearm   Pain Quality Shooting   Frequency constant low level, intermittent flares    Pain is worst  daytime   Exacerbated by  FA pronation/supination    Relieved by none   Progression Relatively unchanged, gradually worsening     Posture  Normal    Sensation  WNL throughout all nerve distributions; per patient report    ROM  Pain Report: - none  + mild    ++ moderate    +++ severe   Elbow 3/24/2023 3/24/2023   AROM (PROM) R L   Extension NT NT   Flexion NT NT   Supination 84 80 +++   Pronation 84 76 +++     Wrist 3/24/2023 3/24/2023   AROM (PROM) R L   Extension 72 70 ++   Flexion 68 52++   RD 25 21 ++   UD 34 25+     3/24/2023: ROM of right elbow WNL, more painful with flexion versus extension.    Resisted Testing  Pain Report:  - none    + mild    ++ moderate    +++ severe    3/24/2023   Elbow Extension NT   Elbow Flexion NT   Supination  ++   Pronation +++   Wrist Ext with RD, Elbow at side +   Wrist Ext with UD, Elbow at side +++   Wrist Ext with RD, Elbow Ext NT   Wrist Ext with UD, Elbow Ext NT   Wrist Flex with RD, Elbow at side +   Wrist Flex with UD, Elbow at side +++   Wrist Flex with RD, Elbow Ext NT   Wrist Flex with UD, Elbow Ext NT   EDC with Elbow at side NT   EDC with Elbow Ext NT   Long Finger Test NT     Palpation  Pain Report:  - none    + mild    ++ moderate    +++ severe    3/24/2023   Triangular Interval NT   Infraspinatus NT   Teres Major NT   Pec Major NT   Pec Minor NT   Proximal Triceps NT   Spiral Groove NT   Distal Triceps NT   Anconeus NT   ECRB at LEP (-)   ECU  at LEP (-)   EDC at LEP (-)   Radial Head NT   Extensor Wad +   PIN Site (-)     3/24/2023: Positive TTP left ulnar styloid and fovea     Assessment:  Patient presents with symptoms consistent with diagnosis of left wrist pain, with conservative intervention.     Patient's limitations or Problem List includes:  Pain, Decreased ROM/motion, Weakness, Sensory disturbance, Hypomobility, Decreased , Decreased pinch and Tightness in musculature of the left wrist and forearm which interferes with the patient's ability to perform Self Care Tasks (dressing), Work Tasks, Sleep Patterns, Recreational Activities, Household Chores and Driving  as compared to previous level of function.    Rehab Potential:  Good - Return to full activity, some limitations    Patient will benefit from skilled Occupational Therapy to increase ROM, flexibility,  strength, pinch strength, forearm strength, stability of wrist and stability of thumb and decrease pain and edema to return to previous activity level and resume normal daily tasks and to reach their rehab potential.    Barriers to Learning:  No barrier    Communication Issues:  Patient appears to be able to clearly communicate and understand verbal and written communication and follow directions correctly.    Chart Review: Chart Review, Brief history including review of medical and/or therapy records relating to the presenting problem and Simple history review with patient    Identified Performance Deficits: dressing, driving and community mobility, health management and maintenance, home establishment and management, meal preparation and cleanup, shopping, sleep and leisure activities    Assessment of Occupational Performance:  3-5 Performance Deficits    Clinical Decision Making (Complexity): Low complexity    Treatment Explanation:  The following has been discussed with the patient:  RX ordered/plan of care  Anticipated outcomes  Possible risks and side  effects    Plan:  Frequency:  1 X week, once daily  Duration:  for 8 weeks    Treatment Plan:    Modalities:    US   Therapeutic Exercise:    AROM, PROM, Tendon Gliding, Isotonics, Isometrics and Stabilization  Therapeutic Activities:  Functional activities   Ergonomics and activity modification  Neuromuscular re-ed:   Nerve Gliding, Posture, Kinesiotaping, Isometrics and Stabilization  Manual Techniques:   Friction massage and Myofascial release  Orthotic Fabrication:    Static and Forearm based  Self Care:    Self Care Tasks, Ergonomic Considerations and Work Tasks    Discharge Plan:  Achieve all LTG.  Independent in home treatment program.  Reach maximal therapeutic benefit.    Home Program:   Warmth for stiffness  AROM with stretch to wrist extensors and flexors, radial and ulnar deviators, FA pronators/supinators  Wrist cock up orthosis at quiet rest and while asleep  Avoid activities that exacerbate pain in the wrist  Lift with elbows at side and forearms in neutral/supinated position  Workplace ergonomics for neutral wrist  Monitoring of compensatory patterns, emphasis on relaxed motion     Next Visit:  MFR flexors and extensors  Orthosis adjustment PRN  Upgrade HEP as able  Scrub/carry, desensitization program

## 2023-03-26 NOTE — PROGRESS NOTES
Diagnosis: Left wrist pain  DOI: Inconclusive etiology, though noticed an acute exacerbation while scooping food at home  DOS: 2/20/2023, per patient    ROM  Pain Report: - none  + mild    ++ moderate    +++ severe   Elbow 3/24/2023 3/24/2023   AROM (PROM) R L   Extension NT NT   Flexion NT NT   Supination 84 80 +++   Pronation 84 76 +++      Wrist 3/24/2023 3/24/2023   AROM (PROM) R L   Extension 72 70 ++   Flexion 68 52++   RD 25 21 ++   UD 34 25+     3/24/2023: Positive TTP left ulnar styloid and fovea     --- (ECU tendinitis, FCR tendinitis)   Treat   - ECU synergy test  -  ulnar impaction test,  with forearm sup/pro  - FCU resisted test   - check splint fit

## 2023-03-29 ENCOUNTER — THERAPY VISIT (OUTPATIENT)
Dept: OCCUPATIONAL THERAPY | Facility: CLINIC | Age: 53
End: 2023-03-29
Attending: STUDENT IN AN ORGANIZED HEALTH CARE EDUCATION/TRAINING PROGRAM
Payer: COMMERCIAL

## 2023-03-29 DIAGNOSIS — M25.532 ACUTE PAIN OF LEFT WRIST: ICD-10-CM

## 2023-03-29 DIAGNOSIS — M77.8 EXTENSOR CARPI ULNARIS TENDINITIS: ICD-10-CM

## 2023-03-29 DIAGNOSIS — M77.8 FLEXOR CARPI ULNARIS TENDINITIS: Primary | ICD-10-CM

## 2023-03-29 PROCEDURE — 97763 ORTHC/PROSTC MGMT SBSQ ENC: CPT | Mod: GO

## 2023-04-01 DIAGNOSIS — F11.90 CHRONIC, CONTINUOUS USE OF OPIOIDS: ICD-10-CM

## 2023-04-01 DIAGNOSIS — M79.7 FIBROMYALGIA: ICD-10-CM

## 2023-04-03 RX ORDER — TRAMADOL HYDROCHLORIDE 50 MG/1
50 TABLET ORAL 2 TIMES DAILY PRN
Qty: 60 TABLET | Refills: 0 | Status: SHIPPED | OUTPATIENT
Start: 2023-04-03 | End: 2023-06-05

## 2023-04-10 ENCOUNTER — THERAPY VISIT (OUTPATIENT)
Dept: OCCUPATIONAL THERAPY | Facility: CLINIC | Age: 53
End: 2023-04-10
Attending: STUDENT IN AN ORGANIZED HEALTH CARE EDUCATION/TRAINING PROGRAM
Payer: COMMERCIAL

## 2023-04-10 DIAGNOSIS — M25.532 ACUTE PAIN OF LEFT WRIST: ICD-10-CM

## 2023-04-10 DIAGNOSIS — M77.8 FLEXOR CARPI ULNARIS TENDINITIS: Primary | ICD-10-CM

## 2023-04-10 DIAGNOSIS — M77.8 EXTENSOR CARPI ULNARIS TENDINITIS: ICD-10-CM

## 2023-04-10 PROCEDURE — 97763 ORTHC/PROSTC MGMT SBSQ ENC: CPT | Mod: GO | Performed by: OCCUPATIONAL THERAPIST

## 2023-04-10 PROCEDURE — 97110 THERAPEUTIC EXERCISES: CPT | Mod: GO | Performed by: OCCUPATIONAL THERAPIST

## 2023-04-10 PROCEDURE — 97140 MANUAL THERAPY 1/> REGIONS: CPT | Mod: GO | Performed by: OCCUPATIONAL THERAPIST

## 2023-04-11 ENCOUNTER — TELEPHONE (OUTPATIENT)
Dept: FAMILY MEDICINE | Facility: CLINIC | Age: 53
End: 2023-04-11
Payer: COMMERCIAL

## 2023-04-11 NOTE — TELEPHONE ENCOUNTER
Faxed updated form again. Patient notified. Asked that she get an alternative fax# if they don't receive again.  Niyah Fragoso,

## 2023-04-11 NOTE — TELEPHONE ENCOUNTER
Patient called in asking if we have faxed the new updated paperwork that Dr Hernandez filled out to [place], states she called them and they didn't receive anything.     Was this update and faxed recently?    Melissa Ortega/

## 2023-04-27 ENCOUNTER — OFFICE VISIT (OUTPATIENT)
Dept: FAMILY MEDICINE | Facility: CLINIC | Age: 53
End: 2023-04-27
Payer: COMMERCIAL

## 2023-04-27 VITALS
HEIGHT: 64 IN | WEIGHT: 238 LBS | OXYGEN SATURATION: 97 % | BODY MASS INDEX: 40.63 KG/M2 | SYSTOLIC BLOOD PRESSURE: 120 MMHG | RESPIRATION RATE: 14 BRPM | HEART RATE: 84 BPM | DIASTOLIC BLOOD PRESSURE: 82 MMHG | TEMPERATURE: 99.1 F

## 2023-04-27 DIAGNOSIS — I10 HYPERTENSION GOAL BP (BLOOD PRESSURE) < 130/80: ICD-10-CM

## 2023-04-27 DIAGNOSIS — M79.7 FIBROMYALGIA: ICD-10-CM

## 2023-04-27 DIAGNOSIS — F41.9 ANXIETY: ICD-10-CM

## 2023-04-27 DIAGNOSIS — E66.01 MORBID OBESITY (H): ICD-10-CM

## 2023-04-27 DIAGNOSIS — E11.9 TYPE 2 DIABETES MELLITUS WITHOUT COMPLICATION, WITHOUT LONG-TERM CURRENT USE OF INSULIN (H): Primary | ICD-10-CM

## 2023-04-27 DIAGNOSIS — F33.42 RECURRENT MAJOR DEPRESSIVE DISORDER, IN FULL REMISSION (H): ICD-10-CM

## 2023-04-27 PROBLEM — M77.8 FLEXOR CARPI ULNARIS TENDINITIS: Status: RESOLVED | Noted: 2023-03-24 | Resolved: 2023-04-27

## 2023-04-27 PROBLEM — M77.8 EXTENSOR CARPI ULNARIS TENDINITIS: Status: RESOLVED | Noted: 2023-03-24 | Resolved: 2023-04-27

## 2023-04-27 LAB — HBA1C MFR BLD: 6.3 % (ref 0–5.6)

## 2023-04-27 PROCEDURE — 83036 HEMOGLOBIN GLYCOSYLATED A1C: CPT | Performed by: FAMILY MEDICINE

## 2023-04-27 PROCEDURE — 2894A ALBUMIN RANDOM URINE QUANTITATIVE: CPT | Performed by: FAMILY MEDICINE

## 2023-04-27 PROCEDURE — 2894A URINE DRUG CONFIRMATION PANEL: CPT | Performed by: FAMILY MEDICINE

## 2023-04-27 PROCEDURE — 99214 OFFICE O/P EST MOD 30 MIN: CPT | Performed by: FAMILY MEDICINE

## 2023-04-27 PROCEDURE — 36415 COLL VENOUS BLD VENIPUNCTURE: CPT | Performed by: FAMILY MEDICINE

## 2023-04-27 PROCEDURE — 99207 PR FOOT EXAM NO CHARGE: CPT | Performed by: FAMILY MEDICINE

## 2023-04-27 RX ORDER — CITALOPRAM HYDROBROMIDE 40 MG/1
40 TABLET ORAL DAILY
Qty: 90 TABLET | Refills: 1 | Status: SHIPPED | OUTPATIENT
Start: 2023-04-27 | End: 2023-11-11

## 2023-04-27 RX ORDER — BUSPIRONE HYDROCHLORIDE 15 MG/1
TABLET ORAL
Qty: 180 TABLET | Refills: 3 | Status: SHIPPED | OUTPATIENT
Start: 2023-04-27 | End: 2024-03-29

## 2023-04-27 RX ORDER — METFORMIN HCL 500 MG
2000 TABLET, EXTENDED RELEASE 24 HR ORAL
Qty: 360 TABLET | Refills: 1 | Status: SHIPPED | OUTPATIENT
Start: 2023-04-27 | End: 2023-07-25

## 2023-04-27 RX ORDER — LISINOPRIL 5 MG/1
5 TABLET ORAL DAILY
Qty: 90 TABLET | Refills: 3 | Status: SHIPPED | OUTPATIENT
Start: 2023-04-27 | End: 2024-03-29

## 2023-04-27 ASSESSMENT — ANXIETY QUESTIONNAIRES
6. BECOMING EASILY ANNOYED OR IRRITABLE: NOT AT ALL
GAD7 TOTAL SCORE: 0
GAD7 TOTAL SCORE: 0
8. IF YOU CHECKED OFF ANY PROBLEMS, HOW DIFFICULT HAVE THESE MADE IT FOR YOU TO DO YOUR WORK, TAKE CARE OF THINGS AT HOME, OR GET ALONG WITH OTHER PEOPLE?: NOT DIFFICULT AT ALL
4. TROUBLE RELAXING: NOT AT ALL
7. FEELING AFRAID AS IF SOMETHING AWFUL MIGHT HAPPEN: NOT AT ALL
IF YOU CHECKED OFF ANY PROBLEMS ON THIS QUESTIONNAIRE, HOW DIFFICULT HAVE THESE PROBLEMS MADE IT FOR YOU TO DO YOUR WORK, TAKE CARE OF THINGS AT HOME, OR GET ALONG WITH OTHER PEOPLE: NOT DIFFICULT AT ALL
1. FEELING NERVOUS, ANXIOUS, OR ON EDGE: NOT AT ALL
2. NOT BEING ABLE TO STOP OR CONTROL WORRYING: NOT AT ALL
5. BEING SO RESTLESS THAT IT IS HARD TO SIT STILL: NOT AT ALL
3. WORRYING TOO MUCH ABOUT DIFFERENT THINGS: NOT AT ALL
7. FEELING AFRAID AS IF SOMETHING AWFUL MIGHT HAPPEN: NOT AT ALL
GAD7 TOTAL SCORE: 0

## 2023-04-27 ASSESSMENT — PATIENT HEALTH QUESTIONNAIRE - PHQ9
SUM OF ALL RESPONSES TO PHQ QUESTIONS 1-9: 2
10. IF YOU CHECKED OFF ANY PROBLEMS, HOW DIFFICULT HAVE THESE PROBLEMS MADE IT FOR YOU TO DO YOUR WORK, TAKE CARE OF THINGS AT HOME, OR GET ALONG WITH OTHER PEOPLE: NOT DIFFICULT AT ALL
SUM OF ALL RESPONSES TO PHQ QUESTIONS 1-9: 2

## 2023-04-27 NOTE — PROGRESS NOTES
Assessment & Plan     Type 2 diabetes mellitus without complication, without long-term current use of insulin (H) - well controlled, continue current.   - Albumin Random Urine Quantitative with Creat Ratio; Future  - HEMOGLOBIN A1C; Future  - Albumin Random Urine Quantitative with Creat Ratio  - HEMOGLOBIN A1C  - metFORMIN (GLUCOPHAGE XR) 500 MG 24 hr tablet; Take 4 tablets (2,000 mg) by mouth daily (with dinner)  - Semaglutide, 1 MG/DOSE, (OZEMPIC) 4 MG/3ML pen; Inject 1 mg Subcutaneous once a week  - FOOT EXAM    Morbid obesity (H) - working on weight loss through medication, diet.     Recurrent major depressive disorder, in full remission (H) - stable, refills  - citalopram (CELEXA) 40 MG tablet; Take 1 tablet (40 mg) by mouth daily    Fibromyalgia - stable, due for annual UDS per  policy  - [id number] - Urine Drug Confirmation Panel (Comprehensive); Future    Anxiety - stable, refills  - busPIRone (BUSPAR) 15 MG tablet; TAKE 1 TABLET BY MOUTH TWICE A DAY    Hypertension goal BP (blood pressure) < 130/80 - controlled, continue current  - lisinopril (ZESTRIL) 5 MG tablet; Take 1 tablet (5 mg) by mouth daily    Moriah Hernandez MD  [facility] is a 53 year old, presenting for the following health issues:  Diabetes         View : No data to display.              History of Present Illness       Reason for visit:  Diabetes check and general check in    She eats 2-3 servings of fruits and vegetables daily.She consumes 2 sweetened beverage(s) daily.She exercises with enough effort to increase her heart rate 9 or less minutes per day.  She exercises with enough effort to increase her heart rate 3 or less days per week.   She is taking medications regularly.    Today's PHQ-9         PHQ-9 Total Score: 2    PHQ-9 Q9 Thoughts of better off dead/self-harm past 2 weeks :   Not at all    How difficult have these problems made it for you to do your work, take care of things  "at home, or get along with other people: Not difficult at all  Today's MICHAEL-7 Score: 0         Review of Systems   Constitutional, HEENT, cardiovascular, pulmonary, gi and gu systems are negative, except as otherwise noted.      Objective    /82 (BP Location: Right arm, Patient Position: Chair, Cuff Size: Adult Large)   Pulse 84   Temp 99.1  F (37.3  C) (Oral)   Resp 14   Ht 1.626 m (5' 4\")   Wt 108 kg (238 lb)   LMP 05/14/2019   SpO2 97%   BMI 40.85 kg/m    Body mass index is 40.85 kg/m .  Physical Exam   GENERAL: healthy, alert and no distress  PSYCH: mentation appears normal, affect normal/bright  Diabetic foot exam: normal DP and PT pulses, no trophic changes or ulcerative lesions and normal sensory exam    Lab Results   Component Value Date    A1C 6.3 04/27/2023    A1C 6.1 11/11/2022    A1C 6.3 04/12/2022    A1C 7.6 01/04/2022    A1C 7.1 09/20/2021    A1C 7.4 06/18/2021    A1C 6.6 12/29/2020    A1C 6.7 09/09/2020    A1C 6.4 11/26/2019    A1C 7.2 04/05/2019               "

## 2023-04-28 LAB
CREAT UR-MCNC: 144 MG/DL
CREAT UR-MCNC: 146 MG/DL
MICROALBUMIN UR-MCNC: <12 MG/L
MICROALBUMIN/CREAT UR: NORMAL MG/G{CREAT}

## 2023-05-08 ENCOUNTER — THERAPY VISIT (OUTPATIENT)
Dept: OCCUPATIONAL THERAPY | Facility: CLINIC | Age: 53
End: 2023-05-08
Payer: COMMERCIAL

## 2023-05-08 DIAGNOSIS — M25.532 LEFT WRIST PAIN: Primary | ICD-10-CM

## 2023-05-08 PROCEDURE — 97110 THERAPEUTIC EXERCISES: CPT | Mod: GO | Performed by: OCCUPATIONAL THERAPIST

## 2023-05-08 NOTE — PROGRESS NOTES
Hand Therapy SOAP Note    Current Date:  5/8/2023    Objective:  Pain Level (Scale 0-10)   3/24/2023 5/8/2023   At Rest 2-3/10 (intermittent flare) 0-1/10   With Use 7/10  3-4/10     Pain Description  Date 3/24/2023 5.8/2023   Location wrist and radiates proximally up the forearm Ulnar, volar wrist   Pain Quality Shooting Achy    Frequency constant low level, intermittent flares  Relatively constant, low level, very rare flares   Pain is worst  daytime Daytime   Exacerbated by  FA pronation/supination  Axillary    Relieved by none Functional activity, stretch   Progression Relatively unchanged, gradually worsening Steadily improving     Posture  Normal    Sensation  WNL throughout all nerve distributions; per patient report    ROM  Pain Report: - none  + mild    ++ moderate    +++ severe   Elbow 3/24/2023 3/24/2023 5/8/2023   AROM (PROM) R L L   Extension NT NT NT   Flexion NT NT NT   Supination 84 80 +++ 80   Pronation 84 76 +++ 77     Wrist 3/24/2023 3/24/2023 5/8/2023   AROM (PROM) R L L   Extension 72 70 ++ 74   Flexion 68 52++ 66   RD 25 21 ++ 26   UD 34 25+ 26     3/24/2023: ROM of right elbow WNL, more painful with flexion versus extension.    Resisted Testing  Pain Report:  - none    + mild    ++ moderate    +++ severe    3/24/2023 5/8/2023   Elbow Extension NT NT   Elbow Flexion NT NT   Supination  ++ +   Pronation +++ +   Wrist Ext with RD, Elbow at side + None   Wrist Ext with UD, Elbow at side +++ None   Wrist Ext with RD, Elbow Ext NT NT   Wrist Ext with UD, Elbow Ext NT NT   Wrist Flex with RD, Elbow at side + None   Wrist Flex with UD, Elbow at side +++ None   Wrist Flex with RD, Elbow Ext NT NT   Wrist Flex with UD, Elbow Ext NT NT   EDC with Elbow at side NT NT   EDC with Elbow Ext NT NT   Long Finger Test NT NT     Palpation  Pain Report:  - none    + mild    ++ moderate    +++ severe    3/24/2023   Triangular Interval NT   Infraspinatus NT   Teres Major NT   Pec Major NT   Pec Minor NT   Proximal  Triceps NT   Spiral Groove NT   Distal Triceps NT   Anconeus NT   ECRB at LEP (-)   ECU at LEP (-)   EDC at LEP (-)   Radial Head NT   Extensor Wad +   PIN Site (-)     3/24/2023: Positive TTP left ulnar styloid and fovea   5/8/2023: NegativeTTP left ulnar styloid and fovea     Strength   (Measured in pounds)  Pain Report: - none  + mild    ++ moderate    +++ severe    5/8/2023 5/8/2023   Trials R L   1  2  3 82 75   Average 82 75     Please refer to the daily flowsheet for treatment today, total treatment time and time spent performing 1:1 timed codes.

## 2023-05-26 ENCOUNTER — HOSPITAL ENCOUNTER (OUTPATIENT)
Dept: MAMMOGRAPHY | Facility: CLINIC | Age: 53
Discharge: HOME | End: 2023-05-26
Attending: FAMILY MEDICINE | Admitting: FAMILY MEDICINE
Payer: COMMERCIAL

## 2023-05-26 DIAGNOSIS — Z12.31 VISIT FOR SCREENING MAMMOGRAM: ICD-10-CM

## 2023-05-26 PROCEDURE — 77067 SCR MAMMO BI INCL CAD: CPT

## 2023-06-03 DIAGNOSIS — M79.7 FIBROMYALGIA: ICD-10-CM

## 2023-06-03 DIAGNOSIS — F11.90 CHRONIC, CONTINUOUS USE OF OPIOIDS: ICD-10-CM

## 2023-06-05 ENCOUNTER — THERAPY VISIT (OUTPATIENT)
Dept: OCCUPATIONAL THERAPY | Facility: CLINIC | Age: 53
End: 2023-06-05
Payer: COMMERCIAL

## 2023-06-05 DIAGNOSIS — M25.532 LEFT WRIST PAIN: Primary | ICD-10-CM

## 2023-06-05 PROCEDURE — 97140 MANUAL THERAPY 1/> REGIONS: CPT | Mod: GO | Performed by: OCCUPATIONAL THERAPIST

## 2023-06-05 PROCEDURE — 97110 THERAPEUTIC EXERCISES: CPT | Mod: GO | Performed by: OCCUPATIONAL THERAPIST

## 2023-06-05 RX ORDER — TRAMADOL HYDROCHLORIDE 50 MG/1
50 TABLET ORAL 2 TIMES DAILY PRN
Qty: 60 TABLET | Refills: 0 | Status: SHIPPED | OUTPATIENT
Start: 2023-06-05 | End: 2023-07-25

## 2023-06-05 NOTE — PROGRESS NOTES
06/05/23 0500   Appointment Info   Treating Provider KELTON Fulton, OTR/L   Total/Authorized Visits 8 (POC)   Visits Used 5   Medical Diagnosis Left wrist pain   OT Tx Diagnosis Left wrist pain   Progress Note/Certification   Onset of Illness/Injury or Date of Surgery 02/20/23  (Referral)   Therapy Frequency 1x/week   Predicted Duration 8 weeks   Progress Note Due Date 05/19/23   Progress Note Completed Date 06/05/23   OT Goal 1   Goal Identifier Goal 1   Goal Description Patient will complete work related duties at pre-injury level of pain.   Rationale In order to maximize safety and independence with performance of self-care activities   Goal Progress Achieved 6/5/2023   Target Date 05/19/23   Date Met 06/05/23   Subjective Report   Subjective Report I feel like I'm at my baseline level of pain with fibromalygia, the wrist doesn't bug me any extra anymore.   Objective Measures   Objective Measures Objective Measure 1;Objective Measure 2;Objective Measure 4;Objective Measure 3;Objective Measure 5;Objective Measure 7;Objective Measure 6;Objective Measure 8;Objective Measure 9;Objective Measure 10;Objective Measure 11;Objective Measure 12;Objective Measure 13   Objective Measure 1   Objective Measure 0/10   Details Pain at rest   Objective Measure 2   Objective Measure 3/10   Details Pain with activity (resolved with massage)   Objective Measure 3   Objective Measure 66   Details RUE AROM wrist flexion   Objective Measure 4   Objective Measure 78   Details RUE AROM wrist extension   Objective Measure 5   Objective Measure 28   Details RUE AROM wrist radial deviation   Objective Measure 6   Objective Measure 39   Details RUE AROM wrist ulnar deviation   Objective Measure 7   Objective Measure Negative, 5/5   Details Resisted right wrist extension, with ulnar deviation   Objective Measure 8   Objective Measure Negative, 5/5   Details Resisted right wrist extension, with radial deviation   Objective Measure 9    Objective Measure Negative, 5/5   Details Resisted right forearm supination   Objective Measure 10   Objective Measure Negative, 5/5   Details Resisted right forearm pronation   Objective Measure 11   Objective Measure Negative   Details TTP Ulnar Fovea, TFCC, ECU right wrist   Objective Measure 12   Objective Measure 76#   Details RUE  strength   Objective Measure 13   Objective Measure 75#   Details LUE  strength   Treatment Interventions (OT)   Interventions Therapeutic Procedure/Exercise;Manual Therapy   Therapeutic Procedure/Exercise   PTRx Ther Proc 1 Wrist Active Range of Motion Extension/Flexion with Gravity Eliminated   PTRx Ther Proc 1 - Details HEP   PTRx Ther Proc 2 Active Range of Motion Combined Radial and Ulnar Deviation   PTRx Ther Proc 2 - Details HEP   PTRx Ther Proc 3 Forearm Active Range of Motion Combined Pronation and Supination   PTRx Ther Proc 3 - Details HEP   PTRx Ther Proc 4 Wrist Strengthening Isometric Extension   PTRx Ther Proc 4 - Details HEP, revised   PTRx Ther Proc 5 Wrist Strengthening Isometric Flexion   PTRx Ther Proc 5 - Details HEP, revised   PTRx Ther Proc 6 Forearm Passive Range of Motion Advanced Extensor Stretch   PTRx Ther Proc 6 - Details HEP, new   Therapeutic Procedure: strength, endurance, ROM, flexibillity minutes (37571) 15   Skilled Intervention For muscular endurance, improved activity tolerance, reduced pain with ADL.   Patient Response/Progress Patient demonstrated and verbalized understanding.   Ther Proc 1 Reviewed HEP, tapering of performance, ECU joint protection and activity modification.   Therapeutic Activity   PTRx Ther Act 1 Warmth   PTRx Ther Act 1 - Details HEP   PTRx Ther Act 2 Education Sheet General   PTRx Ther Act 2 - Details HEP   Manual Therapy   Manual Therapy Minutes (38509) 8   Skilled Intervention For increased blood flow, promoting tissue healing and reduced pain   Patient Response/Progress Tolerated well   Manual Therapy 1 MFR to  common extensor belly, ECU   Manual Therapy 1 - Details Moderate to firm pressure   Education   Learner/Method No Barriers to Learning   Plan   Home program Per PTRX, may taper in maintainance program 3-4x/week, wrist cock-up PRN only.   Updates to plan of care DC   Total Session Time   Timed Code Treatment Minutes 23   Total Treatment Time (sum of timed and untimed services) 23         DISCHARGE  Reason for Discharge: Patient has met all goals.    Equipment Issued: Wrist cock-up orthosis    Discharge Plan: Patient to continue home program.    Referring Provider:  Mallorie Dove

## 2023-06-21 ENCOUNTER — MYC MEDICAL ADVICE (OUTPATIENT)
Dept: FAMILY MEDICINE | Facility: CLINIC | Age: 53
End: 2023-06-21
Payer: COMMERCIAL

## 2023-06-21 DIAGNOSIS — M79.7 FIBROMYALGIA: ICD-10-CM

## 2023-06-21 NOTE — TELEPHONE ENCOUNTER
Routing refill request to provider for review/approval because:  Drug not on the [facility] refill protocol     Roseanne De Dios RN

## 2023-06-22 RX ORDER — CYCLOBENZAPRINE HCL 10 MG
10 TABLET ORAL 3 TIMES DAILY PRN
Qty: 270 TABLET | Refills: 3 | Status: SHIPPED | OUTPATIENT
Start: 2023-06-22 | End: 2024-03-29

## 2023-06-28 ENCOUNTER — HOSPITAL ENCOUNTER (EMERGENCY)
Facility: CLINIC | Age: 53
Discharge: HOME | End: 2023-06-28
Attending: STUDENT IN AN ORGANIZED HEALTH CARE EDUCATION/TRAINING PROGRAM | Admitting: STUDENT IN AN ORGANIZED HEALTH CARE EDUCATION/TRAINING PROGRAM
Payer: COMMERCIAL

## 2023-06-28 VITALS
WEIGHT: 245 LBS | HEIGHT: 64 IN | TEMPERATURE: 98.7 F | HEART RATE: 73 BPM | DIASTOLIC BLOOD PRESSURE: 77 MMHG | BODY MASS INDEX: 41.83 KG/M2 | SYSTOLIC BLOOD PRESSURE: 129 MMHG | RESPIRATION RATE: 20 BRPM | OXYGEN SATURATION: 94 %

## 2023-06-28 DIAGNOSIS — M54.50 ACUTE BILATERAL LOW BACK PAIN WITHOUT SCIATICA: ICD-10-CM

## 2023-06-28 PROCEDURE — 250N000013 HC RX MED GY IP 250 OP 250 PS 637: Performed by: STUDENT IN AN ORGANIZED HEALTH CARE EDUCATION/TRAINING PROGRAM

## 2023-06-28 PROCEDURE — 250N000011 HC RX IP 250 OP 636: Mod: JZ | Performed by: STUDENT IN AN ORGANIZED HEALTH CARE EDUCATION/TRAINING PROGRAM

## 2023-06-28 PROCEDURE — 99284 EMERGENCY DEPT VISIT MOD MDM: CPT

## 2023-06-28 PROCEDURE — 96372 THER/PROPH/DIAG INJ SC/IM: CPT | Performed by: STUDENT IN AN ORGANIZED HEALTH CARE EDUCATION/TRAINING PROGRAM

## 2023-06-28 RX ORDER — KETOROLAC TROMETHAMINE 15 MG/ML
15 INJECTION, SOLUTION INTRAMUSCULAR; INTRAVENOUS ONCE
Status: COMPLETED | OUTPATIENT
Start: 2023-06-28 | End: 2023-06-28

## 2023-06-28 RX ORDER — DIAZEPAM 5 MG
5 TABLET ORAL ONCE
Status: COMPLETED | OUTPATIENT
Start: 2023-06-28 | End: 2023-06-28

## 2023-06-28 RX ORDER — ACETAMINOPHEN 500 MG
1000 TABLET ORAL ONCE
Status: COMPLETED | OUTPATIENT
Start: 2023-06-28 | End: 2023-06-28

## 2023-06-28 RX ADMIN — KETOROLAC TROMETHAMINE 15 MG: 15 INJECTION, SOLUTION INTRAMUSCULAR; INTRAVENOUS at 08:41

## 2023-06-28 RX ADMIN — DIAZEPAM 5 MG: 5 TABLET ORAL at 08:41

## 2023-06-28 RX ADMIN — ACETAMINOPHEN 1000 MG: 500 TABLET, FILM COATED ORAL at 08:40

## 2023-06-28 ASSESSMENT — ACTIVITIES OF DAILY LIVING (ADL): ADLS_ACUITY_SCORE: 35

## 2023-06-28 NOTE — DISCHARGE INSTRUCTIONS
Discharge Instructions  Back Pain  You were seen today for back pain. Back pain can have many causes, but most will get better without surgery or other specific treatment. Sometimes there is a herniated ( slipped ) disc. We do not usually do MRI scans to look for these right away, since most herniated discs will get better on their own with time.  Today, we did not find any evidence that your back pain was caused by a serious condition. However, sometimes symptoms develop over time and cannot be found during an emergency visit, so it is very important that you follow up with your primary provider.  Generally, every Emergency Department visit should have a follow-up clinic visit with either a primary or a specialty clinic/provider. Please follow-up as instructed by your emergency provider today.    Return to the Emergency Department if:  You develop a fever with your back pain.   You have weakness or change in sensation in one or both legs.  You lose control of your bowels or bladder, or cannot empty your bladder (cannot pee).  Your pain gets much worse.     Follow-up with your provider:  Unless your pain has completely gone away, please make an appointment with your provider within one week. Most of the routine care for back pain is available in a clinic and not the Emergency Department. You may need further management of your back pain, such as more pain medication, imaging such as an X-ray or MRI, or physical therapy.    What can I do to help myself?  Remain Active -- People are often afraid that they will hurt their back further or delay recovery by remaining active, but this is one of the best things you can do for your back. In fact, staying in bed for a long time to rest is not recommended. Studies have shown that people with low back pain recover faster when they remain active. Movement helps to bring blood flow to the muscles and relieve muscle spasms as well as preventing loss of muscle strength.  Heat --  Using a heating pad can help with low back pain during the first few weeks. Do not sleep with a heating pad, as you can be burned.   Pain medications - You may take a pain medication such as Tylenol  (acetaminophen), Advil , Motrin  (ibuprofen) or Aleve  (naproxen).  If you were given a prescription for medicine here today, be sure to read all of the information (including the package insert) that comes with your prescription.  This will include important information about the medicine, its side effects, and any warnings that you need to know about.  The pharmacist who fills the prescription can provide more information and answer questions you may have about the medicine.  If you have questions or concerns that the pharmacist cannot address, please call or return to the Emergency Department.   Remember that you can always come back to the Emergency Department if you are not able to see your regular provider in the amount of time listed above, if you get any new symptoms, or if there is anything that worries you.  Return to the emergency department if symptoms are worsening, become concerning, or for any other concerns. Follow-up with your doctor in 2-3 and sooner if needed.

## 2023-06-28 NOTE — ED TRIAGE NOTES
Pt c/o low back pain for the past 2 weeks. Pain got much worse yesterday. Pt denies specific injury but states she has been watering plants at her parents and she has to carry a large heavy water container. Pt takes tramadol and flexeril for her fibromyalgia, also taking tylenol and ibuprofen without relief. Unable to sleep well last night d/t pain.      Triage Assessment     Row Name 06/28/23 0758       Triage Assessment (Adult)    Airway WDL WDL       Respiratory WDL    Respiratory WDL WDL       Skin Circulation/Temperature WDL    Skin Circulation/Temperature WDL WDL       Cardiac WDL    Cardiac WDL WDL       Peripheral/Neurovascular WDL    Peripheral Neurovascular WDL WDL       Cognitive/Neuro/Behavioral WDL    Cognitive/Neuro/Behavioral WDL WDL

## 2023-06-28 NOTE — ED PROVIDER NOTES
History     Chief Complaint:  Back Pain       The history is provided by the patient.      Kaylee Lopez is a 53 year old female with a history of fibromyalgia, type 2 diabetes mellitus, hyperlipidemia, chronic back pain who presents to the ED via private vehicle for evaluation of back pain. Patient reports onset of off and on back pain 2 weeks prior that worsened this morning. She denies any recent trauma to her back but notes that she has been watering plants at her mother's house which has been putting strain on her back. She notes that a couple days ago, her pain radiated to her pelvic area which soon resolved. She adds that she has been icing and resting her back along with taking tylenol and ibuprofen with no relief. She also tried tramadol and flexeril that she takes for her fibromyalgia which did not provide any relief. Notes that she has not taken any medications today. She also states that she required her cane to ambulate this morning due to pain. Denies any leg pain or numbness.  No saddle anesthesia.  No fever or other urinary and bowel symptoms. She also denies prior back surgeries or history of smoking.  No IV drug use.    Independent Historian:   None - Patient Only    Review of External Notes:   June 5, Occupational Therapy visit.    Medications:    albuterol (PROAIR HFA/PROVENTIL HFA/VENTOLIN HFA) 108 (90 Base) MCG/ACT inhaler  atorvastatin (LIPITOR) 10 MG tablet  blood glucose monitoring (NO BRAND SPECIFIED) meter device kit  busPIRone (BUSPAR) 15 MG tablet  cetirizine (ZYRTEC) 10 MG tablet  Cholecalciferol (SM VITAMIN D3) 100 MCG (4000 UT) CAPS  citalopram (CELEXA) 40 MG tablet  cyanocobalamin (VITAMIN B-12) 1000 MCG tablet  cyclobenzaprine (FLEXERIL) 10 MG tablet  ferrous gluconate (FERGON) 324 (38 Fe) MG tablet  lisinopril (ZESTRIL) 5 MG tablet  metFORMIN (GLUCOPHAGE XR) 500 MG 24 hr tablet  ONETOUCH ULTRA test strip  Semaglutide, 1 MG/DOSE, (OZEMPIC) 4 MG/3ML pen  traMADol (ULTRAM) 50 MG  tablet  traZODone (DESYREL) 100 MG tablet  triamcinolone (KENALOG) 0.1 % external cream  vitamin C (ASCORBIC ACID) 1000 MG TABS        Past Medical History:    Past Medical History:   Diagnosis Date     Abnormal Papanicolaou smear of cervix and cervical HPV 2003     Depression      Depressive disorder 15-20 years     Diabetes mellitus      Fibromyalgia      Heart murmur      Inflammatory arthritis      Narcolepsy 11/01/2010     Non-rheumatic tricuspid valve insufficiency      Obesity      Other chronic pain      Pulmonary hypertension (H) 05/02/2010     Reflex sympathetic dystrophy      TRICUSPID VALVE DISEASE (regurg)        Past Surgical History:    Past Surgical History:   Procedure Laterality Date     Angiogram  03/2011    No pulmonary hypertension     COLONOSCOPY  01/2008    normal     COLONOSCOPY N/A 06/14/2022    Procedure: COLONOSCOPY (fv);  Surgeon: Perico Warren MD;  Location: [facility]     CYSTOSCOPY N/A 06/05/2019    Procedure: CYSTOSCOPY;  Surgeon: Georgiana Pizarro DO;  Location: [facility]     DAVINCI HYSTERECTOMY TOTAL, BILATERAL SALPINGO-OOPHORECTOMY, COMBINED Bilateral 06/05/2019    Procedure: robotic assisted total laparoscopic hysterectomy bilateral salpingectomy and cystoscopy;  Surgeon: Georgiana Pizarro DO;  Location: [facility]     DILATION AND CURETTAGE, OPERATIVE HYSTEROSCOPY WITH MORCELLATOR, COMBINED N/A 07/09/2018    Procedure: COMBINED DILATION AND CURETTAGE, OPERATIVE HYSTEROSCOPY WITH MORCELLATOR;  Hysteroscopy, polypectomy  with myosure, dilation and curettage, endometrial biopsy ;  Surgeon: Georgiana Pizarro DO;  Location: [facility]     ENT SURGERY      wisdon teeth removal     HYSTERECTOMY, PAP NO LONGER INDICATED       LAPAROSCOPIC CHOLECYSTECTOMY  07/2000     SURGICAL HISTORY OF -       essure procedure for contraception     TCA for abnormal pap  2004      Physical Exam     Patient Vitals for the past 24 hrs:   BP Temp Temp src Pulse Resp SpO2 Height Weight   06/28/23 1000 129/77  "-- -- 73 -- 94 % -- --   06/28/23 0947 130/78 -- -- 80 -- 96 % -- --   06/28/23 0932 131/76 -- -- 77 -- 96 % -- --   06/28/23 0916 -- -- -- -- -- 95 % -- --   06/28/23 0915 137/74 -- -- 88 -- -- -- --   06/28/23 0901 -- -- -- -- -- 94 % -- --   06/28/23 0900 136/65 -- -- 83 -- -- -- --   06/28/23 0757 (!) 149/88 98.7  F (37.1  C) Oral 98 20 98 % 1.626 m (5' 4\") 111.1 kg (245 lb)      Physical Exam  GENERAL: Patient tearful, holding low back, but nontoxic.  HEAD: Atraumatic.  Neck: No rigidity  CV: RRR, no murmurs rubs or gallops  PULM: CTAB with good aeration; no retractions, rales, rhonchi, or wheezing  ABD: Soft, nontender, nondistended, no guarding  DERM: No rash. Skin warm and dry  EXTREMITY: Moving all extremities. No calf tenderness or peripheral edema  VASCULAR: Symmetric pulses bilaterally  Back: Generalized lower back lumbar mostly right sided muscle and fascial tenderness with no skin changes. No midline step offs, crepitus, or deformity.  NEURO: Strength is 5/5 bilaterally. Sensations intact bilaterally. Patella reflex is 2+ bilaterally.    Emergency Department Course   Emergency Department Course & Assessments:  Interventions:  Medications   diazepam (VALIUM) tablet 5 mg (5 mg Oral $Given 6/28/23 0841)   ketorolac (TORADOL) injection 15 mg (15 mg Intramuscular $Given 6/28/23 0841)   acetaminophen (TYLENOL) tablet 1,000 mg (1,000 mg Oral $Given 6/28/23 0840)        Assessments:  0809 I obtained history and examined the patient as noted above.    Independent Interpretation (X-rays, CTs, rhythm strip):  none    Consultations/Discussion of Management or Tests:  None    Social Determinants of Health affecting care:   None    Disposition:  The patient was discharged to home.     Impression & Plan    Medical Decision Making:  Symptoms most consistent with musculoskeletal back pain.    Chronic conditions complicating - fibromyalgia, back pain    Differential diagnosis considered, but not limited to fracture, " cauda equina, epidural abscess, but evaluation not consistent with these etiologies.     Vital signs unremarkable.     No red flags for back pain and thus considered advanced CT/MRI imaging, but not emergently indicated.    Given IM Toradol, Tylenol and Valium.    On repeat assessment she is feeling improved and resting comfortably.    Recommended physical therapy exercises.     Able to ambulate with cane.    Prescription medication considered, but ultimately management most appropriate with NSAIDs.    Patient was evaluated for acute medical emergencies. Based on my clinical assessment, I do not think any further acute management or work-up is required.  Patient stable for discharge. Given strict return precautions. All questions answered. Patient content with plan. Recommended PCP follow-up in 2-3 days.            Diagnosis:    ICD-10-CM    1. Acute bilateral low back pain without sciatica  M54.50            Discharge Medications:  New Prescriptions    No medications on file          Scribe Disclosure:  JEAN MEZA, am serving as a scribe at 8:46 AM on 6/28/2023 to document services personally performed by Lenard Valencia MD based on my observations and the provider's statements to me.   6/28/2023   Lenard Valencia MD Foss, Kevin, MD  06/28/23 1007

## 2023-07-03 ENCOUNTER — E-VISIT (OUTPATIENT)
Dept: FAMILY MEDICINE | Facility: CLINIC | Age: 53
End: 2023-07-03
Payer: COMMERCIAL

## 2023-07-03 DIAGNOSIS — M54.50 BILATERAL LOW BACK PAIN WITHOUT SCIATICA, UNSPECIFIED CHRONICITY: Primary | ICD-10-CM

## 2023-07-03 PROCEDURE — 99421 OL DIG E/M SVC 5-10 MIN: CPT | Performed by: FAMILY MEDICINE

## 2023-07-03 RX ORDER — PREDNISONE 20 MG/1
40 TABLET ORAL DAILY
Qty: 10 TABLET | Refills: 0 | Status: SHIPPED | OUTPATIENT
Start: 2023-07-03 | End: 2023-07-08

## 2023-07-25 ENCOUNTER — OFFICE VISIT (OUTPATIENT)
Dept: FAMILY MEDICINE | Facility: CLINIC | Age: 53
End: 2023-07-25
Payer: COMMERCIAL

## 2023-07-25 VITALS
TEMPERATURE: 98.8 F | HEIGHT: 64 IN | HEART RATE: 89 BPM | OXYGEN SATURATION: 99 % | WEIGHT: 238 LBS | DIASTOLIC BLOOD PRESSURE: 82 MMHG | BODY MASS INDEX: 40.63 KG/M2 | SYSTOLIC BLOOD PRESSURE: 130 MMHG | RESPIRATION RATE: 14 BRPM

## 2023-07-25 DIAGNOSIS — F11.90 CHRONIC, CONTINUOUS USE OF OPIOIDS: ICD-10-CM

## 2023-07-25 DIAGNOSIS — E11.9 TYPE 2 DIABETES MELLITUS WITHOUT COMPLICATION, WITHOUT LONG-TERM CURRENT USE OF INSULIN (H): Primary | ICD-10-CM

## 2023-07-25 DIAGNOSIS — E78.5 HYPERLIPIDEMIA LDL GOAL <100: ICD-10-CM

## 2023-07-25 DIAGNOSIS — M79.7 FIBROMYALGIA: ICD-10-CM

## 2023-07-25 DIAGNOSIS — E66.01 MORBID OBESITY (H): ICD-10-CM

## 2023-07-25 LAB — HBA1C MFR BLD: 6.9 % (ref 0–5.6)

## 2023-07-25 PROCEDURE — 36415 COLL VENOUS BLD VENIPUNCTURE: CPT | Performed by: FAMILY MEDICINE

## 2023-07-25 PROCEDURE — 80053 COMPREHEN METABOLIC PANEL: CPT | Performed by: FAMILY MEDICINE

## 2023-07-25 PROCEDURE — 83036 HEMOGLOBIN GLYCOSYLATED A1C: CPT | Performed by: FAMILY MEDICINE

## 2023-07-25 PROCEDURE — 80061 LIPID PANEL: CPT | Performed by: FAMILY MEDICINE

## 2023-07-25 PROCEDURE — 99214 OFFICE O/P EST MOD 30 MIN: CPT | Performed by: FAMILY MEDICINE

## 2023-07-25 RX ORDER — METFORMIN HCL 500 MG
2000 TABLET, EXTENDED RELEASE 24 HR ORAL
Qty: 360 TABLET | Refills: 1 | Status: SHIPPED | OUTPATIENT
Start: 2023-07-25 | End: 2023-12-28

## 2023-07-25 RX ORDER — TRAMADOL HYDROCHLORIDE 50 MG/1
50 TABLET ORAL 2 TIMES DAILY PRN
Qty: 60 TABLET | Refills: 0 | Status: CANCELLED | OUTPATIENT
Start: 2023-07-25

## 2023-07-25 RX ORDER — TRAMADOL HYDROCHLORIDE 50 MG/1
50 TABLET ORAL 2 TIMES DAILY PRN
Qty: 60 TABLET | Refills: 0 | Status: SHIPPED | OUTPATIENT
Start: 2023-07-25 | End: 2023-12-28

## 2023-07-25 RX ORDER — ATORVASTATIN CALCIUM 10 MG/1
10 TABLET, FILM COATED ORAL DAILY
Qty: 90 TABLET | Refills: 3 | Status: SHIPPED | OUTPATIENT
Start: 2023-07-25 | End: 2024-03-29

## 2023-07-25 ASSESSMENT — ANXIETY QUESTIONNAIRES
IF YOU CHECKED OFF ANY PROBLEMS ON THIS QUESTIONNAIRE, HOW DIFFICULT HAVE THESE PROBLEMS MADE IT FOR YOU TO DO YOUR WORK, TAKE CARE OF THINGS AT HOME, OR GET ALONG WITH OTHER PEOPLE: SOMEWHAT DIFFICULT
3. WORRYING TOO MUCH ABOUT DIFFERENT THINGS: SEVERAL DAYS
4. TROUBLE RELAXING: NOT AT ALL
2. NOT BEING ABLE TO STOP OR CONTROL WORRYING: SEVERAL DAYS
5. BEING SO RESTLESS THAT IT IS HARD TO SIT STILL: NOT AT ALL
GAD7 TOTAL SCORE: 4
GAD7 TOTAL SCORE: 4
6. BECOMING EASILY ANNOYED OR IRRITABLE: NOT AT ALL
7. FEELING AFRAID AS IF SOMETHING AWFUL MIGHT HAPPEN: SEVERAL DAYS
1. FEELING NERVOUS, ANXIOUS, OR ON EDGE: SEVERAL DAYS

## 2023-07-25 NOTE — PROGRESS NOTES
Assessment & Plan     Type 2 diabetes mellitus without complication, without long-term current use of insulin (H) - worsened control. She will eliminate sugar pop. Will keep meds same for now. 3 month recheck.   - Hemoglobin A1c; Future  - Hemoglobin A1c  - metFORMIN (GLUCOPHAGE XR) 500 MG 24 hr tablet; Take 4 tablets (2,000 mg) by mouth daily (with dinner)  - Semaglutide, 1 MG/DOSE, (OZEMPIC) 4 MG/3ML pen; Inject 1 mg Subcutaneous once a week    Hyperlipidemia LDL goal <100 - on statin, continue  - Comprehensive metabolic panel (BMP + Alb, Alk Phos, ALT, AST, Total. Bili, TP); Future  - Lipid panel reflex to direct LDL Fasting; Future  - Comprehensive metabolic panel (BMP + Alb, Alk Phos, ALT, AST, Total. Bili, TP)  - Lipid panel reflex to direct LDL Fasting  - atorvastatin (LIPITOR) 10 MG tablet; Take 1 tablet (10 mg) by mouth daily    Fibromyalgia - stable, refills  - traMADol (ULTRAM) 50 MG tablet; Take 1 tablet (50 mg) by mouth 2 times daily as needed for severe pain    Chronic, continuous use of opioids  - traMADol (ULTRAM) 50 MG tablet; Take 1 tablet (50 mg) by mouth 2 times daily as needed for severe pain    Morbid obesity (H) - working on weight loss through healthy eating, movement    Moriah Hernandez MD  [facility]   Kaylee is a 53 year old, presenting for the following health issues:  Diabetes        7/25/2023     4:14 PM   Additional Questions   Roomed by Matt Robbins   Accompanied by self     History of Present Illness       Diabetes:   She presents for follow up of diabetes.    She is not checking blood glucose.         She has no concerns regarding her diabetes at this time.   She is not experiencing numbness or burning in feet, excessive thirst, blurry vision, weight changes or redness, sores or blisters on feet. The patient has had a diabetic eye exam in the last 12 months. Eye exam performed on Almost a year ago. Location of last eye exam [facility]  "Care.        She eats 2-3 servings of fruits and vegetables daily.She consumes 2 sweetened beverage(s) daily.She exercises with enough effort to increase her heart rate 9 or less minutes per day.  She exercises with enough effort to increase her heart rate 3 or less days per week.   She is taking medications regularly.       On metformin and ozempic, working well.   Will have some lightheadedness first day after injection but okay with this.   Started drinking more sugar pop since her last visit.     Reports episode of dyspnea earlier this month. O2 meter was low 90s. Symptoms resolved on their own. Unsure what caused - nothing since.       Review of Systems   Constitutional, HEENT, cardiovascular, pulmonary, gi and gu systems are negative, except as otherwise noted.      Objective    /82 (BP Location: Right arm, Patient Position: Chair, Cuff Size: Adult Large)   Pulse 89   Temp 98.8  F (37.1  C) (Oral)   Resp 14   Ht 1.626 m (5' 4\")   Wt 108 kg (238 lb)   LMP 05/14/2019   SpO2 99%   BMI 40.85 kg/m    Body mass index is 40.85 kg/m .  Physical Exam   GENERAL: healthy, alert and no distress  NECK: no adenopathy, no asymmetry, masses, or scars and thyroid normal to palpation  RESP: lungs clear to auscultation - no rales, rhonchi or wheezes  CV: regular rate and rhythm, normal S1 S2, no S3 or S4, no murmur, click or rub, no peripheral edema and peripheral pulses strong  PSYCH: mentation appears normal, affect normal/bright    Lab Results   Component Value Date    A1C 6.9 07/25/2023    A1C 6.3 04/27/2023    A1C 6.1 11/11/2022    A1C 6.3 04/12/2022    A1C 7.6 01/04/2022    A1C 7.4 06/18/2021    A1C 6.6 12/29/2020    A1C 6.7 09/09/2020    A1C 6.4 11/26/2019    A1C 7.2 04/05/2019                   "

## 2023-07-26 LAB
ALBUMIN SERPL BCG-MCNC: 4.4 G/DL (ref 3.5–5.2)
ALP SERPL-CCNC: 149 U/L (ref 35–104)
ALT SERPL W P-5'-P-CCNC: 19 U/L (ref 0–50)
ANION GAP SERPL CALCULATED.3IONS-SCNC: 13 MMOL/L (ref 7–15)
AST SERPL W P-5'-P-CCNC: 21 U/L (ref 0–45)
BILIRUB SERPL-MCNC: 0.2 MG/DL
BUN SERPL-MCNC: 12.4 MG/DL (ref 6–20)
CALCIUM SERPL-MCNC: 9.3 MG/DL (ref 8.6–10)
CHLORIDE SERPL-SCNC: 100 MMOL/L (ref 98–107)
CHOLEST SERPL-MCNC: 143 MG/DL
CREAT SERPL-MCNC: 0.88 MG/DL (ref 0.51–0.95)
DEPRECATED HCO3 PLAS-SCNC: 27 MMOL/L (ref 22–29)
GFR SERPL CREATININE-BSD FRML MDRD: 79 ML/MIN/1.73M2
GLUCOSE SERPL-MCNC: 147 MG/DL (ref 70–99)
HDLC SERPL-MCNC: 59 MG/DL
LDLC SERPL CALC-MCNC: 53 MG/DL
NONHDLC SERPL-MCNC: 84 MG/DL
POTASSIUM SERPL-SCNC: 4.2 MMOL/L (ref 3.4–5.3)
PROT SERPL-MCNC: 7.2 G/DL (ref 6.4–8.3)
SODIUM SERPL-SCNC: 140 MMOL/L (ref 136–145)
TRIGL SERPL-MCNC: 155 MG/DL

## 2023-08-13 ENCOUNTER — HOSPITAL ENCOUNTER (EMERGENCY)
Facility: CLINIC | Age: 53
Discharge: HOME | End: 2023-08-13
Attending: PHYSICIAN ASSISTANT | Admitting: PHYSICIAN ASSISTANT
Payer: COMMERCIAL

## 2023-08-13 VITALS
TEMPERATURE: 97.8 F | OXYGEN SATURATION: 96 % | DIASTOLIC BLOOD PRESSURE: 66 MMHG | SYSTOLIC BLOOD PRESSURE: 105 MMHG | RESPIRATION RATE: 18 BRPM | HEART RATE: 94 BPM

## 2023-08-13 DIAGNOSIS — R79.89 ELEVATED SERUM CREATININE: ICD-10-CM

## 2023-08-13 DIAGNOSIS — K22.6 MALLORY-WEISS TEAR: ICD-10-CM

## 2023-08-13 DIAGNOSIS — R11.10 VOMITING: ICD-10-CM

## 2023-08-13 LAB
ALBUMIN SERPL BCG-MCNC: 4.3 G/DL (ref 3.5–5.2)
ALP SERPL-CCNC: 147 U/L (ref 35–104)
ALT SERPL W P-5'-P-CCNC: 20 U/L (ref 0–50)
ANION GAP SERPL CALCULATED.3IONS-SCNC: 12 MMOL/L (ref 7–15)
AST SERPL W P-5'-P-CCNC: 19 U/L (ref 0–45)
BASOPHILS # BLD AUTO: 0.1 10E3/UL (ref 0–0.2)
BASOPHILS NFR BLD AUTO: 1 %
BILIRUB SERPL-MCNC: 0.6 MG/DL
BUN SERPL-MCNC: 13 MG/DL (ref 6–20)
CALCIUM SERPL-MCNC: 9.5 MG/DL (ref 8.6–10)
CHLORIDE SERPL-SCNC: 100 MMOL/L (ref 98–107)
CREAT SERPL-MCNC: 1.08 MG/DL (ref 0.51–0.95)
DEPRECATED HCO3 PLAS-SCNC: 26 MMOL/L (ref 22–29)
EOSINOPHIL # BLD AUTO: 0.2 10E3/UL (ref 0–0.7)
EOSINOPHIL NFR BLD AUTO: 2 %
ERYTHROCYTE [DISTWIDTH] IN BLOOD BY AUTOMATED COUNT: 13.1 % (ref 10–15)
GFR SERPL CREATININE-BSD FRML MDRD: 62 ML/MIN/1.73M2
GLUCOSE SERPL-MCNC: 153 MG/DL (ref 70–99)
HCT VFR BLD AUTO: 37.4 % (ref 35–47)
HGB BLD-MCNC: 12.2 G/DL (ref 11.7–15.7)
HOLD SPECIMEN: NORMAL
HOLD SPECIMEN: NORMAL
IMM GRANULOCYTES # BLD: 0 10E3/UL
IMM GRANULOCYTES NFR BLD: 0 %
LIPASE SERPL-CCNC: 32 U/L (ref 13–60)
LYMPHOCYTES # BLD AUTO: 2.7 10E3/UL (ref 0.8–5.3)
LYMPHOCYTES NFR BLD AUTO: 26 %
MCH RBC QN AUTO: 30.7 PG (ref 26.5–33)
MCHC RBC AUTO-ENTMCNC: 32.6 G/DL (ref 31.5–36.5)
MCV RBC AUTO: 94 FL (ref 78–100)
MONOCYTES # BLD AUTO: 0.6 10E3/UL (ref 0–1.3)
MONOCYTES NFR BLD AUTO: 6 %
NEUTROPHILS # BLD AUTO: 7 10E3/UL (ref 1.6–8.3)
NEUTROPHILS NFR BLD AUTO: 65 %
NRBC # BLD AUTO: 0 10E3/UL
NRBC BLD AUTO-RTO: 0 /100
PLATELET # BLD AUTO: 411 10E3/UL (ref 150–450)
POTASSIUM SERPL-SCNC: 4.1 MMOL/L (ref 3.4–5.3)
PROT SERPL-MCNC: 7 G/DL (ref 6.4–8.3)
RBC # BLD AUTO: 3.97 10E6/UL (ref 3.8–5.2)
SODIUM SERPL-SCNC: 138 MMOL/L (ref 136–145)
WBC # BLD AUTO: 10.6 10E3/UL (ref 4–11)

## 2023-08-13 PROCEDURE — 85025 COMPLETE CBC W/AUTO DIFF WBC: CPT | Performed by: PHYSICIAN ASSISTANT

## 2023-08-13 PROCEDURE — 96361 HYDRATE IV INFUSION ADD-ON: CPT

## 2023-08-13 PROCEDURE — 85025 COMPLETE CBC W/AUTO DIFF WBC: CPT | Performed by: EMERGENCY MEDICINE

## 2023-08-13 PROCEDURE — 83690 ASSAY OF LIPASE: CPT | Performed by: PHYSICIAN ASSISTANT

## 2023-08-13 PROCEDURE — 36415 COLL VENOUS BLD VENIPUNCTURE: CPT | Performed by: EMERGENCY MEDICINE

## 2023-08-13 PROCEDURE — 250N000011 HC RX IP 250 OP 636: Mod: JZ | Performed by: PHYSICIAN ASSISTANT

## 2023-08-13 PROCEDURE — 258N000003 HC RX IP 258 OP 636: Performed by: PHYSICIAN ASSISTANT

## 2023-08-13 PROCEDURE — 96375 TX/PRO/DX INJ NEW DRUG ADDON: CPT

## 2023-08-13 PROCEDURE — 99284 EMERGENCY DEPT VISIT MOD MDM: CPT | Mod: 25

## 2023-08-13 PROCEDURE — C9113 INJ PANTOPRAZOLE SODIUM, VIA: HCPCS | Mod: JZ | Performed by: PHYSICIAN ASSISTANT

## 2023-08-13 PROCEDURE — 80053 COMPREHEN METABOLIC PANEL: CPT | Performed by: PHYSICIAN ASSISTANT

## 2023-08-13 PROCEDURE — 96374 THER/PROPH/DIAG INJ IV PUSH: CPT

## 2023-08-13 RX ORDER — ONDANSETRON 4 MG/1
4 TABLET, ORALLY DISINTEGRATING ORAL EVERY 6 HOURS PRN
Qty: 10 TABLET | Refills: 0 | Status: SHIPPED | OUTPATIENT
Start: 2023-08-13 | End: 2023-08-16

## 2023-08-13 RX ORDER — ONDANSETRON 2 MG/ML
4 INJECTION INTRAMUSCULAR; INTRAVENOUS ONCE
Status: COMPLETED | OUTPATIENT
Start: 2023-08-13 | End: 2023-08-13

## 2023-08-13 RX ADMIN — PANTOPRAZOLE SODIUM 40 MG: 40 INJECTION, POWDER, FOR SOLUTION INTRAVENOUS at 09:21

## 2023-08-13 RX ADMIN — ONDANSETRON 4 MG: 2 INJECTION INTRAMUSCULAR; INTRAVENOUS at 09:21

## 2023-08-13 RX ADMIN — SODIUM CHLORIDE 1000 ML: 9 INJECTION, SOLUTION INTRAVENOUS at 09:21

## 2023-08-13 ASSESSMENT — ACTIVITIES OF DAILY LIVING (ADL): ADLS_ACUITY_SCORE: 33

## 2023-08-13 NOTE — ED PROVIDER NOTES
History     Chief Complaint:  Hematemesis       HPI   Kaylee Lopez is a 53 year old female with history of fibromyalgia, diabetes, status postcholecystectomy and hysterectomy who presents with vomiting and hematemesis.  The patient was in [place] with her  and they had just eaten dinner when she started to vomit.  She felt better afterwards but then woke up again this morning with several episodes of vomiting after eating breakfast.  On the third episode of vomiting she notes that she had some bright red blood mixed in with her vomit prompting her presentation here today.  She has not had any black or bloody stools.  No further episodes of hematemesis.  No coffee-ground or black emesis.  She denies abdominal pain chest pain shortness of breath fever or blood thinner use.  No recent injuries or endoscopy and no ongoing sore throat.  She does use NSAIDs periodically denies alcohol use.  No unusual food or water exposures or known sick contacts.  No head injuries. She does not feel dizzy or light-headed.      Independent Historian:    Spouse/partner - They report above as well as they did a lot of walking in heat    Review of External Notes:  none      Medications:    ondansetron (ZOFRAN ODT) 4 MG ODT tab  albuterol (PROAIR HFA/PROVENTIL HFA/VENTOLIN HFA) 108 (90 Base) MCG/ACT inhaler  atorvastatin (LIPITOR) 10 MG tablet  blood glucose monitoring (NO BRAND SPECIFIED) meter device kit  busPIRone (BUSPAR) 15 MG tablet  cetirizine (ZYRTEC) 10 MG tablet  Cholecalciferol (SM VITAMIN D3) 100 MCG (4000 UT) CAPS  citalopram (CELEXA) 40 MG tablet  cyanocobalamin (VITAMIN B-12) 1000 MCG tablet  cyclobenzaprine (FLEXERIL) 10 MG tablet  ferrous gluconate (FERGON) 324 (38 Fe) MG tablet  lisinopril (ZESTRIL) 5 MG tablet  metFORMIN (GLUCOPHAGE XR) 500 MG 24 hr tablet  ONETOUCH ULTRA test strip  Semaglutide, 1 MG/DOSE, (OZEMPIC) 4 MG/3ML pen  traMADol (ULTRAM) 50 MG tablet  traZODone (DESYREL) 100 MG  tablet  triamcinolone (KENALOG) 0.1 % external cream  vitamin C (ASCORBIC ACID) 1000 MG TABS        Past Medical History:    Past Medical History:   Diagnosis Date    Abnormal Papanicolaou smear of cervix and cervical HPV 2003    Depression     Depressive disorder 15-20 years    Diabetes mellitus     Fibromyalgia     Heart murmur     Inflammatory arthritis     Narcolepsy 11/01/2010    Non-rheumatic tricuspid valve insufficiency     Obesity     Other chronic pain     Pulmonary hypertension (H) 05/02/2010    Reflex sympathetic dystrophy     TRICUSPID VALVE DISEASE (regurg)        Past Surgical History:    Past Surgical History:   Procedure Laterality Date    Angiogram  03/2011    No pulmonary hypertension    COLONOSCOPY  01/2008    normal    COLONOSCOPY N/A 06/14/2022    Procedure: COLONOSCOPY (fv);  Surgeon: Perico Warren MD;  Location: [facility]    CYSTOSCOPY N/A 06/05/2019    Procedure: CYSTOSCOPY;  Surgeon: Georgiana Pizarro DO;  Location: [facility]    DAVINCI HYSTERECTOMY TOTAL, BILATERAL SALPINGO-OOPHORECTOMY, COMBINED Bilateral 06/05/2019    Procedure: robotic assisted total laparoscopic hysterectomy bilateral salpingectomy and cystoscopy;  Surgeon: Georgiana Pizarro DO;  Location: [facility]    DILATION AND CURETTAGE, OPERATIVE HYSTEROSCOPY WITH MORCELLATOR, COMBINED N/A 07/09/2018    Procedure: COMBINED DILATION AND CURETTAGE, OPERATIVE HYSTEROSCOPY WITH MORCELLATOR;  Hysteroscopy, polypectomy  with myosure, dilation and curettage, endometrial biopsy ;  Surgeon: Georgiana Pizarro DO;  Location: [facility]    ENT SURGERY      wisdon teeth removal    HYSTERECTOMY, PAP NO LONGER INDICATED      LAPAROSCOPIC CHOLECYSTECTOMY  07/2000    SURGICAL HISTORY OF -       essure procedure for contraception    TCA for abnormal pap  2004          Physical Exam   Patient Vitals for the past 24 hrs:   BP Temp Temp src Pulse Resp SpO2   08/13/23 1015 98/51 -- -- 80 -- 95 %   08/13/23 1000 103/56 -- -- 81 -- 99 %   08/13/23 0905  127/60 -- -- 95 -- 96 %   08/13/23 0839 136/70 97.8  F (36.6  C) Temporal 95 18 90 %        Physical Exam  General: Awake, alert, non-toxic.  Head:  Scalp is NC/AT  Eyes:  Conjunctiva normal, PERRL  ENT:  The external nose and ears are normal.     Oropharynx clear, uvula midline.  No bleeding or sores.  Neck:  Normal range of motion without rigidity.  CV:  Regular rate and rhythm    No pathologic murmur, rubs, or gallops.  Resp:  Breath sounds are clear bilaterally    Non-labored, no retractions or accessory muscle use  Abdomen: Abdomen is soft, no distension, no tenderness, no masses.  MS:  No lower extremity edema/swelling.   Skin:  Warm and dry, No rash or lesions noted.  Neuro:  Alert and oriented.  GCS 15 Moves all extremities normal.  No facial asymmetry. Gait normal.  Psych:  Awake. Alert. Normal affect. Appropriate interactions.      Emergency Department Course       Laboratory:  Labs Ordered and Resulted from Time of ED Arrival to Time of ED Departure   COMPREHENSIVE METABOLIC PANEL - Abnormal       Result Value    Sodium 138      Potassium 4.1      Chloride 100      Carbon Dioxide (CO2) 26      Anion Gap 12      Urea Nitrogen 13.0      Creatinine 1.08 (*)     Calcium 9.5      Glucose 153 (*)     Alkaline Phosphatase 147 (*)     AST 19      ALT 20      Protein Total 7.0      Albumin 4.3      Bilirubin Total 0.6      GFR Estimate 62     LIPASE - Normal    Lipase 32     CBC WITH PLATELETS AND DIFFERENTIAL    WBC Count 10.6      RBC Count 3.97      Hemoglobin 12.2      Hematocrit 37.4      MCV 94      MCH 30.7      MCHC 32.6      RDW 13.1      Platelet Count 411      % Neutrophils 65      % Lymphocytes 26      % Monocytes 6      % Eosinophils 2      % Basophils 1      % Immature Granulocytes 0      NRBCs per 100 WBC 0      Absolute Neutrophils 7.0      Absolute Lymphocytes 2.7      Absolute Monocytes 0.6      Absolute Eosinophils 0.2      Absolute Basophils 0.1      Absolute Immature Granulocytes 0.0       Absolute NRBCs 0.0        Emergency Department Course & Assessments:    Interventions:  Medications   0.9% sodium chloride BOLUS (0 mLs Intravenous Stopped 23 1027)   ondansetron (ZOFRAN) injection 4 mg (4 mg Intravenous $Given 23)   pantoprazole (PROTONIX) IV push injection 40 mg (40 mg Intravenous $Given 23)        Assessments:  As above    Independent Interpretation (X-rays, CTs, rhythm strip):  None    Consultations/Discussion of Management or Tests:  None       Social Determinants of Health affecting care:  none     Disposition:  The patient was discharged to home.     Impression & Plan    CMS Diagnoses: None      Medical Decision Makin-year-old female who presents with several episodes of vomiting since last night with a small amount of bright red blood hematemesis with last episode.  Broad differential considered.  Work-up here is reassuring.  She is vitally stable no further episodes of hematemesis hemoglobin and BUN are normal episode is highly suggestive of Noelle-Lawrence tear nothing to suggest more serious GI bleed such as peptic ulcer disease, variceal bleed, gastritis, aorto enteric fistula,.  No evidence of active bleeding.  No evidence of esophageal perforation.  No abdominal pain or tenderness exam is completely benign no indication for advanced imaging.  Afebrile with normal white count no infectious symptoms.  Nothing to suggest atypical ACS or increased ICP.  She is not in DKA.  No urinary symptoms to suggest pyelonephritis.  Lipase etc. unremarkable.    Creatinine mildly elevated though not to the level of true AARON consistent with likely vomiting and mild dehydration.  Lites otherwise normal.  Felt better after fluids and symptomatic treatment tolerating p.o. would like to go home.  Most consistent with acute GI illness/food poisoning.  Zofran and short course of omeprazole for home.  Return for increasing/further episodes, persistent black or bloody stools, pain  fever dizziness shortness of breath weakness or other concerns.    Critical Care time:  was 0 minutes for this patient excluding procedures.    Diagnosis:    ICD-10-CM    1. Elevated serum creatinine  R79.89     mild      2. Vomiting  R11.10       3. Noelle-Lawrence tear  K22.6     suspected           Discharge Medications:  New Prescriptions    ONDANSETRON (ZOFRAN ODT) 4 MG ODT TAB    Take 1 tablet (4 mg) by mouth every 6 hours as needed for nausea or vomiting          8/13/2023   Keagan Bhakta,               Keagan Bhakta, MALCOLM  08/13/23 1038

## 2023-08-13 NOTE — ED TRIAGE NOTES
Pt arrives with c/o N/V for the past couple days. Pt reports she just returned from [facility] today. Pt reports emesis x3 today, last emesis had bright red blood in it. Pt denies any abdominal pain. Denies lightheadedness. Pt not on thinners.

## 2023-08-18 ENCOUNTER — TRANSFERRED RECORDS (OUTPATIENT)
Dept: HEALTH INFORMATION MANAGEMENT | Facility: CLINIC | Age: 53
End: 2023-08-18

## 2023-08-18 DIAGNOSIS — R05.9 COUGH: ICD-10-CM

## 2023-08-18 LAB — RETINOPATHY: NEGATIVE

## 2023-08-18 RX ORDER — ALBUTEROL SULFATE 90 UG/1
2 AEROSOL, METERED RESPIRATORY (INHALATION) EVERY 4 HOURS PRN
Qty: 18 G | Refills: 4 | Status: SHIPPED | OUTPATIENT
Start: 2023-08-18 | End: 2024-03-29

## 2023-08-21 ENCOUNTER — OFFICE VISIT (OUTPATIENT)
Dept: FAMILY MEDICINE | Facility: CLINIC | Age: 53
End: 2023-08-21
Payer: COMMERCIAL

## 2023-08-21 VITALS
BODY MASS INDEX: 40.97 KG/M2 | HEART RATE: 95 BPM | DIASTOLIC BLOOD PRESSURE: 74 MMHG | TEMPERATURE: 98.4 F | WEIGHT: 240 LBS | RESPIRATION RATE: 16 BRPM | HEIGHT: 64 IN | OXYGEN SATURATION: 99 % | SYSTOLIC BLOOD PRESSURE: 116 MMHG

## 2023-08-21 DIAGNOSIS — N17.9 AKI (ACUTE KIDNEY INJURY) (H): ICD-10-CM

## 2023-08-21 DIAGNOSIS — K92.0 HEMATEMESIS WITH NAUSEA: Primary | ICD-10-CM

## 2023-08-21 LAB
ANION GAP SERPL CALCULATED.3IONS-SCNC: 10 MMOL/L (ref 7–15)
BUN SERPL-MCNC: 13.9 MG/DL (ref 6–20)
CALCIUM SERPL-MCNC: 9.2 MG/DL (ref 8.6–10)
CHLORIDE SERPL-SCNC: 101 MMOL/L (ref 98–107)
CREAT SERPL-MCNC: 0.9 MG/DL (ref 0.51–0.95)
DEPRECATED HCO3 PLAS-SCNC: 28 MMOL/L (ref 22–29)
GFR SERPL CREATININE-BSD FRML MDRD: 77 ML/MIN/1.73M2
GLUCOSE SERPL-MCNC: 193 MG/DL (ref 70–99)
POTASSIUM SERPL-SCNC: 4.6 MMOL/L (ref 3.4–5.3)
SODIUM SERPL-SCNC: 139 MMOL/L (ref 136–145)

## 2023-08-21 PROCEDURE — 80048 BASIC METABOLIC PNL TOTAL CA: CPT | Performed by: FAMILY MEDICINE

## 2023-08-21 PROCEDURE — 36415 COLL VENOUS BLD VENIPUNCTURE: CPT | Performed by: FAMILY MEDICINE

## 2023-08-21 PROCEDURE — 99213 OFFICE O/P EST LOW 20 MIN: CPT | Performed by: FAMILY MEDICINE

## 2023-08-21 ASSESSMENT — ANXIETY QUESTIONNAIRES
5. BEING SO RESTLESS THAT IT IS HARD TO SIT STILL: NOT AT ALL
1. FEELING NERVOUS, ANXIOUS, OR ON EDGE: NOT AT ALL
4. TROUBLE RELAXING: NOT AT ALL
3. WORRYING TOO MUCH ABOUT DIFFERENT THINGS: NOT AT ALL
GAD7 TOTAL SCORE: 0
2. NOT BEING ABLE TO STOP OR CONTROL WORRYING: NOT AT ALL
GAD7 TOTAL SCORE: 0
6. BECOMING EASILY ANNOYED OR IRRITABLE: NOT AT ALL
7. FEELING AFRAID AS IF SOMETHING AWFUL MIGHT HAPPEN: NOT AT ALL

## 2023-08-21 NOTE — PROGRESS NOTES
"  Assessment & Plan     Hematemesis with nausea - resolved. If symptoms return, may need endoscopy.     AARON (acute kidney injury) (H) - surveillance labs  - Basic metabolic panel  (Ca, Cl, CO2, Creat, Gluc, K, Na, BUN); Future  - Basic metabolic panel  (Ca, Cl, CO2, Creat, Gluc, K, Na, BUN)      Post Medication Reconciliation Status:  Unable to reconcile discharge medications  Discharge medications reconciled, continue medications without change    Moriah Hernandez MD  [facility] AMY Page is a 53 year old, presenting for the following health issues:    Follow Up (ED follow up)        8/21/2023     8:15 AM   Additional Questions   Roomed by Cee CHRISTINE     ED/UC Followup:    Facility:  [facility]  Date of visit: 08/13/2023  Reason for visit: Hematemesis  Current Status: Still experiencing some nausea      In the ER with episodes of vomiting, including one hematemesis episode.   Started after a day of heavy heat/humidity, lots of walking.   Reports she hadn't eaten much all day.    Hasn't needed anti-emetic since last week.  Felt nauseous a bit this AM, better now.       Review of Systems   Constitutional, HEENT, cardiovascular, pulmonary, gi and gu systems are negative, except as otherwise noted.      Objective    /74 (Cuff Size: Adult Large)   Pulse 95   Temp 98.4  F (36.9  C) (Oral)   Resp 16   Ht 1.626 m (5' 4\")   Wt 108.9 kg (240 lb)   LMP 05/14/2019   SpO2 99%   BMI 41.20 kg/m    Body mass index is 41.2 kg/m .  Physical Exam   GENERAL: healthy, alert and no distress  PSYCH: mentation appears normal, affect normal/bright            Answers submitted by the patient for this visit:  MICHAEL-7 (Submitted on 8/21/2023)  MICHAEL 7 TOTAL SCORE: 0    "

## 2023-08-25 ENCOUNTER — MYC MEDICAL ADVICE (OUTPATIENT)
Dept: FAMILY MEDICINE | Facility: CLINIC | Age: 53
End: 2023-08-25
Payer: COMMERCIAL

## 2023-08-25 DIAGNOSIS — E11.9 TYPE 2 DIABETES MELLITUS WITHOUT COMPLICATION, WITHOUT LONG-TERM CURRENT USE OF INSULIN (H): ICD-10-CM

## 2023-08-25 RX ORDER — BLOOD SUGAR DIAGNOSTIC
100 STRIP MISCELLANEOUS DAILY
Qty: 100 STRIP | Refills: 0 | Status: SHIPPED | OUTPATIENT
Start: 2023-08-25 | End: 2023-11-26

## 2023-08-29 ENCOUNTER — TELEPHONE (OUTPATIENT)
Dept: FAMILY MEDICINE | Facility: CLINIC | Age: 53
End: 2023-08-29

## 2023-08-29 NOTE — TELEPHONE ENCOUNTER
Summary:    Patient is due/failing the following:   Eye exam    Reviewed:    [] CARE EVERYWHERE  [] LAST OV NOTE   [] FYI TAB  [] MYCHART ACTIVE?  [] LAST PANEL ENCOUNTER  [] FUTURE APPTS  [] IMMUNIZATIONS  [] Media Tab            Action needed:   Received eye exam from eye clinic and will have provider sign off on retinopathy status    Type of outreach:    None, routed to provider for review.                                                                               Giuliana Pickens/[facility]---[facility]

## 2023-09-15 ENCOUNTER — TELEPHONE (OUTPATIENT)
Dept: FAMILY MEDICINE | Facility: CLINIC | Age: 53
End: 2023-09-15
Payer: COMMERCIAL

## 2023-09-15 NOTE — TELEPHONE ENCOUNTER
mg given by EMS. Pt arrives moaning, responds to questions appropriately. Summary:    Patient is due/failing the following:   Eye exam    Reviewed:    [] CARE EVERYWHERE  [] LAST OV NOTE   [] FYI TAB  [] MYCHART ACTIVE?  [] LAST PANEL ENCOUNTER  [] FUTURE APPTS  [] IMMUNIZATIONS  [] Media Tab            Action needed:   Routed to provider for review.    Type of outreach:    Negative for retinopathy, report sent to abstraction                                                                               Giuliana Pickens/[facility]---[facility]

## 2023-09-25 ENCOUNTER — MYC MEDICAL ADVICE (OUTPATIENT)
Dept: FAMILY MEDICINE | Facility: CLINIC | Age: 53
End: 2023-09-25
Payer: COMMERCIAL

## 2023-09-25 DIAGNOSIS — R11.0 NAUSEA: Primary | ICD-10-CM

## 2023-09-26 RX ORDER — ONDANSETRON 4 MG/1
4-8 TABLET, FILM COATED ORAL EVERY 8 HOURS PRN
Qty: 30 TABLET | Refills: 1 | Status: SHIPPED | OUTPATIENT
Start: 2023-09-26 | End: 2024-03-29

## 2023-09-26 NOTE — TELEPHONE ENCOUNTER
Can you ask her more specifics please?    I believe we discussed an anti-emetic, but I am not sure if there was anything else.     JH

## 2023-10-13 ENCOUNTER — PATIENT OUTREACH (OUTPATIENT)
Dept: CARE COORDINATION | Facility: CLINIC | Age: 53
End: 2023-10-13
Payer: COMMERCIAL

## 2023-10-16 NOTE — TELEPHONE ENCOUNTER
Patient currently passing eye exam    Giuliana Pickens/[facility]---[facility]

## 2023-10-25 ENCOUNTER — MYC MEDICAL ADVICE (OUTPATIENT)
Dept: FAMILY MEDICINE | Facility: CLINIC | Age: 53
End: 2023-10-25
Payer: COMMERCIAL

## 2023-10-25 DIAGNOSIS — E11.9 TYPE 2 DIABETES MELLITUS WITHOUT COMPLICATION, WITHOUT LONG-TERM CURRENT USE OF INSULIN (H): ICD-10-CM

## 2023-10-25 NOTE — TELEPHONE ENCOUNTER
Pt sends refill request via my chart along with a FYI. Will pend order and send it along with my chart message to PCP.

## 2023-10-27 ENCOUNTER — PATIENT OUTREACH (OUTPATIENT)
Dept: CARE COORDINATION | Facility: CLINIC | Age: 53
End: 2023-10-27
Payer: COMMERCIAL

## 2023-11-11 DIAGNOSIS — F33.42 RECURRENT MAJOR DEPRESSIVE DISORDER, IN FULL REMISSION (H): ICD-10-CM

## 2023-11-13 RX ORDER — CITALOPRAM HYDROBROMIDE 40 MG/1
40 TABLET ORAL DAILY
Qty: 90 TABLET | Refills: 1 | Status: SHIPPED | OUTPATIENT
Start: 2023-11-13 | End: 2023-12-28

## 2023-11-26 DIAGNOSIS — E11.9 TYPE 2 DIABETES MELLITUS WITHOUT COMPLICATION, WITHOUT LONG-TERM CURRENT USE OF INSULIN (H): ICD-10-CM

## 2023-11-27 RX ORDER — BLOOD SUGAR DIAGNOSTIC
100 STRIP MISCELLANEOUS DAILY
Qty: 100 STRIP | Refills: 0 | Status: SHIPPED | OUTPATIENT
Start: 2023-11-27 | End: 2023-12-28

## 2023-12-13 ENCOUNTER — MYC REFILL (OUTPATIENT)
Dept: FAMILY MEDICINE | Facility: CLINIC | Age: 53
End: 2023-12-13
Payer: COMMERCIAL

## 2023-12-13 DIAGNOSIS — E11.9 TYPE 2 DIABETES MELLITUS WITHOUT COMPLICATION, WITHOUT LONG-TERM CURRENT USE OF INSULIN (H): ICD-10-CM

## 2023-12-28 ENCOUNTER — OFFICE VISIT (OUTPATIENT)
Dept: FAMILY MEDICINE | Facility: CLINIC | Age: 53
End: 2023-12-28
Payer: COMMERCIAL

## 2023-12-28 VITALS
DIASTOLIC BLOOD PRESSURE: 78 MMHG | SYSTOLIC BLOOD PRESSURE: 138 MMHG | RESPIRATION RATE: 18 BRPM | WEIGHT: 241 LBS | OXYGEN SATURATION: 98 % | HEIGHT: 64 IN | HEART RATE: 86 BPM | TEMPERATURE: 98.4 F | BODY MASS INDEX: 41.15 KG/M2

## 2023-12-28 DIAGNOSIS — G47.429 NARCOLEPSY DUE TO UNDERLYING CONDITION WITHOUT CATAPLEXY: ICD-10-CM

## 2023-12-28 DIAGNOSIS — F33.42 RECURRENT MAJOR DEPRESSIVE DISORDER, IN FULL REMISSION (H): ICD-10-CM

## 2023-12-28 DIAGNOSIS — F11.90 CHRONIC, CONTINUOUS USE OF OPIOIDS: ICD-10-CM

## 2023-12-28 DIAGNOSIS — E11.9 TYPE 2 DIABETES MELLITUS WITHOUT COMPLICATION, WITHOUT LONG-TERM CURRENT USE OF INSULIN (H): Primary | ICD-10-CM

## 2023-12-28 DIAGNOSIS — M79.7 FIBROMYALGIA: ICD-10-CM

## 2023-12-28 DIAGNOSIS — E66.01 MORBID OBESITY (H): ICD-10-CM

## 2023-12-28 DIAGNOSIS — E78.5 HYPERLIPIDEMIA LDL GOAL <100: ICD-10-CM

## 2023-12-28 LAB — HBA1C MFR BLD: 6.1 % (ref 0–5.6)

## 2023-12-28 PROCEDURE — 36415 COLL VENOUS BLD VENIPUNCTURE: CPT | Performed by: FAMILY MEDICINE

## 2023-12-28 PROCEDURE — 80053 COMPREHEN METABOLIC PANEL: CPT | Performed by: FAMILY MEDICINE

## 2023-12-28 PROCEDURE — 83036 HEMOGLOBIN GLYCOSYLATED A1C: CPT | Performed by: FAMILY MEDICINE

## 2023-12-28 PROCEDURE — 99214 OFFICE O/P EST MOD 30 MIN: CPT | Performed by: FAMILY MEDICINE

## 2023-12-28 RX ORDER — CITALOPRAM HYDROBROMIDE 40 MG/1
40 TABLET ORAL DAILY
Qty: 90 TABLET | Refills: 1 | Status: SHIPPED | OUTPATIENT
Start: 2023-12-28 | End: 2024-03-29

## 2023-12-28 RX ORDER — TRAZODONE HYDROCHLORIDE 100 MG/1
150-200 TABLET ORAL AT BEDTIME
Qty: 180 TABLET | Refills: 3 | Status: SHIPPED | OUTPATIENT
Start: 2023-12-28 | End: 2024-03-29

## 2023-12-28 RX ORDER — TRAMADOL HYDROCHLORIDE 50 MG/1
50 TABLET ORAL 2 TIMES DAILY PRN
Qty: 60 TABLET | Refills: 0 | Status: SHIPPED | OUTPATIENT
Start: 2023-12-28 | End: 2024-03-29

## 2023-12-28 RX ORDER — METFORMIN HCL 500 MG
2000 TABLET, EXTENDED RELEASE 24 HR ORAL
Qty: 360 TABLET | Refills: 1 | Status: SHIPPED | OUTPATIENT
Start: 2023-12-28 | End: 2024-03-29

## 2023-12-28 RX ORDER — BLOOD SUGAR DIAGNOSTIC
100 STRIP MISCELLANEOUS DAILY
Qty: 100 STRIP | Refills: 0 | Status: SHIPPED | OUTPATIENT
Start: 2023-12-28 | End: 2024-04-02

## 2023-12-28 ASSESSMENT — PATIENT HEALTH QUESTIONNAIRE - PHQ9
SUM OF ALL RESPONSES TO PHQ QUESTIONS 1-9: 0
SUM OF ALL RESPONSES TO PHQ QUESTIONS 1-9: 0

## 2023-12-28 ASSESSMENT — PAIN SCALES - GENERAL: PAINLEVEL: MILD PAIN (3)

## 2023-12-28 NOTE — PATIENT INSTRUCTIONS
Lab Results   Component Value Date    A1C 6.1 12/28/2023    A1C 6.9 07/25/2023    A1C 6.3 04/27/2023    A1C 6.1 11/11/2022    A1C 6.3 04/12/2022    A1C 7.4 06/18/2021    A1C 6.6 12/29/2020    A1C 6.7 09/09/2020    A1C 6.4 11/26/2019    A1C 7.2 04/05/2019

## 2023-12-28 NOTE — LETTER

## 2023-12-28 NOTE — PROGRESS NOTES
Assessment & Plan     Type 2 diabetes mellitus without complication, without long-term current use of insulin (H) - improved. Continue current  - Hemoglobin A1c; Future  - Hemoglobin A1c  - blood glucose (NO BRAND SPECIFIED) lancets standard; Use to test blood sugar 2 times daily or as directed.  - blood glucose (ONETOUCH ULTRA) test strip; 100 strips by In Vitro route daily Use to test blood sugar 1 times daily or as directed.  - metFORMIN (GLUCOPHAGE XR) 500 MG 24 hr tablet; Take 4 tablets (2,000 mg) by mouth daily (with dinner)  - Semaglutide, 1 MG/DOSE, (OZEMPIC) 4 MG/3ML pen; Inject 1 mg Subcutaneous once a week    Hyperlipidemia LDL goal <100 - on statin. Continue.   - Comprehensive metabolic panel (BMP + Alb, Alk Phos, ALT, AST, Total. Bili, TP); Future  - Comprehensive metabolic panel (BMP + Alb, Alk Phos, ALT, AST, Total. Bili, TP)    Recurrent major depressive disorder, in full remission (H24) - stable, refills  - citalopram (CELEXA) 40 MG tablet; Take 1 tablet (40 mg) by mouth daily    Fibromyalgia - stable, using tramadol prn, last script lasted 5 months.   - traMADol (ULTRAM) 50 MG tablet; Take 1 tablet (50 mg) by mouth 2 times daily as needed for severe pain    Chronic, continuous use of opioids  - traMADol (ULTRAM) 50 MG tablet; Take 1 tablet (50 mg) by mouth 2 times daily as needed for severe pain    Narcolepsy due to underlying condition without cataplexy  - traZODone (DESYREL) 100 MG tablet; Take 1.5-2 tablets (150-200 mg) by mouth at bedtime    Morbid obesity (H)    Moriah Hernandez MD  [facility]   Kaylee is a 53 year old, presenting for the following health issues:  Diabetes (Here today for a follow up on her Diabetes. )        12/28/2023     5:00 PM   Additional Questions   Roomed by Georgiana Weir CMA   Accompanied by Self       History of Present Illness       Diabetes:   She presents for follow up of diabetes.    She is not checking blood glucose.    "      She has no concerns regarding her diabetes at this time.   She is not experiencing numbness or burning in feet, excessive thirst, blurry vision, weight changes or redness, sores or blisters on feet.           She eats 2-3 servings of fruits and vegetables daily.She consumes 2 sweetened beverage(s) daily.She exercises with enough effort to increase her heart rate 9 or less minutes per day.  She exercises with enough effort to increase her heart rate 3 or less days per week.   She is taking medications regularly.       Medication Followup of Diabetes  Taking Medication as prescribed: yes  Side Effects:  None  Medication Helping Symptoms:  yes      On metformin and ozempic.     On statin and ACEI.       Review of Systems   Constitutional, HEENT, cardiovascular, pulmonary, gi and gu systems are negative, except as otherwise noted.      Objective    /78 (BP Location: Right arm, Patient Position: Sitting, Cuff Size: Adult Large)   Pulse 86   Temp 98.4  F (36.9  C) (Oral)   Resp 18   Ht 1.626 m (5' 4\")   Wt 109.3 kg (241 lb)   LMP 05/14/2019   SpO2 98%   BMI 41.37 kg/m    Body mass index is 41.37 kg/m .  Physical Exam   GENERAL: healthy, alert and no distress  PSYCH: mentation appears normal, affect normal/bright    Lab Results   Component Value Date    A1C 6.1 12/28/2023    A1C 6.9 07/25/2023    A1C 6.3 04/27/2023    A1C 6.1 11/11/2022    A1C 6.3 04/12/2022    A1C 7.4 06/18/2021    A1C 6.6 12/29/2020    A1C 6.7 09/09/2020    A1C 6.4 11/26/2019    A1C 7.2 04/05/2019                   "

## 2023-12-29 LAB
ALBUMIN SERPL BCG-MCNC: 4.1 G/DL (ref 3.5–5.2)
ALP SERPL-CCNC: 123 U/L (ref 40–150)
ALT SERPL W P-5'-P-CCNC: 20 U/L (ref 0–50)
ANION GAP SERPL CALCULATED.3IONS-SCNC: 9 MMOL/L (ref 7–15)
AST SERPL W P-5'-P-CCNC: 17 U/L (ref 0–45)
BILIRUB SERPL-MCNC: 0.2 MG/DL
BUN SERPL-MCNC: 10.3 MG/DL (ref 6–20)
CALCIUM SERPL-MCNC: 8.9 MG/DL (ref 8.6–10)
CHLORIDE SERPL-SCNC: 100 MMOL/L (ref 98–107)
CREAT SERPL-MCNC: 0.8 MG/DL (ref 0.51–0.95)
DEPRECATED HCO3 PLAS-SCNC: 28 MMOL/L (ref 22–29)
EGFRCR SERPLBLD CKD-EPI 2021: 89 ML/MIN/1.73M2
GLUCOSE SERPL-MCNC: 122 MG/DL (ref 70–99)
POTASSIUM SERPL-SCNC: 3.9 MMOL/L (ref 3.4–5.3)
PROT SERPL-MCNC: 6.9 G/DL (ref 6.4–8.3)
SODIUM SERPL-SCNC: 137 MMOL/L (ref 135–145)

## 2024-01-02 ENCOUNTER — TELEPHONE (OUTPATIENT)
Dept: FAMILY MEDICINE | Facility: CLINIC | Age: 54
End: 2024-01-02

## 2024-01-02 NOTE — TELEPHONE ENCOUNTER
Prior Authorization Retail Medication Request    Medication/Dose: traMADol (ULTRAM) 50 MG tablet  Diagnosis and ICD code (if different than what is on RX):    New/renewal/insurance change PA/secondary ins. PA:  Previously Tried and Failed: None    Rationale:  Patient has been taking this medication since 08/09/2019 with no side effects and remains stable while taking this medication.     Insurance   Primary: [employer]   Insurance ID:  [id number]    Secondary (if applicable):  Insurance ID:      Pharmacy Information (if different than what is on RX)  Name:    Phone:    Fax:      Yariel BLEDSOE

## 2024-01-04 NOTE — TELEPHONE ENCOUNTER
PA Initiation    Medication: TRAMADOL HCL 50 MG PO TABS  Insurance Company: [employer] - Phone [phone] Fax [phone]  Ph[facility]illing [id number] [place] [facility] [address] [facility]  Filling Pharmacy Phone: [phone]  Filling Pharmacy Fax: [phone]  Start Date: 1/4/2024

## 2024-01-07 ENCOUNTER — HEALTH MAINTENANCE LETTER (OUTPATIENT)
Age: 54
End: 2024-01-07

## 2024-01-09 NOTE — TELEPHONE ENCOUNTER
Prior Authorization Approval    Medication: TRAMADOL HCL 50 MG PO TABS  Authorization Effective Date: 1/6/2024  Authorization Expiration Date: 7/6/2024  Approved Dose/Quantity:   Reference #: [id number]   Insurance Company: [employer] - Phone [phone] Fax [phone]  Which Pharm[facility] is filling the [id number] [place] [facility] [address] [facility]  Pharmacy Notified: y  Patient Notified: y - pharmacy to notify

## 2024-03-23 SDOH — HEALTH STABILITY: PHYSICAL HEALTH: ON AVERAGE, HOW MANY MINUTES DO YOU ENGAGE IN EXERCISE AT THIS LEVEL?: 0 MIN

## 2024-03-23 SDOH — HEALTH STABILITY: PHYSICAL HEALTH: ON AVERAGE, HOW MANY DAYS PER WEEK DO YOU ENGAGE IN MODERATE TO STRENUOUS EXERCISE (LIKE A BRISK WALK)?: 0 DAYS

## 2024-03-23 ASSESSMENT — SOCIAL DETERMINANTS OF HEALTH (SDOH)
DO YOU BELONG TO ANY CLUBS OR ORGANIZATIONS SUCH AS CHURCH GROUPS UNIONS, FRATERNAL OR ATHLETIC GROUPS, OR SCHOOL GROUPS?: NO
HOW OFTEN DO YOU ATTENT MEETINGS OF THE CLUB OR ORGANIZATION YOU BELONG TO?: NEVER
HOW OFTEN DO YOU GET TOGETHER WITH FRIENDS OR RELATIVES?: ONCE A WEEK
HOW OFTEN DO YOU GET TOGETHER WITH FRIENDS OR RELATIVES?: ONCE A WEEK
HOW OFTEN DO YOU ATTEND CHURCH OR RELIGIOUS SERVICES?: NEVER
IN A TYPICAL WEEK, HOW MANY TIMES DO YOU TALK ON THE PHONE WITH FAMILY, FRIENDS, OR NEIGHBORS?: MORE THAN THREE TIMES A WEEK

## 2024-03-23 ASSESSMENT — LIFESTYLE VARIABLES
HOW OFTEN DO YOU HAVE A DRINK CONTAINING ALCOHOL: MONTHLY OR LESS
HOW MANY STANDARD DRINKS CONTAINING ALCOHOL DO YOU HAVE ON A TYPICAL DAY: 1 OR 2
HOW OFTEN DO YOU HAVE SIX OR MORE DRINKS ON ONE OCCASION: NEVER
SKIP TO QUESTIONS 9-10: 1
AUDIT-C TOTAL SCORE: 1

## 2024-03-23 NOTE — COMMUNITY RESOURCES LIST (ENGLISH)
March 23, 2024           YOUR PERSONALIZED LIST OF SERVICES & PROGRAMS           & RECREATION    Sports      [employer] - [facility]  [address] [place] [address] (Distance: 4.4 miles)  Phone: [phone]  Website: https://www.Miracul[employer].org/child_care__preschool/summer_programs/[place]/summer_youth_sports  Language: English  Fee: Self pay      [facility] - [facility]  [id number] [place] [address] (Distance: 4.4 miles)  Website: http://SDI/  Language: English, Montenegrin  Fee: Free      [facility] - Adult Enrichment  Phone: [phone]  Website: https://[employer]/adults-seniors/adult-enrichment/  Language: English  Hours: Mon 7:30 AM - 4:00 PM Tue 7:30 AM - 4:00 PM Wed 7:30 AM - 4:00 PM Thu 7:30 AM - 4:00 PM Fri 7:30 AM - 4:00 PM    Classes/Groups      [employer] - Group Training & Specialty Programs  [address] [place] [address] (Distance: 4.4 miles)  Phone: [phone]  Website: https://www.InboundWriter/locations/[place]/health__fitness/group_training_specialty_programs  Language: English  Fee: Self pay      [employer] - Group Exercise Classes  [address] [place] [address] (Distance: 4.4 miles)  Phone: [phone]  Website: https://www.InboundWriter/locations/[place]/health__fitness/free_group_exercise_classes  Language: English  Fee: Self pay      [facility] [employer] [facility]  Phone: [phone]  Email: [email or web address]  Website: [email or web address]  Language: English  Hours: Mon 9:00 AM - 6:00 PM Tue 9:00 AM - 6:00 PM Wed 9:00 AM - 6:00 PM Thu 9:00 AM - 6:00 PM Fri 9:00 AM - 6:00 PM  Fee: Free               IMPORTANT NUMBERS & WEBSITES        Emergency Services  911  .   [facility]  211 http://211unitedway.org  .   Poison Control  (204) 925-9809 http://mnpoison.org http://wis[place]poison.org  .     Suicide and Crisis Lifeline  988 http://988lifeline.org  .    Childhelp [place] Child Abuse Hotline  549.571.3497 http://Childhelphotline.org   .   National Sexual Assault Hotline  (846) 566-6297 (HOPE) http://Rainn.org   .     National Runaway Safeline  (408) 408-7146 (RUNAWAY) http://Traxian.Rocketskates  .   Pregnancy & Postpartum Support  Call/text 772-637-5041  [place]: http://ppsupportmn.org  [place]: http://psichapters.com/wi  .   Substance Abuse National Helpline ([facility])  693-729-HELP (7894) http://Findtreatment.gov   .                DISCLAIMER: [employer] does not endorse any service providers mentioned in this resource list. [employer] does not guarantee that the services mentioned in this resource list will be available to you or will improve your health or wellness.    [facility]

## 2024-03-23 NOTE — COMMUNITY RESOURCES LIST (ENGLISH)
March 23, 2024           YOUR PERSONALIZED LIST OF SERVICES & PROGRAMS           & RECREATION    Sports      [employer] [facility]  [address] [place] [address] (Distance: 4.4 miles)  Phone: [phone]  Website: https://www.[employer].org/child_care__preschool/summer_programs/[place]/summer_youth_sports  Language: English  Fee: Self pay      [facility] - [facility]  [id number] [place] [address] (Distance: 4.4 miles)  Website: http://Detectent/  Language: English, Burundian  Fee: Free      [facility] - Adult Enrichment  Phone: [phone]  Website: https://CommercialTribe/adults-seniors/adult-enrichment/  Language: English  Hours: Mon 7:30 AM - 4:00 PM Tue 7:30 AM - 4:00 PM Wed 7:30 AM - 4:00 PM Thu 7:30 AM - 4:00 PM Fri 7:30 AM - 4:00 PM    Classes/Groups      [employer] [facility]ty [facility]  [address] [place] [address] (Distance: 4.4 miles)  Phone: [phone]  Website: https://www.FireScope/locations/[place]/health__fitness/group_training_specialty_programs  Language: English  Fee: Self pay      [employer] - Group Exercise Classes  [address] [place] [address] (Distance: 4.4 miles)  Phone: [phone]  Website: https://www.FireScope/locations/[place]/health__fitness/free_group_exercise_classes  Language: English  Fee: Self pay      [facility]  Phone: [phone]  Email: contact@[email or web address]  Website: [email or web address]  Language: English  Hours: Mon 9:00 AM - 6:00 PM Tue 9:00 AM - 6:00 PM Wed 9:00 AM - 6:00 PM Thu 9:00 AM - 6:00 PM Fri 9:00 AM - 6:00 PM  Fee: Free               IMPORTANT NUMBERS & WEBSITES        Emergency Services  911  .   [facility]  211 http://211unitedway.org  .   Poison Control  (738) 334-1954 http://mnpoison.org http://wisconsinpoison.org  .     Suicide and Crisis Lifeline  988 http://988lifeline.org  .    Childhelp [place] Child Abuse Hotline  435.670.4891 http://Childhelphotline.org   .   National Sexual Assault Hotline  (196) 458-5229 (HOPE) http://Rainn.org   .     National Runaway Safeline  (364) 374-9787 (RUNAWAY) http://Decision Sciences.Xylitol [place]  .   Pregnancy & Postpartum Support  Call/text 194-239-9380  [place]: http://ppsupportmn.org  [place]: http://psichapters.com/wi  .   Substance Abuse National Helpline ([facility])  232-934-HELP (4963) http://Findtreatment.gov   .                DISCLAIMER: [employer] does not endorse any service providers mentioned in this resource list. [employer] does not guarantee that the services mentioned in this resource list will be available to you or will improve your health or wellness.    [facility]

## 2024-03-29 ENCOUNTER — OFFICE VISIT (OUTPATIENT)
Dept: FAMILY MEDICINE | Facility: CLINIC | Age: 54
End: 2024-03-29
Payer: COMMERCIAL

## 2024-03-29 VITALS
WEIGHT: 233 LBS | HEIGHT: 64 IN | HEART RATE: 108 BPM | TEMPERATURE: 98.5 F | SYSTOLIC BLOOD PRESSURE: 120 MMHG | DIASTOLIC BLOOD PRESSURE: 80 MMHG | RESPIRATION RATE: 16 BRPM | BODY MASS INDEX: 39.78 KG/M2 | OXYGEN SATURATION: 97 %

## 2024-03-29 DIAGNOSIS — E66.01 MORBID OBESITY (H): ICD-10-CM

## 2024-03-29 DIAGNOSIS — I10 HYPERTENSION GOAL BP (BLOOD PRESSURE) < 130/80: ICD-10-CM

## 2024-03-29 DIAGNOSIS — R05.3 CHRONIC COUGH: ICD-10-CM

## 2024-03-29 DIAGNOSIS — F11.90 CHRONIC, CONTINUOUS USE OF OPIOIDS: ICD-10-CM

## 2024-03-29 DIAGNOSIS — R11.0 NAUSEA: ICD-10-CM

## 2024-03-29 DIAGNOSIS — F41.9 ANXIETY: ICD-10-CM

## 2024-03-29 DIAGNOSIS — Z00.00 ROUTINE GENERAL MEDICAL EXAMINATION AT A HEALTH CARE FACILITY: Primary | ICD-10-CM

## 2024-03-29 DIAGNOSIS — E78.5 HYPERLIPIDEMIA LDL GOAL <100: ICD-10-CM

## 2024-03-29 DIAGNOSIS — G47.429 NARCOLEPSY DUE TO UNDERLYING CONDITION WITHOUT CATAPLEXY: ICD-10-CM

## 2024-03-29 DIAGNOSIS — E11.9 TYPE 2 DIABETES MELLITUS WITHOUT COMPLICATION, WITHOUT LONG-TERM CURRENT USE OF INSULIN (H): ICD-10-CM

## 2024-03-29 DIAGNOSIS — M79.7 FIBROMYALGIA: ICD-10-CM

## 2024-03-29 DIAGNOSIS — F33.42 RECURRENT MAJOR DEPRESSIVE DISORDER, IN FULL REMISSION (H): ICD-10-CM

## 2024-03-29 LAB
ALBUMIN SERPL BCG-MCNC: 4.3 G/DL (ref 3.5–5.2)
ALP SERPL-CCNC: 156 U/L (ref 40–150)
ALT SERPL W P-5'-P-CCNC: 13 U/L (ref 0–50)
ANION GAP SERPL CALCULATED.3IONS-SCNC: 13 MMOL/L (ref 7–15)
AST SERPL W P-5'-P-CCNC: 13 U/L (ref 0–45)
BILIRUB SERPL-MCNC: 0.3 MG/DL
BUN SERPL-MCNC: 11.7 MG/DL (ref 6–20)
CALCIUM SERPL-MCNC: 9.4 MG/DL (ref 8.6–10)
CHLORIDE SERPL-SCNC: 101 MMOL/L (ref 98–107)
CHOLEST SERPL-MCNC: 138 MG/DL
CREAT SERPL-MCNC: 1.03 MG/DL (ref 0.51–0.95)
CREAT UR-MCNC: 318 MG/DL
CREAT UR-MCNC: 318 MG/DL
DEPRECATED HCO3 PLAS-SCNC: 26 MMOL/L (ref 22–29)
EGFRCR SERPLBLD CKD-EPI 2021: 65 ML/MIN/1.73M2
FASTING STATUS PATIENT QL REPORTED: YES
GLUCOSE SERPL-MCNC: 152 MG/DL (ref 70–99)
HBA1C MFR BLD: 6.3 % (ref 0–5.6)
HDLC SERPL-MCNC: 56 MG/DL
LDLC SERPL CALC-MCNC: 54 MG/DL
MICROALBUMIN UR-MCNC: 14.1 MG/L
MICROALBUMIN/CREAT UR: 4.43 MG/G CR (ref 0–25)
NONHDLC SERPL-MCNC: 82 MG/DL
POTASSIUM SERPL-SCNC: 4.4 MMOL/L (ref 3.4–5.3)
PROT SERPL-MCNC: 7.3 G/DL (ref 6.4–8.3)
SODIUM SERPL-SCNC: 140 MMOL/L (ref 135–145)
TRIGL SERPL-MCNC: 140 MG/DL

## 2024-03-29 PROCEDURE — 99214 OFFICE O/P EST MOD 30 MIN: CPT | Mod: 25 | Performed by: FAMILY MEDICINE

## 2024-03-29 PROCEDURE — G0481 DRUG TEST DEF 8-14 CLASSES: HCPCS | Performed by: FAMILY MEDICINE

## 2024-03-29 PROCEDURE — 90750 HZV VACC RECOMBINANT IM: CPT | Performed by: FAMILY MEDICINE

## 2024-03-29 PROCEDURE — 80061 LIPID PANEL: CPT | Performed by: FAMILY MEDICINE

## 2024-03-29 PROCEDURE — 83036 HEMOGLOBIN GLYCOSYLATED A1C: CPT | Performed by: FAMILY MEDICINE

## 2024-03-29 PROCEDURE — 82043 UR ALBUMIN QUANTITATIVE: CPT | Performed by: FAMILY MEDICINE

## 2024-03-29 PROCEDURE — 90471 IMMUNIZATION ADMIN: CPT | Performed by: FAMILY MEDICINE

## 2024-03-29 PROCEDURE — 80053 COMPREHEN METABOLIC PANEL: CPT | Performed by: FAMILY MEDICINE

## 2024-03-29 PROCEDURE — 82570 ASSAY OF URINE CREATININE: CPT | Performed by: FAMILY MEDICINE

## 2024-03-29 PROCEDURE — 99396 PREV VISIT EST AGE 40-64: CPT | Mod: 25 | Performed by: FAMILY MEDICINE

## 2024-03-29 PROCEDURE — 36415 COLL VENOUS BLD VENIPUNCTURE: CPT | Performed by: FAMILY MEDICINE

## 2024-03-29 RX ORDER — CITALOPRAM HYDROBROMIDE 40 MG/1
40 TABLET ORAL DAILY
Qty: 90 TABLET | Refills: 3 | Status: SHIPPED | OUTPATIENT
Start: 2024-03-29

## 2024-03-29 RX ORDER — LISINOPRIL 5 MG/1
5 TABLET ORAL DAILY
Qty: 90 TABLET | Refills: 3 | Status: SHIPPED | OUTPATIENT
Start: 2024-03-29

## 2024-03-29 RX ORDER — TRAZODONE HYDROCHLORIDE 100 MG/1
150-200 TABLET ORAL AT BEDTIME
Qty: 180 TABLET | Refills: 3 | Status: SHIPPED | OUTPATIENT
Start: 2024-03-29

## 2024-03-29 RX ORDER — BUSPIRONE HYDROCHLORIDE 15 MG/1
TABLET ORAL
Qty: 180 TABLET | Refills: 3 | Status: SHIPPED | OUTPATIENT
Start: 2024-03-29

## 2024-03-29 RX ORDER — METFORMIN HCL 500 MG
2000 TABLET, EXTENDED RELEASE 24 HR ORAL
Qty: 360 TABLET | Refills: 1 | Status: SHIPPED | OUTPATIENT
Start: 2024-03-29 | End: 2024-08-19

## 2024-03-29 RX ORDER — CYCLOBENZAPRINE HCL 10 MG
10 TABLET ORAL 3 TIMES DAILY PRN
Qty: 270 TABLET | Refills: 3 | Status: SHIPPED | OUTPATIENT
Start: 2024-03-29

## 2024-03-29 RX ORDER — ALBUTEROL SULFATE 90 UG/1
2 AEROSOL, METERED RESPIRATORY (INHALATION) EVERY 4 HOURS PRN
Qty: 18 G | Refills: 4 | Status: SHIPPED | OUTPATIENT
Start: 2024-03-29

## 2024-03-29 RX ORDER — ONDANSETRON 4 MG/1
4-8 TABLET, FILM COATED ORAL EVERY 8 HOURS PRN
Qty: 30 TABLET | Refills: 1 | Status: SHIPPED | OUTPATIENT
Start: 2024-03-29 | End: 2024-08-26

## 2024-03-29 RX ORDER — ATORVASTATIN CALCIUM 10 MG/1
10 TABLET, FILM COATED ORAL DAILY
Qty: 90 TABLET | Refills: 3 | Status: SHIPPED | OUTPATIENT
Start: 2024-03-29

## 2024-03-29 RX ORDER — TRAMADOL HYDROCHLORIDE 50 MG/1
50 TABLET ORAL 2 TIMES DAILY PRN
Qty: 60 TABLET | Refills: 0 | Status: SHIPPED | OUTPATIENT
Start: 2024-03-29 | End: 2024-06-09

## 2024-03-29 NOTE — PROGRESS NOTES
Preventive Care Visit  [facility]  Moriah Hernandez MD, Family Medicine  Mar 29, 2024      Assessment & Plan     Routine general medical examination at a health care facility    Type 2 diabetes mellitus without complication, without long-term current use of insulin (H) - previously stable, surveillance labs today, continue current  - Albumin Random Urine Quantitative with Creat Ratio; Future  - metFORMIN (GLUCOPHAGE XR) 500 MG 24 hr tablet; Take 4 tablets (2,000 mg) by mouth daily (with dinner)  - Semaglutide, 1 MG/DOSE, (OZEMPIC) 4 MG/3ML pen; Inject 1 mg Subcutaneous once a week  - Hemoglobin A1c; Future  - Albumin Random Urine Quantitative with Creat Ratio  - Hemoglobin A1c    Morbid obesity (H) - working on weight loss through diet and exercise    Chronic cough   - albuterol (PROAIR HFA/PROVENTIL HFA/VENTOLIN HFA) 108 (90 Base) MCG/ACT inhaler; Inhale 2 puffs into the lungs every 4 hours as needed for shortness of breath or wheezing    Hypertension goal BP (blood pressure) < 130/80 - controlled, continue current  - lisinopril (ZESTRIL) 5 MG tablet; Take 1 tablet (5 mg) by mouth daily    Hyperlipidemia LDL goal <100 - on statin, continue current  - atorvastatin (LIPITOR) 10 MG tablet; Take 1 tablet (10 mg) by mouth daily  - Lipid panel reflex to direct LDL Fasting; Future  - Comprehensive metabolic panel (BMP + Alb, Alk Phos, ALT, AST, Total. Bili, TP); Future  - Lipid panel reflex to direct LDL Fasting  - Comprehensive metabolic panel (BMP + Alb, Alk Phos, ALT, AST, Total. Bili, TP)    Anxiety - stable, refills  - busPIRone (BUSPAR) 15 MG tablet; TAKE 1 TABLET BY MOUTH TWICE A DAY    Recurrent major depressive disorder, in full remission (H24) - stable, refills  - citalopram (CELEXA) 40 MG tablet; Take 1 tablet (40 mg) by mouth daily    Fibromyalgia - recent flare, UTD contract, UDS today, refills  - [id number] - Urine Drug Confirmation Panel (Comprehensive); Future  - cyclobenzaprine  "(FLEXERIL) 10 MG tablet; Take 1 tablet (10 mg) by mouth 3 times daily as needed for muscle spasms  - traMADol (ULTRAM) 50 MG tablet; Take 1 tablet (50 mg) by mouth 2 times daily as needed for severe pain  - [id number] - Urine Drug Confirmation Panel (Comprehensive)    Chronic, continuous use of opioids  - traMADol (ULTRAM) 50 MG tablet; Take 1 tablet (50 mg) by mouth 2 times daily as needed for severe pain    Nausea  - ondansetron (ZOFRAN) 4 MG tablet; Take 1-2 tablets (4-8 mg) by mouth every 8 hours as needed for nausea    Narcolepsy due to underlying condition without cataplexy - stable, refills  - traZODone (DESYREL) 100 MG tablet; Take 1.5-2 tablets (150-200 mg) by mouth at bedtime      BMI  Estimated body mass index is 39.99 kg/m  as calculated from the following:    Height as of this encounter: 1.626 m (5' 4\").    Weight as of this encounter: 105.7 kg (233 lb).     Counseling  Appropriate preventive services were discussed with this patient, including applicable screening as appropriate for fall prevention, nutrition, physical activity, Tobacco-use cessation, weight loss and cognition.  Checklist reviewing preventive services available has been given to the patient.  Reviewed patient's diet, addressing concerns and/or questions.       Lisandro Page is a 54 year old, presenting for the following:  Physical (PE---no pap ---fasting) and Diabetes (Follow-up diabetes)        3/29/2024     8:39 AM   Additional Questions   Roomed by Giuliana Pickens        [facility] Care Directive  Patient does not have a Health Care Directive or Living Will:    HPI      Diabetes Follow-up    How often are you checking your blood sugar? A few times a week  What time of day are you checking your blood sugars (select all that apply)?  Before and after meals  Have you had any blood sugars above 200?  No  Have you had any blood sugars below 70?  No  What symptoms do you notice when your blood sugar is low?  None  What concerns do you have " today about your diabetes? None   Do you have any of these symptoms? (Select all that apply)  No numbness or tingling in feet.  No redness, sores or blisters on feet.  No complaints of excessive thirst.  No reports of blurry vision.  No significant changes to weight.      BP Readings from Last 2 Encounters:   03/29/24 120/80   12/28/23 138/78     Hemoglobin A1C (%)   Date Value   12/28/2023 6.1 (H)   07/25/2023 6.9 (H)   06/18/2021 7.4 (H)   12/29/2020 6.6 (H)     LDL Cholesterol Calculated (mg/dL)   Date Value   07/25/2023 53   11/11/2022 50   09/09/2020 48   04/05/2019 56               3/23/2024   General Health   How would you rate your overall physical health? Good   Feel stress (tense, anxious, or unable to sleep) To some extent    To some extent   (!) STRESS CONCERN      3/23/2024   Nutrition   Three or more servings of calcium each day? Yes   Diet: Diabetic   How many servings of fruit and vegetables per day? (!) 2-3   How many sweetened beverages each day? (!) 2         3/23/2024   Exercise   Days per week of moderate/strenous exercise 0 days    0 days   Average minutes spent exercising at this level 0 min    0 min   (!) EXERCISE CONCERN      3/23/2024   Social Factors   Frequency of gathering with friends or relatives Once a week    Once a week   Worry food won't last until get money to buy more No    No   Food not last or not have enough money for food? No    No   Do you have housing?  Yes    Yes   Are you worried about losing your housing? No    No   Lack of transportation? No    No   Unable to get utilities (heat,electricity)? No    No         3/29/2024   Fall Risk   Gait Speed Test (Document in seconds) 2.88   Gait Speed Test Interpretation Less than or equal to 5.00 seconds - PASS          3/23/2024   Dental   Dentist two times every year? Yes         3/23/2024   TB Screening   Were you born outside of the [place]? No                 3/23/2024   Substance Use   Frequency of drinking alcohol? Monthly or less    Alcohol more than 3/day or more than 7/wk No   Do you use any other substances recreationally? No     Social History     Tobacco Use    Smoking status: Never     Passive exposure: Never    Smokeless tobacco: Never   Vaping Use    Vaping Use: Never used   Substance Use Topics    Alcohol use: Yes     Comment: Rarely    Drug use: No           5/26/2023   LAST FHS-7 RESULTS   1st degree relative breast or ovarian cancer No   Any relative bilateral breast cancer No   Any male have breast cancer No   Any ONE woman have BOTH breast AND ovarian cancer No   Any woman with breast cancer before 50yrs No   2 or more relatives with breast AND/OR ovarian cancer No   2 or more relatives with breast AND/OR bowel cancer Yes        Mammogram Screening - Mammogram every 1-2 years updated in Health Maintenance based on mutual decision making        3/23/2024   STI Screening   New sexual partner(s) since last STI/HIV test? No     History of abnormal Pap smear: Status post benign hysterectomy. Health Maintenance and Surgical History updated.        Latest Ref Rng & Units 4/23/2018     3:50 PM 4/23/2018     3:37 PM 6/22/2016     8:10 AM   PAP / HPV   PAP (Historical)   OTHER-NIL, See Result     HPV 16 DNA NEG^Negative Negative   Negative    HPV 18 DNA NEG^Negative Negative   Negative    Other HR HPV NEG^Negative Negative   Negative      ASCVD Risk   The 10-year ASCVD risk score (Antonina DK, et al., 2019) is: 2.1%    Values used to calculate the score:      Age: 54 years      Sex: Female      Is Non- : No      Diabetic: Yes      Tobacco smoker: No      Systolic Blood Pressure: 120 mmHg      Is BP treated: Yes      HDL Cholesterol: 59 mg/dL      Total Cholesterol: 143 mg/dL         Reviewed and updated as needed this visit by Provider                    Patient Active Problem List   Diagnosis    Fibromyalgia    Non-rheumatic tricuspid valve insufficiency    Narcolepsy    Hyperlipidemia LDL goal <100     Health Care Home    Reflex sympathetic dystrophy    Type 2 diabetes mellitus without complication, without long-term current use of insulin (H)    Migraine with aura and without status migrainosus, not intractable    Anxiety    Chronic, continuous use of opioids    Morbid obesity (H)    Recurrent major depressive disorder, in full remission (H24)     Past Surgical History:   Procedure Laterality Date    Angiogram  2011    No pulmonary hypertension    COLONOSCOPY  2008    normal    COLONOSCOPY N/A 2022    Procedure: COLONOSCOPY (fv);  Surgeon: Perico Warren MD;  Location: [facility]    CYSTOSCOPY N/A 2019    Procedure: CYSTOSCOPY;  Surgeon: Georgiana Pizarro DO;  Location: [facility] OR    DAVINCI HYSTERECTOMY TOTAL, BILATERAL SALPINGO-OOPHORECTOMY, COMBINED Bilateral 2019    Procedure: robotic assisted total laparoscopic hysterectomy bilateral salpingectomy and cystoscopy;  Surgeon: Georgiana Pizarro DO;  Location: [facility] OR    DILATION AND CURETTAGE, OPERATIVE HYSTEROSCOPY WITH MORCELLATOR, COMBINED N/A 2018    Procedure: COMBINED DILATION AND CURETTAGE, OPERATIVE HYSTEROSCOPY WITH MORCELLATOR;  Hysteroscopy, polypectomy  with myosure, dilation and curettage, endometrial biopsy ;  Surgeon: Georgiana Pizarro DO;  Location: [facility] OR    ENT SURGERY      wisdon teeth removal    HYSTERECTOMY, PAP NO LONGER INDICATED      LAPAROSCOPIC CHOLECYSTECTOMY  2000    SURGICAL HISTORY OF -       essure procedure for contraception    TCA for abnormal pap         Social History     Tobacco Use    Smoking status: Never     Passive exposure: Never    Smokeless tobacco: Never   Substance Use Topics    Alcohol use: Yes     Comment: Rarely     Family History   Problem Relation Age of Onset    Respiratory Father         asthma    Arthritis Father         hips    Depression Father          by suicide in     Alcohol/Drug Father     Substance Abuse Father     Asthma Father     Diabetes Paternal  Grandfather     Cerebrovascular Disease Paternal Grandfather          at age 50/51    Hypertension Paternal Grandfather     Cancer - colorectal Paternal Grandmother     Arthritis Paternal Grandmother         hips    Colon Cancer Paternal Grandmother     Other Cancer Paternal Grandmother         Mouth/tongue cancer    Asthma Paternal Grandmother     Prostate Cancer Maternal Grandfather     Hypertension Maternal Grandfather     Substance Abuse Maternal Grandfather     Cancer Maternal Grandmother         pancreatic    Arthritis Maternal Grandmother         hips    Depression Maternal Grandmother     Hypertension Maternal Grandmother     Other Cancer Maternal Grandmother     Gallbladder Disease Maternal Grandmother     Substance Abuse Maternal Grandmother     Depression Mother     Rashes/Skin Problems Mother         Rosacea    Arthritis Mother         osteoarthritis in hands and knees    Hypertension Mother     Substance Abuse Mother         Revovering    Osteoporosis Mother     Depression Maternal Uncle         bipolar    Respiratory Brother         sleep apnea    Mental Illness Brother     Cancer - colorectal Maternal Uncle     Cerebrovascular Disease Maternal Uncle          of massive stroke--no heart problems    Hypertension Son     Diabetes Cousin     Hypertension Son     Breast Cancer Cousin     Breast Cancer Other     Colon Cancer Other         Maternal uncle    Depression Brother          by suicide    Substance Abuse Brother         Relasped  &  by suicide 17    Substance Abuse Other     Substance Abuse Cousin     Substance Abuse Cousin              Review of Systems  Constitutional, neuro, ENT, endocrine, pulmonary, cardiac, gastrointestinal, genitourinary, musculoskeletal, integument and psychiatric systems are negative, except as otherwise noted.     Objective    Exam  /80 (BP Location: Right arm, Patient Position: Chair, Cuff Size: Adult Large)   Pulse 108   Temp 98.5  F  "(36.9  C) (Oral)   Resp 16   Ht 1.626 m (5' 4\")   Wt 105.7 kg (233 lb)   LMP 05/14/2019   SpO2 97%   BMI 39.99 kg/m     Estimated body mass index is 39.99 kg/m  as calculated from the following:    Height as of this encounter: 1.626 m (5' 4\").    Weight as of this encounter: 105.7 kg (233 lb).    Physical Exam  GENERAL: alert and no distress  EYES: Eyes grossly normal to inspection, PERRL and conjunctivae and sclerae normal  HENT: ear canals and TM's normal, nose and mouth without ulcers or lesions  NECK: no adenopathy, no asymmetry, masses, or scars  RESP: lungs clear to auscultation - no rales, rhonchi or wheezes  BREAST: normal without masses, tenderness or nipple discharge and no palpable axillary masses or adenopathy  CV: regular rate and rhythm, normal S1 S2, no S3 or S4, no murmur, click or rub, no peripheral edema  ABDOMEN: soft, nontender, no hepatosplenomegaly, no masses and bowel sounds normal  MS: no gross musculoskeletal defects noted, no edema  SKIN: no suspicious lesions or rashes  NEURO: Normal strength and tone, mentation intact and speech normal  PSYCH: mentation appears normal, affect normal/bright        Signed Electronically by: Moriah Hernandez MD    "

## 2024-03-29 NOTE — PATIENT INSTRUCTIONS
Preventive Care Advice   This is general advice given by our system to help you stay healthy. However, your care team may have specific advice just for you. Please talk to your care team about your preventive care needs.  Nutrition  Eat 5 or more servings of fruits and vegetables each day.  Try wheat bread, brown rice and whole grain pasta (instead of white bread, rice, and pasta).  Get enough calcium and vitamin D. Check the label on foods and aim for 100% of the RDA (recommended daily allowance).  Lifestyle  Exercise at least 150 minutes each week   (30 minutes a day, 5 days a week).  Do muscle strengthening activities 2 days a week. These help control your weight and prevent disease.  No smoking.  Wear sunscreen to prevent skin cancer.  Have a dental exam and cleaning every 6 months.  Yearly exams  See your health care team every year to talk about:  Any changes in your health.  Any medicines your care team has prescribed.  Preventive care, family planning, and ways to prevent chronic diseases.  Shots (vaccines)   HPV shots (up to age 26), if you've never had them before.  Hepatitis B shots (up to age 59), if you've never had them before.  COVID-19 shot: Get this shot when it's due.  Flu shot: Get a flu shot every year.  Tetanus shot: Get a tetanus shot every 10 years.  Pneumococcal, hepatitis A, and RSV shots: Ask your care team if you need these based on your risk.  Shingles shot (for age 50 and up).  General health tests  Diabetes screening:  Starting at age 35, Get screened for diabetes at least every 3 years.  If you are younger than age 35, ask your care team if you should be screened for diabetes.  Cholesterol test: At age 39, start having a cholesterol test every 5 years, or more often if advised.  Bone density scan (DEXA): At age 50, ask your care team if you should have this scan for osteoporosis (brittle bones).  Hepatitis C: Get tested at least once in your life.  STIs (sexually transmitted  infections)  Before age 24: Ask your care team if you should be screened for STIs.  After age 24: Get screened for STIs if you're at risk. You are at risk for STIs (including HIV) if:  You are sexually active with more than one person.  You don't use condoms every time.  You or a partner was diagnosed with a sexually transmitted infection.  If you are at risk for HIV, ask about PrEP medicine to prevent HIV.  Get tested for HIV at least once in your life, whether you are at risk for HIV or not.  Cancer screening tests  Cervical cancer screening: If you have a cervix, begin getting regular cervical cancer screening tests at age 21. Most people who have regular screenings with normal results can stop after age 65. Talk about this with your provider.  Breast cancer scan (mammogram): If you've ever had breasts, begin having regular mammograms starting at age 40. This is a scan to check for breast cancer.  Colon cancer screening: It is important to start screening for colon cancer at age 45.  Have a colonoscopy test every 10 years (or more often if you're at risk) Or, ask your provider about stool tests like a FIT test every year or Cologuard test every 3 years.  To learn more about your testing options, visit: https://www.PowerMag/762502.pdf.  For help making a decision, visit: https://bit.ly/pb03399.  Prostate cancer screening test: If you have a prostate and are age 55 to 69, ask your provider if you would benefit from a yearly prostate cancer screening test.  Lung cancer screening: If you are a current or former smoker age 50 to 80, ask your care team if ongoing lung cancer screenings are right for you.  For informational purposes only. Not to replace the advice of your health care provider. Copyright   2023 [employer]. All rights reserved. Clinically reviewed by the [facility] Transitions Program. relocality 631847 - REV 01/24.    Preventing Falls: Care Instructions  Injuries and health  problems such as trouble walking or poor eyesight can increase your risk of falling. So can some medicines. But there are things you can do to help prevent falls. You can exercise to get stronger. You can also arrange your home to make it safer.    Talk to your doctor about the medicines you take. Ask if any of them increase the risk of falls and whether they can be changed or stopped.   Try to exercise regularly. It can help improve your strength and balance. This can help lower your risk of falling.     Practice fall safety and prevention.    Wear low-heeled shoes that fit well and give your feet good support. Talk to your doctor if you have foot problems that make this hard.  Carry a cellphone or wear a medical alert device that you can use to call for help.  Use stepladders instead of chairs to reach high objects. Don't climb if you're at risk for falls. Ask for help, if needed.  Wear the correct eyeglasses, if you need them.    Make your home safer.    Remove rugs, cords, clutter, and furniture from walkways.  Keep your house well lit. Use night-lights in hallways and bathrooms.  Install and use sturdy handrails on stairways.  Wear nonskid footwear, even inside. Don't walk barefoot or in socks without shoes.    Be safe outside.    Use handrails, curb cuts, and ramps whenever possible.  Keep your hands free by using a shoulder bag or backpack.  Try to walk in well-lit areas. Watch out for uneven ground, changes in pavement, and debris.  Be careful in the winter. Walk on the grass or gravel when sidewalks are slippery. Use de-icer on steps and walkways. Add non-slip devices to shoes.    Put grab bars and nonskid mats in your shower or tub and near the toilet. Try to use a shower chair or bath bench when bathing.   Get into a tub or shower by putting in your weaker leg first. Get out with your strong side first. Have a phone or medical alert device in the bathroom with you.   Where can you learn more?  Go to  "https://www.Fan TV.net/patiented  Enter G117 in the search box to learn more about \"Preventing Falls: Care Instructions.\"  Current as of: July 17, 2023               Content Version: 14.0    3921-3133 [employer].   Care instructions adapted under license by your healthcare professional. If you have questions about a medical condition or this instruction, always ask your healthcare professional. [employer] disclaims any warranty or liability for your use of this information.      Learning About Stress  What is stress?     Stress is your body's response to a hard situation. Your body can have a physical, emotional, or mental response. Stress is a fact of life for most people, and it affects everyone differently. What causes stress for you may not be stressful for someone else.  A lot of things can cause stress. You may feel stress when you go on a job interview, take a test, or run a race. This kind of short-term stress is normal and even useful. It can help you if you need to work hard or react quickly. For example, stress can help you finish an important job on time.  Long-term stress is caused by ongoing stressful situations or events. Examples of long-term stress include long-term health problems, ongoing problems at work, or conflicts in your family. Long-term stress can harm your health.  How does stress affect your health?  When you are stressed, your body responds as though you are in danger. It makes hormones that speed up your heart, make you breathe faster, and give you a burst of energy. This is called the fight-or-flight stress response. If the stress is over quickly, your body goes back to normal and no harm is done.  But if stress happens too often or lasts too long, it can have bad effects. Long-term stress can make you more likely to get sick, and it can make symptoms of some diseases worse. If you tense up when you are stressed, you may develop neck, shoulder, or low " back pain. Stress is linked to high blood pressure and heart disease.  Stress also harms your emotional health. It can make you dia, tense, or depressed. Your relationships may suffer, and you may not do well at work or school.  What can you do to manage stress?  You can try these things to help manage stress:   Do something active. Exercise or activity can help reduce stress. Walking is a great way to get started. Even everyday activities such as housecleaning or yard work can help.  Try yoga or gricel chi. These techniques combine exercise and meditation. You may need some training at first to learn them.  Do something you enjoy. For example, listen to music or go to a movie. Practice your hobby or do volunteer work.  Meditate. This can help you relax, because you are not worrying about what happened before or what may happen in the future.  Do guided imagery. Imagine yourself in any setting that helps you feel calm. You can use online videos, books, or a teacher to guide you.  Do breathing exercises. For example:  From a standing position, bend forward from the waist with your knees slightly bent. Let your arms dangle close to the floor.  Breathe in slowly and deeply as you return to a standing position. Roll up slowly and lift your head last.  Hold your breath for just a few seconds in the standing position.  Breathe out slowly and bend forward from the waist.  Let your feelings out. Talk, laugh, cry, and express anger when you need to. Talking with supportive friends or family, a counselor, or a lisa leader about your feelings is a healthy way to relieve stress. Avoid discussing your feelings with people who make you feel worse.  Write. It may help to write about things that are bothering you. This helps you find out how much stress you feel and what is causing it. When you know this, you can find better ways to cope.  What can you do to prevent stress?  You might try some of these things to help prevent  "stress:  Manage your time. This helps you find time to do the things you want and need to do.  Get enough sleep. Your body recovers from the stresses of the day while you are sleeping.  Get support. Your family, friends, and community can make a difference in how you experience stress.  Limit your news feed. Avoid or limit time on social media or news that may make you feel stressed.  Do something active. Exercise or activity can help reduce stress. Walking is a great way to get started.  Where can you learn more?  Go to https://www.Addictive.net/patiented  Enter N032 in the search box to learn more about \"Learning About Stress.\"  Current as of: October 24, 2023               Content Version: 14.0    4374-1180 [employer].   Care instructions adapted under license by your healthcare professional. If you have questions about a medical condition or this instruction, always ask your healthcare professional. [employer], [employer] disclaims any warranty or liability for your use of this information.      "

## 2024-04-02 DIAGNOSIS — E11.9 TYPE 2 DIABETES MELLITUS WITHOUT COMPLICATION, WITHOUT LONG-TERM CURRENT USE OF INSULIN (H): ICD-10-CM

## 2024-04-02 RX ORDER — BLOOD SUGAR DIAGNOSTIC
100 STRIP MISCELLANEOUS DAILY
Qty: 100 STRIP | Refills: 1 | Status: SHIPPED | OUTPATIENT
Start: 2024-04-02

## 2024-04-04 LAB
TRAMADOL CTO UR CFM-MCNC: 59 NG/ML
TRAMADOL/CREAT UR: 19 NG/MG {CREAT}

## 2024-04-26 ENCOUNTER — APPOINTMENT (OUTPATIENT)
Dept: ULTRASOUND IMAGING | Facility: CLINIC | Age: 54
End: 2024-04-26
Attending: EMERGENCY MEDICINE
Payer: COMMERCIAL

## 2024-04-26 ENCOUNTER — PATIENT OUTREACH (OUTPATIENT)
Dept: CARE COORDINATION | Facility: CLINIC | Age: 54
End: 2024-04-26

## 2024-04-26 ENCOUNTER — HOSPITAL ENCOUNTER (EMERGENCY)
Facility: CLINIC | Age: 54
Discharge: HOME | End: 2024-04-26
Attending: EMERGENCY MEDICINE | Admitting: EMERGENCY MEDICINE
Payer: COMMERCIAL

## 2024-04-26 VITALS
HEART RATE: 95 BPM | RESPIRATION RATE: 20 BRPM | DIASTOLIC BLOOD PRESSURE: 85 MMHG | HEIGHT: 64 IN | OXYGEN SATURATION: 97 % | TEMPERATURE: 98 F | WEIGHT: 240.52 LBS | SYSTOLIC BLOOD PRESSURE: 150 MMHG | BODY MASS INDEX: 41.06 KG/M2

## 2024-04-26 DIAGNOSIS — B37.2 CANDIDIASIS OF SKIN: ICD-10-CM

## 2024-04-26 DIAGNOSIS — M79.662 PAIN OF LEFT LOWER LEG: ICD-10-CM

## 2024-04-26 LAB
ANION GAP SERPL CALCULATED.3IONS-SCNC: 16 MMOL/L (ref 7–15)
BASOPHILS # BLD AUTO: 0.1 10E3/UL (ref 0–0.2)
BASOPHILS NFR BLD AUTO: 1 %
BUN SERPL-MCNC: 11.5 MG/DL (ref 6–20)
CALCIUM SERPL-MCNC: 8.9 MG/DL (ref 8.6–10)
CHLORIDE SERPL-SCNC: 101 MMOL/L (ref 98–107)
CREAT SERPL-MCNC: 0.86 MG/DL (ref 0.51–0.95)
DEPRECATED HCO3 PLAS-SCNC: 21 MMOL/L (ref 22–29)
EGFRCR SERPLBLD CKD-EPI 2021: 81 ML/MIN/1.73M2
EOSINOPHIL # BLD AUTO: 0.2 10E3/UL (ref 0–0.7)
EOSINOPHIL NFR BLD AUTO: 3 %
ERYTHROCYTE [DISTWIDTH] IN BLOOD BY AUTOMATED COUNT: 12.7 % (ref 10–15)
GLUCOSE SERPL-MCNC: 122 MG/DL (ref 70–99)
HCT VFR BLD AUTO: 39.9 % (ref 35–47)
HGB BLD-MCNC: 13.5 G/DL (ref 11.7–15.7)
HOLD SPECIMEN: NORMAL
HOLD SPECIMEN: NORMAL
IMM GRANULOCYTES # BLD: 0 10E3/UL
IMM GRANULOCYTES NFR BLD: 0 %
LYMPHOCYTES # BLD AUTO: 2.3 10E3/UL (ref 0.8–5.3)
LYMPHOCYTES NFR BLD AUTO: 33 %
MCH RBC QN AUTO: 31 PG (ref 26.5–33)
MCHC RBC AUTO-ENTMCNC: 33.8 G/DL (ref 31.5–36.5)
MCV RBC AUTO: 92 FL (ref 78–100)
MONOCYTES # BLD AUTO: 0.4 10E3/UL (ref 0–1.3)
MONOCYTES NFR BLD AUTO: 6 %
NEUTROPHILS # BLD AUTO: 4 10E3/UL (ref 1.6–8.3)
NEUTROPHILS NFR BLD AUTO: 57 %
NRBC # BLD AUTO: 0 10E3/UL
NRBC BLD AUTO-RTO: 0 /100
PLATELET # BLD AUTO: 449 10E3/UL (ref 150–450)
POTASSIUM SERPL-SCNC: 4.4 MMOL/L (ref 3.4–5.3)
RBC # BLD AUTO: 4.35 10E6/UL (ref 3.8–5.2)
SODIUM SERPL-SCNC: 138 MMOL/L (ref 135–145)
WBC # BLD AUTO: 7 10E3/UL (ref 4–11)

## 2024-04-26 PROCEDURE — 99284 EMERGENCY DEPT VISIT MOD MDM: CPT | Mod: 25

## 2024-04-26 PROCEDURE — 250N000013 HC RX MED GY IP 250 OP 250 PS 637: Performed by: EMERGENCY MEDICINE

## 2024-04-26 PROCEDURE — 93971 EXTREMITY STUDY: CPT | Mod: LT

## 2024-04-26 PROCEDURE — 36415 COLL VENOUS BLD VENIPUNCTURE: CPT | Performed by: EMERGENCY MEDICINE

## 2024-04-26 PROCEDURE — 80048 BASIC METABOLIC PNL TOTAL CA: CPT | Performed by: EMERGENCY MEDICINE

## 2024-04-26 PROCEDURE — 85041 AUTOMATED RBC COUNT: CPT | Performed by: EMERGENCY MEDICINE

## 2024-04-26 RX ORDER — OXYCODONE HYDROCHLORIDE 5 MG/1
5 TABLET ORAL ONCE
Status: COMPLETED | OUTPATIENT
Start: 2024-04-26 | End: 2024-04-26

## 2024-04-26 RX ADMIN — OXYCODONE HYDROCHLORIDE 5 MG: 5 TABLET ORAL at 12:04

## 2024-04-26 ASSESSMENT — COLUMBIA-SUICIDE SEVERITY RATING SCALE - C-SSRS
6. HAVE YOU EVER DONE ANYTHING, STARTED TO DO ANYTHING, OR PREPARED TO DO ANYTHING TO END YOUR LIFE?: NO
2. HAVE YOU ACTUALLY HAD ANY THOUGHTS OF KILLING YOURSELF IN THE PAST MONTH?: NO
1. IN THE PAST MONTH, HAVE YOU WISHED YOU WERE DEAD OR WISHED YOU COULD GO TO SLEEP AND NOT WAKE UP?: NO

## 2024-04-26 ASSESSMENT — ACTIVITIES OF DAILY LIVING (ADL)
ADLS_ACUITY_SCORE: 36
ADLS_ACUITY_SCORE: 36

## 2024-04-26 NOTE — ED TRIAGE NOTES
"  Pt arrives from home for a rash and leg pain. She states 5-6 days ago she developed a rash in her L groin, shortly after she developed pain in her L leg. She states the pain is 8/10 and she is having mobility issues because of it, she is concerned she may have a clot. Not on thinners. Denies hx of clots. Denies Cp/SOB.  Has tried tramadol and ibuporfen with no relief.      BP (!) 178/95   Pulse 120   Temp 98  F (36.7  C) (Temporal)   Resp 20   Ht 1.626 m (5' 4\")   Wt 109.1 kg (240 lb 8.4 oz)   LMP 05/14/2019   SpO2 100%   BMI 41.29 kg/m       Triage Assessment (Adult)       Row Name 04/26/24 1106          Triage Assessment    Airway WDL WDL        Respiratory WDL    Respiratory WDL WDL        Cardiac WDL    Cardiac WDL WDL        Cognitive/Neuro/Behavioral WDL    Cognitive/Neuro/Behavioral WDL WDL                     "

## 2024-04-26 NOTE — ED PROVIDER NOTES
"  History     Chief Complaint:  Rash     HPI   Kaylee Lopez is a 54 year old female with history of type 2 diabetes and pulmonary hypertension who presents with rash and left leg pain. She states rash began 5-6 days ago and appeared to be a yeast infection over the left groin. 2 days after this began noticing leg pain down to the ankle. Pain does not alleviate with Tylenol, Advil, or the Tramadol that the patient uses for her fibromyalgia. The patient also notes frequent episodes of fibromyalgia pain. Denies any pain to the back, chest, or abdomen. No shortness of breath, lightheadedness, numbness, weakness, or difficulty urinating. No recent travel. Endorses regular activity. No recent changes to exercise, diet, or medicine. She has been using Lotrimin for her rash.     Medications:    Albuterol  Buspirone  Citalopram  Cyanocobalamin  Cyclobenzaprine  Ferrous gluconate  Lisinopril  Metformin  Semaglutide  Tramadol  Trazodone     Past Medical History:    Depression  Diabetes mellitus  Fibromyalgia  Heart murmur  Narcolepsy  Non-rheumatic tricuspid valve insufficiency  Chronic pain  Pulmonary hypertension  Reflex sympathetic dystrophy     Past Surgical History:    Angiogram  Colonoscopy  Cystoscopy  Total DaVinci hysterectomy, BSO  Dilation and curettage, operative hysteroscopy with morcellator, combined  Nelson teeth removal  Laparoscopic cholecystectomy     Physical Exam   Patient Vitals for the past 24 hrs:   BP Temp Temp src Pulse Resp SpO2 Height Weight   04/26/24 1341 -- -- -- 95 -- -- -- --   04/26/24 1107 (!) 178/95 98  F (36.7  C) Temporal 120 20 100 % 1.626 m (5' 4\") 109.1 kg (240 lb 8.4 oz)        Physical Exam  Constitutional:       General: She is not in acute distress.     Appearance: Normal appearance. She is not diaphoretic.   HENT:      Head: Atraumatic.      Mouth/Throat:      Mouth: Mucous membranes are moist.   Eyes:      General: No scleral icterus.     Conjunctiva/sclera: Conjunctivae " normal.   Cardiovascular:      Rate and Rhythm: Normal rate and regular rhythm.      Heart sounds: Normal heart sounds.   Pulmonary:      Effort: No respiratory distress.      Breath sounds: Normal breath sounds.   Abdominal:      General: Abdomen is flat. There is no distension.      Tenderness: There is no abdominal tenderness.   Musculoskeletal:      Cervical back: Neck supple.      Comments: No swelling of the left thigh or calf compared to the right.  Excellent perfusion of the feet.  Full range of motion of the back, hips, knees, and ankles.     Skin:     General: Skin is warm.      Comments: There is an area that appears to be resolving cutaneous Candida in the left inguinal skin fold.  No sloughing or blistering.   Neurological:      Mental Status: She is alert.      Comments: Strength and sensation of the toes intact.   Psychiatric:         Mood and Affect: Mood normal.         Behavior: Behavior normal.           Emergency Department Course     Imaging:  US Lower Extremity Venous Duplex Left   Final Result   IMPRESSION:    No evidence of deep venous thrombosis in the left lower extremity.      CHEO NGUYỄN DO            SYSTEM ID:  [id number]        Laboratory:  Labs Ordered and Resulted from Time of ED Arrival to Time of ED Departure   BASIC METABOLIC PANEL - Abnormal       Result Value    Sodium 138      Potassium 4.4      Chloride 101      Carbon Dioxide (CO2) 21 (*)     Anion Gap 16 (*)     Urea Nitrogen 11.5      Creatinine 0.86      GFR Estimate 81      Calcium 8.9      Glucose 122 (*)    CBC WITH PLATELETS AND DIFFERENTIAL    WBC Count 7.0      RBC Count 4.35      Hemoglobin 13.5      Hematocrit 39.9      MCV 92      MCH 31.0      MCHC 33.8      RDW 12.7      Platelet Count 449      % Neutrophils 57      % Lymphocytes 33      % Monocytes 6      % Eosinophils 3      % Basophils 1      % Immature Granulocytes 0      NRBCs per 100 WBC 0      Absolute Neutrophils 4.0      Absolute Lymphocytes 2.3       Absolute Monocytes 0.4      Absolute Eosinophils 0.2      Absolute Basophils 0.1      Absolute Immature Granulocytes 0.0      Absolute NRBCs 0.0        Emergency Department Course & Assessments:    Interventions:  Medications   oxyCODONE (ROXICODONE) tablet 5 mg (5 mg Oral $Given 4/26/24 1205)        Disposition:  The patient was discharged.     Impression & Plan         Medical Decision Making:  This patient is a 54-year-old who presents to the ED with left leg pain.  She says her symptoms began after she developed a skin rash in the left inguinal skin fold.  This is resolving with over-the-counter clotrimazole.  DVT study fortunately is negative.  Lab workup overall looks good.  She may have a musculoskeletal strain.  Fibromyalgia likely is playing a component.  She is appropriate for ongoing follow-up through her primary clinic.  She was advised to get a repeat ultrasound in 1 week if she still having pain.      Diagnosis:    ICD-10-CM    1. Pain of left lower leg  M79.662       2. Candidiasis of skin  B37.2               Scribe Disclosure:  [employer]ired, am serving as a scribe at 11:53 AM on 4/26/2024 to document services personally performed by Cesar Ireland MD based on my observations and the provider's statements to me.   4/26/2024   Cesar Ireland MD McRoberts, Sean Edward, MD  04/26/24 2862

## 2024-04-26 NOTE — DISCHARGE INSTRUCTIONS
Return to the ER for worsening pain, shortness of breath, chest pain, or any new concerns.    Please follow-up with your physician in 1 week.  If your leg pain persists you should get a repeat ultrasound.

## 2024-05-24 ENCOUNTER — PATIENT OUTREACH (OUTPATIENT)
Dept: CARE COORDINATION | Facility: CLINIC | Age: 54
End: 2024-05-24
Payer: COMMERCIAL

## 2024-06-09 ENCOUNTER — MYC REFILL (OUTPATIENT)
Dept: FAMILY MEDICINE | Facility: CLINIC | Age: 54
End: 2024-06-09
Payer: COMMERCIAL

## 2024-06-09 DIAGNOSIS — M79.7 FIBROMYALGIA: ICD-10-CM

## 2024-06-09 DIAGNOSIS — F11.90 CHRONIC, CONTINUOUS USE OF OPIOIDS: ICD-10-CM

## 2024-06-10 RX ORDER — TRAMADOL HYDROCHLORIDE 50 MG/1
50 TABLET ORAL 2 TIMES DAILY PRN
Qty: 60 TABLET | Refills: 0 | Status: SHIPPED | OUTPATIENT
Start: 2024-06-10 | End: 2024-09-06

## 2024-08-04 ENCOUNTER — HEALTH MAINTENANCE LETTER (OUTPATIENT)
Age: 54
End: 2024-08-04

## 2024-08-09 ENCOUNTER — HOSPITAL ENCOUNTER (OUTPATIENT)
Dept: MAMMOGRAPHY | Facility: CLINIC | Age: 54
Discharge: HOME | End: 2024-08-09
Attending: FAMILY MEDICINE | Admitting: FAMILY MEDICINE
Payer: COMMERCIAL

## 2024-08-09 DIAGNOSIS — Z12.31 VISIT FOR SCREENING MAMMOGRAM: ICD-10-CM

## 2024-08-09 PROCEDURE — 77063 BREAST TOMOSYNTHESIS BI: CPT

## 2024-08-18 ASSESSMENT — PATIENT HEALTH QUESTIONNAIRE - PHQ9
10. IF YOU CHECKED OFF ANY PROBLEMS, HOW DIFFICULT HAVE THESE PROBLEMS MADE IT FOR YOU TO DO YOUR WORK, TAKE CARE OF THINGS AT HOME, OR GET ALONG WITH OTHER PEOPLE: NOT DIFFICULT AT ALL
SUM OF ALL RESPONSES TO PHQ QUESTIONS 1-9: 2
SUM OF ALL RESPONSES TO PHQ QUESTIONS 1-9: 2

## 2024-08-19 ENCOUNTER — OFFICE VISIT (OUTPATIENT)
Dept: FAMILY MEDICINE | Facility: CLINIC | Age: 54
End: 2024-08-19
Payer: COMMERCIAL

## 2024-08-19 VITALS
WEIGHT: 235.4 LBS | SYSTOLIC BLOOD PRESSURE: 124 MMHG | HEIGHT: 64 IN | BODY MASS INDEX: 40.19 KG/M2 | DIASTOLIC BLOOD PRESSURE: 86 MMHG | OXYGEN SATURATION: 98 % | TEMPERATURE: 97.2 F | RESPIRATION RATE: 18 BRPM | HEART RATE: 89 BPM

## 2024-08-19 DIAGNOSIS — E78.5 HYPERLIPIDEMIA LDL GOAL <100: ICD-10-CM

## 2024-08-19 DIAGNOSIS — L65.9 HAIR LOSS: ICD-10-CM

## 2024-08-19 DIAGNOSIS — E11.9 TYPE 2 DIABETES MELLITUS WITHOUT COMPLICATION, WITHOUT LONG-TERM CURRENT USE OF INSULIN (H): Primary | ICD-10-CM

## 2024-08-19 LAB
ALBUMIN SERPL BCG-MCNC: 3.9 G/DL (ref 3.5–5.2)
ALP SERPL-CCNC: 134 U/L (ref 40–150)
ALT SERPL W P-5'-P-CCNC: 17 U/L (ref 0–50)
ANION GAP SERPL CALCULATED.3IONS-SCNC: 11 MMOL/L (ref 7–15)
AST SERPL W P-5'-P-CCNC: 22 U/L (ref 0–45)
BILIRUB SERPL-MCNC: 0.4 MG/DL
BUN SERPL-MCNC: 10.1 MG/DL (ref 6–20)
CALCIUM SERPL-MCNC: 9.1 MG/DL (ref 8.8–10.4)
CHLORIDE SERPL-SCNC: 102 MMOL/L (ref 98–107)
CREAT SERPL-MCNC: 0.82 MG/DL (ref 0.51–0.95)
EGFRCR SERPLBLD CKD-EPI 2021: 86 ML/MIN/1.73M2
GLUCOSE SERPL-MCNC: 161 MG/DL (ref 70–99)
HBA1C MFR BLD: 6.5 % (ref 0–5.6)
HCO3 SERPL-SCNC: 25 MMOL/L (ref 22–29)
IRON BINDING CAPACITY (ROCHE): 343 UG/DL (ref 240–430)
IRON SATN MFR SERPL: 18 % (ref 15–46)
IRON SERPL-MCNC: 62 UG/DL (ref 37–145)
POTASSIUM SERPL-SCNC: 4.2 MMOL/L (ref 3.4–5.3)
PROT SERPL-MCNC: 6.8 G/DL (ref 6.4–8.3)
SODIUM SERPL-SCNC: 138 MMOL/L (ref 135–145)
TSH SERPL DL<=0.005 MIU/L-ACNC: 2.3 UIU/ML (ref 0.3–4.2)
VIT D+METAB SERPL-MCNC: 33 NG/ML (ref 20–50)

## 2024-08-19 PROCEDURE — 83540 ASSAY OF IRON: CPT | Performed by: FAMILY MEDICINE

## 2024-08-19 PROCEDURE — 36415 COLL VENOUS BLD VENIPUNCTURE: CPT | Performed by: FAMILY MEDICINE

## 2024-08-19 PROCEDURE — 83550 IRON BINDING TEST: CPT | Performed by: FAMILY MEDICINE

## 2024-08-19 PROCEDURE — 83036 HEMOGLOBIN GLYCOSYLATED A1C: CPT | Performed by: FAMILY MEDICINE

## 2024-08-19 PROCEDURE — 84443 ASSAY THYROID STIM HORMONE: CPT | Performed by: FAMILY MEDICINE

## 2024-08-19 PROCEDURE — 99214 OFFICE O/P EST MOD 30 MIN: CPT | Performed by: FAMILY MEDICINE

## 2024-08-19 PROCEDURE — 82306 VITAMIN D 25 HYDROXY: CPT | Performed by: FAMILY MEDICINE

## 2024-08-19 PROCEDURE — 80053 COMPREHEN METABOLIC PANEL: CPT | Performed by: FAMILY MEDICINE

## 2024-08-19 RX ORDER — METFORMIN HCL 500 MG
2000 TABLET, EXTENDED RELEASE 24 HR ORAL
Qty: 360 TABLET | Refills: 1 | Status: SHIPPED | OUTPATIENT
Start: 2024-08-19

## 2024-08-19 ASSESSMENT — ANXIETY QUESTIONNAIRES
5. BEING SO RESTLESS THAT IT IS HARD TO SIT STILL: NOT AT ALL
GAD7 TOTAL SCORE: 0
7. FEELING AFRAID AS IF SOMETHING AWFUL MIGHT HAPPEN: NOT AT ALL
8. IF YOU CHECKED OFF ANY PROBLEMS, HOW DIFFICULT HAVE THESE MADE IT FOR YOU TO DO YOUR WORK, TAKE CARE OF THINGS AT HOME, OR GET ALONG WITH OTHER PEOPLE?: NOT DIFFICULT AT ALL
6. BECOMING EASILY ANNOYED OR IRRITABLE: NOT AT ALL
1. FEELING NERVOUS, ANXIOUS, OR ON EDGE: NOT AT ALL
IF YOU CHECKED OFF ANY PROBLEMS ON THIS QUESTIONNAIRE, HOW DIFFICULT HAVE THESE PROBLEMS MADE IT FOR YOU TO DO YOUR WORK, TAKE CARE OF THINGS AT HOME, OR GET ALONG WITH OTHER PEOPLE: NOT DIFFICULT AT ALL
GAD7 TOTAL SCORE: 0
7. FEELING AFRAID AS IF SOMETHING AWFUL MIGHT HAPPEN: NOT AT ALL
3. WORRYING TOO MUCH ABOUT DIFFERENT THINGS: NOT AT ALL
4. TROUBLE RELAXING: NOT AT ALL
2. NOT BEING ABLE TO STOP OR CONTROL WORRYING: NOT AT ALL

## 2024-08-19 NOTE — PROGRESS NOTES
"  Assessment & Plan     Type 2 diabetes mellitus without complication, without long-term current use of insulin (H) - slightly worsened - will switch to mounjaro as she is having side effects (GI) with Ozempic. Continue metformin same dose.   - Hemoglobin A1c; Future  - TSH with free T4 reflex; Future  - Hemoglobin A1c  - TSH with free T4 reflex  - metFORMIN (GLUCOPHAGE XR) 500 MG 24 hr tablet; Take 4 tablets (2,000 mg) by mouth daily (with dinner)  - tirzepatide (MOUNJARO) 5 MG/0.5ML pen; Inject 5 mg subcutaneously every 7 days for 30 days    Hyperlipidemia LDL goal <100 - on statin, continue  - Comprehensive metabolic panel (BMP + Alb, Alk Phos, ALT, AST, Total. Bili, TP); Future  - Comprehensive metabolic panel (BMP + Alb, Alk Phos, ALT, AST, Total. Bili, TP)    Hair loss - newer issue. Stopped iron supplement around the same time. Will check labs as below to better evaluate.   - TSH with free T4 reflex; Future  - Iron and iron binding capacity; Future  - Vitamin D Deficiency; Future  - TSH with free T4 reflex  - Iron and iron binding capacity  - Vitamin D Deficiency    BMI  Estimated body mass index is 40.41 kg/m  as calculated from the following:    Height as of this encounter: 1.626 m (5' 4\").    Weight as of this encounter: 106.8 kg (235 lb 6.4 oz).       Lisandro Page is a 54 year old, presenting for the following health issues:  Diabetes and Hair Loss        8/19/2024     9:49 AM   Additional Questions   Roomed by Evelin     History of Present Illness       Reason for visit:  Hair loss  Symptom onset:  More than a month  Symptoms include:  Hair loss  Symptom intensity:  Moderate  Symptom progression:  Staying the same  Had these symptoms before:  No  What makes it worse:  N/A  What makes it better:  N/A    She eats 2-3 servings of fruits and vegetables daily.She consumes 2 sweetened beverage(s) daily.She exercises with enough effort to increase her heart rate 9 or less minutes per day.  She exercises " "with enough effort to increase her heart rate 3 or less days per week.   She is taking medications regularly.     Diabetes Follow-up    How often are you checking your blood sugar? A few times a month  What time of day are you checking your blood sugars (select all that apply)?  Before and after meals  Have you had any blood sugars above 200?  No  Have you had any blood sugars below 70?  No  What symptoms do you notice when your blood sugar is low?  None and Not applicable  What concerns do you have today about your diabetes? None   Do you have any of these symptoms? (Select all that apply)  No numbness or tingling in feet.  No redness, sores or blisters on feet.  No complaints of excessive thirst.  No reports of blurry vision.  No significant changes to weight.  Have you had a diabetic eye exam in the last 12 months? No        Patient is due next month. Will schedule appt      BP Readings from Last 2 Encounters:   08/19/24 124/86   04/26/24 (!) 150/85     Hemoglobin A1C (%)   Date Value   08/19/2024 6.5 (H)   03/29/2024 6.3 (H)   06/18/2021 7.4 (H)   12/29/2020 6.6 (H)     LDL Cholesterol Calculated (mg/dL)   Date Value   03/29/2024 54   07/25/2023 53   09/09/2020 48   04/05/2019 56               Review of Systems  Constitutional, HEENT, cardiovascular, pulmonary, gi and gu systems are negative, except as otherwise noted.      Objective    /86   Pulse 89   Temp 97.2  F (36.2  C) (Tympanic)   Resp 18   Ht 1.626 m (5' 4\")   Wt 106.8 kg (235 lb 6.4 oz)   LMP 05/14/2019   SpO2 98%   BMI 40.41 kg/m    Body mass index is 40.41 kg/m .  Physical Exam   GENERAL: alert and no distress  Diabetic foot exam: normal DP and PT pulses, no trophic changes or ulcerative lesions, and normal sensory exam  SKIN: scalp without irritation    Lab Results   Component Value Date    A1C 6.5 08/19/2024    A1C 6.3 03/29/2024    A1C 6.1 12/28/2023    A1C 6.9 07/25/2023    A1C 6.3 04/27/2023    A1C 7.4 06/18/2021    A1C 6.6 " 12/29/2020    A1C 6.7 09/09/2020    A1C 6.4 11/26/2019    A1C 7.2 04/05/2019             Signed Electronically by: Moriah Hernandez MD

## 2024-08-19 NOTE — PATIENT INSTRUCTIONS
Lab Results   Component Value Date    A1C 6.5 08/19/2024    A1C 6.3 03/29/2024    A1C 6.1 12/28/2023    A1C 6.9 07/25/2023    A1C 6.3 04/27/2023    A1C 7.4 06/18/2021    A1C 6.6 12/29/2020    A1C 6.7 09/09/2020    A1C 6.4 11/26/2019    A1C 7.2 04/05/2019   .

## 2024-08-20 DIAGNOSIS — E61.1 IRON DEFICIENCY: Primary | ICD-10-CM

## 2024-08-20 RX ORDER — FERROUS GLUCONATE 324(38)MG
324 TABLET ORAL
Qty: 90 TABLET | Refills: 1 | Status: SHIPPED | OUTPATIENT
Start: 2024-08-20

## 2024-08-25 DIAGNOSIS — R11.0 NAUSEA: ICD-10-CM

## 2024-08-26 RX ORDER — ONDANSETRON 4 MG/1
TABLET, FILM COATED ORAL
Qty: 30 TABLET | Refills: 1 | Status: SHIPPED | OUTPATIENT
Start: 2024-08-26

## 2024-09-02 DIAGNOSIS — E11.9 TYPE 2 DIABETES MELLITUS WITHOUT COMPLICATION, WITHOUT LONG-TERM CURRENT USE OF INSULIN (H): ICD-10-CM

## 2024-09-06 ENCOUNTER — MYC REFILL (OUTPATIENT)
Dept: FAMILY MEDICINE | Facility: CLINIC | Age: 54
End: 2024-09-06
Payer: COMMERCIAL

## 2024-09-06 DIAGNOSIS — M79.7 FIBROMYALGIA: ICD-10-CM

## 2024-09-06 DIAGNOSIS — F11.90 CHRONIC, CONTINUOUS USE OF OPIOIDS: ICD-10-CM

## 2024-09-06 RX ORDER — TRAMADOL HYDROCHLORIDE 50 MG/1
50 TABLET ORAL 2 TIMES DAILY PRN
Qty: 60 TABLET | Refills: 0 | Status: SHIPPED | OUTPATIENT
Start: 2024-09-06 | End: 2024-09-09

## 2024-09-08 ENCOUNTER — TELEPHONE (OUTPATIENT)
Dept: FAMILY MEDICINE | Facility: CLINIC | Age: 54
End: 2024-09-08
Payer: COMMERCIAL

## 2024-09-08 DIAGNOSIS — F11.90 CHRONIC, CONTINUOUS USE OF OPIOIDS: ICD-10-CM

## 2024-09-08 DIAGNOSIS — M79.7 FIBROMYALGIA: ICD-10-CM

## 2024-09-08 NOTE — TELEPHONE ENCOUNTER
Pharmacy states that the medication traMADol (ULTRAM) 50 MG tablet is on a long term backorder until November. Tramadol 25 and 100 MG is available.    Order Information    Ordered Status Priority Ordering User Department   09/06/24 Sent (none) Moriah Hernandez MD  [facility]ILY [facility]ACT[facility]     Order History  Outpatient  Date/Time Action Taken User Additional Information   09/06/24 1122 Pend Jaylene Garcia    09/06/24 1459 Sign Moriah Hernandez MD Reorder from Order:[id number]   09/06/24 1459 Taking Flag Checked Moriah Hernandez MD [id number]     Outpatient Medication Detail     Disp Refills Start End PARVEZ   traMADol (ULTRAM) 50 MG tablet 60 tablet 0 9/6/2024 -- No   Sig - Route: Take 1 tablet (50 mg) by mouth 2 times daily as needed for severe pain. - Oral   Sent to pharmacy as: traMADol HCl 50 MG Oral Tablet (ULTRAM)   Class: E-Prescribe   Order: [id number]   E-Prescribing Status: Receipt confirmed by pharmacy (9/6/2024  2:59 PM CDT)   No prior authorization was found for this prescription.   Found prior authorization for another prescription for the same medication: Closed - Prescription within prescribing limits. Prior Authorization not required.     Outpatient Morphine Milligram Equivalents Per Day    9/6/24 and after   10 MME/Day  Order Name Dose Route Frequency Maximum MME/Day    traMADol (ULTRAM) 50 MG tablet 50 mg Oral 2 TIMES DAILY PRN 10 MME/Day   Total Potential Morphine Milligram Equivalents Per Day 10 MME/Day   Calculation Information          Printout Tracking    External Result Report     Pharmacy    [facility] [address] [facility] [id number] [facility]     Associated Diagnoses    Fibromyalgia [M79.7]      Chronic, continuous use of opioids [F11.90]

## 2024-09-09 RX ORDER — TRAMADOL HYDROCHLORIDE 25 MG/1
50 TABLET, COATED ORAL 2 TIMES DAILY PRN
Qty: 120 TABLET | Refills: 0 | Status: SHIPPED | OUTPATIENT
Start: 2024-09-09

## 2024-09-12 ENCOUNTER — TELEPHONE (OUTPATIENT)
Dept: FAMILY MEDICINE | Facility: CLINIC | Age: 54
End: 2024-09-12
Payer: COMMERCIAL

## 2024-09-12 NOTE — TELEPHONE ENCOUNTER
Prior Authorization Retail Medication Request    Medication/Dose: traMADol HCl 25 MG TABS tablet  Diagnosis and ICD code (if different than what is on RX):    New/renewal/insurance change PA/secondary ins. PA:  Previously Tried and Failed:    Rationale:  renewal (01/02/2024 phone encounter)    Insurance   Primary: [employer]   Insurance ID:  [id number]    Secondary (if applicable):  Insurance ID:      Pharmacy Information (if different than what is on RX)  Name:    Phone:    Fax:      Yariel BLEDSOE

## 2024-09-14 ENCOUNTER — OFFICE VISIT (OUTPATIENT)
Dept: URGENT CARE | Facility: URGENT CARE | Age: 54
End: 2024-09-14
Payer: COMMERCIAL

## 2024-09-14 VITALS
OXYGEN SATURATION: 97 % | WEIGHT: 235 LBS | HEART RATE: 100 BPM | TEMPERATURE: 98.9 F | BODY MASS INDEX: 40.34 KG/M2 | RESPIRATION RATE: 18 BRPM | SYSTOLIC BLOOD PRESSURE: 125 MMHG | DIASTOLIC BLOOD PRESSURE: 82 MMHG

## 2024-09-14 DIAGNOSIS — H92.02 LEFT EAR PAIN: ICD-10-CM

## 2024-09-14 DIAGNOSIS — H60.92 OTITIS EXTERNA OF LEFT EAR, UNSPECIFIED CHRONICITY, UNSPECIFIED TYPE: Primary | ICD-10-CM

## 2024-09-14 PROCEDURE — 99213 OFFICE O/P EST LOW 20 MIN: CPT | Performed by: FAMILY MEDICINE

## 2024-09-14 RX ORDER — NEOMYCIN SULFATE, POLYMYXIN B SULFATE AND HYDROCORTISONE 10; 3.5; 1 MG/ML; MG/ML; [USP'U]/ML
3 SUSPENSION/ DROPS AURICULAR (OTIC) 4 TIMES DAILY
Qty: 10 ML | Refills: 0 | Status: SHIPPED | OUTPATIENT
Start: 2024-09-14 | End: 2024-09-21

## 2024-09-14 NOTE — PROGRESS NOTES
Chief Complaint   Patient presents with    Urgent Care     Woke up this morning and her left ear is painful plus radiating down the jaw     Kaylee was seen today for urgent care.    Diagnoses and all orders for this visit:    Otitis externa of left ear, unspecified chronicity, unspecified type  -     neomycin-polymyxin-hydrocortisone (CORTISPORIN) 3.5-77904-4 otic suspension; Place 3 drops Into the left ear 4 times daily for 7 days.    Left ear pain    D/d  Otitis media/otitis externa/ear canal dermatitis/TMJ  (A) Otitis Externa    (P) Instructed to keep ear dry until better; eardrops per orders, call if persistent pain, swelling or fever, FUV prn.  Avoid getting water in the ear until the problem clears up.     (S) 54 year old female complains of pain in left ear started this morning  No fever or URI symptoms.   Noticed some drainage    (O) She appears well, afebrile. Left ear reveals tenderness of the tragus; debris and inflammation in external canal. TM is not well seen due to debris, but visualized aspects appear normal.    Yari Carrington MD

## 2024-09-17 NOTE — TELEPHONE ENCOUNTER
Central Prior Authorization Team   Phone: [phone]    PA Initiation    Medication: traMADol HCl 25 MG TABS tablet  Insurance Company: [employer] [place] - Phone [phone] Fax [phone]  Pharmacy Filling the Rx: [facility] [id number] [place] [facility] [address] [facility]  Filling Pharmacy Phone: [phone]  Filling Pharmacy Fax:    Start Date: 9/17/2024

## 2024-09-18 NOTE — TELEPHONE ENCOUNTER
PRIOR AUTHORIZATION DENIED    Medication: traMADol HCl 25 MG TABS tablet    Denial Date: 9/18/2024    Denial Rational:  Per insurance, medication is excluded from patient's benefit plan and will not be covered. Review and appeal are not available because of this exclusion.            Appeal Information:  N/A

## 2024-09-19 ENCOUNTER — VIRTUAL VISIT (OUTPATIENT)
Dept: FAMILY MEDICINE | Facility: CLINIC | Age: 54
End: 2024-09-19
Payer: COMMERCIAL

## 2024-09-19 DIAGNOSIS — M79.7 FIBROMYALGIA: Primary | ICD-10-CM

## 2024-09-19 DIAGNOSIS — F11.90 CHRONIC, CONTINUOUS USE OF OPIOIDS: ICD-10-CM

## 2024-09-19 PROCEDURE — G2211 COMPLEX E/M VISIT ADD ON: HCPCS | Mod: 95 | Performed by: FAMILY MEDICINE

## 2024-09-19 PROCEDURE — 99213 OFFICE O/P EST LOW 20 MIN: CPT | Mod: 95 | Performed by: FAMILY MEDICINE

## 2024-09-19 RX ORDER — TRAMADOL HYDROCHLORIDE 25 MG/1
50 TABLET, COATED ORAL 2 TIMES DAILY PRN
Qty: 120 TABLET | Refills: 0 | Status: CANCELLED | OUTPATIENT
Start: 2024-09-19

## 2024-09-19 ASSESSMENT — ANXIETY QUESTIONNAIRES
GAD7 TOTAL SCORE: 0
7. FEELING AFRAID AS IF SOMETHING AWFUL MIGHT HAPPEN: NOT AT ALL
8. IF YOU CHECKED OFF ANY PROBLEMS, HOW DIFFICULT HAVE THESE MADE IT FOR YOU TO DO YOUR WORK, TAKE CARE OF THINGS AT HOME, OR GET ALONG WITH OTHER PEOPLE?: NOT DIFFICULT AT ALL
GAD7 TOTAL SCORE: 0
GAD7 TOTAL SCORE: 0

## 2024-09-19 ASSESSMENT — PATIENT HEALTH QUESTIONNAIRE - PHQ9
10. IF YOU CHECKED OFF ANY PROBLEMS, HOW DIFFICULT HAVE THESE PROBLEMS MADE IT FOR YOU TO DO YOUR WORK, TAKE CARE OF THINGS AT HOME, OR GET ALONG WITH OTHER PEOPLE: NOT DIFFICULT AT ALL
SUM OF ALL RESPONSES TO PHQ QUESTIONS 1-9: 0
SUM OF ALL RESPONSES TO PHQ QUESTIONS 1-9: 0

## 2024-09-19 NOTE — PROGRESS NOTES
"Kaylee is a 54 year old who is being evaluated via a billable video visit.    How would you like to obtain your AVS? MyChart  If the video visit is dropped, the invitation should be resent by: Text to cell phone: [phone]  Will anyone else be joining your video visit? No      Assessment & Plan     Fibromyalgia - nationwide shortage of tramadol 50 mg, and her insurance will not pay for 25 mg tablets. She is using 50 mg BID most days for pain control. Side effects to Lyrica in the past. Has been hesitant to try Cymbalta as she is on citalopram and fears worsening mood in weaning, but will consider. We discussed amitriptyline as possible option. Has upcoming vacation planned. Suggested she pay OOP, which she is prepared to do, for the 25 mg tramadol this month, consider options of cymbalta versus amitriptyline as an alternative.     Chronic, continuous use of opioids    The longitudinal plan of care for the diagnosis(es)/condition(s) as documented were addressed during this visit. Due to the added complexity in care, I will continue to support Kaylee in the subsequent management and with ongoing continuity of care.        BMI  Estimated body mass index is 40.34 kg/m  as calculated from the following:    Height as of 8/19/24: 1.626 m (5' 4\").    Weight as of 9/14/24: 106.6 kg (235 lb).       Subjective   Kaylee is a 54 year old, presenting for the following health issues:  Recheck Medication        9/19/2024     9:27 AM   Additional Questions   Roomed by Matt Robbins     History of Present Illness       Reason for visit:  Alternative med discussion   She is taking medications regularly.         Review of Systems  Constitutional, HEENT, cardiovascular, pulmonary, gi and gu systems are negative, except as otherwise noted.      Objective           Vitals:  No vitals were obtained today due to virtual visit.    Physical Exam   GENERAL: alert and no distress  EYES: Eyes grossly normal to inspection.  No discharge or " erythema, or obvious scleral/conjunctival abnormalities.  RESP: No audible wheeze, cough, or visible cyanosis.    SKIN: Visible skin clear. No significant rash, abnormal pigmentation or lesions.  NEURO: Cranial nerves grossly intact.  Mentation and speech appropriate for age.  PSYCH: Appropriate affect, tone, and pace of words        Video-Visit Details    Type of service:  Video Visit   Originating Location (pt. Location): Home    Distant Location (provider location):  Off-site  Platform used for Video Visit: Phong  Signed Electronically by: Moriah Hernandez MD

## 2024-10-07 ENCOUNTER — VIRTUAL VISIT (OUTPATIENT)
Dept: URGENT CARE | Facility: CLINIC | Age: 54
End: 2024-10-07
Payer: COMMERCIAL

## 2024-10-07 ENCOUNTER — NURSE TRIAGE (OUTPATIENT)
Dept: FAMILY MEDICINE | Facility: CLINIC | Age: 54
End: 2024-10-07

## 2024-10-07 DIAGNOSIS — U07.1 INFECTION DUE TO 2019 NOVEL CORONAVIRUS: Primary | ICD-10-CM

## 2024-10-07 PROCEDURE — 99213 OFFICE O/P EST LOW 20 MIN: CPT | Mod: 95

## 2024-10-07 NOTE — PROGRESS NOTES
"Kaylee is a 54 year old who is being evaluated via a billable video visit.          Assessment & Plan     Infection due to 2019 novel coronavirus    - nirmatrelvir and ritonavir (PAXLOVID) 300 mg/100 mg therapy pack; Take 3 tablets by mouth 2 times daily for 5 days. (Take 2 Nirmatrelvir tablets and 1 Ritonavir tablet twice daily for 5 days)          BMI  Estimated body mass index is 40.34 kg/m  as calculated from the following:    Height as of 8/19/24: 1.626 m (5' 4\").    Weight as of 9/14/24: 106.6 kg (235 lb).         COVID-19 positive patient.  Encounter for consideration of medication intervention. Patient does qualify for a prescription. Full discussion with patient including medication options, risks and benefits. Potential drug interactions reviewed with patient.     Treatment Planned  Paxlovid sent to [facility] pharmacy in [facility] in [place]    Temporary change to home medications: Holding a atorvastatin for 8 days, will hold her trazodone for the 5 days she is on Paxlovid, can start at a half a dose when treatment is complete and do this for 3 days then can resume normal dose.  Will take half a dose of her BuSpar while she is taking Paxlovid and will continue to do this for 3 more days after Paxlovid treatment is completed.    Estimated body mass index is 40.34 kg/m  as calculated from the following:    Height as of 8/19/24: 1.626 m (5' 4\").    Weight as of 9/14/24: 106.6 kg (235 lb).  GFR Estimate   Date Value Ref Range Status   08/19/2024 86 >60 mL/min/1.73m2 Final     Comment:     eGFR calculated using 2021 CKD-EPI equation.   06/18/2021 82 >60 mL/min/[1.73_m2] Final     Comment:     Non  GFR Calc  Starting 12/18/2018, serum creatinine based estimated GFR (eGFR) will be   calculated using the Chronic Kidney Disease Epidemiology Collaboration   (CKD-EPI) equation.       No results found for: \"MOUCZ21TLC\"    No follow-ups on file.    Subjective   Kaylee is a 54 year old, presenting for the " following health issues:  No chief complaint on file.    HPI       COVID-19 Symptom Review  How many days ago did these symptoms start? 2-3 days ago    Are any of the following symptoms significant for you?  New or worsening difficulty breathing? Yes  Please describe what kind of difficulty you are having breathing:Mild dyspnea (able to do ADLs without difficulty, mild shortness of breath with activities such as climbing one or two flights of stairs or walking briskly)  Worsening cough? Yes, it's a dry cough.   Fever or chills? Yes, I felt feverish or had chills.  Headache: YES  Sore throat: No  Chest pain: No  Diarrhea: No  Body aches? YES    What treatments has patient tried? Guaifenesin (mucinex)   Does patient live in a nursing home, group home, or shelter? No  Does patient have a way to get food/medications during quarantined? Yes, I have a friend or family member who can help me.                Review of Systems  Constitutional, HEENT, cardiovascular, pulmonary, gi and gu systems are negative, except as otherwise noted.      Objective           Vitals:  No vitals were obtained today due to virtual visit.    Physical Exam   GENERAL: alert and no distress  RESP: No audible wheeze, visible cyanosis.  Occasional dry cough  PSYCH: Appropriate affect, tone, and pace of words          Video-Visit Details    Type of service:  Video Visit   Originating Location (pt. Location): Home    Distant Location (provider location):  Off-site  Platform used for Video Visit: Phong  Signed Electronically by: [facility]

## 2024-10-07 NOTE — PATIENT INSTRUCTIONS
Start your Paxlovid tomorrow morning since you already took your Lipitor.  Hold Lipitor while you are taking Paxlovid and continue to hold it for 5 more days after you complete your Paxlovid treatment.  You can take your buspirone while you are on Paxlovid, however you need to only take a half a dose of this.  While you are on Paxlovid and continue to do it for 3 more days after you complete treatment.  Hold your trazodone while you are taking Paxlovid.  You can restart it when you are done with your treatment, but only take a half a dose for 3 days and then you can resume your normal dosage.      Coronavirus (COVID-19): Care Instructions  What is COVID-19?  COVID-19 is a disease caused by a type of coronavirus. This illness was first found in 2019 and has since spread worldwide (pandemic). Symptoms can range from mild, such as fever and body aches, to severe, including trouble breathing. COVID-19 can be deadly.  Coronaviruses are a large group of viruses. Some types cause the common cold. Others cause more serious illnesses like Middle East respiratory syndrome (MERS) and severe acute respiratory syndrome (SARS).  Follow-up care is a key part of your treatment and safety. Be sure to make and go to all appointments, and call your doctor if you are having problems. It's also a good idea to know your test results and keep a list of the medicines you take.  How can you self-isolate when you have COVID-19?  If you have COVID-19, there are things you can do to help avoid spreading the virus to others.  Stay home, and avoid contact with other people.  Limit contact with people in your home. If possible, stay in a separate bedroom and use a separate bathroom.  Wear a high-quality mask when you are around other people.  Improve airflow. If you have to spend time indoors with others, open windows and doors. Or you can use a fan to blow air away from people and out a window.  Avoid contact with pets and other animals.  Cover  "your mouth and nose with a tissue when you cough or sneeze. Then throw it in the trash right away.  Wash your hands often, especially after you cough or sneeze. Use soap and water, and scrub for at least 20 seconds. If soap and water aren't available, use an alcohol-based hand .  Don't share personal household items. These include bedding, towels, cups and glasses, and eating utensils.  Wash laundry in the warmest water allowed for the fabric type, and dry it completely. It's okay to wash other people's laundry with yours.  Clean and disinfect your home. Use household  and disinfectant wipes or sprays.  Go to the CDC website at cdc.gov if you have questions.  When can you end self-isolation for COVID-19?  If you know or think that you have the virus, you may need to self-isolate. When you can be around other people you live with and leave home depends on whether you have symptoms.  If you tested positive but had no symptoms, wear a mask for at least 5 days.  If you have symptoms, you need to wait until your symptoms are getting better and you haven't had a fever for 24 hours while not taking medicines to lower the fever. Once you leave isolation, wear a mask for at least 5 more days when you are around other people.  If you were very sick, were in the hospital for COVID, or have a weakened immune system, talk to your doctor about how long you should isolate and wear a mask. It might be longer than 5 days.  Call your doctor or seek care if you have questions about your symptoms or when to end isolation.  Check the CDC website at cdc.gov for the most current information.  Where can you learn more?  Go to https://www.[employer].net/patiented  Enter C007 in the search box to learn more about \"Coronavirus (COVID-19): Care Instructions.\"  Current as of: August 28, 2024  Content Version: 14.2 2024 [employer].   Care instructions adapted under license by your healthcare professional. If you " have questions about a medical condition or this instruction, always ask your healthcare professional. [employer] disclaims any warranty or liability for your use of this information.

## 2024-10-16 ENCOUNTER — APPOINTMENT (OUTPATIENT)
Dept: GENERAL RADIOLOGY | Facility: CLINIC | Age: 54
End: 2024-10-16
Attending: EMERGENCY MEDICINE
Payer: COMMERCIAL

## 2024-10-16 ENCOUNTER — HOSPITAL ENCOUNTER (EMERGENCY)
Facility: CLINIC | Age: 54
Discharge: HOME | End: 2024-10-16
Attending: EMERGENCY MEDICINE | Admitting: EMERGENCY MEDICINE
Payer: COMMERCIAL

## 2024-10-16 ENCOUNTER — APPOINTMENT (OUTPATIENT)
Dept: CT IMAGING | Facility: CLINIC | Age: 54
End: 2024-10-16
Attending: EMERGENCY MEDICINE
Payer: COMMERCIAL

## 2024-10-16 VITALS
RESPIRATION RATE: 18 BRPM | TEMPERATURE: 98 F | OXYGEN SATURATION: 94 % | HEART RATE: 93 BPM | SYSTOLIC BLOOD PRESSURE: 145 MMHG | DIASTOLIC BLOOD PRESSURE: 75 MMHG

## 2024-10-16 DIAGNOSIS — R73.9 HYPERGLYCEMIA: ICD-10-CM

## 2024-10-16 DIAGNOSIS — R11.2 NAUSEA VOMITING AND DIARRHEA: ICD-10-CM

## 2024-10-16 DIAGNOSIS — R19.7 NAUSEA VOMITING AND DIARRHEA: ICD-10-CM

## 2024-10-16 DIAGNOSIS — R10.9 ABDOMINAL PAIN, UNSPECIFIED ABDOMINAL LOCATION: ICD-10-CM

## 2024-10-16 DIAGNOSIS — K85.90 ACUTE PANCREATITIS, UNSPECIFIED COMPLICATION STATUS, UNSPECIFIED PANCREATITIS TYPE: ICD-10-CM

## 2024-10-16 DIAGNOSIS — R74.01 TRANSAMINITIS: ICD-10-CM

## 2024-10-16 LAB
ALBUMIN SERPL BCG-MCNC: 4.5 G/DL (ref 3.5–5.2)
ALBUMIN UR-MCNC: 10 MG/DL
ALP SERPL-CCNC: 142 U/L (ref 40–150)
ALT SERPL W P-5'-P-CCNC: 104 U/L (ref 0–50)
ANION GAP SERPL CALCULATED.3IONS-SCNC: 15 MMOL/L (ref 7–15)
APPEARANCE UR: CLEAR
AST SERPL W P-5'-P-CCNC: 63 U/L (ref 0–45)
BASOPHILS # BLD AUTO: 0 10E3/UL (ref 0–0.2)
BASOPHILS NFR BLD AUTO: 0 %
BILIRUB SERPL-MCNC: 0.4 MG/DL
BILIRUB UR QL STRIP: NEGATIVE
BUN SERPL-MCNC: 15 MG/DL (ref 6–20)
CALCIUM SERPL-MCNC: 9.4 MG/DL (ref 8.8–10.4)
CHLORIDE SERPL-SCNC: 95 MMOL/L (ref 98–107)
COLOR UR AUTO: YELLOW
CREAT SERPL-MCNC: 0.92 MG/DL (ref 0.51–0.95)
EGFRCR SERPLBLD CKD-EPI 2021: 75 ML/MIN/1.73M2
EOSINOPHIL # BLD AUTO: 0 10E3/UL (ref 0–0.7)
EOSINOPHIL NFR BLD AUTO: 0 %
ERYTHROCYTE [DISTWIDTH] IN BLOOD BY AUTOMATED COUNT: 12.4 % (ref 10–15)
FLUAV RNA SPEC QL NAA+PROBE: NEGATIVE
FLUBV RNA RESP QL NAA+PROBE: NEGATIVE
GLUCOSE BLDC GLUCOMTR-MCNC: 186 MG/DL (ref 70–99)
GLUCOSE SERPL-MCNC: 189 MG/DL (ref 70–99)
GLUCOSE UR STRIP-MCNC: NEGATIVE MG/DL
HCO3 SERPL-SCNC: 23 MMOL/L (ref 22–29)
HCT VFR BLD AUTO: 40.3 % (ref 35–47)
HGB BLD-MCNC: 13.1 G/DL (ref 11.7–15.7)
HGB UR QL STRIP: NEGATIVE
HOLD SPECIMEN: NORMAL
IMM GRANULOCYTES # BLD: 0.1 10E3/UL
IMM GRANULOCYTES NFR BLD: 0 %
KETONES UR STRIP-MCNC: ABNORMAL MG/DL
LEUKOCYTE ESTERASE UR QL STRIP: NEGATIVE
LIPASE SERPL-CCNC: 61 U/L (ref 13–60)
LYMPHOCYTES # BLD AUTO: 1.8 10E3/UL (ref 0.8–5.3)
LYMPHOCYTES NFR BLD AUTO: 15 %
MCH RBC QN AUTO: 30 PG (ref 26.5–33)
MCHC RBC AUTO-ENTMCNC: 32.5 G/DL (ref 31.5–36.5)
MCV RBC AUTO: 92 FL (ref 78–100)
MONOCYTES # BLD AUTO: 0.5 10E3/UL (ref 0–1.3)
MONOCYTES NFR BLD AUTO: 4 %
MUCOUS THREADS #/AREA URNS LPF: PRESENT /LPF
NEUTROPHILS # BLD AUTO: 10 10E3/UL (ref 1.6–8.3)
NEUTROPHILS NFR BLD AUTO: 81 %
NITRATE UR QL: NEGATIVE
NRBC # BLD AUTO: 0 10E3/UL
NRBC BLD AUTO-RTO: 0 /100
PH UR STRIP: 5.5 [PH] (ref 5–7)
PLATELET # BLD AUTO: 456 10E3/UL (ref 150–450)
POTASSIUM SERPL-SCNC: 3.9 MMOL/L (ref 3.4–5.3)
PROT SERPL-MCNC: 7.6 G/DL (ref 6.4–8.3)
RBC # BLD AUTO: 4.36 10E6/UL (ref 3.8–5.2)
RBC URINE: <1 /HPF
RSV RNA SPEC NAA+PROBE: NEGATIVE
SARS-COV-2 RNA RESP QL NAA+PROBE: POSITIVE
SODIUM SERPL-SCNC: 133 MMOL/L (ref 135–145)
SP GR UR STRIP: 1.03 (ref 1–1.03)
SQUAMOUS EPITHELIAL: 2 /HPF
UROBILINOGEN UR STRIP-MCNC: NORMAL MG/DL
WBC # BLD AUTO: 12.4 10E3/UL (ref 4–11)
WBC URINE: 3 /HPF

## 2024-10-16 PROCEDURE — 96375 TX/PRO/DX INJ NEW DRUG ADDON: CPT

## 2024-10-16 PROCEDURE — 81001 URINALYSIS AUTO W/SCOPE: CPT | Performed by: EMERGENCY MEDICINE

## 2024-10-16 PROCEDURE — 83690 ASSAY OF LIPASE: CPT | Performed by: EMERGENCY MEDICINE

## 2024-10-16 PROCEDURE — 74177 CT ABD & PELVIS W/CONTRAST: CPT

## 2024-10-16 PROCEDURE — 96361 HYDRATE IV INFUSION ADD-ON: CPT

## 2024-10-16 PROCEDURE — 87637 SARSCOV2&INF A&B&RSV AMP PRB: CPT | Performed by: EMERGENCY MEDICINE

## 2024-10-16 PROCEDURE — 99285 EMERGENCY DEPT VISIT HI MDM: CPT | Mod: 25

## 2024-10-16 PROCEDURE — 80053 COMPREHEN METABOLIC PANEL: CPT | Performed by: EMERGENCY MEDICINE

## 2024-10-16 PROCEDURE — 36415 COLL VENOUS BLD VENIPUNCTURE: CPT | Performed by: EMERGENCY MEDICINE

## 2024-10-16 PROCEDURE — 250N000011 HC RX IP 250 OP 636: Performed by: EMERGENCY MEDICINE

## 2024-10-16 PROCEDURE — 96374 THER/PROPH/DIAG INJ IV PUSH: CPT | Mod: 59

## 2024-10-16 PROCEDURE — 85025 COMPLETE CBC W/AUTO DIFF WBC: CPT | Performed by: EMERGENCY MEDICINE

## 2024-10-16 PROCEDURE — 96376 TX/PRO/DX INJ SAME DRUG ADON: CPT

## 2024-10-16 PROCEDURE — 82962 GLUCOSE BLOOD TEST: CPT

## 2024-10-16 PROCEDURE — 258N000003 HC RX IP 258 OP 636: Performed by: EMERGENCY MEDICINE

## 2024-10-16 PROCEDURE — 71045 X-RAY EXAM CHEST 1 VIEW: CPT

## 2024-10-16 RX ORDER — ONDANSETRON 4 MG/1
4 TABLET, ORALLY DISINTEGRATING ORAL EVERY 8 HOURS PRN
Qty: 10 TABLET | Refills: 0 | Status: SHIPPED | OUTPATIENT
Start: 2024-10-16 | End: 2024-10-19

## 2024-10-16 RX ORDER — ONDANSETRON 2 MG/ML
4 INJECTION INTRAMUSCULAR; INTRAVENOUS ONCE
Status: COMPLETED | OUTPATIENT
Start: 2024-10-16 | End: 2024-10-16

## 2024-10-16 RX ORDER — IOPAMIDOL 755 MG/ML
500 INJECTION, SOLUTION INTRAVASCULAR ONCE
Status: COMPLETED | OUTPATIENT
Start: 2024-10-16 | End: 2024-10-16

## 2024-10-16 RX ORDER — HYDROMORPHONE HYDROCHLORIDE 1 MG/ML
0.5 INJECTION, SOLUTION INTRAMUSCULAR; INTRAVENOUS; SUBCUTANEOUS ONCE
Status: COMPLETED | OUTPATIENT
Start: 2024-10-16 | End: 2024-10-16

## 2024-10-16 RX ORDER — FAMOTIDINE 20 MG/1
20 TABLET, FILM COATED ORAL 2 TIMES DAILY
Qty: 20 TABLET | Refills: 0 | Status: SHIPPED | OUTPATIENT
Start: 2024-10-16 | End: 2024-10-26

## 2024-10-16 RX ORDER — OXYCODONE HYDROCHLORIDE 5 MG/1
5 TABLET ORAL EVERY 6 HOURS PRN
Qty: 12 TABLET | Refills: 0 | Status: SHIPPED | OUTPATIENT
Start: 2024-10-16 | End: 2024-10-19

## 2024-10-16 RX ADMIN — ONDANSETRON 4 MG: 2 INJECTION INTRAMUSCULAR; INTRAVENOUS at 01:55

## 2024-10-16 RX ADMIN — SODIUM CHLORIDE 1000 ML: 9 INJECTION, SOLUTION INTRAVENOUS at 01:55

## 2024-10-16 RX ADMIN — HYDROMORPHONE HYDROCHLORIDE 0.5 MG: 1 INJECTION, SOLUTION INTRAMUSCULAR; INTRAVENOUS; SUBCUTANEOUS at 04:06

## 2024-10-16 RX ADMIN — IOPAMIDOL 100 ML: 755 INJECTION, SOLUTION INTRAVENOUS at 03:34

## 2024-10-16 RX ADMIN — FAMOTIDINE 20 MG: 10 INJECTION, SOLUTION INTRAVENOUS at 02:19

## 2024-10-16 RX ADMIN — HYDROMORPHONE HYDROCHLORIDE 0.5 MG: 1 INJECTION, SOLUTION INTRAMUSCULAR; INTRAVENOUS; SUBCUTANEOUS at 02:19

## 2024-10-16 RX ADMIN — ONDANSETRON 4 MG: 2 INJECTION INTRAMUSCULAR; INTRAVENOUS at 03:13

## 2024-10-16 ASSESSMENT — COLUMBIA-SUICIDE SEVERITY RATING SCALE - C-SSRS
1. IN THE PAST MONTH, HAVE YOU WISHED YOU WERE DEAD OR WISHED YOU COULD GO TO SLEEP AND NOT WAKE UP?: NO
2. HAVE YOU ACTUALLY HAD ANY THOUGHTS OF KILLING YOURSELF IN THE PAST MONTH?: NO
6. HAVE YOU EVER DONE ANYTHING, STARTED TO DO ANYTHING, OR PREPARED TO DO ANYTHING TO END YOUR LIFE?: NO

## 2024-10-16 ASSESSMENT — ACTIVITIES OF DAILY LIVING (ADL)
ADLS_ACUITY_SCORE: 36

## 2024-10-16 NOTE — ED PROVIDER NOTES
Emergency Department Note      History of Present Illness     Chief Complaint   Cold Symptoms and Nausea, Vomiting, & Diarrhea      HPI   Kaylee Lopez is a 54 year old female presenting with a myriad of complaints.  Patient reports on 10/7 being diagnosed with COVID-19.  She reports finishing Paxlovid 3 days ago.  Today she felt nauseous and around 10 PM began developing intractable nonbloody vomiting.  She reports roughly 10 episodes.  She reports accompanying lower abdominal pain described as cramping with associated nonbloody diarrhea as well.  No reported fever, current cough, chest pain, dyspnea, dysuria, vaginal complaints.  No known suspicious foods, recent antibiotics or sick contacts other than family members who also recently tested positive for COVID.    Independent Historian   None    Review of External Notes   10/7/24  visit for COVID    Past Medical History     Medical History and Problem List   Past Medical History:   Diagnosis Date    Abnormal Papanicolaou smear of cervix and cervical HPV 2003    Depression     Depressive disorder 15-20 years    Diabetes mellitus     Fibromyalgia     Heart murmur     Inflammatory arthritis     Narcolepsy 11/01/2010    Non-rheumatic tricuspid valve insufficiency     Obesity     Other chronic pain     Pulmonary hypertension (H) 05/02/2010    Reflex sympathetic dystrophy     TRICUSPID VALVE DISEASE (regurg)        Medications   albuterol (PROAIR HFA/PROVENTIL HFA/VENTOLIN HFA) 108 (90 Base) MCG/ACT inhaler  atorvastatin (LIPITOR) 10 MG tablet  blood glucose (NO BRAND SPECIFIED) lancets standard  blood glucose (ONETOUCH ULTRA) test strip  blood glucose monitoring (NO BRAND SPECIFIED) meter device kit  busPIRone (BUSPAR) 15 MG tablet  cetirizine (ZYRTEC) 10 MG tablet  Cholecalciferol (SM VITAMIN D3) 100 MCG (4000 UT) CAPS  citalopram (CELEXA) 40 MG tablet  cyanocobalamin (VITAMIN B-12) 1000 MCG tablet  cyclobenzaprine (FLEXERIL) 10 MG tablet  ferrous gluconate  (FERGON) 324 (38 Fe) MG tablet  lisinopril (ZESTRIL) 5 MG tablet  metFORMIN (GLUCOPHAGE XR) 500 MG 24 hr tablet  MOUNJARO 5 MG/0.5ML pen  ondansetron (ZOFRAN) 4 MG tablet  traMADol HCl 25 MG TABS tablet  traZODone (DESYREL) 100 MG tablet  vitamin C (ASCORBIC ACID) 1000 MG TABS        Surgical History   Past Surgical History:   Procedure Laterality Date    Angiogram  03/2011    No pulmonary hypertension    COLONOSCOPY  01/2008    normal    COLONOSCOPY N/A 06/14/2022    Procedure: COLONOSCOPY (fv);  Surgeon: Perico Warren MD;  Location: [facility]    CYSTOSCOPY N/A 06/05/2019    Procedure: CYSTOSCOPY;  Surgeon: Georgiana Pizarro DO;  Location: [facility] OR    DAVINCI HYSTERECTOMY TOTAL, BILATERAL SALPINGO-OOPHORECTOMY, COMBINED Bilateral 06/05/2019    Procedure: robotic assisted total laparoscopic hysterectomy bilateral salpingectomy and cystoscopy;  Surgeon: Georgiana Pizarro DO;  Location: [facility] OR    DILATION AND CURETTAGE, OPERATIVE HYSTEROSCOPY WITH MORCELLATOR, COMBINED N/A 07/09/2018    Procedure: COMBINED DILATION AND CURETTAGE, OPERATIVE HYSTEROSCOPY WITH MORCELLATOR;  Hysteroscopy, polypectomy  with myosure, dilation and curettage, endometrial biopsy ;  Surgeon: Georgiana Pizarro DO;  Location: [facility] OR    ENT SURGERY      wisdon teeth removal    HYSTERECTOMY, PAP NO LONGER INDICATED      LAPAROSCOPIC CHOLECYSTECTOMY  07/2000    SURGICAL HISTORY OF -       essure procedure for contraception    TCA for abnormal pap  2004       Physical Exam     Patient Vitals for the past 24 hrs:   BP Temp Pulse Resp SpO2   10/16/24 0151 (!) 142/82 -- -- -- 98 %   10/16/24 0036 (!) 164/95 98  F (36.7  C) 104 18 100 %     Physical Exam  Nursing note and vitals reviewed.  Constitutional: Well nourished.  Eyes: Conjunctiva normal.  Pupils are equal, round, and reactive to light.   ENT: Nose normal. Mucous membranes pink and moist.    Neck: Normal range of motion.  CVS: Sinus tachycardia.  Normal heart sounds.   Pulmonary: Lungs  clear to auscultation bilaterally. No wheezes/rales/rhonchi.  GI: Abdomen soft. Lower abdominal tenderness. No rigidity or guarding. No CVA tenderness  MSK: Moves all extremities equally and symmetrically  Neuro: Alert. Follows simple commands.  Skin: Skin is warm and dry. No rash noted.   Psychiatric: Normal affect.       Diagnostics     Lab Results   Labs Ordered and Resulted from Time of ED Arrival to Time of ED Departure   COMPREHENSIVE METABOLIC PANEL - Abnormal       Result Value    Sodium 133 (*)     Potassium 3.9      Carbon Dioxide (CO2) 23      Anion Gap 15      Urea Nitrogen 15.0      Creatinine 0.92      GFR Estimate 75      Calcium 9.4      Chloride 95 (*)     Glucose 189 (*)     Alkaline Phosphatase 142      AST 63 (*)      (*)     Protein Total 7.6      Albumin 4.5      Bilirubin Total 0.4     LIPASE - Abnormal    Lipase 61 (*)    INFLUENZA A/B, RSV, & SARS-COV2 PCR - Abnormal    Influenza A PCR Negative      Influenza B PCR Negative      RSV PCR Negative      SARS CoV2 PCR Positive (*)    ROUTINE UA WITH MICROSCOPIC - Abnormal    Color Urine Yellow      Appearance Urine Clear      Glucose Urine Negative      Bilirubin Urine Negative      Ketones Urine Trace (*)     Specific Gravity Urine 1.026      Blood Urine Negative      pH Urine 5.5      Protein Albumin Urine 10 (*)     Urobilinogen Urine Normal      Nitrite Urine Negative      Leukocyte Esterase Urine Negative      Mucus Urine Present (*)     RBC Urine <1      WBC Urine 3      Squamous Epithelials Urine 2 (*)    CBC WITH PLATELETS AND DIFFERENTIAL - Abnormal    WBC Count 12.4 (*)     RBC Count 4.36      Hemoglobin 13.1      Hematocrit 40.3      MCV 92      MCH 30.0      MCHC 32.5      RDW 12.4      Platelet Count 456 (*)     % Neutrophils 81      % Lymphocytes 15      % Monocytes 4      % Eosinophils 0      % Basophils 0      % Immature Granulocytes 0      NRBCs per 100 WBC 0      Absolute Neutrophils 10.0 (*)     Absolute Lymphocytes 1.8       Absolute Monocytes 0.5      Absolute Eosinophils 0.0      Absolute Basophils 0.0      Absolute Immature Granulocytes 0.1      Absolute NRBCs 0.0     GLUCOSE BY METER - Abnormal    GLUCOSE BY METER POCT 186 (*)    GLUCOSE MONITOR NURSING POCT       Imaging   XR Chest Port 1 View   Final Result   IMPRESSION: Negative chest.      CT Abdomen Pelvis w Contrast   Final Result   IMPRESSION:    1.  No acute findings in the abdomen or pelvis.          Independent Interpretation   CXR: no pneumonia    ED Course      Medications Administered   Medications   sodium chloride 0.9% BOLUS 1,000 mL (1,000 mLs Intravenous $New Bag 10/16/24 0155)   ondansetron (ZOFRAN) injection 4 mg (4 mg Intravenous $Given 10/16/24 0155)   famotidine (PEPCID) injection 20 mg (20 mg Intravenous $Given 10/16/24 0219)   HYDROmorphone (PF) (DILAUDID) injection 0.5 mg (0.5 mg Intravenous $Given 10/16/24 0219)   ondansetron (ZOFRAN) injection 4 mg (4 mg Intravenous $Given 10/16/24 0313)   iopamidol (ISOVUE-370) solution 500 mL (100 mLs Intravenous $Given 10/16/24 0334)   sodium chloride (PF) 0.9% PF flush 100 mL (65 mLs Intravenous $Given 10/16/24 0334)   HYDROmorphone (PF) (DILAUDID) injection 0.5 mg (0.5 mg Intravenous $Given 10/16/24 0406)       Procedures   Procedures     Discussion of Management   None    ED Course        Additional Documentation  None    Medical Decision Making / Diagnosis     CMS Diagnoses: None    MIPS       None    [facility]   Kaylee Lopez is a 54 year old female presenting with predominantly complaints of abdominal pain, nausea, vomiting and diarrhea.  She is mildly tachycardic on arrival though overall nontoxic-appearing.  She did have mild abdominal tenderness though fortunately CT abdomen without evidence of intra-abdominal catastrophe.  Labs with mild leukocytosis though stronger suspicion this is more reactive.  No profound electrolyte derangements.  Noted hyperglycemia though no evidence to suggest DKA.  LFTs and lipase  mildly elevated.   CT abdomen without evidence of intra-abdominal catastrophe.  UA without evidence of infection.  After analgesia and antiemetics, patient reported significant symptom improvement.  Her repeat abdominal exam is benign and I do not feel further advanced workup is warranted at this point in time.  I did discuss quite possible presentation could be secondary to viral etiology as well.  Quite possible presentation is more secondary to pancreatitis as well.  No reported alcohol use.  She is on Mounjaro which case reports have noted episodes of pancreatitis. I recommended p.o. hydration as well as will provide antiemetics and oxycodone as well as Pepcid for breakthrough symptoms given concern for possible developing gastritis.  She plans to follow-up closely with PCP.  Return precautions given    Disposition   The patient was discharged.     Diagnosis     ICD-10-CM    1. Abdominal pain, unspecified abdominal location  R10.9       2. Nausea vomiting and diarrhea  R11.2     R19.7       3. Acute pancreatitis, unspecified complication status, unspecified pancreatitis type  K85.90       4. Transaminitis  R74.01       5. Hyperglycemia  R73.9            Discharge Medications   New Prescriptions    FAMOTIDINE (PEPCID) 20 MG TABLET    Take 1 tablet (20 mg) by mouth 2 times daily for 10 days.    ONDANSETRON (ZOFRAN ODT) 4 MG ODT TAB    Take 1 tablet (4 mg) by mouth every 8 hours as needed for nausea.    OXYCODONE (ROXICODONE) 5 MG TABLET    Take 1 tablet (5 mg) by mouth every 6 hours as needed for breakthrough pain.         DO Abril Santiago Lindsey E, DO  10/16/24 0515

## 2024-10-16 NOTE — ED TRIAGE NOTES
54 year old female arriving by private car c/o n/v and diarrhea. Patient states that her symptoms started today. Patient states that she did test positive for covid last week but her symptoms today are different than when she had covid. Patient states that she also feels dizzy and has abdomen pain 3/10. Patient states that she took zofran around 0000 today.       Triage Assessment (Adult)       Row Name 10/16/24 0038          Triage Assessment    Airway WDL WDL        Respiratory WDL    Respiratory WDL WDL        Skin Circulation/Temperature WDL    Skin Circulation/Temperature WDL WDL        Cardiac WDL    Cardiac WDL WDL        Peripheral/Neurovascular WDL    Peripheral Neurovascular WDL WDL

## 2024-10-16 NOTE — DISCHARGE INSTRUCTIONS
Possible your mounjaro may be causing presentation. Please followup closely with your PCP. Return for fever, intractable vomiting, bloody vomit/stool, worsening pain

## 2024-10-17 ENCOUNTER — PATIENT OUTREACH (OUTPATIENT)
Dept: FAMILY MEDICINE | Facility: CLINIC | Age: 54
End: 2024-10-17
Payer: COMMERCIAL

## 2024-10-17 NOTE — TELEPHONE ENCOUNTER
IP F/U  Attempt # 1, left voicemail to call nursing triage at the clinic.  Date: 10/16/24  Diagnosis: Abdominal pain, unspecified abdominal location ,Nausea vomiting and diarrhea, Acute pancreatitis, unspecified complication status, unspecified pancreatitis type, Transaminitis, Hyperglycemia   Is patient active in care coordination? No  Was patient in TCU? No    Next 5 appointments (look out 90 days)      Nov 22, 2024 9:00 AM  (Arrive by 8:40 AM)  Provider Visit with Moriah Hernandez MD  [facility] ([facility] ) [address] [facility] [id number] [phone]

## 2024-10-18 NOTE — TELEPHONE ENCOUNTER
S-(situation): patient inquiring if PCP could see her next week for hospital follow-up    B-(background): Patient was seen 10/16/24 for pancreatitis in ER, thought to be related to Mounjaro     A-(assessment): patient scheduled with alternative provider in clinic, but wanted to see if PCP could see patient instead     Appointments in Next Year      Oct 22, 2024 9:00 AM  (Arrive by 8:45 AM)  ED/Hospital Follow Up with Marlee Doshi MD  [facility] ([facility] ) [phone]       R-(recommendations): PCP please advise if able to see patient for hospital follow-up or should patient keep appointment with Dr. Doshi?    Krystina RN 9:22 AM October 18, 2024   [facility]

## 2024-10-18 NOTE — TELEPHONE ENCOUNTER
ED / Discharge Outreach Protocol    Patient Contact    Attempt # 2    Was call answered?  No.  Left message on voicemail with information to triage.  My chart sent as well    Roseanne De Dios RN

## 2024-10-18 NOTE — TELEPHONE ENCOUNTER
Transitions of Care Outreach  Chief Complaint   Patient presents with    Hospital F/U       Most Recent Admission Date: 10/16/2024   Most Recent Admission Diagnosis:      Most Recent Discharge Date: 10/16/2024   Most Recent Discharge Diagnosis: Abdominal pain, unspecified abdominal location - R10.9  Nausea vomiting and diarrhea - R11.2, R19.7  Acute pancreatitis, unspecified complication status, unspecified pancreatitis type - K85.90  Transaminitis - R74.01  Hyperglycemia - R73.9     Transitions of Care Assessment    Discharge Assessment  How are you doing now that you are home?: pt reports that she is improving, but does notice that she worsens as the day progresses. Still having some nausea.  How are your symptoms? (Red Flag symptoms escalate to triage hotline per guidelines): Improved  Do you know how to contact your clinic care team if you have future questions or changes to your health status? : Yes  Does the patient have their discharge instructions? : Yes  Does the patient have questions regarding their discharge instructions? : No  Were you started on any new medications or were there changes to any of your previous medications? : Yes  Does the patient have all of their medications?: Yes  Do you have questions regarding any of your medications? : No  Do you have all of your needed medical supplies or equipment (DME)?  (i.e. oxygen tank, CPAP, cane, etc.): Yes    Follow up Plan     Discharge Follow-Up  Discharge follow up appointment scheduled in alignment with recommended follow up timeframe or Transitions of Risk Category? (Low = within 30 days; Moderate= within 14 days; High= within 7 days): Yes  Discharge Follow Up Appointment Date: 10/22/24  Discharge Follow Up Appointment Scheduled with?: Primary Care Provider    Future Appointments   Date Time Provider Department Center   10/22/2024  9:00 AM Marlee Doshi MD [facility]   11/22/2024  9:00 AM Moriah Hernandez MD [facility]   4/4/2025  9:00 AM David  Moriah Pat MD [facility]       Outpatient Plan as outlined on AVS reviewed with patient.    For any urgent concerns, please contact our 24 hour nurse triage line: [phone] ([phone])       Krystina Maier RN

## 2024-10-21 ENCOUNTER — OFFICE VISIT (OUTPATIENT)
Dept: FAMILY MEDICINE | Facility: CLINIC | Age: 54
End: 2024-10-21
Payer: COMMERCIAL

## 2024-10-21 VITALS
RESPIRATION RATE: 16 BRPM | HEIGHT: 64 IN | WEIGHT: 228.6 LBS | BODY MASS INDEX: 39.03 KG/M2 | OXYGEN SATURATION: 98 % | DIASTOLIC BLOOD PRESSURE: 82 MMHG | SYSTOLIC BLOOD PRESSURE: 137 MMHG | HEART RATE: 96 BPM | TEMPERATURE: 98.1 F

## 2024-10-21 DIAGNOSIS — R74.01 NONSPECIFIC ELEVATION OF LEVELS OF TRANSAMINASE AND LACTIC ACID DEHYDROGENASE (LDH): ICD-10-CM

## 2024-10-21 DIAGNOSIS — R10.11 RUQ ABDOMINAL PAIN: Primary | ICD-10-CM

## 2024-10-21 DIAGNOSIS — R74.02 NONSPECIFIC ELEVATION OF LEVELS OF TRANSAMINASE AND LACTIC ACID DEHYDROGENASE (LDH): ICD-10-CM

## 2024-10-21 LAB
ALBUMIN SERPL BCG-MCNC: 4.3 G/DL (ref 3.5–5.2)
ALP SERPL-CCNC: 132 U/L (ref 40–150)
ALT SERPL W P-5'-P-CCNC: 148 U/L (ref 0–50)
AST SERPL W P-5'-P-CCNC: 42 U/L (ref 0–45)
BASOPHILS # BLD AUTO: 0 10E3/UL (ref 0–0.2)
BASOPHILS NFR BLD AUTO: 1 %
BILIRUB DIRECT SERPL-MCNC: <0.2 MG/DL (ref 0–0.3)
BILIRUB SERPL-MCNC: 0.5 MG/DL
EOSINOPHIL # BLD AUTO: 0.2 10E3/UL (ref 0–0.7)
EOSINOPHIL NFR BLD AUTO: 3 %
ERYTHROCYTE [DISTWIDTH] IN BLOOD BY AUTOMATED COUNT: 12 % (ref 10–15)
HCT VFR BLD AUTO: 41.3 % (ref 35–47)
HGB BLD-MCNC: 13.3 G/DL (ref 11.7–15.7)
IMM GRANULOCYTES # BLD: 0 10E3/UL
IMM GRANULOCYTES NFR BLD: 0 %
LIPASE SERPL-CCNC: 37 U/L (ref 13–60)
LYMPHOCYTES # BLD AUTO: 2.3 10E3/UL (ref 0.8–5.3)
LYMPHOCYTES NFR BLD AUTO: 39 %
MCH RBC QN AUTO: 30.2 PG (ref 26.5–33)
MCHC RBC AUTO-ENTMCNC: 32.2 G/DL (ref 31.5–36.5)
MCV RBC AUTO: 94 FL (ref 78–100)
MONOCYTES # BLD AUTO: 0.4 10E3/UL (ref 0–1.3)
MONOCYTES NFR BLD AUTO: 7 %
NEUTROPHILS # BLD AUTO: 3 10E3/UL (ref 1.6–8.3)
NEUTROPHILS NFR BLD AUTO: 51 %
PLATELET # BLD AUTO: 474 10E3/UL (ref 150–450)
PROT SERPL-MCNC: 7.4 G/DL (ref 6.4–8.3)
RBC # BLD AUTO: 4.4 10E6/UL (ref 3.8–5.2)
WBC # BLD AUTO: 6 10E3/UL (ref 4–11)

## 2024-10-21 PROCEDURE — 80076 HEPATIC FUNCTION PANEL: CPT | Performed by: FAMILY MEDICINE

## 2024-10-21 PROCEDURE — 36415 COLL VENOUS BLD VENIPUNCTURE: CPT | Performed by: FAMILY MEDICINE

## 2024-10-21 PROCEDURE — 85025 COMPLETE CBC W/AUTO DIFF WBC: CPT | Performed by: FAMILY MEDICINE

## 2024-10-21 PROCEDURE — 83690 ASSAY OF LIPASE: CPT | Performed by: FAMILY MEDICINE

## 2024-10-21 PROCEDURE — 99214 OFFICE O/P EST MOD 30 MIN: CPT | Performed by: FAMILY MEDICINE

## 2024-10-21 NOTE — PROGRESS NOTES
"  Assessment & Plan     RUQ abdominal pain - overall improved, suspect related to recent COVID infection.     Nonspecific elevation of levels of transaminase and lactic acid dehydrogenase (LDH) - suspect related to recent COVID infection, surveillance labs today, suspect labs will have improved  - CBC with platelets and differential; Future  - Hepatic panel (Albumin, ALT, AST, Bili, Alk Phos, TP); Future  - Lipase; Future  - CBC with platelets and differential  - Hepatic panel (Albumin, ALT, AST, Bili, Alk Phos, TP)  - Lipase      MED REC REQUIRED  Post Medication Reconciliation Status:  Discharge medications reconciled, continue medications without change  BMI  Estimated body mass index is 39.24 kg/m  as calculated from the following:    Height as of this encounter: 1.626 m (5' 4\").    Weight as of this encounter: 103.7 kg (228 lb 9.6 oz).         Lisandro Page is a 54 year old, presenting for the following health issues:  ER F/U (- Abdominal Pain; Vomiting )        10/21/2024    10:48 AM   Additional Questions   Roomed by ROGER Velasquez   Accompanied by Self     HPI   ED/UC Followup:    Facility:  [facility]  Date of visit: 10/16/2024 (4 hours)   Reason for visit: Abdominal pain; Nausea, Vomiting, & Diarrhea   Current Status: Improvement      One episode of gnawing RUQ abdominal pain since her ER trip - subsided on its own. Overall feeling better.       Review of Systems  Constitutional, HEENT, cardiovascular, pulmonary, gi and gu systems are negative, except as otherwise noted.      Objective    /82 (BP Location: Right arm, Patient Position: Sitting, Cuff Size: Adult Large)   Pulse 96   Temp 98.1  F (36.7  C) (Oral)   Resp 16   Ht 1.626 m (5' 4\")   Wt 103.7 kg (228 lb 9.6 oz)   LMP 05/14/2019 (Approximate)   SpO2 98%   Breastfeeding No   BMI 39.24 kg/m    Body mass index is 39.24 kg/m .  Physical Exam   GENERAL: alert and no distress  PSYCH: mentation appears " normal, affect normal/bright          Signed Electronically by: Moriah Hernandez MD

## 2024-10-22 DIAGNOSIS — R74.01 NONSPECIFIC ELEVATION OF LEVELS OF TRANSAMINASE AND LACTIC ACID DEHYDROGENASE (LDH): Primary | ICD-10-CM

## 2024-10-22 DIAGNOSIS — R74.02 NONSPECIFIC ELEVATION OF LEVELS OF TRANSAMINASE AND LACTIC ACID DEHYDROGENASE (LDH): Primary | ICD-10-CM

## 2024-11-04 ENCOUNTER — LAB (OUTPATIENT)
Dept: LAB | Facility: CLINIC | Age: 54
End: 2024-11-04
Payer: COMMERCIAL

## 2024-11-04 DIAGNOSIS — R74.02 NONSPECIFIC ELEVATION OF LEVELS OF TRANSAMINASE AND LACTIC ACID DEHYDROGENASE (LDH): ICD-10-CM

## 2024-11-04 DIAGNOSIS — R74.01 NONSPECIFIC ELEVATION OF LEVELS OF TRANSAMINASE AND LACTIC ACID DEHYDROGENASE (LDH): ICD-10-CM

## 2024-11-04 LAB
ALBUMIN SERPL BCG-MCNC: 3.8 G/DL (ref 3.5–5.2)
ALP SERPL-CCNC: 120 U/L (ref 40–150)
ALT SERPL W P-5'-P-CCNC: 14 U/L (ref 0–50)
AST SERPL W P-5'-P-CCNC: 15 U/L (ref 0–45)
BILIRUB DIRECT SERPL-MCNC: <0.2 MG/DL (ref 0–0.3)
BILIRUB SERPL-MCNC: 0.3 MG/DL
PROT SERPL-MCNC: 6.5 G/DL (ref 6.4–8.3)

## 2024-11-04 PROCEDURE — 36415 COLL VENOUS BLD VENIPUNCTURE: CPT

## 2024-11-04 PROCEDURE — 80076 HEPATIC FUNCTION PANEL: CPT

## 2024-11-14 DIAGNOSIS — E11.9 TYPE 2 DIABETES MELLITUS WITHOUT COMPLICATION, WITHOUT LONG-TERM CURRENT USE OF INSULIN (H): ICD-10-CM

## 2024-11-14 RX ORDER — TIRZEPATIDE 5 MG/.5ML
INJECTION, SOLUTION SUBCUTANEOUS
Qty: 2 ML | Refills: 2 | Status: SHIPPED | OUTPATIENT
Start: 2024-11-14

## 2024-11-18 ENCOUNTER — TRANSFERRED RECORDS (OUTPATIENT)
Dept: MULTI SPECIALTY CLINIC | Facility: CLINIC | Age: 54
End: 2024-11-18
Payer: COMMERCIAL

## 2024-11-18 LAB — RETINOPATHY: NORMAL

## 2025-02-03 DIAGNOSIS — F41.9 ANXIETY: ICD-10-CM

## 2025-02-03 DIAGNOSIS — F33.42 RECURRENT MAJOR DEPRESSIVE DISORDER, IN FULL REMISSION: ICD-10-CM

## 2025-02-03 RX ORDER — BUSPIRONE HYDROCHLORIDE 15 MG/1
TABLET ORAL
Qty: 180 TABLET | Refills: 3 | OUTPATIENT
Start: 2025-02-03

## 2025-02-03 RX ORDER — CITALOPRAM HYDROBROMIDE 40 MG/1
40 TABLET ORAL DAILY
Qty: 90 TABLET | Refills: 3 | OUTPATIENT
Start: 2025-02-03

## 2025-02-18 DIAGNOSIS — E61.1 IRON DEFICIENCY: ICD-10-CM

## 2025-02-19 RX ORDER — FERROUS GLUCONATE 324(38)MG
324 TABLET ORAL
Qty: 90 TABLET | Refills: 1 | Status: SHIPPED | OUTPATIENT
Start: 2025-02-19

## 2025-03-19 ENCOUNTER — TELEPHONE (OUTPATIENT)
Dept: CARDIOLOGY | Facility: CLINIC | Age: 55
End: 2025-03-19
Payer: COMMERCIAL

## 2025-03-19 DIAGNOSIS — E78.5 HYPERLIPIDEMIA LDL GOAL <100: ICD-10-CM

## 2025-03-19 DIAGNOSIS — I36.1 NON-RHEUMATIC TRICUSPID VALVE INSUFFICIENCY: Primary | ICD-10-CM

## 2025-03-19 NOTE — TELEPHONE ENCOUNTER
[facility] Call Center    Phone Message    May a detailed message be left on voicemail: yes    Reason for Call: Patient called requesting for Dr. Ag to place an order in for an echo. Please place the order in and call back to further coordinate.    Thank you!  [facility]

## 2025-03-19 NOTE — TELEPHONE ENCOUNTER
Called pt, she does plan to follow up with cardiology, states she used to get a letter when it was time to schedule her annual follow which she states she has not been getting. Pt advised that Dr. Ag is out on medical leave & is booking through August, so she might need to see another cardiologist or HELEN. Pt verbalized understanding & states she will scheduling tomorrow. Leah CABALLERO

## 2025-03-23 NOTE — TELEPHONE ENCOUNTER
No care due was identified.  [facility] Embedded Care Due Messages. Reference number: [id number].   3/23/2025 10:25:00 AM CDT   Patient is able to wait until appointment.

## 2025-04-01 ENCOUNTER — MYC REFILL (OUTPATIENT)
Dept: FAMILY MEDICINE | Facility: CLINIC | Age: 55
End: 2025-04-01
Payer: COMMERCIAL

## 2025-04-01 DIAGNOSIS — R11.0 NAUSEA: ICD-10-CM

## 2025-04-01 DIAGNOSIS — M79.7 FIBROMYALGIA: ICD-10-CM

## 2025-04-01 DIAGNOSIS — F11.90 CHRONIC, CONTINUOUS USE OF OPIOIDS: ICD-10-CM

## 2025-04-01 RX ORDER — ONDANSETRON 4 MG/1
TABLET, FILM COATED ORAL
Qty: 30 TABLET | Refills: 1 | Status: SHIPPED | OUTPATIENT
Start: 2025-04-01

## 2025-04-02 RX ORDER — TRAMADOL HYDROCHLORIDE 50 MG/1
50 TABLET ORAL 2 TIMES DAILY PRN
Qty: 60 TABLET | Refills: 0 | Status: SHIPPED | OUTPATIENT
Start: 2025-04-02

## 2025-04-07 DIAGNOSIS — F33.42 RECURRENT MAJOR DEPRESSIVE DISORDER, IN FULL REMISSION: ICD-10-CM

## 2025-04-07 RX ORDER — CITALOPRAM HYDROBROMIDE 40 MG/1
40 TABLET ORAL DAILY
Qty: 30 TABLET | Refills: 0 | Status: SHIPPED | OUTPATIENT
Start: 2025-04-07

## 2025-05-03 ENCOUNTER — HEALTH MAINTENANCE LETTER (OUTPATIENT)
Age: 55
End: 2025-05-03

## 2025-05-05 ENCOUNTER — MYC REFILL (OUTPATIENT)
Dept: FAMILY MEDICINE | Facility: CLINIC | Age: 55
End: 2025-05-05
Payer: COMMERCIAL

## 2025-05-05 DIAGNOSIS — E78.5 HYPERLIPIDEMIA LDL GOAL <100: ICD-10-CM

## 2025-05-05 DIAGNOSIS — F33.42 RECURRENT MAJOR DEPRESSIVE DISORDER, IN FULL REMISSION: ICD-10-CM

## 2025-05-05 DIAGNOSIS — I10 HYPERTENSION GOAL BP (BLOOD PRESSURE) < 130/80: ICD-10-CM

## 2025-05-05 RX ORDER — LISINOPRIL 5 MG/1
5 TABLET ORAL DAILY
Qty: 90 TABLET | Refills: 0 | Status: SHIPPED | OUTPATIENT
Start: 2025-05-05

## 2025-05-05 RX ORDER — ATORVASTATIN CALCIUM 10 MG/1
10 TABLET, FILM COATED ORAL DAILY
Qty: 90 TABLET | Refills: 0 | Status: SHIPPED | OUTPATIENT
Start: 2025-05-05

## 2025-05-06 DIAGNOSIS — G47.429 NARCOLEPSY DUE TO UNDERLYING CONDITION WITHOUT CATAPLEXY: ICD-10-CM

## 2025-05-06 RX ORDER — CITALOPRAM HYDROBROMIDE 40 MG/1
40 TABLET ORAL DAILY
Qty: 30 TABLET | Refills: 0 | OUTPATIENT
Start: 2025-05-06

## 2025-05-07 RX ORDER — TRAZODONE HYDROCHLORIDE 100 MG/1
150-200 TABLET ORAL AT BEDTIME
Qty: 180 TABLET | Refills: 1 | Status: SHIPPED | OUTPATIENT
Start: 2025-05-07

## 2025-05-07 RX ORDER — CITALOPRAM HYDROBROMIDE 40 MG/1
40 TABLET ORAL DAILY
Qty: 90 TABLET | Refills: 0 | Status: SHIPPED | OUTPATIENT
Start: 2025-05-07

## 2025-05-24 ENCOUNTER — HEALTH MAINTENANCE LETTER (OUTPATIENT)
Age: 55
End: 2025-05-24

## 2025-06-02 ENCOUNTER — HOSPITAL ENCOUNTER (OUTPATIENT)
Dept: CARDIOLOGY | Facility: CLINIC | Age: 55
Discharge: HOME | End: 2025-06-02
Attending: INTERNAL MEDICINE | Admitting: INTERNAL MEDICINE
Payer: COMMERCIAL

## 2025-06-02 DIAGNOSIS — I36.1 NON-RHEUMATIC TRICUSPID VALVE INSUFFICIENCY: ICD-10-CM

## 2025-06-02 DIAGNOSIS — E78.5 HYPERLIPIDEMIA LDL GOAL <100: ICD-10-CM

## 2025-06-02 LAB — LVEF ECHO: NORMAL

## 2025-06-02 PROCEDURE — 93306 TTE W/DOPPLER COMPLETE: CPT

## 2025-06-02 PROCEDURE — 93306 TTE W/DOPPLER COMPLETE: CPT | Mod: 26 | Performed by: INTERNAL MEDICINE

## 2025-06-03 ENCOUNTER — RESULTS FOLLOW-UP (OUTPATIENT)
Dept: CARDIOLOGY | Facility: CLINIC | Age: 55
End: 2025-06-03

## 2025-06-17 ENCOUNTER — LAB (OUTPATIENT)
Dept: LAB | Facility: CLINIC | Age: 55
End: 2025-06-17
Payer: COMMERCIAL

## 2025-06-17 DIAGNOSIS — I36.1 NON-RHEUMATIC TRICUSPID VALVE INSUFFICIENCY: ICD-10-CM

## 2025-06-17 DIAGNOSIS — E78.5 HYPERLIPIDEMIA LDL GOAL <100: ICD-10-CM

## 2025-06-17 DIAGNOSIS — F11.90 CHRONIC, CONTINUOUS USE OF OPIOIDS: ICD-10-CM

## 2025-06-17 LAB
ALT SERPL W P-5'-P-CCNC: 15 U/L (ref 0–50)
ANION GAP SERPL CALCULATED.3IONS-SCNC: 12 MMOL/L (ref 7–15)
BUN SERPL-MCNC: 11.5 MG/DL (ref 6–20)
CALCIUM SERPL-MCNC: 9.5 MG/DL (ref 8.8–10.4)
CHLORIDE SERPL-SCNC: 102 MMOL/L (ref 98–107)
CHOLEST SERPL-MCNC: 134 MG/DL
CREAT SERPL-MCNC: 0.9 MG/DL (ref 0.51–0.95)
CREAT UR-MCNC: 174 MG/DL
EGFRCR SERPLBLD CKD-EPI 2021: 76 ML/MIN/1.73M2
FASTING STATUS PATIENT QL REPORTED: YES
GLUCOSE SERPL-MCNC: 141 MG/DL (ref 70–99)
HCO3 SERPL-SCNC: 29 MMOL/L (ref 22–29)
HDLC SERPL-MCNC: 60 MG/DL
LDLC SERPL CALC-MCNC: 51 MG/DL
NONHDLC SERPL-MCNC: 74 MG/DL
POTASSIUM SERPL-SCNC: 4.6 MMOL/L (ref 3.4–5.3)
SODIUM SERPL-SCNC: 143 MMOL/L (ref 135–145)
TRIGL SERPL-MCNC: 114 MG/DL

## 2025-06-17 PROCEDURE — 80048 BASIC METABOLIC PNL TOTAL CA: CPT

## 2025-06-17 PROCEDURE — 84460 ALANINE AMINO (ALT) (SGPT): CPT

## 2025-06-17 PROCEDURE — 36415 COLL VENOUS BLD VENIPUNCTURE: CPT

## 2025-06-17 PROCEDURE — 80061 LIPID PANEL: CPT

## 2025-06-19 LAB
N-NORTRAMADOL/CREAT UR CFM: 65 NG/MG {CREAT}
O-NORTRAMADOL UR CFM-MCNC: 113 NG/ML
TRAMADOL CTO UR CFM-MCNC: 113 NG/ML
TRAMADOL/CREAT UR: 65 NG/MG {CREAT}

## 2025-06-24 ENCOUNTER — OFFICE VISIT (OUTPATIENT)
Dept: CARDIOLOGY | Facility: CLINIC | Age: 55
End: 2025-06-24
Payer: COMMERCIAL

## 2025-06-24 VITALS
HEART RATE: 84 BPM | OXYGEN SATURATION: 99 % | DIASTOLIC BLOOD PRESSURE: 72 MMHG | HEIGHT: 64 IN | WEIGHT: 226.6 LBS | BODY MASS INDEX: 38.68 KG/M2 | SYSTOLIC BLOOD PRESSURE: 116 MMHG

## 2025-06-24 DIAGNOSIS — E78.5 HYPERLIPIDEMIA LDL GOAL <100: ICD-10-CM

## 2025-06-24 DIAGNOSIS — I36.1 NON-RHEUMATIC TRICUSPID VALVE INSUFFICIENCY: Primary | ICD-10-CM

## 2025-06-24 DIAGNOSIS — R06.09 DOE (DYSPNEA ON EXERTION): ICD-10-CM

## 2025-06-24 NOTE — LETTER
6/24/2025    Moriah Hernandez MD  [id number]  [facility] [address]    RE: Kaylee Lopez       Dear Colleague,     I had the pleasure of seeing Kaylee Lopez in the [facility].  HPI and Plan:   Ms Lopez is a very pleasant 55-year-old female with history of tricuspid digitation pulmonary hypertension who has followed Dr. Ariel Hills for several years.  Today I am seeing him in his absence.  Patient is coming for routine follow-up.  She has a recent echocardiogram that showed normal LV systolic function probably borderline dilated RV as it was not well-visualized and normal RV systolic function with mild to moderate tricuspid regurgitation with pulm hypertension with RVSP of 42 mg mercury plus RA.  Patient tells me overall health wise she feels fine.  She has diabetes and hypertension and is on lisinopril and metformin and Mounjaro.  She has lost about 20 pounds of weight in last few years.  Looking back at cardiology notes sleeplike she was diagnosed with significant pulm hypertension about 14 years ago but on right heart catheterization pressures were actually normal.  Over the years she has fluctuated in terms of tricuspid regurgitation from mild to moderate and pulm hypertension from low 30s PA systolic pressure to low 40s.  In 2008 looks like she had a sleep study and was not found to have sleep apnea.  She does now acknowledge that she snores and sometimes feels sleepy during the daytime despite full night sleep.  She does not use any tobacco.  She had PFTs done several years ago which were normal.  She had also CT chest done without any evidence of PE.  She does get some shortness of breath with exertion but this is stable longstanding.  No chest discomfort.  EKG today shows sinus rhythm.    Assessment and plan  Tricuspid regurgitation, mild to moderate overall appears to be fluctuating over several years but within that fluctuation appears to be stable.  Pulmonary  hypertension overall mild.  PA systolic estimated pressure has varied between low 30s to low 40s.  No evidence of PE in the past.  Inferolateral catheterization several years ago showed normal PA pressures.  It was felt this could have been due to underlying obesity and possible sleep apnea which I think is quite possible.  It has been more than 16 years that patient had sleep study.  She has some symptoms suggestive of sleep apnea.  I recommend reviewing sleep medicine and possible sleep study.  Dyspnea exertion which is longstanding stable she has CAD risk factors of hypertension diabetes.  I recommend cardiac MRI stress perfusion this will also help us evaluate RV which was not very well-visualized in the recent echo.  Educated patient not to consume any caffeine for 12 asper for the stress test.  If cardiac event/perfusion looks okay I recommend she can follow-up in a year with her primary cardiologist Dr. Ariel Hills with repeat echocardiogram.    Recommendations  Cardiac MRI stress perfusion  Sleep medicine evaluation  Tentative follow-up with HELEN in about 1 to 2 months, if cardiac system reperfusion looks okay she may even cancel the follow-up with HELEN  Follow-up in 1 year with Dr. Arile Hills her primary cardiologist with an echocardiogram    Orders Placed This Encounter   Procedures     Adult Sleep Eval & Management  Referral     Follow-Up with Cardiology     Follow-Up with Cardiology HELEN     EKG 12-lead complete w/read - Clinics (performed today)     Echocardiogram Complete       No orders of the defined types were placed in this encounter.      There are no discontinued medications.      Encounter Diagnoses   Name Primary?     Non-rheumatic tricuspid valve insufficiency Yes     Hyperlipidemia LDL goal <100      CAMARILLO (dyspnea on exertion)        CURRENT MEDICATIONS:  Current Outpatient Medications   Medication Sig Dispense Refill     albuterol (PROAIR HFA/PROVENTIL HFA/VENTOLIN HFA) 108 (90 Base)  MCG/ACT inhaler Inhale 2 puffs into the lungs every 4 hours as needed for shortness of breath or wheezing 18 g 4     atorvastatin (LIPITOR) 10 MG tablet TAKE 1 TABLET (10 MG) BY MOUTH DAILY. 90 tablet 0     busPIRone (BUSPAR) 15 MG tablet TAKE 1 TABLET BY MOUTH TWICE A  tablet 0     cetirizine (ZYRTEC) 10 MG tablet Take 10 mg by mouth daily as needed for allergies       Cholecalciferol ( VITAMIN D3) 100 MCG (4000 UT) CAPS        citalopram (CELEXA) 40 MG tablet Take 1 tablet (40 mg) by mouth daily. 90 tablet 0     cyanocobalamin (VITAMIN B-12) 1000 MCG tablet TAKE 1 TABLET BY MOUTH DAILY 90 tablet 0     cyclobenzaprine (FLEXERIL) 10 MG tablet Take 1 tablet (10 mg) by mouth 3 times daily as needed for muscle spasms. 270 tablet 3     ferrous gluconate (FERGON) 324 (38 Fe) MG tablet TAKE 1 TABLET (324 MG) BY MOUTH DAILY (WITH BREAKFAST) 90 tablet 1     lisinopril (ZESTRIL) 5 MG tablet Take 1 tablet (5 mg) by mouth daily. 90 tablet 0     metFORMIN (GLUCOPHAGE XR) 500 MG 24 hr tablet Take 4 tablets (2,000 mg) by mouth daily (with dinner). 360 tablet 1     MOUNJARO 5 MG/0.5ML SOAJ auto-injector pen Inject 0.5 mLs (5 mg) subcutaneously once a week. 6 mL 1     ondansetron (ZOFRAN) 4 MG tablet TAKE 1-2 TABLETS BY MOUTH EVERY 8 HOURS AS NEEDED FOR NAUSEA 30 tablet 1     traMADol (ULTRAM) 50 MG tablet Take 1 tablet (50 mg) by mouth 2 times daily as needed for severe pain. 60 tablet 0     traZODone (DESYREL) 100 MG tablet TAKE 1.5-2 TABLETS (150-200 MG) BY MOUTH AT BEDTIME 180 tablet 1     vitamin C (ASCORBIC ACID) 1000 MG TABS        blood glucose (NO BRAND SPECIFIED) lancets standard Use to test blood sugar 2 times daily or as directed. 100 each 3     blood glucose (ONETOUCH ULTRA) test strip 100 strips by In Vitro route daily Use to test blood sugar 1 times daily or as directed. 100 strip 1     blood glucose monitoring (NO BRAND SPECIFIED) meter device kit Use to test blood sugar 1 times daily or as directed. Please  give her all needed supplies with prn refills. 1 kit 0     traMADol HCl 25 MG TABS tablet Take 2 tablets (50 mg) by mouth 2 times daily as needed (moderate to severe pain). 120 tablet 0       ALLERGIES     Allergies   Allergen Reactions     Morphine And Codeine Nausea     Nausea (with tylenol/codeine)       PAST MEDICAL HISTORY:  Past Medical History:   Diagnosis Date     Abnormal Papanicolaou smear of cervix and cervical HPV 2003    late 2003, early 2004; treated with TCA     Depression      Depressive disorder 15-20 years     Diabetes mellitus      Fibromyalgia      Heart murmur     tricuspid valve disorder     Inflammatory arthritis     ?; has been discharged from Rheumatology     Narcolepsy 11/01/2010    doing well without medication.     Non-rheumatic tricuspid valve insufficiency     antibiotic prophylax Problem list name updated by automated process. Provider to review      Obesity      Other chronic pain      Pulmonary hypertension (H) 05/02/2010    on ECHO, but normal right heart cath 2011     Reflex sympathetic dystrophy     right foot ; related to stress fracture     TRICUSPID VALVE DISEASE (regurg)     sees Cardiology        PAST SURGICAL HISTORY:  Past Surgical History:   Procedure Laterality Date     Angiogram  03/2011    No pulmonary hypertension     COLONOSCOPY  01/2008    normal     COLONOSCOPY N/A 06/14/2022    Procedure: COLONOSCOPY (fv);  Surgeon: Perico Warren MD;  Location:  GI     CYSTOSCOPY N/A 06/05/2019    Procedure: CYSTOSCOPY;  Surgeon: Georgiana Pizarro DO;  Location:  OR     DAVINCI HYSTERECTOMY TOTAL, BILATERAL SALPINGO-OOPHORECTOMY, COMBINED Bilateral 06/05/2019    Procedure: robotic assisted total laparoscopic hysterectomy bilateral salpingectomy and cystoscopy;  Surgeon: Georgiana Pizarro DO;  Location:  OR     DILATION AND CURETTAGE, OPERATIVE HYSTEROSCOPY WITH MORCELLATOR, COMBINED N/A 07/09/2018    Procedure: COMBINED DILATION AND CURETTAGE, OPERATIVE  HYSTEROSCOPY WITH MORCELLATOR;  Hysteroscopy, polypectomy  with myosure, dilation and curettage, endometrial biopsy ;  Surgeon: Georgiana Pizarro DO;  Location: [facility] OR     ENT SURGERY      wisdon teeth removal     HYSTERECTOMY, PAP NO LONGER INDICATED       LAPAROSCOPIC CHOLECYSTECTOMY  2000     SURGICAL HISTORY OF -       essure procedure for contraception     TCA for abnormal pap         FAMILY HISTORY:  Family History   Problem Relation Age of Onset     Respiratory Father         asthma     Arthritis Father         hips     Depression Father          by suicide in      Alcohol/Drug Father      Substance Abuse Father      Asthma Father      Diabetes Paternal Grandfather      Cerebrovascular Disease Paternal Grandfather          at age 50/51     Hypertension Paternal Grandfather      Cancer - colorectal Paternal Grandmother      Arthritis Paternal Grandmother         hips     Colon Cancer Paternal Grandmother      Other Cancer Paternal Grandmother         Mouth/tongue cancer     Asthma Paternal Grandmother      Prostate Cancer Maternal Grandfather      Hypertension Maternal Grandfather      Substance Abuse Maternal Grandfather      Cancer Maternal Grandmother         pancreatic     Arthritis Maternal Grandmother         hips     Depression Maternal Grandmother      Hypertension Maternal Grandmother      Other Cancer Maternal Grandmother      Gallbladder Disease Maternal Grandmother      Substance Abuse Maternal Grandmother      Depression Mother      Rashes/Skin Problems Mother         Rosacea     Arthritis Mother         osteoarthritis in hands and knees     Hypertension Mother      Substance Abuse Mother         Revovering     Osteoporosis Mother      Depression Maternal Uncle         bipolar     Respiratory Brother         sleep apnea     Mental Illness Brother      Cancer - colorectal Maternal Uncle      Cerebrovascular Disease Maternal Uncle          of massive stroke--no heart  problems     Hypertension Son      Diabetes Cousin      Hypertension Son      Breast Cancer Cousin      Breast Cancer Other      Colon Cancer Other         Maternal uncle     Depression Brother          by suicide     Substance Abuse Brother         Relasped 2017 &  by suicide 17     Substance Abuse Other      Substance Abuse Cousin      Substance Abuse Cousin        SOCIAL HISTORY:  Social History     Socioeconomic History     Marital status:      Spouse name: None     Number of children: 2     Years of education: 13+     Highest education level: None   Occupational History     Occupation: ; new job starting      Employer: NONE    Tobacco Use     Smoking status: Never     Passive exposure: Never     Smokeless tobacco: Never   Vaping Use     Vaping status: Never Used   Substance and Sexual Activity     Alcohol use: Yes     Comment: Rarely     Drug use: No     Sexual activity: Yes     Partners: Male     Birth control/protection: Female Surgical     Comment: essure procedure   Other Topics Concern     Caffeine Concern No     Comment: soda- 1 a day     Special Diet No     Comment: watches; fruits and veggies throughout the day.      Exercise No     Comment: working with fibromyalgia nurses; anticipating more.      Parent/sibling w/ CABG, MI or angioplasty before 65F 55M? No     Social Drivers of Health     Financial Resource Strain: Low Risk  (2025)    Financial Resource Strain      Within the past 12 months, have you or your family members you live with been unable to get utilities (heat, electricity) when it was really needed?: No   Food Insecurity: Low Risk  (2025)    Food Insecurity      Within the past 12 months, did you worry that your food would run out before you got money to buy more?: No      Within the past 12 months, did the food you bought just not last and you didn t have money to get more?: No   Transportation Needs: Low Risk  (2025)     "Transportation Needs      Within the past 12 months, has lack of transportation kept you from medical appointments, getting your medicines, non-medical meetings or appointments, work, or from getting things that you need?: No   Physical Activity: Inactive (4/5/2025)    Exercise Vital Sign      Days of Exercise per Week: 0 days      Minutes of Exercise per Session: 0 min   Stress: Stress Concern Present (4/5/2025)    [employer] - Occupational Stress Questionnaire      Feeling of Stress : To some extent   Social Connections: Unknown (4/5/2025)    Social Connection and Isolation Panel [NHANES]      Frequency of Social Gatherings with Friends and Family: Never   Interpersonal Safety: Low Risk  (10/21/2024)    Interpersonal Safety      Do you feel physically and emotionally safe where you currently live?: Yes      Within the past 12 months, have you been hit, slapped, kicked or otherwise physically hurt by someone?: No      Within the past 12 months, have you been humiliated or emotionally abused in other ways by your partner or ex-partner?: No   Housing Stability: High Risk (4/5/2025)    Housing Stability      Do you have housing? : No      Are you worried about losing your housing?: No       Review of Systems:  Skin:          Eyes:         ENT:         Respiratory:  Positive for shortness of breath, cough     Cardiovascular:    No exertional chest pain, dizziness presyncope syncope  Gastroenterology:        Genitourinary:         Musculoskeletal:         Neurologic:         Psychiatric:         Heme/Lymph/Imm:  Positive for allergies    Endocrine:  Positive for diabetes      Physical Exam:  Vitals: /72   Pulse 84   Ht 1.626 m (5' 4\")   Wt 102.8 kg (226 lb 9.6 oz)   LMP 05/14/2019 (Approximate)   SpO2 99%   BMI 38.90 kg/m      General Patient appears comfortable  Neck normal JVP, negative hepatojugular reflux  Cardiovascular system S1-S2 normal no murmur rub or " gallop  Respiratory system clear to auscultation  Extremities no edema      CC  Moriah Hernandez MD  [id number] [place] [address]                      Thank you for allowing me to participate in the care of your patient.      Sincerely,     Ian Song MD     [facility]  cc:   Moriah Hernandez MD  [id number] [place] [address]

## 2025-06-24 NOTE — PROGRESS NOTES
HPI and Plan:   Ms Lopez is a very pleasant 55-year-old female with history of tricuspid digitation pulmonary hypertension who has followed Dr. Ariel Hills for several years.  Today I am seeing him in his absence.  Patient is coming for routine follow-up.  She has a recent echocardiogram that showed normal LV systolic function probably borderline dilated RV as it was not well-visualized and normal RV systolic function with mild to moderate tricuspid regurgitation with pulm hypertension with RVSP of 42 mg mercury plus RA.  Patient tells me overall health wise she feels fine.  She has diabetes and hypertension and is on lisinopril and metformin and Mounjaro.  She has lost about 20 pounds of weight in last few years.  Looking back at cardiology notes sleeplike she was diagnosed with significant pulm hypertension about 14 years ago but on right heart catheterization pressures were actually normal.  Over the years she has fluctuated in terms of tricuspid regurgitation from mild to moderate and pulm hypertension from low 30s PA systolic pressure to low 40s.  In 2008 looks like she had a sleep study and was not found to have sleep apnea.  She does now acknowledge that she snores and sometimes feels sleepy during the daytime despite full night sleep.  She does not use any tobacco.  She had PFTs done several years ago which were normal.  She had also CT chest done without any evidence of PE.  She does get some shortness of breath with exertion but this is stable longstanding.  No chest discomfort.  EKG today shows sinus rhythm.    Assessment and plan  Tricuspid regurgitation, mild to moderate overall appears to be fluctuating over several years but within that fluctuation appears to be stable.  Pulmonary hypertension overall mild.  PA systolic estimated pressure has varied between low 30s to low 40s.  No evidence of PE in the past.  Inferolateral catheterization several years ago showed normal PA pressures.  It was felt  this could have been due to underlying obesity and possible sleep apnea which I think is quite possible.  It has been more than 16 years that patient had sleep study.  She has some symptoms suggestive of sleep apnea.  I recommend reviewing sleep medicine and possible sleep study.  Dyspnea exertion which is longstanding stable she has CAD risk factors of hypertension diabetes.  I recommend cardiac MRI stress perfusion this will also help us evaluate RV which was not very well-visualized in the recent echo.  Educated patient not to consume any caffeine for 12 asper for the stress test.  If cardiac event/perfusion looks okay I recommend she can follow-up in a year with her primary cardiologist Dr. Ariel Hilsl with repeat echocardiogram.    Recommendations  Cardiac MRI stress perfusion  Sleep medicine evaluation  Tentative follow-up with [facility] in about 1 to 2 months, if cardiac system reperfusion looks okay she may even cancel the follow-up with [facility]  Follow-up in 1 year with Dr. Ariel Hills her primary cardiologist with an echocardiogram    Orders Placed This Encounter   Procedures    Adult Sleep Eval & Management  Referral    Follow-Up with Cardiology    Follow-Up with Cardiology [facility]    EKG 12-lead complete w/read - Clinics (performed today)    Echocardiogram Complete       No orders of the defined types were placed in this encounter.      There are no discontinued medications.      Encounter Diagnoses   Name Primary?    Non-rheumatic tricuspid valve insufficiency Yes    Hyperlipidemia LDL goal <100     CAMARILLO (dyspnea on exertion)        CURRENT MEDICATIONS:  Current Outpatient Medications   Medication Sig Dispense Refill    albuterol (PROAIR HFA/PROVENTIL HFA/VENTOLIN HFA) 108 (90 Base) MCG/ACT inhaler Inhale 2 puffs into the lungs every 4 hours as needed for shortness of breath or wheezing 18 g 4    atorvastatin (LIPITOR) 10 MG tablet TAKE 1 TABLET (10 MG) BY MOUTH DAILY. 90 tablet 0    busPIRone (BUSPAR) 15  MG tablet TAKE 1 TABLET BY MOUTH TWICE A  tablet 0    cetirizine (ZYRTEC) 10 MG tablet Take 10 mg by mouth daily as needed for allergies      Cholecalciferol (SM VITAMIN D3) 100 MCG (4000 UT) CAPS       citalopram (CELEXA) 40 MG tablet Take 1 tablet (40 mg) by mouth daily. 90 tablet 0    cyanocobalamin (VITAMIN B-12) 1000 MCG tablet TAKE 1 TABLET BY MOUTH DAILY 90 tablet 0    cyclobenzaprine (FLEXERIL) 10 MG tablet Take 1 tablet (10 mg) by mouth 3 times daily as needed for muscle spasms. 270 tablet 3    ferrous gluconate (FERGON) 324 (38 Fe) MG tablet TAKE 1 TABLET (324 MG) BY MOUTH DAILY (WITH BREAKFAST) 90 tablet 1    lisinopril (ZESTRIL) 5 MG tablet Take 1 tablet (5 mg) by mouth daily. 90 tablet 0    metFORMIN (GLUCOPHAGE XR) 500 MG 24 hr tablet Take 4 tablets (2,000 mg) by mouth daily (with dinner). 360 tablet 1    MOUNJARO 5 MG/0.5ML SOAJ auto-injector pen Inject 0.5 mLs (5 mg) subcutaneously once a week. 6 mL 1    ondansetron (ZOFRAN) 4 MG tablet TAKE 1-2 TABLETS BY MOUTH EVERY 8 HOURS AS NEEDED FOR NAUSEA 30 tablet 1    traMADol (ULTRAM) 50 MG tablet Take 1 tablet (50 mg) by mouth 2 times daily as needed for severe pain. 60 tablet 0    traZODone (DESYREL) 100 MG tablet TAKE 1.5-2 TABLETS (150-200 MG) BY MOUTH AT BEDTIME 180 tablet 1    vitamin C (ASCORBIC ACID) 1000 MG TABS       blood glucose (NO BRAND SPECIFIED) lancets standard Use to test blood sugar 2 times daily or as directed. 100 each 3    blood glucose (ONETOUCH ULTRA) test strip 100 strips by In Vitro route daily Use to test blood sugar 1 times daily or as directed. 100 strip 1    blood glucose monitoring (NO BRAND SPECIFIED) meter device kit Use to test blood sugar 1 times daily or as directed. Please give her all needed supplies with prn refills. 1 kit 0    traMADol HCl 25 MG TABS tablet Take 2 tablets (50 mg) by mouth 2 times daily as needed (moderate to severe pain). 120 tablet 0       ALLERGIES     Allergies   Allergen Reactions     Morphine And Codeine Nausea     Nausea (with tylenol/codeine)       PAST MEDICAL HISTORY:  Past Medical History:   Diagnosis Date    Abnormal Papanicolaou smear of cervix and cervical HPV 2003    late 2003, early 2004; treated with TCA    Depression     Depressive disorder 15-20 years    Diabetes mellitus     Fibromyalgia     Heart murmur     tricuspid valve disorder    Inflammatory arthritis     ?; has been discharged from Rheumatology    Narcolepsy 11/01/2010    doing well without medication.    Non-rheumatic tricuspid valve insufficiency     antibiotic prophylax Problem list name updated by automated process. Provider to review     Obesity     Other chronic pain     Pulmonary hypertension (H) 05/02/2010    on ECHO, but normal right heart cath 2011    Reflex sympathetic dystrophy     right foot ; related to stress fracture    TRICUSPID VALVE DISEASE (regurg)     sees Cardiology        PAST SURGICAL HISTORY:  Past Surgical History:   Procedure Laterality Date    Angiogram  03/2011    No pulmonary hypertension    COLONOSCOPY  01/2008    normal    COLONOSCOPY N/A 06/14/2022    Procedure: COLONOSCOPY (fv);  Surgeon: Perico Warren MD;  Location:  GI    CYSTOSCOPY N/A 06/05/2019    Procedure: CYSTOSCOPY;  Surgeon: Georgiana Pizarro DO;  Location:  OR    DAVINCI HYSTERECTOMY TOTAL, BILATERAL SALPINGO-OOPHORECTOMY, COMBINED Bilateral 06/05/2019    Procedure: robotic assisted total laparoscopic hysterectomy bilateral salpingectomy and cystoscopy;  Surgeon: Georgiana Pizarro DO;  Location:  OR    DILATION AND CURETTAGE, OPERATIVE HYSTEROSCOPY WITH MORCELLATOR, COMBINED N/A 07/09/2018    Procedure: COMBINED DILATION AND CURETTAGE, OPERATIVE HYSTEROSCOPY WITH MORCELLATOR;  Hysteroscopy, polypectomy  with myosure, dilation and curettage, endometrial biopsy ;  Surgeon: Georgiana Pizarro DO;  Location:  OR    ENT SURGERY      [place] teeth removal    HYSTERECTOMY, PAP NO LONGER INDICATED      LAPAROSCOPIC  CHOLECYSTECTOMY  2000    SURGICAL HISTORY OF -       essure procedure for contraception    TCA for abnormal pap         FAMILY HISTORY:  Family History   Problem Relation Age of Onset    Respiratory Father         asthma    Arthritis Father         hips    Depression Father          by suicide in     Alcohol/Drug Father     Substance Abuse Father     Asthma Father     Diabetes Paternal Grandfather     Cerebrovascular Disease Paternal Grandfather          at age 50/51    Hypertension Paternal Grandfather     Cancer - colorectal Paternal Grandmother     Arthritis Paternal Grandmother         hips    Colon Cancer Paternal Grandmother     Other Cancer Paternal Grandmother         Mouth/tongue cancer    Asthma Paternal Grandmother     Prostate Cancer Maternal Grandfather     Hypertension Maternal Grandfather     Substance Abuse Maternal Grandfather     Cancer Maternal Grandmother         pancreatic    Arthritis Maternal Grandmother         hips    Depression Maternal Grandmother     Hypertension Maternal Grandmother     Other Cancer Maternal Grandmother     Gallbladder Disease Maternal Grandmother     Substance Abuse Maternal Grandmother     Depression Mother     Rashes/Skin Problems Mother         Rosacea    Arthritis Mother         osteoarthritis in hands and knees    Hypertension Mother     Substance Abuse Mother         Revovering    Osteoporosis Mother     Depression Maternal Uncle         bipolar    Respiratory Brother         sleep apnea    Mental Illness Brother     Cancer - colorectal Maternal Uncle     Cerebrovascular Disease Maternal Uncle          of massive stroke--no heart problems    Hypertension Son     Diabetes Cousin     Hypertension Son     Breast Cancer Cousin     Breast Cancer Other     Colon Cancer Other         Maternal uncle    Depression Brother          by suicide    Substance Abuse Brother         Relasped  &  by suicide 17    Substance Abuse Other      Substance Abuse Cousin     Substance Abuse Cousin        SOCIAL HISTORY:  Social History     Socioeconomic History    Marital status:      Spouse name: None    Number of children: 2    Years of education: 13+    Highest education level: None   Occupational History    Occupation: ; new job starting 5/11     Employer: NONE    Tobacco Use    Smoking status: Never     Passive exposure: Never    Smokeless tobacco: Never   Vaping Use    Vaping status: Never Used   Substance and Sexual Activity    Alcohol use: Yes     Comment: Rarely    Drug use: No    Sexual activity: Yes     Partners: Male     Birth control/protection: Female Surgical     Comment: essure procedure   Other Topics Concern    Caffeine Concern No     Comment: soda- 1 a day    Special Diet No     Comment: watches; fruits and veggies throughout the day.     Exercise No     Comment: working with fibromyalgia nurses; anticipating more.     Parent/sibling w/ CABG, MI or angioplasty before 65F 55M? No     Social Drivers of Health     Financial Resource Strain: Low Risk  (4/5/2025)    Financial Resource Strain     Within the past 12 months, have you or your family members you live with been unable to get utilities (heat, electricity) when it was really needed?: No   Food Insecurity: Low Risk  (4/5/2025)    Food Insecurity     Within the past 12 months, did you worry that your food would run out before you got money to buy more?: No     Within the past 12 months, did the food you bought just not last and you didn t have money to get more?: No   Transportation Needs: Low Risk  (4/5/2025)    Transportation Needs     Within the past 12 months, has lack of transportation kept you from medical appointments, getting your medicines, non-medical meetings or appointments, work, or from getting things that you need?: No   Physical Activity: Inactive (4/5/2025)    Exercise Vital Sign     Days of Exercise per Week: 0 days     Minutes of Exercise per  "Session: 0 min   Stress: Stress Concern Present (4/5/2025)    [employer] [place] [employer] - Occupational Stress Questionnaire     Feeling of Stress : To some extent   Social Connections: Unknown (4/5/2025)    Social Connection and Isolation Panel [NHANES]     Frequency of Social Gatherings with Friends and Family: Never   Interpersonal Safety: Low Risk  (10/21/2024)    Interpersonal Safety     Do you feel physically and emotionally safe where you currently live?: Yes     Within the past 12 months, have you been hit, slapped, kicked or otherwise physically hurt by someone?: No     Within the past 12 months, have you been humiliated or emotionally abused in other ways by your partner or ex-partner?: No   Housing Stability: High Risk (4/5/2025)    Housing Stability     Do you have housing? : No     Are you worried about losing your housing?: No       Review of Systems:  Skin:          Eyes:         ENT:         Respiratory:  Positive for shortness of breath, cough     Cardiovascular:    No exertional chest pain, dizziness presyncope syncope  Gastroenterology:        Genitourinary:         Musculoskeletal:         Neurologic:         Psychiatric:         Heme/Lymph/Imm:  Positive for allergies    Endocrine:  Positive for diabetes      Physical Exam:  Vitals: /72   Pulse 84   Ht 1.626 m (5' 4\")   Wt 102.8 kg (226 lb 9.6 oz)   LMP 05/14/2019 (Approximate)   SpO2 99%   BMI 38.90 kg/m      General Patient appears comfortable  Neck normal JVP, negative hepatojugular reflux  Cardiovascular system S1-S2 normal no murmur rub or gallop  Respiratory system clear to auscultation  Extremities no edema      CC  Moriah Hernandez MD  [id number] [place] [address]                    "

## 2025-06-25 ENCOUNTER — PATIENT OUTREACH (OUTPATIENT)
Dept: CARE COORDINATION | Facility: CLINIC | Age: 55
End: 2025-06-25

## 2025-07-09 ENCOUNTER — MYC REFILL (OUTPATIENT)
Dept: FAMILY MEDICINE | Facility: CLINIC | Age: 55
End: 2025-07-09
Payer: COMMERCIAL

## 2025-07-09 DIAGNOSIS — M79.7 FIBROMYALGIA: ICD-10-CM

## 2025-07-09 DIAGNOSIS — F11.90 CHRONIC, CONTINUOUS USE OF OPIOIDS: ICD-10-CM

## 2025-07-09 NOTE — TELEPHONE ENCOUNTER
provider needs to review pharmacy/patient note. Provider, please approve or deny the prescription.    Eve Gabriel RN on 7/9/2025 at 10:20 AM

## 2025-07-10 ENCOUNTER — PATIENT OUTREACH (OUTPATIENT)
Dept: CARE COORDINATION | Facility: CLINIC | Age: 55
End: 2025-07-10
Payer: COMMERCIAL

## 2025-07-10 RX ORDER — TRAMADOL HYDROCHLORIDE 50 MG/1
50 TABLET ORAL 2 TIMES DAILY PRN
Qty: 60 TABLET | Refills: 0 | Status: SHIPPED | OUTPATIENT
Start: 2025-07-10

## 2025-07-10 SDOH — HEALTH STABILITY: PHYSICAL HEALTH: ON AVERAGE, HOW MANY MINUTES DO YOU ENGAGE IN EXERCISE AT THIS LEVEL?: 0 MIN

## 2025-07-10 SDOH — HEALTH STABILITY: PHYSICAL HEALTH: ON AVERAGE, HOW MANY DAYS PER WEEK DO YOU ENGAGE IN MODERATE TO STRENUOUS EXERCISE (LIKE A BRISK WALK)?: 0 DAYS

## 2025-07-10 ASSESSMENT — SOCIAL DETERMINANTS OF HEALTH (SDOH): HOW OFTEN DO YOU GET TOGETHER WITH FRIENDS OR RELATIVES?: PATIENT DECLINED

## 2025-07-13 DIAGNOSIS — R05.3 CHRONIC COUGH: ICD-10-CM

## 2025-07-14 RX ORDER — ALBUTEROL SULFATE 90 UG/1
AEROSOL, METERED RESPIRATORY (INHALATION)
Qty: 18 G | Refills: 0 | Status: SHIPPED | OUTPATIENT
Start: 2025-07-14 | End: 2025-07-15

## 2025-07-14 ASSESSMENT — PATIENT HEALTH QUESTIONNAIRE - PHQ9
SUM OF ALL RESPONSES TO PHQ QUESTIONS 1-9: 3
SUM OF ALL RESPONSES TO PHQ QUESTIONS 1-9: 3
10. IF YOU CHECKED OFF ANY PROBLEMS, HOW DIFFICULT HAVE THESE PROBLEMS MADE IT FOR YOU TO DO YOUR WORK, TAKE CARE OF THINGS AT HOME, OR GET ALONG WITH OTHER PEOPLE: SOMEWHAT DIFFICULT

## 2025-07-15 ENCOUNTER — OFFICE VISIT (OUTPATIENT)
Dept: FAMILY MEDICINE | Facility: CLINIC | Age: 55
End: 2025-07-15
Payer: COMMERCIAL

## 2025-07-15 VITALS
RESPIRATION RATE: 16 BRPM | OXYGEN SATURATION: 99 % | HEIGHT: 64 IN | BODY MASS INDEX: 38.24 KG/M2 | SYSTOLIC BLOOD PRESSURE: 126 MMHG | HEART RATE: 91 BPM | DIASTOLIC BLOOD PRESSURE: 83 MMHG | WEIGHT: 224 LBS | TEMPERATURE: 98.6 F

## 2025-07-15 DIAGNOSIS — F33.42 RECURRENT MAJOR DEPRESSIVE DISORDER, IN FULL REMISSION: ICD-10-CM

## 2025-07-15 DIAGNOSIS — R05.3 CHRONIC COUGH: ICD-10-CM

## 2025-07-15 DIAGNOSIS — G47.429 NARCOLEPSY DUE TO UNDERLYING CONDITION WITHOUT CATAPLEXY: ICD-10-CM

## 2025-07-15 DIAGNOSIS — I10 HYPERTENSION GOAL BP (BLOOD PRESSURE) < 130/80: ICD-10-CM

## 2025-07-15 DIAGNOSIS — R11.0 NAUSEA: ICD-10-CM

## 2025-07-15 DIAGNOSIS — Z00.00 ROUTINE GENERAL MEDICAL EXAMINATION AT A HEALTH CARE FACILITY: Primary | ICD-10-CM

## 2025-07-15 DIAGNOSIS — E11.9 TYPE 2 DIABETES MELLITUS WITHOUT COMPLICATION, WITHOUT LONG-TERM CURRENT USE OF INSULIN (H): ICD-10-CM

## 2025-07-15 DIAGNOSIS — M79.7 FIBROMYALGIA: ICD-10-CM

## 2025-07-15 DIAGNOSIS — E66.01 CLASS 2 SEVERE OBESITY DUE TO EXCESS CALORIES WITH SERIOUS COMORBIDITY AND BODY MASS INDEX (BMI) OF 38.0 TO 38.9 IN ADULT (H): ICD-10-CM

## 2025-07-15 DIAGNOSIS — F41.9 ANXIETY: ICD-10-CM

## 2025-07-15 DIAGNOSIS — E61.1 IRON DEFICIENCY: ICD-10-CM

## 2025-07-15 DIAGNOSIS — E66.812 CLASS 2 SEVERE OBESITY DUE TO EXCESS CALORIES WITH SERIOUS COMORBIDITY AND BODY MASS INDEX (BMI) OF 38.0 TO 38.9 IN ADULT (H): ICD-10-CM

## 2025-07-15 DIAGNOSIS — E78.5 HYPERLIPIDEMIA LDL GOAL <100: ICD-10-CM

## 2025-07-15 DIAGNOSIS — L30.9 DERMATITIS: ICD-10-CM

## 2025-07-15 LAB
EST. AVERAGE GLUCOSE BLD GHB EST-MCNC: 126 MG/DL
HBA1C MFR BLD: 6 % (ref 0–5.6)
HOLD SPECIMEN: NORMAL

## 2025-07-15 PROCEDURE — 99214 OFFICE O/P EST MOD 30 MIN: CPT | Mod: 25 | Performed by: FAMILY MEDICINE

## 2025-07-15 PROCEDURE — 3044F HG A1C LEVEL LT 7.0%: CPT | Performed by: FAMILY MEDICINE

## 2025-07-15 PROCEDURE — 3079F DIAST BP 80-89 MM HG: CPT | Performed by: FAMILY MEDICINE

## 2025-07-15 PROCEDURE — 36415 COLL VENOUS BLD VENIPUNCTURE: CPT | Performed by: FAMILY MEDICINE

## 2025-07-15 PROCEDURE — G2211 COMPLEX E/M VISIT ADD ON: HCPCS | Performed by: FAMILY MEDICINE

## 2025-07-15 PROCEDURE — 3074F SYST BP LT 130 MM HG: CPT | Performed by: FAMILY MEDICINE

## 2025-07-15 PROCEDURE — 99396 PREV VISIT EST AGE 40-64: CPT | Performed by: FAMILY MEDICINE

## 2025-07-15 PROCEDURE — 82570 ASSAY OF URINE CREATININE: CPT | Performed by: FAMILY MEDICINE

## 2025-07-15 PROCEDURE — 83036 HEMOGLOBIN GLYCOSYLATED A1C: CPT | Performed by: FAMILY MEDICINE

## 2025-07-15 PROCEDURE — 82043 UR ALBUMIN QUANTITATIVE: CPT | Performed by: FAMILY MEDICINE

## 2025-07-15 RX ORDER — ALBUTEROL SULFATE 90 UG/1
2 INHALANT RESPIRATORY (INHALATION) EVERY 4 HOURS PRN
Qty: 18 G | Refills: 11 | Status: SHIPPED | OUTPATIENT
Start: 2025-07-15

## 2025-07-15 RX ORDER — TRAZODONE HYDROCHLORIDE 100 MG/1
150-200 TABLET ORAL AT BEDTIME
Qty: 180 TABLET | Refills: 3 | Status: SHIPPED | OUTPATIENT
Start: 2025-07-15

## 2025-07-15 RX ORDER — CITALOPRAM HYDROBROMIDE 40 MG/1
40 TABLET ORAL DAILY
Qty: 90 TABLET | Refills: 3 | Status: SHIPPED | OUTPATIENT
Start: 2025-07-15

## 2025-07-15 RX ORDER — ATORVASTATIN CALCIUM 10 MG/1
10 TABLET, FILM COATED ORAL DAILY
Qty: 90 TABLET | Refills: 3 | Status: SHIPPED | OUTPATIENT
Start: 2025-07-15

## 2025-07-15 RX ORDER — LISINOPRIL 5 MG/1
5 TABLET ORAL DAILY
Qty: 90 TABLET | Refills: 3 | Status: SHIPPED | OUTPATIENT
Start: 2025-07-15

## 2025-07-15 RX ORDER — BLOOD SUGAR DIAGNOSTIC
100 STRIP MISCELLANEOUS DAILY
Qty: 100 STRIP | Refills: 3 | Status: SHIPPED | OUTPATIENT
Start: 2025-07-15

## 2025-07-15 RX ORDER — TIRZEPATIDE 5 MG/.5ML
5 INJECTION, SOLUTION SUBCUTANEOUS WEEKLY
Qty: 6 ML | Refills: 1 | Status: SHIPPED | OUTPATIENT
Start: 2025-07-15

## 2025-07-15 RX ORDER — ONDANSETRON 4 MG/1
TABLET, FILM COATED ORAL
Qty: 30 TABLET | Refills: 1 | Status: SHIPPED | OUTPATIENT
Start: 2025-07-15

## 2025-07-15 RX ORDER — METFORMIN HYDROCHLORIDE 500 MG/1
2000 TABLET, EXTENDED RELEASE ORAL
Qty: 360 TABLET | Refills: 1 | Status: SHIPPED | OUTPATIENT
Start: 2025-07-15

## 2025-07-15 RX ORDER — BUSPIRONE HYDROCHLORIDE 15 MG/1
TABLET ORAL
Qty: 180 TABLET | Refills: 3 | Status: SHIPPED | OUTPATIENT
Start: 2025-07-15

## 2025-07-15 RX ORDER — TRIAMCINOLONE ACETONIDE 1 MG/G
CREAM TOPICAL 2 TIMES DAILY
Qty: 45 G | Refills: 3 | Status: SHIPPED | OUTPATIENT
Start: 2025-07-15

## 2025-07-15 RX ORDER — FERROUS GLUCONATE 324(38)MG
324 TABLET ORAL
Qty: 90 TABLET | Refills: 3 | Status: SHIPPED | OUTPATIENT
Start: 2025-07-15

## 2025-07-15 NOTE — PATIENT INSTRUCTIONS
Patient Education   Preventive Care Advice   This is general advice given by our system to help you stay healthy. However, your care team may have specific advice just for you. Please talk to your care team about your preventive care needs.  Nutrition  Eat 5 or more servings of fruits and vegetables each day.  Try wheat bread, brown rice and whole grain pasta (instead of white bread, rice, and pasta).  Get enough calcium and vitamin D. Check the label on foods and aim for 100% of the RDA (recommended daily allowance).  Lifestyle  Exercise at least 150 minutes each week  (30 minutes a day, 5 days a week).  Do muscle strengthening activities 2 days a week. These help control your weight and prevent disease.  No smoking.  Wear sunscreen to prevent skin cancer.  Have a dental exam and cleaning every 6 months.  Yearly exams  See your health care team every year to talk about:  Any changes in your health.  Any medicines your care team has prescribed.  Preventive care, family planning, and ways to prevent chronic diseases.  Shots (vaccines)   HPV shots (up to age 26), if you've never had them before.  Hepatitis B shots (up to age 59), if you've never had them before.  COVID-19 shot: Get this shot when it's due.  Flu shot: Get a flu shot every year.  Tetanus shot: Get a tetanus shot every 10 years.  Pneumococcal, hepatitis A, and RSV shots: Ask your care team if you need these based on your risk.  Shingles shot (for age 50 and up)  General health tests  Diabetes screening:  Starting at age 35, Get screened for diabetes at least every 3 years.  If you are younger than age 35, ask your care team if you should be screened for diabetes.  Cholesterol test: At age 39, start having a cholesterol test every 5 years, or more often if advised.  Bone density scan (DEXA): At age 50, ask your care team if you should have this scan for osteoporosis (brittle bones).  Hepatitis C: Get tested at least once in your life.  STIs (sexually  transmitted infections)  Before age 24: Ask your care team if you should be screened for STIs.  After age 24: Get screened for STIs if you're at risk. You are at risk for STIs (including HIV) if:  You are sexually active with more than one person.  You don't use condoms every time.  You or a partner was diagnosed with a sexually transmitted infection.  If you are at risk for HIV, ask about PrEP medicine to prevent HIV.  Get tested for HIV at least once in your life, whether you are at risk for HIV or not.  Cancer screening tests  Cervical cancer screening: If you have a cervix, begin getting regular cervical cancer screening tests starting at age 21.  Breast cancer scan (mammogram): If you've ever had breasts, begin having regular mammograms starting at age 40. This is a scan to check for breast cancer.  Colon cancer screening: It is important to start screening for colon cancer at age 45.  Have a colonoscopy test every 10 years (or more often if you're at risk) Or, ask your provider about stool tests like a FIT test every year or Cologuard test every 3 years.  To learn more about your testing options, visit:   .  For help making a decision, visit:   https://bit.ly/fw21853.  Prostate cancer screening test: If you have a prostate, ask your care team if a prostate cancer screening test (PSA) at age 55 is right for you.  Lung cancer screening: If you are a current or former smoker ages 50 to 80, ask your care team if ongoing lung cancer screenings are right for you.  For informational purposes only. Not to replace the advice of your health care provider. Copyright   2023 [facility]. All rights reserved. Clinically reviewed by the [facility] Transitions Program. Treatful 261940 - REV 01/24.  Preventing Falls: Care Instructions  Injuries and health problems such as trouble walking or poor eyesight can increase your risk of falling. So can some medicines. But there are things you can do to help  "prevent falls. You can exercise to get stronger. You can also arrange your home to make it safer.    Talk to your doctor about the medicines you take. Ask if any of them increase the risk of falls and whether they can be changed or stopped.   Try to exercise regularly. It can help improve your strength and balance. This can help lower your risk of falling.         Practice fall safety and prevention.   Wear low-heeled shoes that fit well and give your feet good support. Talk to your doctor if you have foot problems that make this hard.  Carry a cellphone or wear a medical alert device that you can use to call for help.  Use stepladders instead of chairs to reach high objects. Don't climb if you're at risk for falls. Ask for help, if needed.  Wear the correct eyeglasses, if you need them.        Make your home safer.   Remove rugs, cords, clutter, and furniture from walkways.  Keep your house well lit. Use night-lights in hallways and bathrooms.  Install and use sturdy handrails on stairways.  Wear nonskid footwear, even inside. Don't walk barefoot or in socks without shoes.        Be safe outside.   Use handrails, curb cuts, and ramps whenever possible.  Keep your hands free by using a shoulder bag or backpack.  Try to walk in well-lit areas. Watch out for uneven ground, changes in pavement, and debris.  Be careful in the winter. Walk on the grass or gravel when sidewalks are slippery. Use de-icer on steps and walkways. Add non-slip devices to shoes.    Put grab bars and nonskid mats in your shower or tub and near the toilet. Try to use a shower chair or bath bench when bathing.   Get into a tub or shower by putting in your weaker leg first. Get out with your strong side first. Have a phone or medical alert device in the bathroom with you.   Where can you learn more?  Go to https://www.[employer]Book[employer].net/patiented  Enter G117 in the search box to learn more about \"Preventing Falls: Care Instructions.\"  Current as of: " July 31, 2024  Content Version: 14.5    7292-2602 [employer].   Care instructions adapted under license by your healthcare professional. If you have questions about a medical condition or this instruction, always ask your healthcare professional. [employer] disclaims any warranty or liability for your use of this information.    Learning About Stress  What is stress?     Stress is your body's response to a hard situation. Your body can have a physical, emotional, or mental response. Stress is a fact of life for most people, and it affects everyone differently. What causes stress for you may not be stressful for someone else.  A lot of things can cause stress. You may feel stress when you go on a job interview, take a test, or run a race. This kind of short-term stress is normal and even useful. It can help you if you need to work hard or react quickly. For example, stress can help you finish an important job on time.  Long-term stress is caused by ongoing stressful situations or events. Examples of long-term stress include long-term health problems, ongoing problems at work, or conflicts in your family. Long-term stress can harm your health.  How does stress affect your health?  When you are stressed, your body responds as though you are in danger. It makes hormones that speed up your heart, make you breathe faster, and give you a burst of energy. This is called the fight-or-flight stress response. If the stress is over quickly, your body goes back to normal and no harm is done.  But if stress happens too often or lasts too long, it can have bad effects. Long-term stress can make you more likely to get sick, and it can make symptoms of some diseases worse. If you tense up when you are stressed, you may develop neck, shoulder, or low back pain. Stress is linked to high blood pressure and heart disease.  Stress also harms your emotional health. It can make you dia, tense, or depressed. Your  relationships may suffer, and you may not do well at work or school.  What can you do to manage stress?  You can try these things to help manage stress:   Do something active. Exercise or activity can help reduce stress. Walking is a great way to get started. Even everyday activities such as housecleaning or yard work can help.  Try yoga or gricel chi. These techniques combine exercise and meditation. You may need some training at first to learn them.  Do something you enjoy. For example, listen to music or go to a movie. Practice your hobby or do volunteer work.  Meditate. This can help you relax, because you are not worrying about what happened before or what may happen in the future.  Do guided imagery. Imagine yourself in any setting that helps you feel calm. You can use online videos, books, or a teacher to guide you.  Do breathing exercises. For example:  From a standing position, bend forward from the waist with your knees slightly bent. Let your arms dangle close to the floor.  Breathe in slowly and deeply as you return to a standing position. Roll up slowly and lift your head last.  Hold your breath for just a few seconds in the standing position.  Breathe out slowly and bend forward from the waist.  Let your feelings out. Talk, laugh, cry, and express anger when you need to. Talking with supportive friends or family, a counselor, or a lisa leader about your feelings is a healthy way to relieve stress. Avoid discussing your feelings with people who make you feel worse.  Write. It may help to write about things that are bothering you. This helps you find out how much stress you feel and what is causing it. When you know this, you can find better ways to cope.  What can you do to prevent stress?  You might try some of these things to help prevent stress:  Manage your time. This helps you find time to do the things you want and need to do.  Get enough sleep. Your body recovers from the stresses of the day while  "you are sleeping.  Get support. Your family, friends, and community can make a difference in how you experience stress.  Limit your news feed. Avoid or limit time on social media or news that may make you feel stressed.  Do something active. Exercise or activity can help reduce stress. Walking is a great way to get started.  Where can you learn more?  Go to https://www.[employer].net/patiented  Enter N032 in the search box to learn more about \"Learning About Stress.\"  Current as of: October 24, 2024  Content Version: 14.5    8646-0100 [employer].   Care instructions adapted under license by your healthcare professional. If you have questions about a medical condition or this instruction, always ask your healthcare professional. [employer] disclaims any warranty or liability for your use of this information.       "

## 2025-07-15 NOTE — PROGRESS NOTES
Preventive Care Visit  [facility]  Moriah Hernandez MD, Family Medicine  Jul 15, 2025      Assessment & Plan     Routine general medical examination at a health care facility    Type 2 diabetes mellitus without complication, without long-term current use of insulin (H) - well controlled, continue current  - Hemoglobin A1c; Future  - Extra Tube; Future  - Albumin Random Urine Quantitative with Creat Ratio; Future  - Hemoglobin A1c  - Extra Tube  - Albumin Random Urine Quantitative with Creat Ratio  - blood glucose (NO BRAND SPECIFIED) lancets standard; Use to test blood sugar 2 times daily or as directed.  - blood glucose (ONETOUCH ULTRA) test strip; 100 strips by In Vitro route daily. Use to test blood sugar 1 times daily or as directed.  - metFORMIN (GLUCOPHAGE XR) 500 MG 24 hr tablet; Take 4 tablets (2,000 mg) by mouth daily (with dinner).  - MOUNJARO 5 MG/0.5ML SOAJ auto-injector pen; Inject 0.5 mLs (5 mg) subcutaneously once a week.    Nausea  - ondansetron (ZOFRAN) 4 MG tablet; TAKE 1-2 TABLETS BY MOUTH EVERY 8 HOURS AS NEEDED FOR NAUSEA    Chronic cough  - albuterol (VENTOLIN HFA) 108 (90 Base) MCG/ACT inhaler; Inhale 2 puffs into the lungs every 4 hours as needed for shortness of breath, wheezing or cough.    Hyperlipidemia LDL goal <100 - on statin, continue  - atorvastatin (LIPITOR) 10 MG tablet; Take 1 tablet (10 mg) by mouth daily.    Anxiety - stable, refills  - busPIRone (BUSPAR) 15 MG tablet; TAKE 1 TABLET BY MOUTH TWICE A DAY    Recurrent major depressive disorder, in full remission - stable, refills  - citalopram (CELEXA) 40 MG tablet; Take 1 tablet (40 mg) by mouth daily.    Narcolepsy due to underlying condition without cataplexy - stable, refills  - traZODone (DESYREL) 100 MG tablet; Take 1.5-2 tablets (150-200 mg) by mouth at bedtime.    Iron deficiency - surveillance labs today, refills  - ferrous gluconate (FERGON) 324 (38 Fe) MG tablet; Take 1 tablet (324 mg) by  "mouth daily (with breakfast).    Hypertension goal BP (blood pressure) < 130/80 - stable, refills  - lisinopril (ZESTRIL) 5 MG tablet; Take 1 tablet (5 mg) by mouth daily.    Dermatitis - under the breast and posterior right thigh, discussed treatment options.   - triamcinolone (KENALOG) 0.1 % external cream; Apply topically 2 times daily.    Class 2 severe obesity due to excess calories with serious comorbidity and body mass index (BMI) of 38.0 to 38.9 in adult (H) - on GLP1    Fibromyalgia - flare last week, used tramadol more frequently, otherwise taking 3-4 times daily.     The longitudinal plan of care for the diagnosis(es)/condition(s) as documented were addressed during this visit. Due to the added complexity in care, I will continue to support Kaylee in the subsequent management and with ongoing continuity of care.      BMI  Estimated body mass index is 38.45 kg/m  as calculated from the following:    Height as of this encounter: 1.626 m (5' 4\").    Weight as of this encounter: 101.6 kg (224 lb).       Counseling  Appropriate preventive services were addressed with this patient via screening, questionnaire, or discussion as appropriate for fall prevention, nutrition, physical activity, Tobacco-use cessation, social engagement, weight loss and cognition.  Checklist reviewing preventive services available has been given to the patient.  Reviewed patient's diet, addressing concerns and/or questions.   She is at risk for psychosocial distress and has been provided with information to reduce risk.       Subjective   Kaylee is a 55 year old, presenting for the following:  Physical and Diabetes        7/15/2025    11:15 AM   Additional Questions   Roomed by Matt Robbins   Accompanied by self          HPI     Advance Care Planning    Discussed advance care planning with patient; informed AVS has link to [facility].        7/10/2025   General Health   How would you rate your overall physical health? (!) FAIR "   Feel stress (tense, anxious, or unable to sleep) To some extent   (!) STRESS CONCERN      7/10/2025   Nutrition   Three or more servings of calcium each day? (!) NO   Diet: Diabetic   How many servings of fruit and vegetables per day? (!) 2-3   How many sweetened beverages each day? (!) 2         7/10/2025   Exercise   Days per week of moderate/strenous exercise 0 days   Average minutes spent exercising at this level 0 min   (!) EXERCISE CONCERN      7/10/2025   Social Factors   Frequency of gathering with friends or relatives Patient declined   Worry food won't last until get money to buy more No   Food not last or not have enough money for food? No   Do you have housing? (Housing is defined as stable permanent housing and does not include staying outside in a car, in a tent, in an abandoned building, in an overnight shelter, or couch-surfing.) Yes   Are you worried about losing your housing? No   Lack of transportation? No   Unable to get utilities (heat,electricity)? No         7/15/2025   Fall Risk   Gait Speed Test (Document in seconds) 4          7/10/2025   Dental   Dentist two times every year? Yes       Today's PHQ-9 Score:       7/14/2025    12:02 PM   PHQ-9 SCORE   PHQ-9 Total Score MyChart 3 (Minimal depression)   PHQ-9 Total Score 3        Patient-reported         7/10/2025   Substance Use   Alcohol more than 3/day or more than 7/wk No   Do you use any other substances recreationally? No     Social History     Tobacco Use    Smoking status: Never     Passive exposure: Never    Smokeless tobacco: Never   Vaping Use    Vaping status: Never Used   Substance Use Topics    Alcohol use: Yes     Comment: Rarely    Drug use: No           8/9/2024   LAST FHS-7 RESULTS   1st degree relative breast or ovarian cancer No   Any relative bilateral breast cancer No   Any male have breast cancer No   Any ONE woman have BOTH breast AND ovarian cancer No   Any woman with breast cancer before 50yrs No   2 or more  relatives with breast AND/OR ovarian cancer No   2 or more relatives with breast AND/OR bowel cancer Yes        Mammogram Screening - Mammogram every 1-2 years updated in Health Maintenance based on mutual decision making        7/10/2025   STI Screening   New sexual partner(s) since last STI/HIV test? No     History of abnormal Pap smear: Status post hysterectomy with removal of cervix and no history of CIN2 or greater or cervical cancer. Health Maintenance and Surgical History updated.        Latest Ref Rng & Units 4/23/2018     3:50 PM 4/23/2018     3:37 PM 6/22/2016     8:10 AM   PAP / HPV   PAP (Historical)   OTHER-NIL, See Result     HPV 16 DNA NEG^Negative Negative   Negative    HPV 18 DNA NEG^Negative Negative   Negative    Other HR HPV NEG^Negative Negative   Negative      ASCVD Risk   The 10-year ASCVD risk score (Antonina HOLLINS, et al., 2019) is: 2.4%    Values used to calculate the score:      Age: 55 years      Sex: Female      Is Non- : No      Diabetic: Yes      Tobacco smoker: No      Systolic Blood Pressure: 126 mmHg      Is BP treated: Yes      HDL Cholesterol: 60 mg/dL      Total Cholesterol: 134 mg/dL         Reviewed and updated as needed this visit by Provider                    Patient Active Problem List   Diagnosis    Fibromyalgia    Non-rheumatic tricuspid valve insufficiency    Narcolepsy    Hyperlipidemia LDL goal <100    Reflex sympathetic dystrophy    Type 2 diabetes mellitus without complication, without long-term current use of insulin (H)    Migraine with aura and without status migrainosus, not intractable    Anxiety    Chronic, continuous use of opioids    Recurrent major depressive disorder, in full remission    Class 2 severe obesity due to excess calories with serious comorbidity and body mass index (BMI) of 38.0 to 38.9 in adult (H)     Past Surgical History:   Procedure Laterality Date    Angiogram  03/2011    No pulmonary hypertension     COLONOSCOPY  2008    normal    COLONOSCOPY N/A 2022    Procedure: COLONOSCOPY (fv);  Surgeon: Perico Warren MD;  Location: [facility]    CYSTOSCOPY N/A 2019    Procedure: CYSTOSCOPY;  Surgeon: Georgiana Pizarro DO;  Location: [facility] OR    DAVINCI HYSTERECTOMY TOTAL, BILATERAL SALPINGO-OOPHORECTOMY, COMBINED Bilateral 2019    Procedure: robotic assisted total laparoscopic hysterectomy bilateral salpingectomy and cystoscopy;  Surgeon: Georgiana Pizarro DO;  Location: [facility] OR    DILATION AND CURETTAGE, OPERATIVE HYSTEROSCOPY WITH MORCELLATOR, COMBINED N/A 2018    Procedure: COMBINED DILATION AND CURETTAGE, OPERATIVE HYSTEROSCOPY WITH MORCELLATOR;  Hysteroscopy, polypectomy  with myosure, dilation and curettage, endometrial biopsy ;  Surgeon: Georgiana Pizarro DO;  Location: [facility] OR    ENT SURGERY      wisdon teeth removal    HYSTERECTOMY, PAP NO LONGER INDICATED      LAPAROSCOPIC CHOLECYSTECTOMY  2000    SURGICAL HISTORY OF -       essure procedure for contraception    TCA for abnormal pap         Social History     Tobacco Use    Smoking status: Never     Passive exposure: Never    Smokeless tobacco: Never   Substance Use Topics    Alcohol use: Yes     Comment: Rarely     Family History   Problem Relation Age of Onset    Respiratory Father         asthma    Arthritis Father         hips    Depression Father          by suicide in     Alcohol/Drug Father     Substance Abuse Father     Asthma Father     Diabetes Paternal Grandfather     Cerebrovascular Disease Paternal Grandfather          at age 50/51    Hypertension Paternal Grandfather     Cancer - colorectal Paternal Grandmother     Arthritis Paternal Grandmother         hips    Colon Cancer Paternal Grandmother     Other Cancer Paternal Grandmother         Mouth/tongue cancer    Asthma Paternal Grandmother     Prostate Cancer Maternal Grandfather     Hypertension Maternal Grandfather     Substance Abuse Maternal  "Grandfather     Cancer Maternal Grandmother         pancreatic    Arthritis Maternal Grandmother         hips    Depression Maternal Grandmother     Hypertension Maternal Grandmother     Other Cancer Maternal Grandmother     Gallbladder Disease Maternal Grandmother     Substance Abuse Maternal Grandmother     Depression Mother     Rashes/Skin Problems Mother         Rosacea    Arthritis Mother         osteoarthritis in hands and knees    Hypertension Mother     Substance Abuse Mother         Revovering    Osteoporosis Mother     Depression Maternal Uncle         bipolar    Respiratory Brother         sleep apnea    Mental Illness Brother     Cancer - colorectal Maternal Uncle     Cerebrovascular Disease Maternal Uncle          of massive stroke--no heart problems    Hypertension Son     Diabetes Cousin     Hypertension Son     Breast Cancer Cousin     Breast Cancer Other     Colon Cancer Other         Maternal uncle    Depression Brother          by suicide    Substance Abuse Brother         Relasped 2017 &  by suicide 17    Substance Abuse Other     Substance Abuse Cousin     Substance Abuse Cousin              Review of Systems  Constitutional, neuro, ENT, endocrine, pulmonary, cardiac, gastrointestinal, genitourinary, musculoskeletal, integument and psychiatric systems are negative, except as otherwise noted.     Objective    Exam  /83 (BP Location: Right arm, Patient Position: Chair, Cuff Size: Adult Large)   Pulse 91   Temp 98.6  F (37  C) (Oral)   Resp 16   Ht 1.626 m (5' 4\")   Wt 101.6 kg (224 lb)   LMP 2019 (Approximate)   SpO2 99%   Breastfeeding No   BMI 38.45 kg/m     Estimated body mass index is 38.45 kg/m  as calculated from the following:    Height as of this encounter: 1.626 m (5' 4\").    Weight as of this encounter: 101.6 kg (224 lb).    Physical Exam  GENERAL: alert and no distress  EYES: Eyes grossly normal to inspection, PERRL and conjunctivae and sclerae " normal  HENT: ear canals and TM's normal, nose and mouth without ulcers or lesions  NECK: no adenopathy, no asymmetry, masses, or scars  RESP: lungs clear to auscultation - no rales, rhonchi or wheezes  BREAST: normal without masses, tenderness or nipple discharge and no palpable axillary masses or adenopathy  CV: regular rate and rhythm, normal S1 S2, no S3 or S4, no murmur, click or rub, no peripheral edema  ABDOMEN: soft, nontender, no hepatosplenomegaly, no masses and bowel sounds normal  MS: no gross musculoskeletal defects noted, no edema  SKIN: no suspicious lesions or rashes  NEURO: Normal strength and tone, mentation intact and speech normal  PSYCH: mentation appears normal, affect normal/bright        Signed Electronically by: Moriah Hernandez MD    Answers submitted by the patient for this visit:  Patient Health Questionnaire (Submitted on 7/14/2025)  If you checked off any problems, how difficult have these problems made it for you to do your work, take care of things at home, or get along with other people?: Somewhat difficult  PHQ9 TOTAL SCORE: 3

## 2025-07-16 LAB
CREAT UR-MCNC: 108 MG/DL
MICROALBUMIN UR-MCNC: <12 MG/L
MICROALBUMIN/CREAT UR: NORMAL MG/G{CREAT}

## 2025-09-02 ENCOUNTER — HOSPITAL ENCOUNTER (OUTPATIENT)
Dept: CARDIOLOGY | Facility: CLINIC | Age: 55
Discharge: HOME | End: 2025-09-02
Attending: INTERNAL MEDICINE
Payer: COMMERCIAL

## 2025-09-02 DIAGNOSIS — R06.09 DOE (DYSPNEA ON EXERTION): ICD-10-CM

## 2025-09-02 PROCEDURE — 255N000002 HC RX 255 OP 636: Performed by: INTERNAL MEDICINE

## 2025-09-02 PROCEDURE — A9585 GADOBUTROL INJECTION: HCPCS | Performed by: INTERNAL MEDICINE

## 2025-09-02 PROCEDURE — 250N000011 HC RX IP 250 OP 636: Performed by: INTERNAL MEDICINE

## 2025-09-02 PROCEDURE — 93017 CV STRESS TEST TRACING ONLY: CPT

## 2025-09-02 RX ORDER — CAFFEINE CITRATE 20 MG/ML
60 SOLUTION INTRAVENOUS
Status: ACTIVE | OUTPATIENT
Start: 2025-09-02 | End: 2025-09-02

## 2025-09-02 RX ORDER — REGADENOSON 0.08 MG/ML
0.4 INJECTION, SOLUTION INTRAVENOUS ONCE
Status: DISCONTINUED | OUTPATIENT
Start: 2025-09-02 | End: 2025-09-03 | Stop reason: HOSPADM

## 2025-09-02 RX ORDER — CAFFEINE 200 MG
200 TABLET ORAL
Status: ACTIVE | OUTPATIENT
Start: 2025-09-02 | End: 2025-09-02

## 2025-09-02 RX ORDER — AMINOPHYLLINE 25 MG/ML
50-100 INJECTION, SOLUTION INTRAVENOUS
Status: DISCONTINUED | OUTPATIENT
Start: 2025-09-02 | End: 2025-09-03 | Stop reason: HOSPADM

## 2025-09-02 RX ORDER — ALBUTEROL SULFATE 90 UG/1
2 INHALANT RESPIRATORY (INHALATION) EVERY 5 MIN PRN
Status: DISCONTINUED | OUTPATIENT
Start: 2025-09-02 | End: 2025-09-03 | Stop reason: HOSPADM

## 2025-09-02 RX ORDER — LIDOCAINE 40 MG/G
CREAM TOPICAL
Status: DISCONTINUED | OUTPATIENT
Start: 2025-09-02 | End: 2025-09-03 | Stop reason: HOSPADM

## 2025-09-02 RX ORDER — DIAZEPAM 5 MG/1
5 TABLET ORAL EVERY 30 MIN PRN
Status: DISCONTINUED | OUTPATIENT
Start: 2025-09-02 | End: 2025-09-03 | Stop reason: HOSPADM

## 2025-09-02 RX ORDER — SODIUM CHLORIDE 9 MG/ML
INJECTION, SOLUTION INTRAVENOUS CONTINUOUS PRN
Status: ACTIVE | OUTPATIENT
Start: 2025-09-02 | End: 2025-09-02

## 2025-09-02 RX ORDER — GADOBUTROL 604.72 MG/ML
26 INJECTION INTRAVENOUS ONCE
Status: COMPLETED | OUTPATIENT
Start: 2025-09-02 | End: 2025-09-02

## 2025-09-02 RX ORDER — NITROGLYCERIN 0.4 MG/1
0.4 TABLET SUBLINGUAL EVERY 5 MIN PRN
Status: ACTIVE | OUTPATIENT
Start: 2025-09-02 | End: 2025-09-02

## 2025-09-02 RX ORDER — LORAZEPAM 0.5 MG/1
0.5 TABLET ORAL EVERY 30 MIN PRN
Status: DISCONTINUED | OUTPATIENT
Start: 2025-09-02 | End: 2025-09-03 | Stop reason: HOSPADM

## 2025-09-02 RX ADMIN — REGADENOSON 0.4 MG: 0.08 INJECTION, SOLUTION INTRAVENOUS at 12:05

## 2025-09-02 RX ADMIN — GADOBUTROL 26 ML: 604.72 INJECTION INTRAVENOUS at 12:33

## 2025-09-03 VITALS — DIASTOLIC BLOOD PRESSURE: 64 MMHG | HEART RATE: 94 BPM | SYSTOLIC BLOOD PRESSURE: 117 MMHG

## (undated) DEVICE — SYR 10ML LL W/O NDL 302995

## (undated) DEVICE — SU WND CLOSURE VLOC 180 ABS 0 9" GS-21 VLOCL0346

## (undated) DEVICE — SU MONOCRYL 0 CT-2 27" Y334H

## (undated) DEVICE — KIT ENDO TURNOVER/PROCEDURE W/CLEAN A SCOPE LINERS 103888

## (undated) DEVICE — SUCTION CURETTE 3MM ENDOMETRIAL MX140

## (undated) DEVICE — DEVICE TISSUE REMOVAL HYSTEROSCOPIC MYOSURE LITE 30-401LITE

## (undated) DEVICE — ESU CORD BIPOLAR GREEN 10-4000

## (undated) DEVICE — PAD CHUX UNDERPAD 30X36" P3036C

## (undated) DEVICE — GLOVE PROTEXIS POWDER FREE SMT 6.0  2D72PT60X

## (undated) DEVICE — RETR ELEV / UTERINE MANIPULATOR V-CARE LG CUP 60-6085-202A

## (undated) DEVICE — SYR 30ML LL W/O NDL 302832

## (undated) DEVICE — NDL INSUFFLATION 13GA 150MM C2202

## (undated) DEVICE — TUBING CONMED AIRSEAL SMOKE EVAC INSUFFLATION ASM-EVAC

## (undated) DEVICE — BASIN SET MINOR DISP

## (undated) DEVICE — PAD CHUX UNDERPAD 23X24" 7136

## (undated) DEVICE — GLOVE PROTEXIS BLUE W/NEU-THERA 6.5  2D73EB65

## (undated) DEVICE — LINEN FULL SHEET 5511

## (undated) DEVICE — DEVICE SUTURE GRASPER TROCAR CLOSURE 14GA PMITCSG

## (undated) DEVICE — SOL WATER IRRIG 1000ML BOTTLE 2F7114

## (undated) DEVICE — DAVINCI OBTURATOR 8MM BLADELESS 420023

## (undated) DEVICE — DAVINCI HOT SHEARS TIP COVER  400180

## (undated) DEVICE — LINEN TOWEL PACK X10 5473

## (undated) DEVICE — ENDO TROCAR CONMED AIRSEAL BLADELESS 05X120MM IAS5-120LP

## (undated) DEVICE — GLOVE PROTEXIS BLUE W/NEU-THERA 6.0  2D73EB60

## (undated) DEVICE — DAVINCI SI DRAPE ACCESSORY KIT 3-ARM 420290

## (undated) DEVICE — SOL NACL 0.9% IRRIG 1000ML BOTTLE 2F7124

## (undated) DEVICE — CATH TRAY FOLEY SURESTEP 16FR DRAIN BAG STATOCK A899916

## (undated) DEVICE — SUCTION IRR STRYKERFLOW II W/TIP 250-070-520

## (undated) DEVICE — SU MONOCRYL 4-0 PS-2 27" UND Y426H

## (undated) DEVICE — LINEN HALF SHEET 5512

## (undated) DEVICE — PACK DAVINCI GYN SMA15GDFS1

## (undated) DEVICE — SPECIMEN BAG BEMIS HI FLOW SUCTION WHITE SOCK 533810

## (undated) DEVICE — PACK MINOR LITHOTOMY RIDGES

## (undated) DEVICE — ADH SKIN CLOSURE PREMIERPRO EXOFIN MICOR HV 0.5ML 3471

## (undated) DEVICE — GLOVE PROTEXIS POWDER FREE SMT 6.5  2D72PT65X

## (undated) DEVICE — PACK CYSTO CUSTOM RIDGES

## (undated) DEVICE — ESU GROUND PAD ADULT W/CORD E7507

## (undated) DEVICE — DAVINCI S CANNULA SEAL 8.5-13MM 420206

## (undated) DEVICE — PROTECTOR ARM ONE-STEP TRENDELENBURG 40418

## (undated) DEVICE — SUCTION MANIFOLD NEPTUNE 2 SYS 4 PORT 0702-020-000

## (undated) DEVICE — TUBING SYS AQUILEX BLUE INFLOW AQL-110 YLW OUTFLOW AQL-111

## (undated) DEVICE — SYR 03ML LL W/O NDL 309657

## (undated) DEVICE — BAG CLEAR TRASH 1.3M 39X33" P4040C

## (undated) DEVICE — TUBING IRRIG CYSTO/BLADDER SET 81" LF 2C4040

## (undated) DEVICE — SOL WATER IRRIG 3000ML BAG 2B7117

## (undated) DEVICE — KIT PATIENT POSITIONING PIGAZZI LATEX FREE 40580

## (undated) DEVICE — SU MONOCRYL 0 CT-1 36" UND Y946H

## (undated) DEVICE — ENDO TROCAR FIRST ENTRY KII FIOS Z-THRD 12X150MM CTF71

## (undated) DEVICE — LUBRICANT INST ELECTROLUBE EL101

## (undated) DEVICE — LINEN DRAPE 54X72" 5467

## (undated) DEVICE — BARRIER SEPRAFILM 5X6" SINGLE SHEET 4301-02

## (undated) DEVICE — SOL NACL 0.9% IRRIG 3000ML BAG 2B7477

## (undated) DEVICE — ESU CORD MONOPOLAR 10'  E0510

## (undated) DEVICE — LINEN ORTHO ACL PACK 5447

## (undated) DEVICE — SUCTION CANISTER BEMIS HI FLOW 006772-901

## (undated) DEVICE — SOL NACL 0.9% INJ 1000ML BAG 2B1324X

## (undated) DEVICE — GLOVE PROTEXIS W/NEU-THERA 6.5  2D73TE65

## (undated) DEVICE — SEAL SET MYOSURE ROD LENS SCOPE SINGLE USE 40-902

## (undated) DEVICE — DILATOR PROBE UTERINE OS CANAL FINDER 260-610

## (undated) RX ORDER — FENTANYL CITRATE 50 UG/ML
INJECTION, SOLUTION INTRAMUSCULAR; INTRAVENOUS
Status: DISPENSED
Start: 2018-07-09

## (undated) RX ORDER — NEOSTIGMINE METHYLSULFATE 1 MG/ML
VIAL (ML) INJECTION
Status: DISPENSED
Start: 2019-06-05

## (undated) RX ORDER — CEFAZOLIN SODIUM 2 G/100ML
INJECTION, SOLUTION INTRAVENOUS
Status: DISPENSED
Start: 2019-06-05

## (undated) RX ORDER — FENTANYL CITRATE 0.05 MG/ML
INJECTION, SOLUTION INTRAMUSCULAR; INTRAVENOUS
Status: DISPENSED
Start: 2022-06-14

## (undated) RX ORDER — HYDROMORPHONE HYDROCHLORIDE 1 MG/ML
INJECTION, SOLUTION INTRAMUSCULAR; INTRAVENOUS; SUBCUTANEOUS
Status: DISPENSED
Start: 2019-06-05

## (undated) RX ORDER — CLINDAMYCIN PHOSPHATE 900 MG/50ML
INJECTION, SOLUTION INTRAVENOUS
Status: DISPENSED
Start: 2019-06-05

## (undated) RX ORDER — ACETAMINOPHEN 325 MG/1
TABLET ORAL
Status: DISPENSED
Start: 2018-07-09

## (undated) RX ORDER — LIDOCAINE HYDROCHLORIDE 10 MG/ML
INJECTION, SOLUTION EPIDURAL; INFILTRATION; INTRACAUDAL; PERINEURAL
Status: DISPENSED
Start: 2019-06-05

## (undated) RX ORDER — KETOROLAC TROMETHAMINE 30 MG/ML
INJECTION, SOLUTION INTRAMUSCULAR; INTRAVENOUS
Status: DISPENSED
Start: 2018-07-09

## (undated) RX ORDER — HYDROCODONE BITARTRATE AND ACETAMINOPHEN 5; 325 MG/1; MG/1
TABLET ORAL
Status: DISPENSED
Start: 2018-07-09

## (undated) RX ORDER — ONDANSETRON 2 MG/ML
INJECTION INTRAMUSCULAR; INTRAVENOUS
Status: DISPENSED
Start: 2018-07-09

## (undated) RX ORDER — PROPOFOL 10 MG/ML
INJECTION, EMULSION INTRAVENOUS
Status: DISPENSED
Start: 2018-07-09

## (undated) RX ORDER — GLYCOPYRROLATE 0.2 MG/ML
INJECTION INTRAMUSCULAR; INTRAVENOUS
Status: DISPENSED
Start: 2019-06-05

## (undated) RX ORDER — FENTANYL CITRATE 50 UG/ML
INJECTION, SOLUTION INTRAMUSCULAR; INTRAVENOUS
Status: DISPENSED
Start: 2019-06-05

## (undated) RX ORDER — ONDANSETRON 2 MG/ML
INJECTION INTRAMUSCULAR; INTRAVENOUS
Status: DISPENSED
Start: 2019-06-05

## (undated) RX ORDER — KETOROLAC TROMETHAMINE 30 MG/ML
INJECTION, SOLUTION INTRAMUSCULAR; INTRAVENOUS
Status: DISPENSED
Start: 2019-06-05

## (undated) RX ORDER — GLYCOPYRROLATE 0.2 MG/ML
INJECTION INTRAMUSCULAR; INTRAVENOUS
Status: DISPENSED
Start: 2018-07-09

## (undated) RX ORDER — LIDOCAINE HYDROCHLORIDE 10 MG/ML
INJECTION, SOLUTION EPIDURAL; INFILTRATION; INTRACAUDAL; PERINEURAL
Status: DISPENSED
Start: 2018-07-09

## (undated) RX ORDER — ACETAMINOPHEN 325 MG/1
TABLET ORAL
Status: DISPENSED
Start: 2019-06-05

## (undated) RX ORDER — PROPOFOL 10 MG/ML
INJECTION, EMULSION INTRAVENOUS
Status: DISPENSED
Start: 2019-06-05

## (undated) RX ORDER — REGADENOSON 0.08 MG/ML
INJECTION, SOLUTION INTRAVENOUS
Status: DISPENSED
Start: 2025-09-02

## (undated) RX ORDER — DEXAMETHASONE SODIUM PHOSPHATE 4 MG/ML
INJECTION, SOLUTION INTRA-ARTICULAR; INTRALESIONAL; INTRAMUSCULAR; INTRAVENOUS; SOFT TISSUE
Status: DISPENSED
Start: 2018-07-09

## (undated) RX ORDER — CEFAZOLIN SODIUM 2 G/100ML
INJECTION, SOLUTION INTRAVENOUS
Status: DISPENSED
Start: 2018-07-09

## (undated) RX ORDER — PHENAZOPYRIDINE HYDROCHLORIDE 200 MG/1
TABLET, FILM COATED ORAL
Status: DISPENSED
Start: 2019-06-05

## (undated) RX ORDER — BUPIVACAINE HYDROCHLORIDE AND EPINEPHRINE 5; 5 MG/ML; UG/ML
INJECTION, SOLUTION EPIDURAL; INTRACAUDAL; PERINEURAL
Status: DISPENSED
Start: 2019-06-05

## (undated) RX ORDER — BUPIVACAINE HYDROCHLORIDE 2.5 MG/ML
INJECTION, SOLUTION EPIDURAL; INFILTRATION; INTRACAUDAL
Status: DISPENSED
Start: 2019-06-05

## (undated) RX ORDER — OXYCODONE HYDROCHLORIDE 5 MG/1
TABLET ORAL
Status: DISPENSED
Start: 2019-06-05

## (undated) RX ORDER — DEXAMETHASONE SODIUM PHOSPHATE 4 MG/ML
INJECTION, SOLUTION INTRA-ARTICULAR; INTRALESIONAL; INTRAMUSCULAR; INTRAVENOUS; SOFT TISSUE
Status: DISPENSED
Start: 2019-06-05